# Patient Record
Sex: FEMALE | Race: WHITE | NOT HISPANIC OR LATINO | Employment: OTHER | URBAN - METROPOLITAN AREA
[De-identification: names, ages, dates, MRNs, and addresses within clinical notes are randomized per-mention and may not be internally consistent; named-entity substitution may affect disease eponyms.]

---

## 2019-06-28 ENCOUNTER — TRANSFERRED RECORDS (OUTPATIENT)
Dept: HEALTH INFORMATION MANAGEMENT | Facility: CLINIC | Age: 59
End: 2019-06-28

## 2021-05-24 ENCOUNTER — RECORDS - HEALTHEAST (OUTPATIENT)
Dept: ADMINISTRATIVE | Facility: CLINIC | Age: 61
End: 2021-05-24

## 2023-01-19 ENCOUNTER — MEDICAL CORRESPONDENCE (OUTPATIENT)
Dept: HEALTH INFORMATION MANAGEMENT | Facility: CLINIC | Age: 63
End: 2023-01-19
Payer: COMMERCIAL

## 2023-01-20 ENCOUNTER — TRANSCRIBE ORDERS (OUTPATIENT)
Dept: OTHER | Age: 63
End: 2023-01-20

## 2023-01-20 ENCOUNTER — TELEPHONE (OUTPATIENT)
Dept: DERMATOLOGY | Facility: CLINIC | Age: 63
End: 2023-01-20

## 2023-01-20 DIAGNOSIS — K50.119 CROHN'S DISEASE OF COLON WITH COMPLICATION (H): Primary | ICD-10-CM

## 2023-01-20 NOTE — TELEPHONE ENCOUNTER
This encounter is being sent to inform the clinic that this patient has a referral from Dr Aftab Wall  [facility]  for the diagnoses of Pyoderma Gangrenosum and has requested that this patient be seen within 3-5 days.  Based on the availability of our provider(s), we are unable to accommodate this request.      Were all sites offered this patient?  Yes      Does scheduling algorithm request to schedule next available?  Patient has been scheduled for the first available opening with Dr Frandy Crocker on 6/26.  We have informed the patient that the clinic will review their referral and reach out if a sooner appointment is medically necessary.

## 2023-01-21 NOTE — TELEPHONE ENCOUNTER
FUTURE VISIT INFORMATION      FUTURE VISIT INFORMATION:    Date: 1.31.23    Time: 10:00    Location: [facility]  REFERRAL INFORMATION:    Referring provider:  Mae    Referring providers clinic:  [facility]    Reason for visit/diagnosis  Pyoderma Gangrenosum/ referrd by Dr Aftab Wall/ recs in Epic/ appt sched per pt    RECORDS REQUESTED FROM:       Clinic name Comments Records Status   [facility] 1.13.23  Mae TRAN                                   [FreeTextEntry1] : US and records reviewed

## 2023-01-31 ENCOUNTER — LAB (OUTPATIENT)
Dept: LAB | Facility: CLINIC | Age: 63
End: 2023-01-31
Payer: COMMERCIAL

## 2023-01-31 ENCOUNTER — OFFICE VISIT (OUTPATIENT)
Dept: DERMATOLOGY | Facility: CLINIC | Age: 63
End: 2023-01-31
Payer: COMMERCIAL

## 2023-01-31 ENCOUNTER — PRE VISIT (OUTPATIENT)
Dept: DERMATOLOGY | Facility: CLINIC | Age: 63
End: 2023-01-31

## 2023-01-31 DIAGNOSIS — Z79.899 ENCOUNTER FOR LONG-TERM (CURRENT) USE OF HIGH-RISK MEDICATION: ICD-10-CM

## 2023-01-31 DIAGNOSIS — L88 PARASTOMAL PYODERMA GANGRENOSUM (H): Primary | ICD-10-CM

## 2023-01-31 DIAGNOSIS — K50.119 CROHN'S DISEASE OF COLON WITH COMPLICATION (H): ICD-10-CM

## 2023-01-31 LAB
BASOPHILS # BLD AUTO: 0.1 10E3/UL (ref 0–0.2)
BASOPHILS NFR BLD AUTO: 1 %
EOSINOPHIL # BLD AUTO: 0.6 10E3/UL (ref 0–0.7)
EOSINOPHIL NFR BLD AUTO: 6 %
ERYTHROCYTE [DISTWIDTH] IN BLOOD BY AUTOMATED COUNT: 14.9 % (ref 10–15)
FERRITIN SERPL-MCNC: 363 NG/ML (ref 11–328)
HCT VFR BLD AUTO: 34.9 % (ref 35–47)
HGB BLD-MCNC: 11 G/DL (ref 11.7–15.7)
IMM GRANULOCYTES # BLD: 0.1 10E3/UL
IMM GRANULOCYTES NFR BLD: 1 %
IRON BINDING CAPACITY (ROCHE): 226 UG/DL (ref 240–430)
IRON SATN MFR SERPL: 15 % (ref 15–46)
IRON SERPL-MCNC: 35 UG/DL (ref 37–145)
LYMPHOCYTES # BLD AUTO: 1.2 10E3/UL (ref 0.8–5.3)
LYMPHOCYTES NFR BLD AUTO: 11 %
MCH RBC QN AUTO: 28.6 PG (ref 26.5–33)
MCHC RBC AUTO-ENTMCNC: 31.5 G/DL (ref 31.5–36.5)
MCV RBC AUTO: 91 FL (ref 78–100)
MONOCYTES # BLD AUTO: 0.5 10E3/UL (ref 0–1.3)
MONOCYTES NFR BLD AUTO: 5 %
NEUTROPHILS # BLD AUTO: 7.9 10E3/UL (ref 1.6–8.3)
NEUTROPHILS NFR BLD AUTO: 76 %
NRBC # BLD AUTO: 0 10E3/UL
NRBC BLD AUTO-RTO: 0 /100
PLATELET # BLD AUTO: 404 10E3/UL (ref 150–450)
RBC # BLD AUTO: 3.85 10E6/UL (ref 3.8–5.2)
RETICS # AUTO: 0.05 10E6/UL (ref 0.03–0.1)
RETICS/RBC NFR AUTO: 1.3 % (ref 0.5–2)
WBC # BLD AUTO: 10.3 10E3/UL (ref 4–11)

## 2023-01-31 PROCEDURE — 99000 SPECIMEN HANDLING OFFICE-LAB: CPT | Performed by: PATHOLOGY

## 2023-01-31 PROCEDURE — 82728 ASSAY OF FERRITIN: CPT | Performed by: PATHOLOGY

## 2023-01-31 PROCEDURE — 85045 AUTOMATED RETICULOCYTE COUNT: CPT | Performed by: PATHOLOGY

## 2023-01-31 PROCEDURE — 99205 OFFICE O/P NEW HI 60 MIN: CPT | Mod: GC | Performed by: STUDENT IN AN ORGANIZED HEALTH CARE EDUCATION/TRAINING PROGRAM

## 2023-01-31 PROCEDURE — 85025 COMPLETE CBC W/AUTO DIFF WBC: CPT | Performed by: PATHOLOGY

## 2023-01-31 PROCEDURE — 83550 IRON BINDING TEST: CPT | Performed by: PATHOLOGY

## 2023-01-31 PROCEDURE — 83540 ASSAY OF IRON: CPT | Performed by: PATHOLOGY

## 2023-01-31 PROCEDURE — 36415 COLL VENOUS BLD VENIPUNCTURE: CPT | Performed by: PATHOLOGY

## 2023-01-31 RX ORDER — CLOBETASOL PROPIONATE 0.5 MG/G
OINTMENT TOPICAL
COMMUNITY
Start: 2022-12-27 | End: 2023-09-19

## 2023-01-31 RX ORDER — DAPSONE 100 MG/1
100 TABLET ORAL DAILY
Qty: 30 TABLET | Refills: 2 | Status: SHIPPED | OUTPATIENT
Start: 2023-01-31 | End: 2023-06-20

## 2023-01-31 RX ORDER — OXYCODONE HYDROCHLORIDE 5 MG/1
TABLET ORAL
COMMUNITY
Start: 2022-11-29 | End: 2023-06-20

## 2023-01-31 RX ORDER — OSTOMY SUPPLY 1"
EACH MISCELLANEOUS
Qty: 30 EACH | Refills: 2 | Status: SHIPPED | OUTPATIENT
Start: 2023-01-31 | End: 2023-04-20

## 2023-01-31 RX ORDER — CLOBETASOL PROPIONATE 0.5 MG/G
CREAM TOPICAL
Qty: 60 G | Refills: 2 | Status: SHIPPED | OUTPATIENT
Start: 2023-01-31 | End: 2023-09-19

## 2023-01-31 RX ORDER — ONDANSETRON 4 MG/1
4 TABLET, ORALLY DISINTEGRATING ORAL
COMMUNITY
Start: 2022-12-12 | End: 2023-03-07

## 2023-01-31 NOTE — PROGRESS NOTES
[facility] Dermatology Note  Encounter Date: Jan 31, 2023  Office Visit     Dermatology Problem List:  1.  Peristomal pyoderma gangrenosum  -Dapsone 100 mg daily, clobetasol cream  *History of Crohn's disease and anal cancer status post colostomy, on vedolizumab    ____________________________________________    Assessment & Plan:     # Peristomal pyoderma gangrenosum  Currently very mild activity, however given her prior response to dapsone we have opted to restart her on this treatment and are confident that it will continue to help her PG improve.  Counseled on adverse effects to be aware of including hemolytic anemia, methemoglobinemia and long-term use with peripheral sensory and motor neuropathy, however we suspect that she will only be on this for short period of time. We will also change her clobetasol ointment to a cream formulation, suggested by her stoma nurse, this may help her colostomy bag adhere bit better to the skin.  We will also have her continue the DuoDERM dressing mostly as a barrier to prevent stool from irritating the surrounding skin and eroded areas.  -Restart dapsone 100 mg daily  -Change clobetasol ointment to cream  -Continue DuoDERM dressing with bag changes  -Labs today: CBC, reticulocyte, ferritin, iron, TIBC  -Repeat CBC and reticulocyte labs [id number]    Procedures Performed:   None    Follow-up: 1 month(s) in-person, or earlier for new or changing lesions    Staff and Resident:     Kesha Goel DO (PGY-4)  Dermatology Resident  [facility]    Staff: Dr. CESAR Guo  ____________________________________________    CC: Autoimmune Disease (Pyoderma on stomach near stoma. Patient noticed it in September; and states they were treating it at [place] with dapsone that had success in the past. Patient also states that they have c-diff and chron's diease. )    HPI:  Ms. Nicol Aldrich is a(n) 62 year old female who presents today as a new patient for evaluation  of peristomal pyoderma gangrenosum.  She has a history of Crohn's disease as well as anal cancer (SCC) and is status post diverting colostomy in 2021 with stoma currently in place on the left lower abdomen. She is currently on vedolizumab to treat her Crohns and was previously on prednisone as well, which was stopped during recent hospitalization.  She reports recent admission to [facility] where she was diagnosed with pyoderma gangrenosum as well as abdominal wall abscess near her stoma. She initially noticed a small ulcer near her stoma in September and was evaluated by dermatology during her stay at [place]. She was also diagnosed with C. difficile during that admission and was treated with fidaxomicin at that time as well as dapsone for her pyoderma.  After her discharge she was advised to discontinue all antibiotics, therefore she has also since stopped the dapsone, but states that it did help her pyoderma gangrenosum improve while she was on it.  Her regimen more recently has consisted of daily colostomy bag changes with application of clobetasol ointment to the affected skin around her stoma site followed by DuoDERM application and then her colostomy bag over that.  She denies any severe pain but states that the skin frequently gets irritated from the stool that leaks onto the open areas.    Patient is otherwise feeling well, without additional skin concerns.    Labs Reviewed:  CBC, G6PD    Physical Exam:  Vitals: There were no vitals taken for this visit.  SKIN: Focused examination of abdomen was performed.  - Very small eroded area just lateral to the stoma site  - Violaceous to pink patch of skin with overlying shallow depressions that have fully re-epithelialized   - No other lesions of concern on areas examined.           Medications:  Current Outpatient Medications   Medication     ondansetron (ZOFRAN ODT) 4 MG ODT tab     clobetasol (TEMOVATE) 0.05 % external ointment     oxyCODONE (ROXICODONE) 5 MG  tablet     vedolizumab (ENTYVIO) 60 MG/ML injection     No current facility-administered medications for this visit.      Past Medical History:   There is no problem list on file for this patient.    History reviewed. No pertinent past medical history.    CC Aftab Wall MD  [facility]  [address] [place] [address] on close of this encounter.

## 2023-01-31 NOTE — PATIENT INSTRUCTIONS
- Restart dapsone at 100 mg per day   - Please check your labs today: CBC, Reticulocyte, ferritin and iron panel  - Please have your labs drawn every 2 weeks for the next 2 months at any [place] location

## 2023-01-31 NOTE — NURSING NOTE
Dermatology Rooming Note    Nicol Aldrich's goals for this visit include:   Chief Complaint   Patient presents with     Autoimmune Disease     Pyoderma on stomach near stoma. Patient noticed it in September; and states they were treating it at [place] with dapsone that had success in the past. Patient also states that they have c-diff and chron's diease.      Chiki Lopez, EMT-B

## 2023-01-31 NOTE — LETTER
1/31/2023       RE: Nicol Aldrich  [address]  [place] [facility] [address]     Dear Colleague,    Thank you for referring your patient, Nicol Aldrich, to the [facility] [place] at [facility]. Please see a copy of my visit note below.    [facility] Dermatology Note  Encounter Date: Jan 31, 2023  Office Visit     Dermatology Problem List:  1.  Peristomal pyoderma gangrenosum  -Dapsone 100 mg daily, clobetasol cream  *History of Crohn's disease and anal cancer status post colostomy, on vedolizumab    ____________________________________________    Assessment & Plan:     # Peristomal pyoderma gangrenosum  Currently very mild activity, however given her prior response to dapsone we have opted to restart her on this treatment and are confident that it will continue to help her PG improve.  Counseled on adverse effects to be aware of including hemolytic anemia, methemoglobinemia and long-term use with peripheral sensory and motor neuropathy, however we suspect that she will only be on this for short period of time. We will also change her clobetasol ointment to a cream formulation, suggested by her stoma nurse, this may help her colostomy bag adhere bit better to the skin.  We will also have her continue the DuoDERM dressing mostly as a barrier to prevent stool from irritating the surrounding skin and eroded areas.  -Restart dapsone 100 mg daily  -Change clobetasol ointment to cream  -Continue DuoDERM dressing with bag changes  -Labs today: CBC, reticulocyte, ferritin, iron, TIBC  -Repeat CBC and reticulocyte labs [id number]    Procedures Performed:   None    Follow-up: 1 month(s) in-person, or earlier for new or changing lesions    Staff and Resident:     Kesha Goel DO (PGY-4)  Dermatology Resident  [facility]    Staff: Dr. CESAR Guo  ____________________________________________    CC: Autoimmune Disease (Pyoderma  on stomach near stoma. Patient noticed it in September; and states they were treating it at [place] with dapsone that had success in the past. Patient also states that they have c-diff and chron's diease. )    HPI:  Ms. Nicol Aldrich is a(n) 62 year old female who presents today as a new patient for evaluation of peristomal pyoderma gangrenosum.  She has a history of Crohn's disease as well as anal cancer (SCC) and is status post diverting colostomy in 2021 with stoma currently in place on the left lower abdomen. She is currently on vedolizumab to treat her Crohns and was previously on prednisone as well, which was stopped during recent hospitalization.  She reports recent admission to [facility] where she was diagnosed with pyoderma gangrenosum as well as abdominal wall abscess near her stoma. She initially noticed a small ulcer near her stoma in September and was evaluated by dermatology during her stay at [place]. She was also diagnosed with C. difficile during that admission and was treated with fidaxomicin at that time as well as dapsone for her pyoderma.  After her discharge she was advised to discontinue all antibiotics, therefore she has also since stopped the dapsone, but states that it did help her pyoderma gangrenosum improve while she was on it.  Her regimen more recently has consisted of daily colostomy bag changes with application of clobetasol ointment to the affected skin around her stoma site followed by DuoDERM application and then her colostomy bag over that.  She denies any severe pain but states that the skin frequently gets irritated from the stool that leaks onto the open areas.    Patient is otherwise feeling well, without additional skin concerns.    Labs Reviewed:  CBC, G6PD    Physical Exam:  Vitals: There were no vitals taken for this visit.  SKIN: Focused examination of abdomen was performed.  - Very small eroded area just lateral to the stoma site  - Violaceous to pink patch of skin with  overlying shallow depressions that have fully re-epithelialized   - No other lesions of concern on areas examined.           Medications:  Current Outpatient Medications   Medication     ondansetron (ZOFRAN ODT) 4 MG ODT tab     clobetasol (TEMOVATE) 0.05 % external ointment     oxyCODONE (ROXICODONE) 5 MG tablet     vedolizumab (ENTYVIO) 60 MG/ML injection     No current facility-administered medications for this visit.      Past Medical History:   There is no problem list on file for this patient.    History reviewed. No pertinent past medical history.    CC Aftab Wall MD  [facility]  [address] [place] [address] on close of this encounter.    Attestation signed by Kelechi Guo MD at 1/31/2023 11:43 AM:  I have personally examined this patient and agree with the resident doctor's documentation and plan of care. I have reviewed and amended the resident's note. The documentation accurately reflects my clinical observations, diagnoses, treatment and follow-up plans.     Provider Time: New 61 minutes spent on the date of the encounter doing chart review, patient visit (including history, exam, and counseling), and documentation, excluding any procedures performed.       Kelechi Guo MD  Dermatology Staff

## 2023-02-14 ENCOUNTER — TELEPHONE (OUTPATIENT)
Dept: WOUND CARE | Facility: CLINIC | Age: 63
End: 2023-02-14
Payer: COMMERCIAL

## 2023-02-14 NOTE — TELEPHONE ENCOUNTER
"[facility]    Received call from patient with voicemail 2/14 13:19p explaining she has a colostomy and is from the [facility].     Call returned, left voicemail asking patient to call back and provided dept fax and shared with patient she may contact PCP for referral to ostomy clinic.    Karlene PATEL   1st: Voc[employer] Connect, call \"Karlene Cutler\" or call Group \"[facility] Nurse\"   (2nd option: leave a voicemail at Dept. Office Number: [phone]. Messages checked occasionally M-F)    "

## 2023-02-15 ENCOUNTER — LAB (OUTPATIENT)
Dept: LAB | Facility: CLINIC | Age: 63
End: 2023-02-15
Payer: COMMERCIAL

## 2023-02-15 DIAGNOSIS — Z79.899 ENCOUNTER FOR LONG-TERM (CURRENT) USE OF HIGH-RISK MEDICATION: ICD-10-CM

## 2023-02-15 LAB
BASOPHILS # BLD AUTO: 0.1 10E3/UL (ref 0–0.2)
BASOPHILS NFR BLD AUTO: 1 %
EOSINOPHIL # BLD AUTO: 0.8 10E3/UL (ref 0–0.7)
EOSINOPHIL NFR BLD AUTO: 10 %
ERYTHROCYTE [DISTWIDTH] IN BLOOD BY AUTOMATED COUNT: 14.1 % (ref 10–15)
HCT VFR BLD AUTO: 34.4 % (ref 35–47)
HGB BLD-MCNC: 10.5 G/DL (ref 11.7–15.7)
IMM GRANULOCYTES # BLD: 0 10E3/UL
IMM GRANULOCYTES NFR BLD: 1 %
LYMPHOCYTES # BLD AUTO: 1.4 10E3/UL (ref 0.8–5.3)
LYMPHOCYTES NFR BLD AUTO: 17 %
MCH RBC QN AUTO: 29.1 PG (ref 26.5–33)
MCHC RBC AUTO-ENTMCNC: 30.5 G/DL (ref 31.5–36.5)
MCV RBC AUTO: 95 FL (ref 78–100)
MONOCYTES # BLD AUTO: 0.7 10E3/UL (ref 0–1.3)
MONOCYTES NFR BLD AUTO: 8 %
NEUTROPHILS # BLD AUTO: 5.2 10E3/UL (ref 1.6–8.3)
NEUTROPHILS NFR BLD AUTO: 63 %
NRBC # BLD AUTO: 0 10E3/UL
NRBC BLD AUTO-RTO: 0 /100
PLATELET # BLD AUTO: 427 10E3/UL (ref 150–450)
RBC # BLD AUTO: 3.61 10E6/UL (ref 3.8–5.2)
RETICS # AUTO: 0.11 10E6/UL (ref 0.03–0.1)
RETICS/RBC NFR AUTO: 3 % (ref 0.5–2)
WBC # BLD AUTO: 8.1 10E3/UL (ref 4–11)

## 2023-02-15 PROCEDURE — 36415 COLL VENOUS BLD VENIPUNCTURE: CPT

## 2023-02-15 PROCEDURE — 85045 AUTOMATED RETICULOCYTE COUNT: CPT

## 2023-02-15 PROCEDURE — 85025 COMPLETE CBC W/AUTO DIFF WBC: CPT

## 2023-02-16 ENCOUNTER — TELEPHONE (OUTPATIENT)
Dept: WOUND CARE | Facility: CLINIC | Age: 63
End: 2023-02-16
Payer: COMMERCIAL

## 2023-02-16 NOTE — TELEPHONE ENCOUNTER
Pt calling to schedule appt with Ostomy nurse, having had a referral placed in [facility].  She has new insurance and will no longer be following up at [place]. Pt has had a colostomy since May 2021 related to Crohn's.  Over past few months she has had bouts of colitis and the development of pyoderma wounds to peristomal area.  She was last seen by Derm 1/31/23.  She continues to have issues with the bag leaking, she thinks because of the wounds undermining the seal.  Currently reports needing to change the bag at least once a day and is running low on supplies.  She is using a hydrocolloid over the wounds, then applying her 2pc Coloplast Click convex cut-to-fit appliance with a barrier ring and a little paste.  She states she does have stoma powder and a little PolyMem, both of which she has used before but did not think they were of much help.  She also has a spray barrier film.  Encouraged pt to trial using the powder, then the film, then PolyMem under the hydrocolloid to assist with managing the drainage, until she can come into clinic.  Appt scheduled for next Tues 2/12 @ 1p at [facility].  Pt will call with any questions or concerns in the meantime as needed.

## 2023-02-21 ENCOUNTER — TELEPHONE (OUTPATIENT)
Dept: WOUND CARE | Facility: CLINIC | Age: 63
End: 2023-02-21
Payer: COMMERCIAL

## 2023-02-21 NOTE — TELEPHONE ENCOUNTER
"[facility]    2/20 15:41p received call from patient with voicemail stating wants to change appt time on Tues 2/21.     2/20 per [facility] team member handwriting, a voicemail for patient was left on her machine offering 10am 2/21 for appointment or another day.     2/21 ~10am patient called to reschedule apt and left voicemail.     2/21 call returned 16:15pm, spoke to patient via phone, discussed rescheduling apt, discussed rescheduling for feb 28th, advised patient of the need to bring a current set of pouch supplies to be applied after the peristomal skin assessment, patient verbalized feeling uncomfortable with the application of the products which she brings from home, verbalized desiring to have different pouching products applied at the appointment. Clarified for patient that recommendations are made at [facility] and product samples can be obtained from product manufactures. Patient verbalized feeling unsure about making an appointment due to the fact her same pouching products wound be applied and not the newly recommended products, if different recommendations were made. Encouraged patient think about it and call back when ready to make appointment. Patient stated she may make appointment with a different clinic instead.     Karlene PATEL   1st: [employer] Connect, call \"Karlene Cutler\" or call Group \"[facility] Nurse\"   (2nd option: leave a voicemail at Dept. Office Number: [phone]. Messages checked occasionally M-F)    "

## 2023-03-03 ENCOUNTER — LAB (OUTPATIENT)
Dept: LAB | Facility: CLINIC | Age: 63
End: 2023-03-03
Payer: COMMERCIAL

## 2023-03-03 DIAGNOSIS — Z79.899 ENCOUNTER FOR LONG-TERM (CURRENT) USE OF HIGH-RISK MEDICATION: ICD-10-CM

## 2023-03-03 LAB
BASOPHILS # BLD AUTO: 0.1 10E3/UL (ref 0–0.2)
BASOPHILS NFR BLD AUTO: 1 %
EOSINOPHIL # BLD AUTO: 0.7 10E3/UL (ref 0–0.7)
EOSINOPHIL NFR BLD AUTO: 7 %
ERYTHROCYTE [DISTWIDTH] IN BLOOD BY AUTOMATED COUNT: 18.7 % (ref 10–15)
HCT VFR BLD AUTO: 28.6 % (ref 35–47)
HGB BLD-MCNC: 8.7 G/DL (ref 11.7–15.7)
IMM GRANULOCYTES # BLD: 0 10E3/UL
IMM GRANULOCYTES NFR BLD: 0 %
LYMPHOCYTES # BLD AUTO: 1.5 10E3/UL (ref 0.8–5.3)
LYMPHOCYTES NFR BLD AUTO: 16 %
MCH RBC QN AUTO: 29.8 PG (ref 26.5–33)
MCHC RBC AUTO-ENTMCNC: 30.4 G/DL (ref 31.5–36.5)
MCV RBC AUTO: 98 FL (ref 78–100)
MONOCYTES # BLD AUTO: 0.6 10E3/UL (ref 0–1.3)
MONOCYTES NFR BLD AUTO: 7 %
NEUTROPHILS # BLD AUTO: 6.6 10E3/UL (ref 1.6–8.3)
NEUTROPHILS NFR BLD AUTO: 69 %
NRBC # BLD AUTO: 0 10E3/UL
NRBC BLD AUTO-RTO: 0 /100
PLATELET # BLD AUTO: 370 10E3/UL (ref 150–450)
RBC # BLD AUTO: 2.92 10E6/UL (ref 3.8–5.2)
RETICS # AUTO: 0.18 10E6/UL (ref 0.03–0.1)
RETICS/RBC NFR AUTO: 6.3 % (ref 0.5–2)
WBC # BLD AUTO: 9.4 10E3/UL (ref 4–11)

## 2023-03-03 PROCEDURE — 36415 COLL VENOUS BLD VENIPUNCTURE: CPT

## 2023-03-03 PROCEDURE — 85025 COMPLETE CBC W/AUTO DIFF WBC: CPT

## 2023-03-03 PROCEDURE — 85045 AUTOMATED RETICULOCYTE COUNT: CPT

## 2023-03-07 ENCOUNTER — OFFICE VISIT (OUTPATIENT)
Dept: DERMATOLOGY | Facility: CLINIC | Age: 63
End: 2023-03-07
Payer: COMMERCIAL

## 2023-03-07 DIAGNOSIS — L88 PYODERMA GANGRENOSUM (H): ICD-10-CM

## 2023-03-07 DIAGNOSIS — L20.89 OTHER ATOPIC DERMATITIS: Primary | ICD-10-CM

## 2023-03-07 PROCEDURE — 99215 OFFICE O/P EST HI 40 MIN: CPT | Mod: 25 | Performed by: STUDENT IN AN ORGANIZED HEALTH CARE EDUCATION/TRAINING PROGRAM

## 2023-03-07 PROCEDURE — 11900 INJECT SKIN LESIONS </W 7: CPT | Performed by: STUDENT IN AN ORGANIZED HEALTH CARE EDUCATION/TRAINING PROGRAM

## 2023-03-07 RX ORDER — PIMECROLIMUS 10 MG/G
CREAM TOPICAL 2 TIMES DAILY
Qty: 60 G | Refills: 3 | Status: SHIPPED | OUTPATIENT
Start: 2023-03-07 | End: 2023-09-19

## 2023-03-07 NOTE — PATIENT INSTRUCTIONS
Stop dapsone     Continue clobetasol until you get the pimecrolimus. Once you have that, use clobetasol and pimecrolimus on alternate days for 2 weeks, then transition to pimecrolimus entirely. Continue until areas are healed.

## 2023-03-07 NOTE — NURSING NOTE
Dermatology Rooming Note    Nicol Aldrich's goals for this visit include:   Chief Complaint   Patient presents with     Derm Problem     1 month follow up for pyoderma gangrenosum on stomach near stoma. Patient states that the wound has been healing near the stoma but not the other section and they have experienced a increase in fatigue and dizziness and shortness of breath.      Chiki Lopez, EMT-B

## 2023-03-07 NOTE — LETTER
3/7/2023       RE: Nicol Aldrich  [address]  [facility] [address]     Dear Colleague,    Thank you for referring your patient, Nicol Aldrich, to the [facility]ERMAT[facility]IN[facility] [place] at [facility]. Please see a copy of my visit note below.    Provider Time: Established  40 minutes spent on the date of the encounter doing chart review, patient visit (including history, exam, and counseling), and documentation, excluding any procedures performed.       [facility] Dermatology Note    Encounter Date: Mar 7, 2023    Dermatology Problem List:  #Peristomal pyoderma gangrenosum  - 01/31/23 Dapsone 100 mg daily, clobetasol cream  - 03/07/23 stop dapsone due to anemia. Transition from clob to pimecrolimus. 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 ILK10 0.2ml to that area     Major PMHx  #*History of Crohn's disease and anal cancer status post colostomy, on vedolizumab    ______________________________________    Impression/Plan:  Nicol was seen today for derm problem.    Diagnoses and all orders for this visit:    Other atopic dermatitis  -     pimecrolimus (ELIDEL) 1 % external cream; Apply topically 2 times daily    Pyoderma gangrenosum  -     INJECTION INTO SKIN LESIONS <=7  -     triamcinolone acetonide (KENALOG-10) injection 2 mg  -Pyoderma gangrenosum appears to be quiescent  - Minor erosion adjacent to stoma site appears improved  - Some mild drainage from fistulous appearing papules at within sites of previous PG. These papules are more proliferative than last time, bolstering my suspicion that they are not active PG. I recommend zooming in on both photos to appreciate this difference It's possible they represent cutaneous crohns, graulation tissue, or even incipient enterocutaneous fistulas.   - we empirically injected 0.2ml of K10 and will see how the area responds. If no improvement at follow up we will  biopsy  - STOP dapsone due to sig drop in hemoglobin which is symptomatic w/ DAMON and orthostatic sx. Recheck hemoglobin 2 weeks  - transition from clobetasol cream to pimecrolimus cream due tto prevent skin atrophy in hte context of quiescent PG    01/31/23 03/07/23             After positioning and cleansing with isopropyl alcohol, 0.2 total cc of Kenalog 10 mg/cc was injected into 3  Lesion(s) on the abdomen.  The patient tolerated the procedure well and left the Dermatology clinic in good condition.    Follow-up in 1 mo.       Staff Involved:  Staff Only    Kelechi Guo MD   of Dermatology  Department of Dermatology  [facility] [employer] [facility]      CC:   Chief Complaint   Patient presents with     Derm Problem     1 month follow up for pyoderma gangrenosum on stomach near stoma. Patient states that the wound has been healing near the stoma but not the other section and they have experienced a increase in fatigue and dizziness and shortness of breath.        History of Present Illness  Ms. Nicol Aldrich is a 62 year old female who presents as a return patient.    Last visit restarted dapsone, switched clob oint to cr. Continue duoderm dressing w/ back changes. Labs [id number]. Hgb at that time was 11. 2 weeks later had dropped to 10.5, and 03/04 had dropped to 8.7    Has been using clobetasol regularly. Feels like things are better     Labs:      Physical exam:  Vitals: There were no vitals taken for this visit.  GEN: well developed, well-nourished, in no acute distress, in a pleasant mood.     SKIN: Jarrell phototype 1  - Focused examination of the abdomen was performed.  - improved appearance of peristomal erosion  - more pronounce moist papules adjaced to stoma at site of previous PG   - No other lesions of concern on areas examined.     Past Medical History:   History reviewed. No pertinent past medical history.  History reviewed. No pertinent surgical  "history.    Social History:   reports that she has never smoked. She has never used smokeless tobacco. She reports current alcohol use of about 2.0 standard drinks per week.    Family History:  History reviewed. No pertinent family history.    Medications:  Current Outpatient Medications   Medication Sig Dispense Refill     clobetasol (TEMOVATE) 0.05 % external cream Apply to ulcers around stoma site with each bag change 60 g 2     Control Gel Formula Dressing (DUODERM CGF DRESSING) MISC Apply daily to stoma site - DuoDerm Extra Thin 30 each 2     dapsone (ACZONE) 100 MG tablet Take 1 tablet (100 mg) by mouth daily 30 tablet 2     pimecrolimus (ELIDEL) 1 % external cream Apply topically 2 times daily 60 g 3     vedolizumab (ENTYVIO) 60 MG/ML injection Inject 300 mg into the vein       clobetasol (TEMOVATE) 0.05 % external ointment APPLY TO THE AFFECTED AREA TWICE DAILY AS NEEDED FOR UP TO 14 DAYS. APPLY TO ULCER AROUND THE OSTOMY (Patient not taking: Reported on 3/7/2023)       oxyCODONE (ROXICODONE) 5 MG tablet  (Patient not taking: Reported on 3/7/2023)       Allergies   Allergen Reactions     Penicillins Rash     On 07/10/18, pt states that she experienced a rash when she had this \"a while ago.\" This was not a reaction from childhood.   On 07/10/18, pt states that she experienced a rash when she had this \"a while ago.\" This was not a reaction from childhood.            "

## 2023-03-07 NOTE — PROGRESS NOTES
Provider Time: Established  40 minutes spent on the date of the encounter doing chart review, patient visit (including history, exam, and counseling), and documentation, excluding any procedures performed.       [facility] Dermatology Note    Encounter Date: Mar 7, 2023    Dermatology Problem List:  #Peristomal pyoderma gangrenosum  - 01/31/23 Dapsone 100 mg daily, clobetasol cream  - 03/07/23 stop dapsone due to anemia. Transition from clob to pimecrolimus. 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 ILK10 0.2ml to that area     Major PMHx  #*History of Crohn's disease and anal cancer status post colostomy, on vedolizumab    ______________________________________    Impression/Plan:  Nicol was seen today for derm problem.    Diagnoses and all orders for this visit:    Other atopic dermatitis  -     pimecrolimus (ELIDEL) 1 % external cream; Apply topically 2 times daily    Pyoderma gangrenosum  -     INJECTION INTO SKIN LESIONS <=7  -     triamcinolone acetonide (KENALOG-10) injection 2 mg  -Pyoderma gangrenosum appears to be quiescent  - Minor erosion adjacent to stoma site appears improved  - Some mild drainage from fistulous appearing papules at within sites of previous PG. These papules are more proliferative than last time, bolstering my suspicion that they are not active PG. I recommend zooming in on both photos to appreciate this difference It's possible they represent cutaneous crohns, graulation tissue, or even incipient enterocutaneous fistulas.   - we empirically injected 0.2ml of K10 and will see how the area responds. If no improvement at follow up we will biopsy  - STOP dapsone due to sig drop in hemoglobin which is symptomatic w/ DAMON and orthostatic sx. Recheck hemoglobin 2 weeks  - transition from clobetasol cream to pimecrolimus cream due tto prevent skin atrophy in hte context of quiescent PG    01/31/23 03/07/23             After positioning and  cleansing with isopropyl alcohol, 0.2 total cc of Kenalog 10 mg/cc was injected into 3  Lesion(s) on the abdomen.  The patient tolerated the procedure well and left the Dermatology clinic in good condition.    Follow-up in 1 mo.       Staff Involved:  Staff Only    Kelechi Guo MD   of Dermatology  Department of Dermatology  [facility] [facility] [employer]      CC:   Chief Complaint   Patient presents with     Derm Problem     1 month follow up for pyoderma gangrenosum on stomach near stoma. Patient states that the wound has been healing near the stoma but not the other section and they have experienced a increase in fatigue and dizziness and shortness of breath.        History of Present Illness  Ms. Nicol Aldrich is a 62 year old female who presents as a return patient.    Last visit restarted dapsone, switched clob oint to cr. Continue duoderm dressing w/ back changes. Labs [id number]. Hgb at that time was 11. 2 weeks later had dropped to 10.5, and 03/04 had dropped to 8.7    Has been using clobetasol regularly. Feels like things are better     Labs:      Physical exam:  Vitals: There were no vitals taken for this visit.  GEN: well developed, well-nourished, in no acute distress, in a pleasant mood.     SKIN: Jarrell phototype 1  - Focused examination of the abdomen was performed.  - improved appearance of peristomal erosion  - more pronounce moist papules adjaced to stoma at site of previous PG   - No other lesions of concern on areas examined.     Past Medical History:   History reviewed. No pertinent past medical history.  History reviewed. No pertinent surgical history.    Social History:   reports that she has never smoked. She has never used smokeless tobacco. She reports current alcohol use of about 2.0 standard drinks per week.    Family History:  History reviewed. No pertinent family history.    Medications:  Current Outpatient Medications   Medication Sig Dispense  "Refill     clobetasol (TEMOVATE) 0.05 % external cream Apply to ulcers around stoma site with each bag change 60 g 2     Control Gel Formula Dressing (DUODERM CGF DRESSING) MISC Apply daily to stoma site - DuoDerm Extra Thin 30 each 2     dapsone (ACZONE) 100 MG tablet Take 1 tablet (100 mg) by mouth daily 30 tablet 2     pimecrolimus (ELIDEL) 1 % external cream Apply topically 2 times daily 60 g 3     vedolizumab (ENTYVIO) 60 MG/ML injection Inject 300 mg into the vein       clobetasol (TEMOVATE) 0.05 % external ointment APPLY TO THE AFFECTED AREA TWICE DAILY AS NEEDED FOR UP TO 14 DAYS. APPLY TO ULCER AROUND THE OSTOMY (Patient not taking: Reported on 3/7/2023)       oxyCODONE (ROXICODONE) 5 MG tablet  (Patient not taking: Reported on 3/7/2023)       Allergies   Allergen Reactions     Penicillins Rash     On 07/10/18, pt states that she experienced a rash when she had this \"a while ago.\" This was not a reaction from childhood.   On 07/10/18, pt states that she experienced a rash when she had this \"a while ago.\" This was not a reaction from childhood.                  "

## 2023-03-07 NOTE — NURSING NOTE
Drug Administration Record    Prior to injection, verified patient identity using patient's name and date of birth.  Due to injection administration, patient instructed to remain in clinic for 15 minutes  afterwards, and to report any adverse reaction to me immediately.    Drug Name: triamcinolone acetonide(kenalog)  Dose: 0.2mL of triamcinolone 10mg/mL, 2mg dose  Route administered: intra-lesional  NDC #: Kenalog-10 (3936-1200-55)  Amount of waste(mL):4.8mL  Reason for waste: Multi dose vial    LOT #: [id number]  SITE: See provider's notes  : [employer]  EXPIRATION DATE: 04/30/2023

## 2023-03-20 ENCOUNTER — LAB (OUTPATIENT)
Dept: LAB | Facility: CLINIC | Age: 63
End: 2023-03-20
Attending: STUDENT IN AN ORGANIZED HEALTH CARE EDUCATION/TRAINING PROGRAM
Payer: COMMERCIAL

## 2023-03-20 DIAGNOSIS — Z79.899 ENCOUNTER FOR LONG-TERM (CURRENT) USE OF HIGH-RISK MEDICATION: ICD-10-CM

## 2023-03-20 LAB
BASOPHILS # BLD AUTO: 0 10E3/UL (ref 0–0.2)
BASOPHILS NFR BLD AUTO: 0 %
EOSINOPHIL # BLD AUTO: 0.3 10E3/UL (ref 0–0.7)
EOSINOPHIL NFR BLD AUTO: 3 %
ERYTHROCYTE [DISTWIDTH] IN BLOOD BY AUTOMATED COUNT: 13.7 % (ref 10–15)
HCT VFR BLD AUTO: 35.2 % (ref 35–47)
HGB BLD-MCNC: 10.9 G/DL (ref 11.7–15.7)
IMM GRANULOCYTES # BLD: 0.1 10E3/UL
IMM GRANULOCYTES NFR BLD: 1 %
LYMPHOCYTES # BLD AUTO: 1.1 10E3/UL (ref 0.8–5.3)
LYMPHOCYTES NFR BLD AUTO: 10 %
MCH RBC QN AUTO: 30 PG (ref 26.5–33)
MCHC RBC AUTO-ENTMCNC: 31 G/DL (ref 31.5–36.5)
MCV RBC AUTO: 97 FL (ref 78–100)
MONOCYTES # BLD AUTO: 0.8 10E3/UL (ref 0–1.3)
MONOCYTES NFR BLD AUTO: 7 %
NEUTROPHILS # BLD AUTO: 8.4 10E3/UL (ref 1.6–8.3)
NEUTROPHILS NFR BLD AUTO: 79 %
NRBC # BLD AUTO: 0 10E3/UL
NRBC BLD AUTO-RTO: 0 /100
PLATELET # BLD AUTO: 513 10E3/UL (ref 150–450)
RBC # BLD AUTO: 3.63 10E6/UL (ref 3.8–5.2)
RETICS # AUTO: 0.15 10E6/UL (ref 0.03–0.1)
RETICS/RBC NFR AUTO: 4 % (ref 0.5–2)
WBC # BLD AUTO: 10.6 10E3/UL (ref 4–11)

## 2023-03-20 PROCEDURE — 85045 AUTOMATED RETICULOCYTE COUNT: CPT

## 2023-03-20 PROCEDURE — 85025 COMPLETE CBC W/AUTO DIFF WBC: CPT

## 2023-03-20 PROCEDURE — 36415 COLL VENOUS BLD VENIPUNCTURE: CPT

## 2023-04-04 DIAGNOSIS — L88 PYODERMA GANGRENOSUM (H): Primary | ICD-10-CM

## 2023-04-04 RX ORDER — PREDNISONE 20 MG/1
TABLET ORAL
Qty: 65 TABLET | Refills: 0 | Status: SHIPPED | OUTPATIENT
Start: 2023-04-04 | End: 2023-05-25

## 2023-04-04 RX ORDER — CLOBETASOL PROPIONATE 0.5 MG/G
OINTMENT TOPICAL
Qty: 60 G | Refills: 1 | Status: SHIPPED | OUTPATIENT
Start: 2023-04-04 | End: 2023-09-19

## 2023-04-08 ENCOUNTER — HEALTH MAINTENANCE LETTER (OUTPATIENT)
Age: 63
End: 2023-04-08

## 2023-04-19 ENCOUNTER — MYC MEDICAL ADVICE (OUTPATIENT)
Dept: DERMATOLOGY | Facility: CLINIC | Age: 63
End: 2023-04-19
Payer: COMMERCIAL

## 2023-04-20 DIAGNOSIS — L88 PARASTOMAL PYODERMA GANGRENOSUM (H): ICD-10-CM

## 2023-04-20 RX ORDER — OSTOMY SUPPLY 1"
EACH MISCELLANEOUS
Qty: 30 EACH | Refills: 2 | Status: SHIPPED | OUTPATIENT
Start: 2023-04-20 | End: 2023-09-19

## 2023-04-25 ENCOUNTER — OFFICE VISIT (OUTPATIENT)
Dept: DERMATOLOGY | Facility: CLINIC | Age: 63
End: 2023-04-25
Payer: COMMERCIAL

## 2023-04-25 DIAGNOSIS — L02.91 ABSCESS: Primary | ICD-10-CM

## 2023-04-25 DIAGNOSIS — L88 PYODERMA GANGRENOSA (H): ICD-10-CM

## 2023-04-25 PROCEDURE — 87077 CULTURE AEROBIC IDENTIFY: CPT | Performed by: PATHOLOGY

## 2023-04-25 PROCEDURE — 87070 CULTURE OTHR SPECIMN AEROBIC: CPT | Performed by: PATHOLOGY

## 2023-04-25 PROCEDURE — 99214 OFFICE O/P EST MOD 30 MIN: CPT | Performed by: STUDENT IN AN ORGANIZED HEALTH CARE EDUCATION/TRAINING PROGRAM

## 2023-04-25 PROCEDURE — 99000 SPECIMEN HANDLING OFFICE-LAB: CPT | Performed by: PATHOLOGY

## 2023-04-25 PROCEDURE — 87186 SC STD MICRODIL/AGAR DIL: CPT | Mod: 91 | Performed by: PATHOLOGY

## 2023-04-25 RX ORDER — VANCOMYCIN HYDROCHLORIDE 125 MG/1
CAPSULE ORAL
COMMUNITY
Start: 2023-01-10 | End: 2023-05-17

## 2023-04-25 NOTE — LETTER
4/25/2023       RE: Nicol Aldrich  [address]  [facility] [address]     Dear Colleague,    Thank you for referring your patient, Nicol Aldrich, to the [facility] [place] at [facility]. Please see a copy of my visit note below.    [facility] Dermatology Note  Encounter Date: Apr 25, 2023  Office Visit     Dermatology Problem List:  #Peristomal pyoderma gangrenosum  - 01/31/23 Dapsone 100 mg daily, clobetasol cream  - 03/07/23 stop dapsone due to anemia. Transition from clob to pimecrolimus. 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 ILK10 0.2ml to that area   - 4/25/23 Currently on prednisone 30 mg d/t flare and will taper off. Prednisone helped alleviate symptoms.     #Peristomal Abscess, ? Recurrent  #Peristomal enterocutaneous fistula  - 4/25/23 Ordered US to further evaluate. Advised she follow up with GI doctor or establish with colorectal surgeon as well.      Major PMHx  #History of Crohn's disease and anal cancer status post colostomy, on vedolizumab  ____________________________________________    Assessment & Plan:  # Pyoderma Gangrenosum, peristomal, chronic, quiescent.   Concern for other etiology occurring as PG seems to be quiescent. Most concerned for a recurrent abscess, cutaneous crohn's, and/or enterocutaneous fistula.  - Minor erosion adjacent to stoma site appears improved on prednisone  - Having drainage or purulence and possible stool as above.    #Peristomal abscess, chronic, flared  She has previously been diagnosed with an abscess at this site. She is having purulent drainage from the same area with some overlying erythema.   - Continue clobetasol with dressing changes.    - Currently taking 30 mg prednisone with plans to taper down to 20 mg daily for 30 days.   - Will culture this today.  - Will order ultrasound today.    - Future considerations: biopsy, in-depth  imaging as advised by GI or colorectal surgery.     #Concern for enterocutaneous fistula  Patient is able to express stool from the peristomal cutaneous erosive sites.   - Advised patient to discuss with GI doctor regarding possible enterocutaneous fistula. Emphasized the importance of her following up with her GI doctor about possible stool coming out of the peristomal skin erosions.      Procedures Performed:   None.     Follow-up: 3 month(s) in-person, or earlier for new or changing lesions    Staff and Medical Student:     IChandrakant, discussed and evaluated the patient with Dr. Guo.     Chandrakant Gomez, MS3  ____________________________________________    CC: Derm Problem (Patient states it has improved with prednisone, 1 month follow up for pyroderma on abdomen.)    HPI:  Ms. Nicol Aldrich is a(n) 62 year old female who presents today for follow-up  for peristomal skin changes.     Last visit 3/7/23. Dapsone was stopped d/t anemia. At last visit, she had kenalog injected into sites.  Messaged in early April with concerns of worsening symptoms including pain. Symptoms greatly improved after starting prednisone 3 weeks ago. She continues duoderm dressing changes with topical clobetasol at time of dressing changes.     She is concerned as she has had increased drainage from the from the peristomal skin erosions and possibly able to express stool from this area as well.     Patient is otherwise feeling well, without additional skin concerns.      Labs:  None reviewed.    Physical Exam:  Vitals: There were no vitals taken for this visit.  SKIN:   - Focused exam of the abdomen performed.   - Mild peristomal erosion, mostly lateral to the stoma.  - Several punctum with purulent drainage.   - Possible stool expressed from one of the punctum.   - No other lesions of concern on areas examined.      01/31/23 03/07/23 4/25/23        Medications:  Current Outpatient Medications    Medication    clobetasol (TEMOVATE) 0.05 % external cream    clobetasol (TEMOVATE) 0.05 % external ointment    Control Gel Formula Dressing (DUODERM CGF DRESSING) MISC    pimecrolimus (ELIDEL) 1 % external cream    predniSONE (DELTASONE) 20 MG tablet    vancomycin (VANCOCIN) 125 MG capsule    vedolizumab (ENTYVIO) 60 MG/ML injection    clobetasol (TEMOVATE) 0.05 % external ointment    dapsone (ACZONE) 100 MG tablet    oxyCODONE (ROXICODONE) 5 MG tablet     No current facility-administered medications for this visit.      Past Medical History:   There is no problem list on file for this patient.    No past medical history on file.     CC Aftab Wall MD  [facility]  [address] [place] [address] on close of this encounter.     Attestation signed by Kelechi Guo MD at 4/25/2023 12:55 PM:  I have personally examined this patient and agree with the medical student's documentation and plan of care. I have reviewed and amended the medical student's note as necessary.The documentation accurately reflects my clinical observations, diagnoses, treatment and follow-up plans.     #Peristomal PG  - improved on pred, 2 areas of not healed. 1. Which drains stool; recommend pt discuss enterocutaneous fistual w/ GI doc. 2. One which drains purulent material; this material was cultured, US ordered to Eval as pt has hx of peristomal abscess   - continue pred taper, off dapsone due to anemia. Continue clobetasol PRN    01/31/23 03/07/23 4/25/23    Kelechi Guo MD  Dermatology Staff

## 2023-04-25 NOTE — PROGRESS NOTES
[facility] Dermatology Note  Encounter Date: Apr 25, 2023  Office Visit     Dermatology Problem List:  #Peristomal pyoderma gangrenosum  - 01/31/23 Dapsone 100 mg daily, clobetasol cream  - 03/07/23 stop dapsone due to anemia. Transition from clob to pimecrolimus. 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 ILK10 0.2ml to that area   - 4/25/23 Currently on prednisone 30 mg d/t flare and will taper off. Prednisone helped alleviate symptoms.     #Peristomal Abscess, ? Recurrent  #Peristomal enterocutaneous fistula  - 4/25/23 Ordered US to further evaluate. Advised she follow up with GI doctor or establish with colorectal surgeon as well.      Major PMHx  #History of Crohn's disease and anal cancer status post colostomy, on vedolizumab  ____________________________________________    Assessment & Plan:  # Pyoderma Gangrenosum, peristomal, chronic, quiescent.   Concern for other etiology occurring as PG seems to be quiescent. Most concerned for a recurrent abscess, cutaneous crohn's, and/or enterocutaneous fistula.  - Minor erosion adjacent to stoma site appears improved on prednisone  - Having drainage or purulence and possible stool as above.    #Peristomal abscess, chronic, flared  She has previously been diagnosed with an abscess at this site. She is having purulent drainage from the same area with some overlying erythema.   - Continue clobetasol with dressing changes.    - Currently taking 30 mg prednisone with plans to taper down to 20 mg daily for 30 days.   - Will culture this today.  - Will order ultrasound today.    - Future considerations: biopsy, in-depth imaging as advised by GI or colorectal surgery.     #Concern for enterocutaneous fistula  Patient is able to express stool from the peristomal cutaneous erosive sites.   - Advised patient to discuss with GI doctor regarding possible enterocutaneous fistula. Emphasized the importance of her following up with her GI  doctor about possible stool coming out of the peristomal skin erosions.      Procedures Performed:   None.     Follow-up: 3 month(s) in-person, or earlier for new or changing lesions    Staff and Medical Student:     Chandrakant TAYLOR, discussed and evaluated the patient with Dr. Guo.     Chandrakant Gomez, MS3  ____________________________________________    CC: Derm Problem (Patient states it has improved with prednisone, 1 month follow up for pyroderma on abdomen.)    HPI:  Ms. Nicol Aldrich is a(n) 62 year old female who presents today for follow-up  for peristomal skin changes.     Last visit 3/7/23. Dapsone was stopped d/t anemia. At last visit, she had kenalog injected into sites.  Messaged in early April with concerns of worsening symptoms including pain. Symptoms greatly improved after starting prednisone 3 weeks ago. She continues duoderm dressing changes with topical clobetasol at time of dressing changes.     She is concerned as she has had increased drainage from the from the peristomal skin erosions and possibly able to express stool from this area as well.     Patient is otherwise feeling well, without additional skin concerns.      Labs:  None reviewed.    Physical Exam:  Vitals: There were no vitals taken for this visit.  SKIN:   - Focused exam of the abdomen performed.   - Mild peristomal erosion, mostly lateral to the stoma.  - Several punctum with purulent drainage.   - Possible stool expressed from one of the punctum.   - No other lesions of concern on areas examined.      01/31/23 03/07/23 4/25/23        Medications:  Current Outpatient Medications   Medication     clobetasol (TEMOVATE) 0.05 % external cream     clobetasol (TEMOVATE) 0.05 % external ointment     Control Gel Formula Dressing (DUODERM CGF DRESSING) MISC     pimecrolimus (ELIDEL) 1 % external cream     predniSONE (DELTASONE) 20 MG tablet     vancomycin (VANCOCIN) 125 MG capsule     vedolizumab  (ENTYVIO) 60 MG/ML injection     clobetasol (TEMOVATE) 0.05 % external ointment     dapsone (ACZONE) 100 MG tablet     oxyCODONE (ROXICODONE) 5 MG tablet     No current facility-administered medications for this visit.      Past Medical History:   There is no problem list on file for this patient.    No past medical history on file.     CC Aftab Wall MD  [facility]  [address] [phone] on close of this encounter.

## 2023-04-25 NOTE — NURSING NOTE
Dermatology Rooming Note    Nicol Aldrich's goals for this visit include:   Chief Complaint   Patient presents with     Derm Problem     Patient states it has improved with prednisone, 1 month follow up for pyroderma.     Chiki Lopez, EMT-B

## 2023-04-25 NOTE — PATIENT INSTRUCTIONS
Please follow up with your GI doctor and let him know that we are concerned about a possible enterocutaneous fistula.     Given that you describe stool coming out of the area. We were able to get pus out of other punctum and we ordered an Ultrasound today to evaluated for abscess and we also took a bacterial culture.

## 2023-04-28 ENCOUNTER — TELEPHONE (OUTPATIENT)
Dept: DERMATOLOGY | Facility: CLINIC | Age: 63
End: 2023-04-28
Payer: COMMERCIAL

## 2023-04-28 DIAGNOSIS — L02.91 ABSCESS: Primary | ICD-10-CM

## 2023-04-28 LAB
BACTERIA ABSC ANAEROBE+AEROBE CULT: ABNORMAL

## 2023-04-28 RX ORDER — SULFAMETHOXAZOLE/TRIMETHOPRIM 800-160 MG
1 TABLET ORAL 2 TIMES DAILY
Qty: 20 TABLET | Refills: 0 | Status: SHIPPED | OUTPATIENT
Start: 2023-04-28 | End: 2023-06-20

## 2023-04-28 NOTE — TELEPHONE ENCOUNTER
[facility] Call Center    Phone Message    May a detailed message be left on voicemail: yes     Reason for Call: imaging needs order to be revised for abdomin then needs scheduling. Pls call Pt      Action Taken: Message routed to:  [facility] DERM    Travel Screening: Not Applicable

## 2023-04-28 NOTE — TELEPHONE ENCOUNTER
This order needs to be corrected. They can not do an abdomen US with a neck US order.    Yancy NEWMAN, MICHOACANO

## 2023-05-01 NOTE — TELEPHONE ENCOUNTER
Aneudy Waldron MD  You; [facility] 3 days ago     [place]  Order changed. Thanks

## 2023-05-02 ENCOUNTER — ANCILLARY PROCEDURE (OUTPATIENT)
Dept: ULTRASOUND IMAGING | Facility: OTHER | Age: 63
End: 2023-05-02
Attending: STUDENT IN AN ORGANIZED HEALTH CARE EDUCATION/TRAINING PROGRAM
Payer: COMMERCIAL

## 2023-05-02 DIAGNOSIS — L02.91 ABSCESS: ICD-10-CM

## 2023-05-02 PROCEDURE — 76705 ECHO EXAM OF ABDOMEN: CPT | Mod: TC | Performed by: RADIOLOGY

## 2023-05-12 ENCOUNTER — MEDICAL CORRESPONDENCE (OUTPATIENT)
Dept: HEALTH INFORMATION MANAGEMENT | Facility: CLINIC | Age: 63
End: 2023-05-12
Payer: COMMERCIAL

## 2023-05-17 ENCOUNTER — TRANSCRIBE ORDERS (OUTPATIENT)
Dept: OTHER | Age: 63
End: 2023-05-17

## 2023-05-17 DIAGNOSIS — K50.819 CROHN'S DISEASE OF BOTH SMALL AND LARGE INTESTINE WITH COMPLICATION (H): Primary | ICD-10-CM

## 2023-05-17 DIAGNOSIS — K50.819 CROHN'S DISEASE OF SMALL AND LARGE INTESTINES WITH COMPLICATION (H): Primary | ICD-10-CM

## 2023-05-19 NOTE — TELEPHONE ENCOUNTER
Diagnosis, Referred by & from: Crohn's Disease of both small and large intestine with complication   Appt date: 8/15/2023   NOTES STATUS DETAILS   OFFICE NOTE from referring provider Care Everywhere [facility]:  4/26/23, 1/13/23 - GI OV with Dr. Wall   OFFICE NOTE from other specialist Care Everywhere / Internal [facility]:  6/13/23 - GI OV with Dr. Coyle  4/25/23 - DERM OV with Dr. Guo    [facility]:  5/12/23, 1/11/23 - WOUND OV with Linda Morrison RN  9/16/22 - GI OV with Dr. Talavera  8/10/18 - SURG OV with DANICA Jay  8/9/18 - ONC OV with Dr. Beckman  4/6/18 - Infusion  6/23/15 - RAD ONC OV with Dr. Velazco    [facility]:  1/23/23 - PCC OV with Dr. Noe    [facility]:  12/19/22 - CR OV with Severiano Mar NP  12/15/22 - ID OV with Dr. Eliot Gillis  11/18/22, 1/6/21 - CR VO with Dr. Keys  1/6/21 - GI OV with Dr. Pop   DISCHARGE SUMMARY from hospital Care Everywhere [facility]:  12/6/22 - Admission with Dr. Daily  5/12/21 - Admission with Dr. Keys    [facility]  11/1/22 - Admission with Dr. Lucas  4/8/21 - Admission with Dr. Macario  11/18/18 - Admission with Dr. Macario   DISCHARGE REPORT from the ER Care Everywhere [facility]:  10/26/22 - ED OV with Dr. Simental  5/22/18 - ED OV with Dr. Carrillo  5/2/18 - ED OV with Dr. Severson   OPERATIVE REPORT Care Everywhere [facility]:  5/12/21 - OP Note for LAPAROSCOPIC COLOSTOMY CONSTRUCTION with Dr. Ludmila De Paz:  8/2/18 - OP Note for PERIANAL EXAM WITH BIOPSIES with Dr. Kumar  10/2/14 - OP Note for TRANSANAL RESECTION SQUAMOUS CELL CARCINOMA INSITU ANUS with Dr. Rivas   MEDICATION LIST Internal    LABS     BIOPSIES/PATHOLOGY RELATED TO DIAGNOSIS Care Everywhere [facility]:  11/3/22 - Colon Biopsy (Case: [id number])  8/29/22 - Colon Biopsy (Case: [id number])  12/19/18 - Colon Biopsy (Case: [id number])  8/2/18 - Anal Biopsy (Case: [id number])  5/30/18 - Anal Biopsy (Case: [id number])    [facility]:  12/9/20 - Colon Biopsy (Case: [id number])    DIAGNOSTIC PROCEDURES     COLONOSCOPY Care Everywhere [facility]:  11/3/22 - Colonoscopy  8/29/22 - Colonoscopy  12/19/18 - Colonoscopy    [facility]:  12/9/20 - Colonoscopy   FLEX SIGMOIDOSCOPY Care Everywhere [facility]:  2/6/20 - Flexible Sigmoidoscopy  5/30/18 - Flexible Sigmoidoscopy  1/30/18 - Flexible Sigmoidoscopy   IMAGING (DISC & REPORT)      CT Received [facility]:  1/11/23 - CT Abd/Pelvis  11/1/22 - CT Abd/Pelvis  9/28/22 - CT Abd/Pelvis   4/8/21 - CT Abd/Pelvis  11/19/18 - CT Abd/Pelvis  8/8/18 - CT Abd/Pelvis  7/10/18 - CT Abd/Pelvis  5/2/18 - CT Abd/Pelvis    [facility]:  12/7/22 - CT Abd/Pelvis   MRI Received / Internal [facility]:  ([facility]) 6/21/23 - MRI Enterography    [facility]:  5/8/23 - MRI Enterography  11/5/21 - MRI Pelvis  8/16/22 - MRI Pelvis  2/10/20 - MRI Pelvis  5/15/18 - MRI Pelvis    [facility]:  12/2/20 - MRI Pelvis   ULTRASOUND  (ENDOANAL/ENDORECTAL) Received / Internal [facility]:  5/2/23 - US Abdomen    [facility]:  12/27/22 - US Abdomen  12/10/22 - US Abdomen     Records Requested  05/19/23    Facility  [facility]  Fax: [phone]  [facility]  Fax: [phone]   Outcome * 5/19/23 9:03 AM Faxed req to  and [facility] for images to be pushed to [facility] PACs. - Janie    * 5/22/23 2:40 PM Images received from  and attached to the patient in PACs. - Janie    * 6/15/23 8:32 AM Faxed 2nd urg req to [facility] for images to be pushed to [place]s. - Janie    * 6/16/23 3:51 PM Images received from [facility] and attached to the patient in PACs. - Janie

## 2023-06-09 ASSESSMENT — ENCOUNTER SYMPTOMS
CHILLS: 0
JAUNDICE: 0
CONSTIPATION: 0
HEARTBURN: 0
NAUSEA: 0
ABDOMINAL PAIN: 1
POLYPHAGIA: 0
DIARRHEA: 1
WEIGHT LOSS: 0
ALTERED TEMPERATURE REGULATION: 0
VOMITING: 0
WEIGHT GAIN: 0
BOWEL INCONTINENCE: 1
FATIGUE: 0
FEVER: 0
BLOATING: 0
POOR WOUND HEALING: 1
HALLUCINATIONS: 0
INCREASED ENERGY: 0
NAIL CHANGES: 0
NIGHT SWEATS: 1
POLYDIPSIA: 0
RECTAL PAIN: 0
DECREASED APPETITE: 0
BLOOD IN STOOL: 1
SKIN CHANGES: 0

## 2023-06-12 NOTE — TELEPHONE ENCOUNTER
REFERRAL INFORMATION:    Referring Provider:  Dr. Aftab Wall    Referring Clinic:  [facility]     Reason for Visit/Diagnosis: Crohn's disease     FUTURE VISIT INFORMATION:    Appointment Date: 06-13-23    Appointment Time: @ 2:20pm      NOTES STATUS DETAILS   OFFICE NOTE from Referring Provider Care Everywhere 05-17-23, 01-13-23 Dr. Aftab Wall   OFFICE NOTE from Other Specialist Care Everywhere 11-14-22 Dr. Bart Holt  11-19-18 Dr. Nichols [facility] DISCHARGE SUMMARY/  ED VISITS Care Everywhere 11-07-22, 11-05-22 Dr. Yosef Swann  11-04-22, 11-03-22 Dr. Bart Phan  11-02-22 Alisa Wynn PA-C  11-02-22 Dr. Beto Hernández  04-08-21 Dr. Nicol Lorenz   MEDICATION LIST Internal         PERTINENT LABS Internal/Care everywhere CBC: 03-30-23, 03-20-23, 03--03-23, 02-15-23, 01-31-23, 12-27-22  PCR: 03-22-23, 01-09-23  BMP: 12-12-22, 12-11-22, 12-10-22, 12-09-22  *additional in epic*    IMAGING (CT, MRI, EGD, MRCP, Small Bowel Follow Through/SBT, MR/CT Enterography) Internal/Care everywhere  MRI enterography: 05-09-23  US abd: 05-02-23, 12-27-22, 12-11-22  CT abd: 01-13-23, 12-07-22, 11-01-22, 09-28-22  XR abd: 08-31-20

## 2023-06-13 ENCOUNTER — PRE VISIT (OUTPATIENT)
Dept: GASTROENTEROLOGY | Facility: CLINIC | Age: 63
End: 2023-06-13

## 2023-06-13 ENCOUNTER — LAB (OUTPATIENT)
Dept: LAB | Facility: CLINIC | Age: 63
End: 2023-06-13
Payer: COMMERCIAL

## 2023-06-13 ENCOUNTER — OFFICE VISIT (OUTPATIENT)
Dept: GASTROENTEROLOGY | Facility: CLINIC | Age: 63
End: 2023-06-13
Attending: INTERNAL MEDICINE
Payer: COMMERCIAL

## 2023-06-13 VITALS
HEIGHT: 66 IN | BODY MASS INDEX: 27.38 KG/M2 | OXYGEN SATURATION: 98 % | HEART RATE: 79 BPM | WEIGHT: 170.4 LBS | SYSTOLIC BLOOD PRESSURE: 115 MMHG | DIASTOLIC BLOOD PRESSURE: 61 MMHG

## 2023-06-13 DIAGNOSIS — Z71.89 OSTOMY NURSE CONSULTATION: ICD-10-CM

## 2023-06-13 DIAGNOSIS — K50.819 CROHN'S DISEASE OF SMALL AND LARGE INTESTINES WITH COMPLICATION (H): ICD-10-CM

## 2023-06-13 DIAGNOSIS — A49.8 RECURRENT CLOSTRIDIOIDES DIFFICILE INFECTION: ICD-10-CM

## 2023-06-13 DIAGNOSIS — A49.8 RECURRENT CLOSTRIDIOIDES DIFFICILE INFECTION: Primary | ICD-10-CM

## 2023-06-13 LAB
ALBUMIN SERPL BCG-MCNC: 4.2 G/DL (ref 3.5–5.2)
ALP SERPL-CCNC: 81 U/L (ref 35–104)
ALT SERPL W P-5'-P-CCNC: 14 U/L (ref 0–50)
ANION GAP SERPL CALCULATED.3IONS-SCNC: 8 MMOL/L (ref 7–15)
AST SERPL W P-5'-P-CCNC: 60 U/L (ref 0–45)
BASOPHILS # BLD AUTO: 0 10E3/UL (ref 0–0.2)
BASOPHILS NFR BLD AUTO: 0 %
BILIRUB SERPL-MCNC: 0.2 MG/DL
BUN SERPL-MCNC: 13.1 MG/DL (ref 8–23)
CALCIUM SERPL-MCNC: 9.5 MG/DL (ref 8.8–10.2)
CHLORIDE SERPL-SCNC: 104 MMOL/L (ref 98–107)
CREAT SERPL-MCNC: 0.97 MG/DL (ref 0.51–0.95)
CRP SERPL-MCNC: 12.2 MG/L
DEPRECATED HCO3 PLAS-SCNC: 28 MMOL/L (ref 22–29)
EOSINOPHIL # BLD AUTO: 0.1 10E3/UL (ref 0–0.7)
EOSINOPHIL NFR BLD AUTO: 1 %
ERYTHROCYTE [DISTWIDTH] IN BLOOD BY AUTOMATED COUNT: 14.1 % (ref 10–15)
ERYTHROCYTE [SEDIMENTATION RATE] IN BLOOD BY WESTERGREN METHOD: 17 MM/HR (ref 0–30)
FERRITIN SERPL-MCNC: 135 NG/ML (ref 11–328)
GFR SERPL CREATININE-BSD FRML MDRD: 67 ML/MIN/1.73M2
GLUCOSE SERPL-MCNC: 96 MG/DL (ref 70–99)
HCT VFR BLD AUTO: 38.5 % (ref 35–47)
HGB BLD-MCNC: 12.5 G/DL (ref 11.7–15.7)
IMM GRANULOCYTES # BLD: 0 10E3/UL
IMM GRANULOCYTES NFR BLD: 0 %
IRON BINDING CAPACITY (ROCHE): 301 UG/DL (ref 240–430)
IRON SATN MFR SERPL: 15 % (ref 15–46)
IRON SERPL-MCNC: 45 UG/DL (ref 37–145)
LYMPHOCYTES # BLD AUTO: 1.8 10E3/UL (ref 0.8–5.3)
LYMPHOCYTES NFR BLD AUTO: 18 %
MCH RBC QN AUTO: 27.8 PG (ref 26.5–33)
MCHC RBC AUTO-ENTMCNC: 32.5 G/DL (ref 31.5–36.5)
MCV RBC AUTO: 86 FL (ref 78–100)
MONOCYTES # BLD AUTO: 0.6 10E3/UL (ref 0–1.3)
MONOCYTES NFR BLD AUTO: 6 %
NEUTROPHILS # BLD AUTO: 7.4 10E3/UL (ref 1.6–8.3)
NEUTROPHILS NFR BLD AUTO: 75 %
NRBC # BLD AUTO: 0 10E3/UL
NRBC BLD AUTO-RTO: 0 /100
PLATELET # BLD AUTO: 366 10E3/UL (ref 150–450)
POTASSIUM SERPL-SCNC: 4.4 MMOL/L (ref 3.4–5.3)
PROT SERPL-MCNC: 7.3 G/DL (ref 6.4–8.3)
RBC # BLD AUTO: 4.5 10E6/UL (ref 3.8–5.2)
SODIUM SERPL-SCNC: 140 MMOL/L (ref 136–145)
WBC # BLD AUTO: 10 10E3/UL (ref 4–11)

## 2023-06-13 PROCEDURE — 85025 COMPLETE CBC W/AUTO DIFF WBC: CPT | Performed by: PATHOLOGY

## 2023-06-13 PROCEDURE — 80053 COMPREHEN METABOLIC PANEL: CPT | Performed by: PATHOLOGY

## 2023-06-13 PROCEDURE — 86140 C-REACTIVE PROTEIN: CPT | Performed by: PATHOLOGY

## 2023-06-13 PROCEDURE — 83540 ASSAY OF IRON: CPT | Performed by: PATHOLOGY

## 2023-06-13 PROCEDURE — 82728 ASSAY OF FERRITIN: CPT | Performed by: PATHOLOGY

## 2023-06-13 PROCEDURE — 36415 COLL VENOUS BLD VENIPUNCTURE: CPT | Performed by: PATHOLOGY

## 2023-06-13 PROCEDURE — 99205 OFFICE O/P NEW HI 60 MIN: CPT | Mod: GC | Performed by: INTERNAL MEDICINE

## 2023-06-13 PROCEDURE — 83550 IRON BINDING TEST: CPT | Performed by: PATHOLOGY

## 2023-06-13 PROCEDURE — 85652 RBC SED RATE AUTOMATED: CPT | Performed by: PATHOLOGY

## 2023-06-13 RX ORDER — BUDESONIDE 3 MG/1
9 CAPSULE, COATED PELLETS ORAL EVERY MORNING
Qty: 90 CAPSULE | Refills: 11 | Status: SHIPPED | OUTPATIENT
Start: 2023-06-13 | End: 2023-09-19

## 2023-06-13 ASSESSMENT — PAIN SCALES - GENERAL: PAINLEVEL: SEVERE PAIN (7)

## 2023-06-13 NOTE — PATIENT INSTRUCTIONS
Nicol,     It was a pleasure meeting you in clinic today. Please see below for our care plan and recommendations.     We recommend continuing your Remicade as ordered, we will order a Remicade drug level prior to you week 6 infusion, our nurses will help to continue your home infusion  We have ordered some labs for today: blood (inflammation, blood counts, liver, kindeys), stool (C. Diff test)  We have ordered a repeat MRI of your belly to give us a better idea of what your fistulas are doing, our radiology center will call to schedule   We have ordered budesonide today (start 9mg daily for now)  We have placed a referral for wound care nursing for your ostomy, they will call to schedule  We will communicate with our colorectal surgery colleagues to potentially expedite your visit  We will see you again in 3 months, sooner if needed    Please reach out with any questions or concerns,     Best,  Dr. Caldwell and Dr. Coyle

## 2023-06-13 NOTE — NURSING NOTE
"Chief Complaint   Patient presents with     New Patient       Vitals:    06/13/23 1357   BP: 115/61   Pulse: 79   SpO2: 98%   Weight: 77.3 kg (170 lb 6.4 oz)   Height: 1.664 m (5' 5.5\")       Body mass index is 27.92 kg/m .    [employer]    "

## 2023-06-14 ENCOUNTER — APPOINTMENT (OUTPATIENT)
Dept: LAB | Facility: OTHER | Age: 63
End: 2023-06-14
Payer: COMMERCIAL

## 2023-06-14 LAB — C DIFF TOX B STL QL: POSITIVE

## 2023-06-14 PROCEDURE — 87493 C DIFF AMPLIFIED PROBE: CPT | Mod: 90 | Performed by: PATHOLOGY

## 2023-06-14 PROCEDURE — 99000 SPECIMEN HANDLING OFFICE-LAB: CPT | Performed by: PATHOLOGY

## 2023-06-15 ENCOUNTER — TELEPHONE (OUTPATIENT)
Dept: SURGERY | Facility: CLINIC | Age: 63
End: 2023-06-15
Payer: COMMERCIAL

## 2023-06-15 ENCOUNTER — MYC MEDICAL ADVICE (OUTPATIENT)
Dept: GASTROENTEROLOGY | Facility: CLINIC | Age: 63
End: 2023-06-15
Payer: COMMERCIAL

## 2023-06-15 LAB
C DIFF GDH STL QL IA: POSITIVE
C DIFF TOX A+B STL QL IA: NEGATIVE

## 2023-06-15 RX ORDER — VANCOMYCIN HYDROCHLORIDE 125 MG/1
125 CAPSULE ORAL DAILY
Qty: 90 CAPSULE | Refills: 3 | Status: SHIPPED | OUTPATIENT
Start: 2023-06-15 | End: 2023-09-19

## 2023-06-15 NOTE — TELEPHONE ENCOUNTER
Called to offer sooner Esther appt. LVANEESH with L pod #. Will also send MyChart.    Offer 06/22 at 8am.

## 2023-06-16 ENCOUNTER — TELEPHONE (OUTPATIENT)
Dept: GASTROENTEROLOGY | Facility: CLINIC | Age: 63
End: 2023-06-16
Payer: COMMERCIAL

## 2023-06-16 DIAGNOSIS — K50.80 CROHN'S ILEOCOLITIS (H): ICD-10-CM

## 2023-06-16 NOTE — TELEPHONE ENCOUNTER
----- Message from Jordan Coyle MD sent at 6/16/2023  8:23 AM CDT -----  Regarding: RE: IBD care, home infusion, lab monitoring  Thanks, Hayder!    Jeovany, just to clarify. We want a week 14 level. Thanks!    J  ----- Message -----  From: Hayder Caldwell MD  Sent: 6/15/2023   9:10 PM CDT  To: Angle Kay RN; #  Subject: IBD care, home infusion, lab monitoring          Jeovany,     I hope all is well.     Can you help with the following for Nicol?    1. She is currently undergoing induction with infliximab through her previous provider, how do we transfer her home infusion management to our clinic and thus our lab monitoring care plan for infliximab? Can you help with this.     2. also, can we have an infliximab level and anti-infliximab antibody drawn the day of her first maintenance infusion (post-induction)?    Thank you! Let me know how I can help.     Best,  Hayder

## 2023-06-16 NOTE — TELEPHONE ENCOUNTER
Contacted patient to discuss infusions and next steps. Patient currently infuses through [employer], and would prefer to stay with this infusion company. Next infusion is scheduled on 6/22. Writer informed patient that no changes will be made prior to that infusion to ensure the prior authorization is in place. Inbasket reminder message set for writer to transition all orders over to Dr. Coyle following the 6/22 infusion, and to add on a level for the first maintenance infusion 8/17. Patient expressed understanding and appreciation.

## 2023-06-16 NOTE — PROGRESS NOTES
Colon and Rectal Surgery Clinic Note    RE: Nicol Aldrich.  : 1960.  RYLAND: 2023.    Reason for visit: Crohn's, perianal crohn's - Patient of Dr. Coyle    HPI: Nicol is a 62 year old female who was diagnosed with Crohn disease at age 31, with colonic involvement and with a perianal fistula (had a fistulotomy but no records) She was treated initially with mesalamine. 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 On 2014 she underwent transanal excision of the lesion with subsequent chemotherapy and radiation therapy. She started methotrexate postoperatively and in  she started on Entyvio. 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 Recently started on infliximab, but having stool output from EC fistulas. 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       History of C. Diff. She was C. Diff + last as of , treated with PO Vancomycin by Dr. Coyle      [facility]  Description of Procedure:  The patient was taken to the operating room and placed in the supine position. General anesthesia was induced. Endotracheal tube was placed. She was rolled to the prone jackknife position. Buttocks taped laterally. The anal area was prepped with Betadine Prep and sterilely draped.   Pause for identification of patient and procedure carried out.   Anoscopy is done. We found the lesion just to the left of posterior midline. This does run over about 2 to 2.5 cm in length. An elliptical incision is now made, working from outside the dentated line proximally. The anal sphincters are spared. We now work deeper and work our way around this lesion up to the upper end and the distal end of the specimen was marked with a Vicryl suture. Beyond the upper end of resection, a 2-0 Vicryl suture was placed. The  specimen was removed and sent to Pathology. After hemostasis was obtained with cautery, the wound was closed with running locked 2-0 Vicryl. The frozen section revealed on the anterior margin that would be further to the left we were very close to this margin and felt we should take additional tissue. We took out the sutures and removed about another 0.5 cm of tissue. Again hemostasis was obtained with cautery. The wound was again closed with running locked 2-0 Vicryl. (Exparel) long acting local anesthetic is used. We used 20 mL of this. Fibrillar collagen hemostatic agent applied and additional 4 x 4s. She tolerated this well.   Patient transferred out of operating room in good condition. All surgical medications, procedures and x-rays performed in surgery-on the order of the operating physicians. Micaela Buenrostro PA-C assisted in the above procedure. They provided assistance with pre-operative positioning, prepping, and draping of the patient. The assistant provided vital operative assistance with retraction using instruments thus providing the necessary exposure and visualization for the case, manipulation of tissues to achieve hemostasis, suction for visualization and assisted in closure of the wound. Post-operatively they assisted in transfer of the patient off the operative table and transition to the PACU physicians.    DIAGNOSIS:   DISTAL RECTUM AND ANUS, EXCISION WITH ANTERIOR MARGIN RE-EXCISION        --HISTOLOGIC DIAGNOSIS:  INVASIVE MODERATELY DIFFERENTIATED SQUAMOUS CELL CARCINOMA WITH EXTENSIVE SQUAMOUS CELL CARCINOMA IN SITU        --DEPTH OF INVASION: TUMOR INVOLVES THE SUBMUCOSA        --TUMOR SIZE:  1.5 CM (LINEAR EXTENT)        --MARGINS:             --SQUAMOUS CELL CARCINOMA IN SITU INVOLVES THE POSTERIOR MARGIN             --INVASIVE SQUAMOUS CELL CARCINOMA IS 0.1 CM FROM THE POSTERIOR MARGIN        --NO INVASIVE CARCINOMA IDENTIFIED AT THE MARGINS        --LYMPH NODES: NO CARCINOMA IDENTIFIED IN 1  LYMPH NODE WITHIN THE          MAIN SPECIMEN        --VASCULAR INVASION: NOT IDENTIFIED     [id number]   STAGING NOTE:  With the available information, this lesion represents a   Stage I tumor (pT1, N0, MX).       PERIANAL EXAM WITH BIOPSIES with Dr. Kumar 8/2/18          Laparoscopic LOOP COLOSTOMY  by Dr. Keys- [facility] 5/12/21  This case was substantially more difficult than usual because of significant effort and difficulty mobilizing and identifying anatomical structures due to altered surgical field secondary to distorted anatomy and inflammation.    Nicol Aldrich was taken to the operating room, placed on a table in a supine position, underwent general endotracheal anesthesia, and was then repositioned in a synchronous position with Rubin napier. A urinary catheter was inserted and sequential compression devices were placed to the bilateral lower extremities. Preoperative antibiotics and subcutaneous heparin were administered. The patient was then prepped and draped in a normal sterile fashion followed by a pause identifying the correct patient, date, site, and procedure.    The abdominal cavity was entered under direct visualization utilizing a Riky technique in the supraumbilical midline. Three additional 5mm trocars were placed under direct visualization.     After appropriate positioning the sigmoid colon was identified and mobilized. The sigmoid itself was quite abnormal with signs of prior inflammation consistent with Crohn's disease. She also had extensive diverticulosis involving the sigmoid colon. There were dense adhesions between the sigmoid and the retroperitoneum/lateral abdominal wall. We did take down the majority of these adhesions and then took down the attachments between the descending colon and the abdominal wall and retroperitoneum. We were able to identify and protect the left ureter. We continued this dissection up to the splenic flexure. At this point we did make our  stoma site in the left upper quadrant marked 1. We cored out the skin and soft tissue down to the fascia and then opened the fascia in a cruciate manner. We spread the rectus muscle to expose the posterior fashion peritoneum which was also opened in a cruciate manner. We attempted to pull the mobilized colon out of this hole laparoscopically, but it was clear that we did not have enough mobilization. So we placed an Daniel wound protector at this site and reinsufflated the abdomen. I then used the LigaSure device to fully mobilize the splenic flexure from all of its attachments. We did have to take the omentum off of the transverse colon and off of the splenic flexure and proximal descending colon to get full mobilization. We then tried again to pull the colon through the planned site, and this time it did reach with minimal tension. We did use a red rubber catheter to act as a stoma bridge to hold it up without significant tension. We also looked again laparoscopically to confirm correct proximal and distal orientation.     We desufflated the abdomen and proceeded to close all the 5 port as well as the Curran port. The Curran was closed with interrupted 1. Vicryl sutures. All skin wounds were closed with running 3-0 Monocryl in a subcuticular fashion followed by Dermabond. We then matured the stoma with multiple interrupted 3 0 Vicryl suture circumferentially. We did leave the red rubber catheter in place to act as a stoma bridge and secured this to the skin with nylon sutures.    The abdomen was then cleaned and dried and dressings were applied to all the incisions.     Next I put her legs up and looked at the perineum. She has significant radiation damage to the perianal skin. She does have a patulous anus with exposed mucosa circumferentially. There is an essentially divot or crater like abnormality on the left anterior region of the anus. There is no mucosal break in no skin breaks year consistent with a  fistula. There is no open wound and the mucosa itself looks normal. I was not able to probe any areas to suggest that there was any underlying tracking. It just appears that this is likely the site of her prior cancer and with regression of the cancer at left some functional and structural abnormality in this region. There was no other concern on the perineum for any undrained fluid collections.    Nicol Aldrich tolerated the procedure well. There were no complications during the case. The patient was transferred to the postanesthesia care unit in stable condition.  Kera Keys M.D.       Colonoscopy 11/3/22  Findings:        The perianal exam findings included some scarring at the anal area and absence of anal tone. The periostomal skin was without rashes or abscesses. The colostomy was first accessed: A continuous area of severly inflammed        and friable nonbleeding mucosa with extensive large cratered ulcers was present in the transverse colon, in the ascending colon and in the cecum. Biopsies were taken with a cold forceps for histology R/O CMV, Crohn's disease, ischemia.        One large ulcer was present in the distal rectum. Biopsies were taken with a cold forceps for histology. A diffuse area of severely friable mucosa with contact bleeding was found in the rectum and in the recto-sigmoid colon. Biopsies were taken        with a cold forceps for histology R/O diversion colitis. A 2 mm likely fistula was found at 18 cm proximal to the anus.     Impression:     - Abnormal perianal exam.                          - Colostomy accessed: Severly inflammed mucosa with large ulcers. Biopsied R/O CMV, Crohn's disease, ischemia.                          - Ulcer in distal rectum. Biopsied.                          - Friability with contact bleeding in the rectum and in the recto-sigmoid colon. Biopsied R/O diversion colitis.                          - A possible fistula opening was seen at 18 cm from the  "anus.           Medical history:  Anal cancer  Pyoderma Gangrenosum    Surgical history:  Loop colostomy, lap 2019    Family history:  Noncontributory    Medications:  Current Outpatient Medications   Medication Sig Dispense Refill     budesonide (ENTOCORT EC) 3 MG EC capsule Take 3 capsules (9 mg) by mouth every morning for 360 days 90 capsule 11     clobetasol (TEMOVATE) 0.05 % external cream Apply to ulcers around stoma site with each bag change 60 g 2     clobetasol (TEMOVATE) 0.05 % external ointment Use on Saturday / Sunday, twice daily 60 g 1     clobetasol (TEMOVATE) 0.05 % external ointment APPLY TO THE AFFECTED AREA TWICE DAILY AS NEEDED FOR UP TO 14 DAYS. APPLY TO ULCER AROUND THE OSTOMY (Patient not taking: Reported on 3/7/2023)       Control Gel Formula Dressing (DUODERM CGF DRESSING) MISC Apply daily to stoma site - DuoDerm Extra Thin 30 each 2     dapsone (ACZONE) 100 MG tablet Take 1 tablet (100 mg) by mouth daily (Patient not taking: Reported on 4/25/2023) 30 tablet 2     oxyCODONE (ROXICODONE) 5 MG tablet  (Patient not taking: Reported on 3/7/2023)       pimecrolimus (ELIDEL) 1 % external cream Apply topically 2 times daily 60 g 3     sulfamethoxazole-trimethoprim (BACTRIM DS) 800-160 MG tablet Take 1 tablet by mouth 2 times daily 20 tablet 0     vancomycin (VANCOCIN) 125 MG capsule Take 1 capsule (125 mg) by mouth daily 90 capsule 3     vedolizumab (ENTYVIO) 60 MG/ML injection Inject 300 mg into the vein         Allergies:  Allergies   Allergen Reactions     Penicillins Rash     On 07/10/18, pt states that she experienced a rash when she had this \"a while ago.\" This was not a reaction from childhood.   On 07/10/18, pt states that she experienced a rash when she had this \"a while ago.\" This was not a reaction from childhood.          Social history:   Social History     Tobacco Use     Smoking status: Never     Smokeless tobacco: Never   Vaping Use     Vaping status: Not on file   Substance Use " "Topics     Alcohol use: Yes     Alcohol/week: 2.0 standard drinks of alcohol     Types: 2 Glasses of wine per week     Marital status: .    ROS:  A complete review of systems was performed with the patient and all systems negative except as per HPI.    Physical Examination:  Exam was chaperoned by Gerri Padilla CMA  /75 (BP Location: Left arm, Patient Position: Sitting, Cuff Size: Adult Regular)   Pulse 72   Ht 1.664 m (5' 5.5\")   Wt 77.5 kg (170 lb 14.4 oz)   SpO2 100%   BMI 28.01 kg/m    General: Well hydrated. No acute distress.  Abdomen: Soft, NT, nondistended, loop colostomy present and healthy with primary colocutaneous fistula 3 cm left lateral to stoma. Mildly macerated skin around fistula, but otherwise minimal obvious inflammation, overall appears pretty healthy. No inguinal adenopathy palpated.  Perianal external examination:  Perianal skin with changes of scarring and radiation, without obvious growths or lesions.  Digital rectal examination: Was performed.   No evidence of masses, easily friable anal mucosa.    Anoscopy: Was deferred      Laboratory values reviewed:  Recent Labs   Lab Test 06/13/23  1554   WBC 10.0   HGB 12.5      CR 0.97*   ALBUMIN 4.2   BILITOTAL 0.2   ALKPHOS 81   ALT 14   AST 60*       Imaging personally reviewed by me:       MR Pelvis 11/5/22  IMPRESSION:   1. Increased fluid signal enhancement along the posterior left aspect of the anus near the 5 o'clock position which may represent a small fistula.   2. No abscess or large fistulous tract identified.   3. Chronic inflammatory changes of the sigmoid colon and rectum.      CT A/P 1/13/23  IMPRESSION:   1. Interval resolution of previous pancolitis.   2. Peristomal hernia containing multiple small bowel loops from the diverting colostomy in the left anterior mid abdomen.  No obstruction or strangulation. Fatty hernia that is ventral above the umbilicus also redemonstrated.   3. No abnormal fluid " collection or abscess formation.   4. The left posterior perianal fistulous tract at approximate 5 o'clock position without active inflammation or abscess formation redemonstrated.    ASSESSMENT  1. Crohn disease with distal colonic involvement - (Budesonide, Entyvio)  2. History of squamous cell carcinoma of the anus, previous radiation and chemotherapy in 2014  3. Fecal incontinence, recurrent perianal skin infection  4. APR in 2021  5. Pyoderma Gangrenosum  6. Colostomy status    PLAN  1. Given her improvement under the care of Dr. Coyle and Dr. Guo, I would favor continued medical management. Her parastomal hernia is not symptomatic at this time, and with her improvement, a local revision is still a major undertaking. Furthermore, her primary disease has been her distal colon, which without optimal control will likely result in a futile revision with recurrence. The two surgical options I discussed were a local revision which would require a conversion from loop to end colostomy, and then resection back proximal to her fistulas. This is a major undertaking in itself, and would not guarantee that I would be able to appropriately extracorporialize the stoma. The other option, should her disease progress, is an total abdominal colectomy with end ileostomy, with new siting in the right abdomen, without primary repair closure of her stoma.    Risks, benefits, and alternatives of operative treatment were thoroughly discussed with the patient, she understands these well and agrees to proceed.    Time spent: 60 minutes, >50% spent in discussing, counseling and coordinating care.    Kunal Herrera M.D    Division of Colon and Rectal Surgery  [facility]    Referring Provider:  Aftab Wall MD  [address]  [address]  [place] [address]     Primary Care Provider:  Janet Noe

## 2023-06-20 ENCOUNTER — OFFICE VISIT (OUTPATIENT)
Dept: DERMATOLOGY | Facility: CLINIC | Age: 63
End: 2023-06-20
Payer: COMMERCIAL

## 2023-06-20 DIAGNOSIS — K63.2 ENTEROCUTANEOUS FISTULA: ICD-10-CM

## 2023-06-20 DIAGNOSIS — L88 PYODERMA GANGRENOSUM (H): Primary | ICD-10-CM

## 2023-06-20 PROCEDURE — 99214 OFFICE O/P EST MOD 30 MIN: CPT | Performed by: STUDENT IN AN ORGANIZED HEALTH CARE EDUCATION/TRAINING PROGRAM

## 2023-06-20 ASSESSMENT — PAIN SCALES - GENERAL: PAINLEVEL: NO PAIN (0)

## 2023-06-20 NOTE — PROGRESS NOTES
[facility] Dermatology Note    Encounter Date: Jun 20, 2023    Dermatology Problem List:  #Peristomal pyoderma gangrenosum  - 01/31/23 Dapsone 100 mg daily, clobetasol cream  - 03/07/23 stop dapsone due to anemia. Transition from clob to pimecrolimus. 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 ILK10 0.2ml to that area   - 4/25/23 Currently on prednisone 30 mg d/t flare and will taper off. Prednisone helped alleviate symptoms.   - 05/2023 switched from vedolizumab to infliximab     #Peristomal Abscess, ? Recurrent  #Peristomal enterocutaneous fistula  - 4/25/23 Ordered US to further evaluate. Advised she follow up with GI doctor or establish with colorectal surgeon as well.   - 06/08/23 pt scheduled w/ colorectal surgeon and scheduled for MRI. Improved on infliximab. Decrease clobetasol application -> more pimecrolimus    Major PMHx  -   ______________________________________    Impression/Plan:  Nicol was seen today for derm problem.    Diagnoses and all orders for this visit:    Pyoderma gangrenosum  -At this point is quiescent, its not clear whether patient's disease from the beginning was an enteric cutaneous fistula or if it was pyoderma gangrenosum that led to an enteric cutaneous fistula or something else  - Patient was originally diagnosed and treated empirically as PG without a skin biopsy as far as I can tell  - Regardless patient is currently on infliximab which is an excellent treatment for pyoderma gangrenosum    Enterocutaneous fistula  -Areas of ulceration around stomal site which patient saw me for originally, they at points appeared proliferative rather than ulcerative, at subsequent visits vickie fecal material was noted to be emanating from the areas of erosion essentially confirming enteric cutaneous fistulas.  She does have a history of perianal fistulas in the past  - These areas have been responsive to topical and oral steroids  - Most recently she is  started oral budesonide as well as transitioned off vedolizumab onto infliximab.  With these changes she has noticed closure of the skin around the fistulous  - She has an upcoming MR enterography as well as consultation with a colorectal surgeon to evaluate for treatment of the enteric cutaneous fistulas  -Now that areas are doing better can decrease clobetasol use and increase pimecrolimus use to prevent skin atrophy    06/20/23 04/03/23            Follow-up in 3 mo .       Staff Involved:  Staff Only    Kelechi Guo MD   of Dermatology  Department of Dermatology  [facility]      CC:   Chief Complaint   Patient presents with     Derm Problem     Pyoderma gangrenosum follow up.  Sore on the abdomen is getting worse.         History of Present Illness:  Ms. Nicol Aldrich is a 62 year old female who presents as a return patient.    Enterocutaneous fistulas have improved since using clobetasol twice daily most days as well as switching infliximab is also on oral budesonide.     Labs:      Physical exam:  Vitals: There were no vitals taken for this visit.  GEN: well developed, well-nourished, in no acute distress, in a pleasant mood.     SKIN: Jarrell phototype 1  - Waist-up skin, which includes the head/face, neck, both arms, chest, back, abdomen, digits and/or nails was examined.  - small punctate areas of skin breakdown around stoma  - No other lesions of concern on areas examined.     Past Medical History:   No past medical history on file.  No past surgical history on file.    Social History:   reports that she has never smoked. She has never used smokeless tobacco. She reports current alcohol use of about 2.0 standard drinks of alcohol per week.    Family History:  No family history on file.    Medications:  Current Outpatient Medications   Medication Sig Dispense Refill     budesonide (ENTOCORT EC) 3 MG EC capsule Take 3 capsules (9 mg) by mouth every  "morning for 360 days 90 capsule 11     clobetasol (TEMOVATE) 0.05 % external cream Apply to ulcers around stoma site with each bag change 60 g 2     Control Gel Formula Dressing (DUODERM CGF DRESSING) MISC Apply daily to stoma site - DuoDerm Extra Thin 30 each 2     pimecrolimus (ELIDEL) 1 % external cream Apply topically 2 times daily 60 g 3     vancomycin (VANCOCIN) 125 MG capsule Take 1 capsule (125 mg) by mouth daily 90 capsule 3     clobetasol (TEMOVATE) 0.05 % external ointment Use on Saturday / Sunday, twice daily 60 g 1     clobetasol (TEMOVATE) 0.05 % external ointment APPLY TO THE AFFECTED AREA TWICE DAILY AS NEEDED FOR UP TO 14 DAYS. APPLY TO ULCER AROUND THE OSTOMY (Patient not taking: Reported on 3/7/2023)       Allergies   Allergen Reactions     Penicillins Rash     On 07/10/18, pt states that she experienced a rash when she had this \"a while ago.\" This was not a reaction from childhood.   On 07/10/18, pt states that she experienced a rash when she had this \"a while ago.\" This was not a reaction from childhood.                  "

## 2023-06-20 NOTE — PATIENT INSTRUCTIONS
Your peristomal fistulas have improved since starting the Remicade and using the clobetasol.   - as tolerated transition back to using the pimecrolimus    Follow up with colorectal surgeon to discuss treatment of peristomal fistulas

## 2023-06-20 NOTE — NURSING NOTE
Dermatology Rooming Note    Nicol Aldrich's goals for this visit include:   Chief Complaint   Patient presents with     Derm Problem     Pyoderma gangrenosum follow up.  Sore on the abdomen is getting worse.       Marry Moore CMA

## 2023-06-20 NOTE — LETTER
6/20/2023       RE: Nicol Aldrich  [address]  [facility] [address]     Dear Colleague,    Thank you for referring your patient, Nicol Aldrich, to the [facility] DERMATOLOGY CLINIC [place] at [facility]. Please see a copy of my visit note below.    [facility] Dermatology Note    Encounter Date: Jun 20, 2023    Dermatology Problem List:  #Peristomal pyoderma gangrenosum  - 01/31/23 Dapsone 100 mg daily, clobetasol cream  - 03/07/23 stop dapsone due to anemia. Transition from clob to pimecrolimus. 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 ILK10 0.2ml to that area   - 4/25/23 Currently on prednisone 30 mg d/t flare and will taper off. Prednisone helped alleviate symptoms.   - 05/2023 switched from vedolizumab to infliximab     #Peristomal Abscess, ? Recurrent  #Peristomal enterocutaneous fistula  - 4/25/23 Ordered US to further evaluate. Advised she follow up with GI doctor or establish with colorectal surgeon as well.   - 06/08/23 pt scheduled w/ colorectal surgeon and scheduled for MRI. Improved on infliximab. Decrease clobetasol application -> more pimecrolimus    Major PMHx  -   ______________________________________    Impression/Plan:  Nicol was seen today for derm problem.    Diagnoses and all orders for this visit:    Pyoderma gangrenosum  -At this point is quiescent, its not clear whether patient's disease from the beginning was an enteric cutaneous fistula or if it was pyoderma gangrenosum that led to an enteric cutaneous fistula or something else  - Patient was originally diagnosed and treated empirically as PG without a skin biopsy as far as I can tell  - Regardless patient is currently on infliximab which is an excellent treatment for pyoderma gangrenosum    Enterocutaneous fistula  -Areas of ulceration around stomal site which patient saw me for originally, they at points appeared proliferative rather  than ulcerative, at subsequent visits vickie fecal material was noted to be emanating from the areas of erosion essentially confirming enteric cutaneous fistulas.  She does have a history of perianal fistulas in the past  - These areas have been responsive to topical and oral steroids  - Most recently she is started oral budesonide as well as transitioned off vedolizumab onto infliximab.  With these changes she has noticed closure of the skin around the fistulous  - She has an upcoming MR enterography as well as consultation with a colorectal surgeon to evaluate for treatment of the enteric cutaneous fistulas  -Now that areas are doing better can decrease clobetasol use and increase pimecrolimus use to prevent skin atrophy    06/20/23 04/03/23            Follow-up in 3 mo .       Staff Involved:  Staff Only    Kelechi Guo MD   of Dermatology  Department of Dermatology  [facility]      CC:   Chief Complaint   Patient presents with    Derm Problem     Pyoderma gangrenosum follow up.  Sore on the abdomen is getting worse.         History of Present Illness:  Ms. Nicol Aldrich is a 62 year old female who presents as a return patient.    Enterocutaneous fistulas have improved since using clobetasol twice daily most days as well as switching infliximab is also on oral budesonide.     Labs:      Physical exam:  Vitals: There were no vitals taken for this visit.  GEN: well developed, well-nourished, in no acute distress, in a pleasant mood.     SKIN: Jarrell phototype 1  - Waist-up skin, which includes the head/face, neck, both arms, chest, back, abdomen, digits and/or nails was examined.  - small punctate areas of skin breakdown around stoma  - No other lesions of concern on areas examined.     Past Medical History:   No past medical history on file.  No past surgical history on file.    Social History:   reports that she has never smoked. She has never used  "smokeless tobacco. She reports current alcohol use of about 2.0 standard drinks of alcohol per week.    Family History:  No family history on file.    Medications:  Current Outpatient Medications   Medication Sig Dispense Refill    budesonide (ENTOCORT EC) 3 MG EC capsule Take 3 capsules (9 mg) by mouth every morning for 360 days 90 capsule 11    clobetasol (TEMOVATE) 0.05 % external cream Apply to ulcers around stoma site with each bag change 60 g 2    Control Gel Formula Dressing (DUODERM CGF DRESSING) MISC Apply daily to stoma site - DuoDerm Extra Thin 30 each 2    pimecrolimus (ELIDEL) 1 % external cream Apply topically 2 times daily 60 g 3    vancomycin (VANCOCIN) 125 MG capsule Take 1 capsule (125 mg) by mouth daily 90 capsule 3    clobetasol (TEMOVATE) 0.05 % external ointment Use on Saturday / Sunday, twice daily 60 g 1    clobetasol (TEMOVATE) 0.05 % external ointment APPLY TO THE AFFECTED AREA TWICE DAILY AS NEEDED FOR UP TO 14 DAYS. APPLY TO ULCER AROUND THE OSTOMY (Patient not taking: Reported on 3/7/2023)       Allergies   Allergen Reactions    Penicillins Rash     On 07/10/18, pt states that she experienced a rash when she had this \"a while ago.\" This was not a reaction from childhood.   On 07/10/18, pt states that she experienced a rash when she had this \"a while ago.\" This was not a reaction from childhood.          "

## 2023-06-21 ENCOUNTER — HOSPITAL ENCOUNTER (OUTPATIENT)
Dept: MRI IMAGING | Facility: CLINIC | Age: 63
Discharge: HOME | End: 2023-06-21
Attending: INTERNAL MEDICINE | Admitting: INTERNAL MEDICINE
Payer: COMMERCIAL

## 2023-06-21 DIAGNOSIS — K50.819 CROHN'S DISEASE OF SMALL AND LARGE INTESTINES WITH COMPLICATION (H): ICD-10-CM

## 2023-06-21 PROCEDURE — 72197 MRI PELVIS W/O & W/DYE: CPT

## 2023-06-21 PROCEDURE — 255N000002 HC RX 255 OP 636: Mod: JZ | Performed by: INTERNAL MEDICINE

## 2023-06-21 PROCEDURE — A9585 GADOBUTROL INJECTION: HCPCS | Mod: JZ | Performed by: INTERNAL MEDICINE

## 2023-06-21 PROCEDURE — 74183 MRI ABD W/O CNTR FLWD CNTR: CPT

## 2023-06-21 PROCEDURE — 250N000011 HC RX IP 250 OP 636: Performed by: INTERNAL MEDICINE

## 2023-06-21 RX ORDER — GADOBUTROL 604.72 MG/ML
7.5 INJECTION INTRAVENOUS ONCE
Status: COMPLETED | OUTPATIENT
Start: 2023-06-21 | End: 2023-06-21

## 2023-06-21 RX ADMIN — GLUCAGON HYDROCHLORIDE 1 MG: KIT at 09:26

## 2023-06-21 RX ADMIN — GADOBUTROL 7.5 ML: 604.72 INJECTION INTRAVENOUS at 10:11

## 2023-06-22 ASSESSMENT — ENCOUNTER SYMPTOMS
FATIGUE: 1
BLOATING: 0
POLYDIPSIA: 0
WEIGHT GAIN: 1
CONSTIPATION: 0
SKIN CHANGES: 0
HALLUCINATIONS: 0
POLYPHAGIA: 0
DYSURIA: 0
WEIGHT LOSS: 0
DECREASED APPETITE: 0
NAUSEA: 1
DIFFICULTY URINATING: 0
CHILLS: 1
NIGHT SWEATS: 1
BLOOD IN STOOL: 0
VOMITING: 0
ALTERED TEMPERATURE REGULATION: 1
ABDOMINAL PAIN: 1
FEVER: 0
DIARRHEA: 1
RECTAL PAIN: 0
FLANK PAIN: 0
BOWEL INCONTINENCE: 1
HEMATURIA: 0
POOR WOUND HEALING: 1
HEARTBURN: 0
JAUNDICE: 0
INCREASED ENERGY: 0
NAIL CHANGES: 0

## 2023-06-22 NOTE — TELEPHONE ENCOUNTER
Jordan Coyle MD  You 3 minutes ago (11:23 AM)     JH  Please let her know I will call her later today or tomorrow to discuss.     Thanks!   J

## 2023-06-23 NOTE — TELEPHONE ENCOUNTER
Called patient to discuss:    She is feeling better. Pain at stoma is improved/gone. She thinks IFX and budesonide helping.    Stool output improving. She occasionally has constipation. Drinks coffee and it helps. After MRE all her stool evacuated    Discussed results.    1. Looks like there is improved inflammation in transverse colon. This is good news. Continue IFX and check trough level 1 day prior to next infusion (she prefers to do this for trough level)    2. Stool in colon - follow for constipation - take miralax as needed (can use coffee too)    3. Small pancreatic cyst - repeat MRI in 1 year. Reassurance    4. C diff - continue daily vanco until IBD under control. May be 3-6 months. Then stop and consider FMT.    5. No clear fistula around stoma seen on MRI but we remain concerned for EC clinically with feculent material coming from this spot. Will have her see CRS and ostomy nurse    She will continue to follow with dermatology for PG - this is now quiescent.  Continue IFX    All questions answered. The patient agrees with the plan.    Jordan Coyle MD

## 2023-06-29 ENCOUNTER — OFFICE VISIT (OUTPATIENT)
Dept: WOUND CARE | Facility: CLINIC | Age: 63
End: 2023-06-29
Attending: INTERNAL MEDICINE
Payer: COMMERCIAL

## 2023-06-29 ENCOUNTER — OFFICE VISIT (OUTPATIENT)
Dept: SURGERY | Facility: CLINIC | Age: 63
End: 2023-06-29
Attending: INTERNAL MEDICINE
Payer: COMMERCIAL

## 2023-06-29 ENCOUNTER — TELEPHONE (OUTPATIENT)
Dept: GASTROENTEROLOGY | Facility: CLINIC | Age: 63
End: 2023-06-29

## 2023-06-29 VITALS
HEART RATE: 72 BPM | WEIGHT: 170.9 LBS | DIASTOLIC BLOOD PRESSURE: 75 MMHG | HEIGHT: 66 IN | SYSTOLIC BLOOD PRESSURE: 125 MMHG | BODY MASS INDEX: 27.47 KG/M2 | OXYGEN SATURATION: 100 %

## 2023-06-29 DIAGNOSIS — Z71.89 OSTOMY NURSE CONSULTATION: ICD-10-CM

## 2023-06-29 DIAGNOSIS — K50.819 CROHN'S DISEASE OF BOTH SMALL AND LARGE INTESTINE WITH COMPLICATION (H): ICD-10-CM

## 2023-06-29 DIAGNOSIS — K50.819 CROHN'S DISEASE OF SMALL AND LARGE INTESTINES WITH COMPLICATION (H): ICD-10-CM

## 2023-06-29 PROBLEM — K50.80 CROHN'S ILEOCOLITIS (H): Status: ACTIVE | Noted: 2023-06-29

## 2023-06-29 PROCEDURE — 99205 OFFICE O/P NEW HI 60 MIN: CPT | Performed by: SURGERY

## 2023-06-29 RX ORDER — ALBUTEROL SULFATE 0.83 MG/ML
2.5 SOLUTION RESPIRATORY (INHALATION)
OUTPATIENT
Start: 2023-06-29

## 2023-06-29 RX ORDER — DIPHENHYDRAMINE HYDROCHLORIDE 50 MG/ML
50 INJECTION INTRAMUSCULAR; INTRAVENOUS
Start: 2023-06-29

## 2023-06-29 RX ORDER — EPINEPHRINE 1 MG/ML
0.3 INJECTION, SOLUTION, CONCENTRATE INTRAVENOUS EVERY 5 MIN PRN
OUTPATIENT
Start: 2023-06-29

## 2023-06-29 RX ORDER — DIPHENHYDRAMINE HCL 25 MG
25 CAPSULE ORAL ONCE
Start: 2023-06-29 | End: 2023-06-29

## 2023-06-29 RX ORDER — METHYLPREDNISOLONE SODIUM SUCCINATE 125 MG/2ML
125 INJECTION, POWDER, LYOPHILIZED, FOR SOLUTION INTRAMUSCULAR; INTRAVENOUS
Start: 2023-06-29

## 2023-06-29 RX ORDER — ALBUTEROL SULFATE 90 UG/1
1-2 AEROSOL, METERED RESPIRATORY (INHALATION)
Start: 2023-06-29

## 2023-06-29 RX ORDER — ACETAMINOPHEN 325 MG/1
650 TABLET ORAL ONCE
Start: 2023-06-29 | End: 2023-06-29

## 2023-06-29 RX ORDER — MEPERIDINE HYDROCHLORIDE 25 MG/ML
25 INJECTION INTRAMUSCULAR; INTRAVENOUS; SUBCUTANEOUS EVERY 30 MIN PRN
OUTPATIENT
Start: 2023-06-29

## 2023-06-29 ASSESSMENT — PAIN SCALES - GENERAL: PAINLEVEL: NO PAIN (0)

## 2023-06-29 NOTE — LETTER
2023       RE: Nicol Aldrich  [address]  [facility] [address]       Dear Colleague,    Thank you for referring your patient, Nicol Aldrich, to the [facility] COLON AND RECTAL SURGERY CLINIC [place] at [facility]. Please see a copy of my visit note below.    Colon and Rectal Surgery Clinic Note    RE: Nicol Aldrich.  : 1960.  [place]LAND: 2023.    Reason for visit: Crohn's, perianal crohn's - Patient of Dr. Coyle    HPI: Nicol is a 62 year old female who was diagnosed with Crohn disease at age 31, with colonic involvement and with a perianal fistula (had a fistulotomy but no records) She was treated initially with mesalamine. 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 On 2014 she underwent transanal excision of the lesion with subsequent chemotherapy and radiation therapy. She started methotrexate postoperatively and in  she started on Entyvio. 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 Recently started on infliximab, but having stool output from EC fistulas. 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     History of C. Diff. She was C. Diff + last as of , treated with PO Vancomycin by Dr. Coyle    Transanal excision (10/2014) Dr. Rivas [facility]  Description of Procedure:  The patient was taken to the operating room and placed in the supine position. General anesthesia was induced. Endotracheal tube was placed. She was rolled to the prone jackknife position. Buttocks taped laterally. The anal area was prepped with Betadine Prep and sterilely draped.   Pause for identification of patient and procedure carried out.   Anoscopy is done. We found the lesion just to the left of posterior midline. This does run over about 2 to 2.5 cm in length. An  elliptical incision is now made, working from outside the dentated line proximally. The anal sphincters are spared. We now work deeper and work our way around this lesion up to the upper end and the distal end of the specimen was marked with a Vicryl suture. Beyond the upper end of resection, a 2-0 Vicryl suture was placed. The specimen was removed and sent to Pathology. After hemostasis was obtained with cautery, the wound was closed with running locked 2-0 Vicryl. The frozen section revealed on the anterior margin that would be further to the left we were very close to this margin and felt we should take additional tissue. We took out the sutures and removed about another 0.5 cm of tissue. Again hemostasis was obtained with cautery. The wound was again closed with running locked 2-0 Vicryl. (Exparel) long acting local anesthetic is used. We used 20 mL of this. Fibrillar collagen hemostatic agent applied and additional 4 x 4s. She tolerated this well.   Patient transferred out of operating room in good condition. All surgical medications, procedures and x-rays performed in surgery-on the order of the operating physicians. Micaela Buenrostro PA-C assisted in the above procedure. They provided assistance with pre-operative positioning, prepping, and draping of the patient. The assistant provided vital operative assistance with retraction using instruments thus providing the necessary exposure and visualization for the case, manipulation of tissues to achieve hemostasis, suction for visualization and assisted in closure of the wound. Post-operatively they assisted in transfer of the patient off the operative table and transition to the PACU physicians.    DIAGNOSIS:   DISTAL RECTUM AND ANUS, EXCISION WITH ANTERIOR MARGIN RE-EXCISION        --HISTOLOGIC DIAGNOSIS:  INVASIVE MODERATELY DIFFERENTIATED SQUAMOUS CELL CARCINOMA WITH EXTENSIVE SQUAMOUS CELL CARCINOMA IN SITU        --DEPTH OF INVASION: TUMOR INVOLVES THE SUBMUCOSA         --TUMOR SIZE:  1.5 CM (LINEAR EXTENT)        --MARGINS:             --SQUAMOUS CELL CARCINOMA IN SITU INVOLVES THE POSTERIOR MARGIN             --INVASIVE SQUAMOUS CELL CARCINOMA IS 0.1 CM FROM THE POSTERIOR MARGIN        --NO INVASIVE CARCINOMA IDENTIFIED AT THE MARGINS        --LYMPH NODES: NO CARCINOMA IDENTIFIED IN 1 LYMPH NODE WITHIN THE          MAIN SPECIMEN        --VASCULAR INVASION: NOT IDENTIFIED     [id number]   STAGING NOTE:  With the available information, this lesion represents a   Stage I tumor (pT1, N0, MX).     PERIANAL EXAM WITH BIOPSIES with Dr. Kumar 8/2/18        Laparoscopic LOOP COLOSTOMY  by Dr. Keys- [facility] 5/12/21  This case was substantially more difficult than usual because of significant effort and difficulty mobilizing and identifying anatomical structures due to altered surgical field secondary to distorted anatomy and inflammation.    Nicol Aldrich was taken to the operating room, placed on a table in a supine position, underwent general endotracheal anesthesia, and was then repositioned in a synchronous position with Yellofin stirrups. A urinary catheter was inserted and sequential compression devices were placed to the bilateral lower extremities. Preoperative antibiotics and subcutaneous heparin were administered. The patient was then prepped and draped in a normal sterile fashion followed by a pause identifying the correct patient, date, site, and procedure.    The abdominal cavity was entered under direct visualization utilizing a Riky technique in the supraumbilical midline. Three additional 5mm trocars were placed under direct visualization.     After appropriate positioning the sigmoid colon was identified and mobilized. The sigmoid itself was quite abnormal with signs of prior inflammation consistent with Crohn's disease. She also had extensive diverticulosis involving the sigmoid colon. There were dense adhesions between the sigmoid and the  retroperitoneum/lateral abdominal wall. We did take down the majority of these adhesions and then took down the attachments between the descending colon and the abdominal wall and retroperitoneum. We were able to identify and protect the left ureter. We continued this dissection up to the splenic flexure. At this point we did make our stoma site in the left upper quadrant marked 1. We cored out the skin and soft tissue down to the fascia and then opened the fascia in a cruciate manner. We spread the rectus muscle to expose the posterior fashion peritoneum which was also opened in a cruciate manner. We attempted to pull the mobilized colon out of this hole laparoscopically, but it was clear that we did not have enough mobilization. So we placed an Daniel wound protector at this site and reinsufflated the abdomen. I then used the LigaSure device to fully mobilize the splenic flexure from all of its attachments. We did have to take the omentum off of the transverse colon and off of the splenic flexure and proximal descending colon to get full mobilization. We then tried again to pull the colon through the planned site, and this time it did reach with minimal tension. We did use a red rubber catheter to act as a stoma bridge to hold it up without significant tension. We also looked again laparoscopically to confirm correct proximal and distal orientation.     We desufflated the abdomen and proceeded to close all the 5 port as well as the Curran port. The Curran was closed with interrupted 1. Vicryl sutures. All skin wounds were closed with running 3-0 Monocryl in a subcuticular fashion followed by Dermabond. We then matured the stoma with multiple interrupted 3 0 Vicryl suture circumferentially. We did leave the red rubber catheter in place to act as a stoma bridge and secured this to the skin with nylon sutures.    The abdomen was then cleaned and dried and dressings were applied to all the incisions.     Next I put  her legs up and looked at the perineum. She has significant radiation damage to the perianal skin. She does have a patulous anus with exposed mucosa circumferentially. There is an essentially divot or crater like abnormality on the left anterior region of the anus. There is no mucosal break in no skin breaks year consistent with a fistula. There is no open wound and the mucosa itself looks normal. I was not able to probe any areas to suggest that there was any underlying tracking. It just appears that this is likely the site of her prior cancer and with regression of the cancer at left some functional and structural abnormality in this region. There was no other concern on the perineum for any undrained fluid collections.    Nicol Aldrich tolerated the procedure well. There were no complications during the case. The patient was transferred to the postanesthesia care unit in stable condition.  Kera Keys M.D.     Colonoscopy 11/3/22  Findings:        The perianal exam findings included some scarring at the anal area and absence of anal tone. The periostomal skin was without rashes or abscesses. The colostomy was first accessed: A continuous area of severly inflammed        and friable nonbleeding mucosa with extensive large cratered ulcers was present in the transverse colon, in the ascending colon and in the cecum. Biopsies were taken with a cold forceps for histology R/O CMV, Crohn's disease, ischemia.        One large ulcer was present in the distal rectum. Biopsies were taken with a cold forceps for histology. A diffuse area of severely friable mucosa with contact bleeding was found in the rectum and in the recto-sigmoid colon. Biopsies were taken        with a cold forceps for histology R/O diversion colitis. A 2 mm likely fistula was found at 18 cm proximal to the anus.     Impression:     - Abnormal perianal exam.                          - Colostomy accessed: Severly inflammed mucosa with large  "ulcers. Biopsied R/O CMV, Crohn's disease, ischemia.                          - Ulcer in distal rectum. Biopsied.                          - Friability with contact bleeding in the rectum and in the recto-sigmoid colon. Biopsied R/O diversion colitis.                          - A possible fistula opening was seen at 18 cm from the anus.           Medical history:  Anal cancer  Pyoderma Gangrenosum    Surgical history:  Loop colostomy, lap 2019    Family history:  Noncontributory    Medications:  Current Outpatient Medications   Medication Sig Dispense Refill    budesonide (ENTOCORT EC) 3 MG EC capsule Take 3 capsules (9 mg) by mouth every morning for 360 days 90 capsule 11    clobetasol (TEMOVATE) 0.05 % external cream Apply to ulcers around stoma site with each bag change 60 g 2    clobetasol (TEMOVATE) 0.05 % external ointment Use on Saturday / Sunday, twice daily 60 g 1    clobetasol (TEMOVATE) 0.05 % external ointment APPLY TO THE AFFECTED AREA TWICE DAILY AS NEEDED FOR UP TO 14 DAYS. APPLY TO ULCER AROUND THE OSTOMY (Patient not taking: Reported on 3/7/2023)      Control Gel Formula Dressing (DUODERM CGF DRESSING) MISC Apply daily to stoma site - DuoDerm Extra Thin 30 each 2    dapsone (ACZONE) 100 MG tablet Take 1 tablet (100 mg) by mouth daily (Patient not taking: Reported on 4/25/2023) 30 tablet 2    oxyCODONE (ROXICODONE) 5 MG tablet  (Patient not taking: Reported on 3/7/2023)      pimecrolimus (ELIDEL) 1 % external cream Apply topically 2 times daily 60 g 3    sulfamethoxazole-trimethoprim (BACTRIM DS) 800-160 MG tablet Take 1 tablet by mouth 2 times daily 20 tablet 0    vancomycin (VANCOCIN) 125 MG capsule Take 1 capsule (125 mg) by mouth daily 90 capsule 3    vedolizumab (ENTYVIO) 60 MG/ML injection Inject 300 mg into the vein         Allergies:  Allergies   Allergen Reactions    Penicillins Rash     On 07/10/18, pt states that she experienced a rash when she had this \"a while ago.\" This was not a " "reaction from childhood.   On 07/10/18, pt states that she experienced a rash when she had this \"a while ago.\" This was not a reaction from childhood.          Social history:   Social History     Tobacco Use    Smoking status: Never    Smokeless tobacco: Never   Vaping Use    Vaping status: Not on file   Substance Use Topics    Alcohol use: Yes     Alcohol/week: 2.0 standard drinks of alcohol     Types: 2 Glasses of wine per week     Marital status: .    ROS:  A complete review of systems was performed with the patient and all systems negative except as per HPI.    Physical Examination:  Exam was chaperoned by Gerri Padilla CMA  /75 (BP Location: Left arm, Patient Position: Sitting, Cuff Size: Adult Regular)   Pulse 72   Ht 1.664 m (5' 5.5\")   Wt 77.5 kg (170 lb 14.4 oz)   SpO2 100%   BMI 28.01 kg/m    General: Well hydrated. No acute distress.  Abdomen: Soft, NT, nondistended, loop colostomy present and healthy with primary colocutaneous fistula 3 cm left lateral to stoma. Mildly macerated skin around fistula, but otherwise minimal obvious inflammation, overall appears pretty healthy. No inguinal adenopathy palpated.  Perianal external examination:  Perianal skin with changes of scarring and radiation, without obvious growths or lesions.  Digital rectal examination: Was performed.   No evidence of masses, easily friable anal mucosa.    Anoscopy: Was deferred      Laboratory values reviewed:  Recent Labs   Lab Test 06/13/23  1554   WBC 10.0   HGB 12.5      CR 0.97*   ALBUMIN 4.2   BILITOTAL 0.2   ALKPHOS 81   ALT 14   AST 60*       Imaging personally reviewed by me:     MR Pelvis 11/5/22  IMPRESSION:   1. Increased fluid signal enhancement along the posterior left aspect of the anus near the 5 o'clock position which may represent a small fistula.   2. No abscess or large fistulous tract identified.   3. Chronic inflammatory changes of the sigmoid colon and rectum.    CT A/P " 1/13/23  IMPRESSION:   1. Interval resolution of previous pancolitis.   2. Peristomal hernia containing multiple small bowel loops from the diverting colostomy in the left anterior mid abdomen.  No obstruction or strangulation. Fatty hernia that is ventral above the umbilicus also redemonstrated.   3. No abnormal fluid collection or abscess formation.   4. The left posterior perianal fistulous tract at approximate 5 o'clock position without active inflammation or abscess formation redemonstrated.    ASSESSMENT  1. Crohn disease with distal colonic involvement - (Budesonide, Entyvio)  2. History of squamous cell carcinoma of the anus, previous radiation and chemotherapy in 2014  3. Fecal incontinence, recurrent perianal skin infection  4. APR in 2021  5. Pyoderma Gangrenosum  6. Colostomy status    PLAN  1. Given her improvement under the care of Dr. Coyle and Dr. Guo, I would favor continued medical management. Her parastomal hernia is not symptomatic at this time, and with her improvement, a local revision is still a major undertaking. Furthermore, her primary disease has been her distal colon, which without optimal control will likely result in a futile revision with recurrence. The two surgical options I discussed were a local revision which would require a conversion from loop to end colostomy, and then resection back proximal to her fistulas. This is a major undertaking in itself, and would not guarantee that I would be able to appropriately extracorporialize the stoma. The other option, should her disease progress, is an total abdominal colectomy with end ileostomy, with new siting in the right abdomen, without primary repair closure of her stoma.    Risks, benefits, and alternatives of operative treatment were thoroughly discussed with the patient, she understands these well and agrees to proceed.    Time spent: 60 minutes, >50% spent in discussing, counseling and coordinating care.    Referring  Provider:  Aftab Wall MD  [address]  [address]  [place] [address]     Primary Care Provider:  Janet Noe, thank you for allowing me to participate in the care of your patient.      Sincerely,    Kunal Herrera MD, MD

## 2023-06-29 NOTE — NURSING NOTE
"Chief Complaint   Patient presents with     New Patient     Crohn's disease        Vitals:    06/29/23 0830   BP: 125/75   BP Location: Left arm   Patient Position: Sitting   Cuff Size: Adult Regular   Pulse: 72   SpO2: 100%   Weight: 170 lb 14.4 oz   Height: 5' 5.5\"       Body mass index is 28.01 kg/m .    Gerri Padilla CMA    "

## 2023-06-29 NOTE — TELEPHONE ENCOUNTER
----- Message from Angle Kay RN sent at 6/29/2023 12:46 PM CDT -----  Regarding: RE: IBD care, home infusion, lab monitoring  Nope!! Thank you though!  ----- Message -----  From: J CarloseleanordevanCleopatra LPN  Sent: 6/29/2023  12:43 PM CDT  To: Angle Kay RN  Subject: RE: IBD care, home infusion, lab monitoring      Did you also want the future orders: hep b and tb faxed as well?    Cleopatra WhitmaneleanordevanIZABELLA    ----- Message -----  From: Angle Kay RN  Sent: 6/29/2023  10:44 AM CDT  To: Cleopatra IZABELLA Lozada  Subject: FW: IBD care, home infusion, lab monitoring      Good morning,    Can we please fax this patient's therapy plan, standing lab orders, and IFX level order to [facility] at [phone]?    Thank you!!  Jeovany   ----- Message -----  From: Angle Kay RN  Sent: 6/26/2023  12:00 AM CDT  To: Angle Kay RN  Subject: FW: IBD care, home infusion, lab monitoring      Third induction dose completed 6/22. Call [facility] and transition infusion orders to Dr. Coyle. Order level for first maintenance infusion on 8/17.    ----- Message -----  From: Jordan Coyle MD  Sent: 6/16/2023   8:24 AM CDT  To: Angle Kay RN; Hayder Caldwell MD  Subject: RE: IBD care, home infusion, lab monitoring      Thanks, Hayder!    Jeovany, just to clarify. We want a week 14 level. Thanks!    EUSEBIO  ----- Message -----  From: Hayder Caldwell MD  Sent: 6/15/2023   9:10 PM CDT  To: Angle Kay RN; #  Subject: IBD care, home infusion, lab monitoring          Jeovany,     I hope all is well.     Can you help with the following for Nicol?    1. She is currently undergoing induction with infliximab through her previous provider, how do we transfer her home infusion management to our clinic and thus our lab monitoring care plan for infliximab? Can you help with this.     2. also, can we have an infliximab level and anti-infliximab antibody drawn the day of her first maintenance infusion  (post-induction)?    Thank you! Let me know how I can help.     Best,  Hayder

## 2023-06-29 NOTE — PROGRESS NOTES
"[facility] Ostomy Assessment  Patient comes to clinic for consultation regarding ostomy issues.    Procedure: past medical history of Crohn's disease (s/p hemicolectomy with colostomy bag in 2021, on prednisone and vedolizumab), two hernias near stoma, diversion colitis, squamous cell carcinoma of the anal canal (s/p transanal excision of anal CA in 2014), and perirectal abscess who presents with an ulcer of her ostomy    Dx related to ostomy:Crohn's and rectal cancer  Consulted per Dr Jamal Herrera    Subjective:Ostomy care is provided by self   Patient's reason for visit: \"fistula care recommendations\"     The quantity of ostomy supplies needed by a beneficiary is determined primarily by the type of ostomy, its location, its construction, and the condition of the skin surface surrounding the stoma.    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 This is  scheduled.   Current pouch system/supplies: Coloplast   two piece, cut to fit, convex and barrier ring    Assessment: Pt is doing well with daily stoma cares. HX of PG and now Crohn's related fistula. Erythema has reduced since starting Remicade to control Crohn's. She has used Foam and Duoderm on the fistula and was given an ointment by derm. Drainage is reducing     Intervention/Plan:  Continue current cares since she is managing well and skin is improving. Suggested to isolate the fistula from the stoma by cutting af the lateral edge of the barrier so the fistula can be covered with foam and gauze , frequently changed to protect the skin and the barrier wouldn't need to be replaced often in that scenario. Pt was given some foam, Aquacel, Duoderm and Tegaderm to try.   Return to clinic prn .      KIKE Herrera " saw pt and  was available for supervision of care if needed or if questions should arise and regarding plan of care.      Sabiha Ventura, RN  RN CWON

## 2023-06-29 NOTE — PATIENT INSTRUCTIONS
Follow up:  As needed      Please call with any questions or concerns regarding your clinic visit today.    It is a pleasure being involved in your health care.    Contacts post-consultation depending on your need:    Radiology Appointments [phone]    Schedule Clinic Appointments [phone] # 1   M-F 7:30 - 5 pm    [facility] [phone]    Clinic Fax Number [phone]    Surgery Scheduling [phone]    My Chart is available 24 hours a day and is a secure way to access your records and communicate with your care team.  I strongly recommend signing up if you haven't already done so, if you are comfortable with computers.  If you would like to inquire about this or are having problems with My Chart access, you may call [phone] or go online at [email or web address].  Please allow at least 24 hours for a response and extra time on weekends and Holidays.

## 2023-06-29 NOTE — TELEPHONE ENCOUNTER
Therapy plan ordered. Standing lab orders placed. Contacted [facility] to confirm Infliximab dose- per pharmacist patient last infused 400mg, which is 5mg/kg.     [employer] message sent to Ms. Lozada requesting therapy plan and standing lab orders be faxed to [facility] at [phone].     TB Quantiferon Gold and Hepatitis B Serologies due at this time- no record noted in the chart. Attempted to contact the patient to set up lab appointment while the patient is currently in the clinic, no answer. Left detailed message requesting callback.

## 2023-06-29 NOTE — TELEPHONE ENCOUNTER
Printed therapy plan, lab orders: infliximab level, hepatic, CRP, CBC, ESR and faxed to [facility] at [phone]. Will follow up to make sure they received this fax.     There was no answer so left message with [facility] to see if they got the lab orders. Writers callback number given.     Left message with [facility] as not heard back.

## 2023-06-30 ENCOUNTER — LAB (OUTPATIENT)
Dept: LAB | Facility: OTHER | Age: 63
End: 2023-06-30
Payer: COMMERCIAL

## 2023-06-30 DIAGNOSIS — K50.80 CROHN'S ILEOCOLITIS (H): ICD-10-CM

## 2023-06-30 PROCEDURE — 86704 HEP B CORE ANTIBODY TOTAL: CPT

## 2023-06-30 PROCEDURE — 36415 COLL VENOUS BLD VENIPUNCTURE: CPT

## 2023-06-30 PROCEDURE — 86481 TB AG RESPONSE T-CELL SUSP: CPT

## 2023-06-30 PROCEDURE — 86706 HEP B SURFACE ANTIBODY: CPT

## 2023-06-30 PROCEDURE — 87340 HEPATITIS B SURFACE AG IA: CPT

## 2023-07-02 LAB
GAMMA INTERFERON BACKGROUND BLD IA-ACNC: 0.01 IU/ML
HBV CORE AB SERPL QL IA: NONREACTIVE
HBV SURFACE AB SERPL IA-ACNC: 0.53 M[IU]/ML
HBV SURFACE AB SERPL IA-ACNC: NONREACTIVE M[IU]/ML
HBV SURFACE AG SERPL QL IA: NONREACTIVE
M TB IFN-G BLD-IMP: NEGATIVE
M TB IFN-G CD4+ BCKGRND COR BLD-ACNC: 9.99 IU/ML
MITOGEN IGNF BCKGRD COR BLD-ACNC: 0.05 IU/ML
MITOGEN IGNF BCKGRD COR BLD-ACNC: 0.06 IU/ML
QUANTIFERON MITOGEN: 10 IU/ML
QUANTIFERON NIL TUBE: 0.01 IU/ML
QUANTIFERON TB1 TUBE: 0.07 IU/ML
QUANTIFERON TB2 TUBE: 0.06

## 2023-07-06 ENCOUNTER — TELEPHONE (OUTPATIENT)
Dept: GASTROENTEROLOGY | Facility: CLINIC | Age: 63
End: 2023-07-06
Payer: COMMERCIAL

## 2023-07-06 NOTE — TELEPHONE ENCOUNTER
faxed the Hepatitis B serologies and TB Quant Gold results to [facility] at [phone]    Labs w/ [facility]  Received: Yesterday  Angle Kay, RN  Cleopatra Lozada LPN Hey Crystal,    Just to prevent them missing it again, we are going to have Nicol go to the [facility] lab and do her IFX level, and then also her routine labs. Would you mind reaching out to [facility] and letting them know that we do not need ANY labs drawn at her upcoming infusion on 8/17.    I just dont want them to draw labs as well and then have the pt potentially get two separate bills.    I also told the pt to tell her infusion nurse, but you never know.    Thank you!!   Jeovany    Called [facility] and left message regarding level.    Cleopatra Lozada LPN

## 2023-07-07 ENCOUNTER — TELEPHONE (OUTPATIENT)
Dept: GASTROENTEROLOGY | Facility: CLINIC | Age: 63
End: 2023-07-07
Payer: COMMERCIAL

## 2023-07-07 NOTE — TELEPHONE ENCOUNTER
Contacted [facility] to ensure orders have been transferred over to Dr. Coyle. Received confirmation. Current prior authorization is good through April 2024.

## 2023-08-14 ENCOUNTER — TELEPHONE (OUTPATIENT)
Dept: GASTROENTEROLOGY | Facility: CLINIC | Age: 63
End: 2023-08-14
Payer: COMMERCIAL

## 2023-08-14 ENCOUNTER — MYC MEDICAL ADVICE (OUTPATIENT)
Dept: GASTROENTEROLOGY | Facility: CLINIC | Age: 63
End: 2023-08-14
Payer: COMMERCIAL

## 2023-08-14 DIAGNOSIS — K50.819 CROHN'S DISEASE OF SMALL AND LARGE INTESTINES WITH COMPLICATION (H): Primary | ICD-10-CM

## 2023-08-14 NOTE — TELEPHONE ENCOUNTER
Received voicemail from patient reporting that the next infusion is scheduled for 8/17. Wondering if trough should be drawn in [facility] rather than having [facility] draw.     Attempted to contact, the patient however no answer. Left detailed message confirming plan for trough level. Also sent [employer] message. Canceled previous IFX level order and reordered to avoid confusion of fax instructions. Updated Ms. Lozada regarding order and prometheus form.

## 2023-08-15 ENCOUNTER — PRE VISIT (OUTPATIENT)
Dept: SURGERY | Facility: CLINIC | Age: 63
End: 2023-08-15

## 2023-08-15 NOTE — TELEPHONE ENCOUNTER
Lab appointment scheduled on 8/17, prior to patient's infusion to have all routine labs completed, along with IFX level. Inbasket message sent to Ms. Mcdanieldevan requesting [facility] be updated that no labs will be needed at this infusion. Updated patient via [employer].

## 2023-08-16 ENCOUNTER — DOCUMENTATION ONLY (OUTPATIENT)
Dept: GASTROENTEROLOGY | Facility: CLINIC | Age: 63
End: 2023-08-16
Payer: COMMERCIAL

## 2023-08-16 ENCOUNTER — MEDICAL CORRESPONDENCE (OUTPATIENT)
Dept: HEALTH INFORMATION MANAGEMENT | Facility: CLINIC | Age: 63
End: 2023-08-16
Payer: COMMERCIAL

## 2023-08-17 ENCOUNTER — LAB (OUTPATIENT)
Dept: LAB | Facility: OTHER | Age: 63
End: 2023-08-17
Payer: COMMERCIAL

## 2023-08-17 DIAGNOSIS — K50.819 CROHN'S DISEASE OF SMALL AND LARGE INTESTINES WITH COMPLICATION (H): ICD-10-CM

## 2023-08-17 DIAGNOSIS — K50.80 CROHN'S ILEOCOLITIS (H): ICD-10-CM

## 2023-08-17 LAB
ALBUMIN SERPL BCG-MCNC: 4.2 G/DL (ref 3.5–5.2)
ALP SERPL-CCNC: 72 U/L (ref 35–104)
ALT SERPL W P-5'-P-CCNC: 15 U/L (ref 0–50)
AST SERPL W P-5'-P-CCNC: 31 U/L (ref 0–45)
BASOPHILS # BLD AUTO: 0 10E3/UL (ref 0–0.2)
BASOPHILS NFR BLD AUTO: 0 %
BILIRUB DIRECT SERPL-MCNC: <0.2 MG/DL (ref 0–0.3)
BILIRUB SERPL-MCNC: 0.3 MG/DL
CRP SERPL-MCNC: 6.46 MG/L
EOSINOPHIL # BLD AUTO: 0.1 10E3/UL (ref 0–0.7)
EOSINOPHIL NFR BLD AUTO: 2 %
ERYTHROCYTE [DISTWIDTH] IN BLOOD BY AUTOMATED COUNT: 13.8 % (ref 10–15)
ERYTHROCYTE [SEDIMENTATION RATE] IN BLOOD BY WESTERGREN METHOD: 11 MM/HR (ref 0–30)
HCT VFR BLD AUTO: 38.6 % (ref 35–47)
HGB BLD-MCNC: 12.8 G/DL (ref 11.7–15.7)
IMM GRANULOCYTES # BLD: 0 10E3/UL
IMM GRANULOCYTES NFR BLD: 0 %
LYMPHOCYTES # BLD AUTO: 1.3 10E3/UL (ref 0.8–5.3)
LYMPHOCYTES NFR BLD AUTO: 22 %
MCH RBC QN AUTO: 29.2 PG (ref 26.5–33)
MCHC RBC AUTO-ENTMCNC: 33.2 G/DL (ref 31.5–36.5)
MCV RBC AUTO: 88 FL (ref 78–100)
MONOCYTES # BLD AUTO: 0.6 10E3/UL (ref 0–1.3)
MONOCYTES NFR BLD AUTO: 11 %
NEUTROPHILS # BLD AUTO: 3.7 10E3/UL (ref 1.6–8.3)
NEUTROPHILS NFR BLD AUTO: 65 %
PLATELET # BLD AUTO: 294 10E3/UL (ref 150–450)
PROT SERPL-MCNC: 6.9 G/DL (ref 6.4–8.3)
RBC # BLD AUTO: 4.39 10E6/UL (ref 3.8–5.2)
WBC # BLD AUTO: 5.6 10E3/UL (ref 4–11)

## 2023-08-17 PROCEDURE — 80076 HEPATIC FUNCTION PANEL: CPT

## 2023-08-17 PROCEDURE — 86140 C-REACTIVE PROTEIN: CPT

## 2023-08-17 PROCEDURE — 85652 RBC SED RATE AUTOMATED: CPT

## 2023-08-17 PROCEDURE — 80230 DRUG ASSAY INFLIXIMAB: CPT | Mod: 90

## 2023-08-17 PROCEDURE — 36415 COLL VENOUS BLD VENIPUNCTURE: CPT

## 2023-08-17 PROCEDURE — 82542 COL CHROMOTOGRAPHY QUAL/QUAN: CPT | Mod: 90

## 2023-08-17 PROCEDURE — 99000 SPECIMEN HANDLING OFFICE-LAB: CPT

## 2023-08-17 PROCEDURE — 85025 COMPLETE CBC W/AUTO DIFF WBC: CPT

## 2023-08-22 LAB
INFLIXIMAB AB SERPL IA-MCNC: <3.1 U/ML
INFLIXIMAB SERPL-MCNC: 12.6 UG/ML

## 2023-09-18 ASSESSMENT — ENCOUNTER SYMPTOMS
WEIGHT GAIN: 0
CHILLS: 0
HEARTBURN: 0
RECTAL PAIN: 0
ABDOMINAL PAIN: 1
FATIGUE: 1
BLOOD IN STOOL: 0
POLYPHAGIA: 0
BLOATING: 0
DIFFICULTY URINATING: 0
HALLUCINATIONS: 0
NAUSEA: 0
DECREASED APPETITE: 0
WEIGHT LOSS: 0
JAUNDICE: 0
VOMITING: 0
ALTERED TEMPERATURE REGULATION: 1
FEVER: 0
NIGHT SWEATS: 0
CONSTIPATION: 0
FLANK PAIN: 0
HEMATURIA: 0
INCREASED ENERGY: 1
DYSURIA: 0
BOWEL INCONTINENCE: 0
DIARRHEA: 0

## 2023-09-19 ENCOUNTER — OFFICE VISIT (OUTPATIENT)
Dept: DERMATOLOGY | Facility: CLINIC | Age: 63
End: 2023-09-19
Payer: COMMERCIAL

## 2023-09-19 ENCOUNTER — LAB (OUTPATIENT)
Dept: LAB | Facility: CLINIC | Age: 63
End: 2023-09-19
Payer: COMMERCIAL

## 2023-09-19 ENCOUNTER — OFFICE VISIT (OUTPATIENT)
Dept: GASTROENTEROLOGY | Facility: CLINIC | Age: 63
End: 2023-09-19
Attending: INTERNAL MEDICINE
Payer: COMMERCIAL

## 2023-09-19 VITALS
HEART RATE: 91 BPM | WEIGHT: 177.5 LBS | SYSTOLIC BLOOD PRESSURE: 112 MMHG | DIASTOLIC BLOOD PRESSURE: 73 MMHG | BODY MASS INDEX: 28.53 KG/M2 | OXYGEN SATURATION: 98 % | HEIGHT: 66 IN

## 2023-09-19 DIAGNOSIS — R39.15 URINARY URGENCY: Primary | ICD-10-CM

## 2023-09-19 DIAGNOSIS — A49.8 RECURRENT CLOSTRIDIOIDES DIFFICILE INFECTION: ICD-10-CM

## 2023-09-19 DIAGNOSIS — D48.5 NEOPLASM OF UNCERTAIN BEHAVIOR OF SKIN: Primary | ICD-10-CM

## 2023-09-19 DIAGNOSIS — K50.819 CROHN'S DISEASE OF SMALL AND LARGE INTESTINES WITH COMPLICATION (H): ICD-10-CM

## 2023-09-19 DIAGNOSIS — K63.2 ENTEROCUTANEOUS FISTULA: ICD-10-CM

## 2023-09-19 PROCEDURE — 11102 TANGNTL BX SKIN SINGLE LES: CPT | Mod: GC | Performed by: STUDENT IN AN ORGANIZED HEALTH CARE EDUCATION/TRAINING PROGRAM

## 2023-09-19 PROCEDURE — 99215 OFFICE O/P EST HI 40 MIN: CPT | Performed by: STUDENT IN AN ORGANIZED HEALTH CARE EDUCATION/TRAINING PROGRAM

## 2023-09-19 PROCEDURE — 99214 OFFICE O/P EST MOD 30 MIN: CPT | Mod: 25 | Performed by: STUDENT IN AN ORGANIZED HEALTH CARE EDUCATION/TRAINING PROGRAM

## 2023-09-19 PROCEDURE — 88305 TISSUE EXAM BY PATHOLOGIST: CPT | Mod: TC | Performed by: STUDENT IN AN ORGANIZED HEALTH CARE EDUCATION/TRAINING PROGRAM

## 2023-09-19 PROCEDURE — 11103 TANGNTL BX SKIN EA SEP/ADDL: CPT | Mod: GC | Performed by: STUDENT IN AN ORGANIZED HEALTH CARE EDUCATION/TRAINING PROGRAM

## 2023-09-19 PROCEDURE — 88305 TISSUE EXAM BY PATHOLOGIST: CPT | Mod: 26 | Performed by: PATHOLOGY

## 2023-09-19 ASSESSMENT — PAIN SCALES - GENERAL: PAINLEVEL: MODERATE PAIN (4)

## 2023-09-19 NOTE — LETTER
9/19/2023         RE: Nicol Lopeztter  [address]  [facility] [address]      [facility]-  IBD Clinic:     Follow up for complicated Crohn's disease, EC fistulas, PG, hx of CDI    Last visit: 6/13/23  Date of visit: 09/19/23     Referring provider: Jordan Coyle    Follow up: 2nd opinion - penetrating crohn's with peristomal fistulas     Referring provider: Mae     ASSESSMENT AND PLAN:     #. Penetrating ileocolonic crohn's  Diagnosed at approximately age 31. Significant perianal disease and now with more than likely peristomal enterocutaneous fistulas. Has been exposed to steroids, 6MP, methtrexate, vedolizumab (low drug levels, no antibodies, progression of penetrating disease) and now recent start on infliximab (currently in induction phase) with a recent taper off of prednisone.  Since being seen has done well with IFX. CRP decreasing, MRE without activity (aside from possible areas of thickening within peristomal hernia). Clinically and biochemically improving on IFX, drug levels 12, no abs. Would ideally target levels > 15 given penetrating phenotype with residual fistula.   -- Most recent endoscopic disease activity: severe inflammation, large cratered ulcers in colon, needs repeat disease activity assessment   -- Most recent radiologic disease activity:  6/21/23, normal SB, some akren-stomal inflammation vs decompressed colon within karen-stomal hernia  -- Most recent TDM eval:   -- vedo levels: 5/1 (3/2021) and 3.4 (11/2022) (undetectable per their assay),  --  IFX levels 12.6 (8/17/23), abs undetectable   -- Most recent serologic eval: CRP 6.4 (8/2023) down from 12.2 (6/2023)  -- Most recent FC: 492 (4/2023), down from 906 (11/2022)  -- Current medication: infliximab, early maintenance     PLAN:  -- narrow IFX interval to q 4 weeks to increase drug levels given penetrating disease (ideally >15)  -- fecal calprotectin to quantify luminal inflammation given equivocal  MRE  -- colonoscopy for disease activity assessment in December 2022/January 2023  -- follow with CRS as she is, no current plans for resection   -- follow up in 3-6 months in clinic with Dr. Caldwell and Dr. Coyle     #. Peristomal fistulas  Significant improvement in last 3 months while on IFX. One small area of fistula remains with intermittent pus and stool passage.   -- continue IFX, narrow interval to increase levels associated with fistula healing     #. Recurrent clostridiodes difficile infection  Two positive tests since January 2023, first positive 8/2020. Numerous negative intervening tests. Completed a number of vancomycin tapers. Increased risk of recurrent illness given iBD and increased risk of worsened IBD outcomes with colonization/recurrent infection. Positive again 6/2023, s/p vancomycin course with improvement in stool consistency and pain.   -- will not re-test C.diff PCR today  -- pre-emptive referral to IMT clinic     #. Pyoderma gangrenosum   Peristomal location. Complicated by skin/soft tissue breakdown and recurrent infections. Managed by dermatology here at the [facility]. Most recently in early 2023 has been told her disease is now quiescent. Pictures of this area with documented improvement can be seen in the dermatology notes and the media tab. Ongoing impressive improvement during IFX therapy.  -- defer management to dermatology     #. Routine health maintenance in IBD  We will address vaccines, age appropriate cancer screening, infectious screening, bone health, and mental health as IBD therapy becomes more stable.   -- defer to next visit      #. Hx of anal cancer  #. 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 Diverted due to incontinence presumed secondary to rectal irritation/compliance issues.   -- continue to monitor    #. Nocturia  #. Urinary urgency  -- urogynecology referral   -- resume pelvic physical therapy exercises at home as  "previous     Return to Clinic: 3-6 months with Dr. Caldwell and/or Dr. Coyle.     Hayder Caldwell MD  GI fellow      Patient discussed and seen with Gastroenterology staff Dr. Coyle, who is in agreement with the above.       ====================================================================================================================================  HPI:     Ms. Aldrich presents for follow up today.     Overall, she feels much improved. In general, abdominal pain has improved, stool consistency has thickened, and her peristomal cutaneous issues have healed dramatically. Ostomy output is mostly solid, no melena, no hematochezia. Blood comes out from around ostomy at level of skin and is infrequent.     She is currently one her first infusion of IFX during the maintenance phase and it has been well tolerated. Completed both vancomycin and budesonide in mid August, both of which were helpful.     She does complain today though off pre-defecation pain near her stoma. She thinks it may be here hernia but is worried it could be recurrent Cdiff. Reassurance provided. Has to \"push\" her hernia back in at times.     She continues to follow with dermatology (PG) and CRS (complicated penetrating crohns, hx of ECFs, hx of anal cancer).    Targeted GI ROS negative unless noted above. All questions answered.     No past medical history on file.    No past surgical history on file.    No family history on file.    Social History     Tobacco Use    Smoking status: Never    Smokeless tobacco: Never   Substance Use Topics    Alcohol use: Yes     Alcohol/week: 2.0 standard drinks of alcohol     Types: 2 Glasses of wine per week       Physical exam:     Vitals:/73   Pulse 91   Ht 1.664 m (5' 5.5\")   Wt 80.5 kg (177 lb 8 oz)   SpO2 98%   BMI 29.09 kg/m     BMI: Body mass index is 29.09 kg/m .      GEN: NAD, A&Ox3, appropriate  HEENT: EOMI, PERRLA, MMM, hearing grossly intact  Neck: full ROM  Cardiopulmonary: non " labored, comfortable on RA, nondiaphoretic, no LE edema  Abdominal: soft, nontender, nondistended, no HSM, no rebound, no guarding, normoactive BS, ostomy appliance in place, abdominal wall hernia near stoma (reducible),  Skin: no jaundice, peristomal fistula with small punctate tract, decreased surrounding erythema from previous  Neuro: A&Ox3, grossly intact motor/sensation, gait intact  MSK: no gross deformity, moves arms/legs equally  Psych: normal affect, congruent with mood      Labs:   Lab Results   Component Value Date    WBC 5.6 08/17/2023    HGB 12.8 08/17/2023    HCT 38.6 08/17/2023     08/17/2023     06/13/2023    POTASSIUM 4.4 06/13/2023    CHLORIDE 104 06/13/2023    CO2 28 06/13/2023    BUN 13.1 06/13/2023    CR 0.97 (H) 06/13/2023    GLC 96 06/13/2023    SED 11 08/17/2023    AST 31 08/17/2023    ALT 15 08/17/2023    ALKPHOS 72 08/17/2023    BILITOTAL 0.3 08/17/2023      CRP: 6 (from 12)    C.diff: PCR and GDH positive (6/2023)    Relevant imaging:     MRE: 6/21/23  IMPRESSION:  1. Postsurgical changes of loop colostomy with moderate size  parastomal hernia. The transverse colon near the stoma within the  parastomal hernia demonstrates mild colonic wall thickening and  enhancement, could be due to underdistention versus active  inflammation. No convincing evidence of fistula formation. Large  amount of stool in the colon upstream to the stoma. The more distal  descending and sigmoid colon are decompressed.  2. No evidence of abnormal small bowel wall thickening or enhancement  to suggest presence of active small bowel Crohn's disease.  3. 8 mm T2 hyperintense focus in the pancreatic body, too small to  characterize could represent side branch intraductal papillary  mucinous neoplasm versus other pancreatic cystic lesions. Recommend  follow-up exam with contrast enhanced MRI/MRCP in one year to assess  for interval change.     HEMAL MAYER MD     Endoscopy:    Colonoscopy:  11/3/2022    Findings:        The perianal exam findings included some scarring at the anal area and        absence of anal tone.        The periostomal skin was without rashes or abscesses.        The colostomy was first accessed: A continuous area of severly inflammed        and friable nonbleeding mucosa with extensive large cratered ulcers was        present in the transverse colon, in the ascending colon and in the        cecum. Biopsies were taken with a cold forceps for histology R/O CMV,        Crohn's disease, ischemia.        One large ulcer was present in the distal rectum. Biopsies were taken        with a cold forceps for histology.        A diffuse area of severely friable mucosa with contact bleeding was        found in the rectum and in the recto-sigmoid colon. Biopsies were taken        with a cold forceps for histology R/O diversion colitis.        A 2 mm likely fistula was found at 18 cm proximal to the anus.     Impression:            - Abnormal perianal exam.                          - Colostomy accessed: Severly inflammed mucosa with                          large ulcers. Biopsied R/O CMV, Crohn's disease,                          ischemia.                          - Ulcer in distal rectum. Biopsied.                          - Friability with contact bleeding in the rectum and                          in the recto-sigmoid colon. Biopsied R/O diversion                          colitis.                          - A possible fistula opening was seen at 18 cm from                          the anus.     Recommendation:        - [facility] patient to [facility] for [facility] care.                          - Resume previous diet.                          - Await pathology results.                          - Consider MRI pelvis R/O fistula.                          - Further recs per inaptient GI team. Will consider IV                          steroids.   Ramiro Bach MD   11/3/2022 5:59:45 PM             Patient ROS:  Answers for HPI/ROS submitted by the patient on 9/18/2023  General Symptoms: Yes  Skin Symptoms: No  HENT Symptoms: No  EYE SYMPTOMS: No  HEART SYMPTOMS: No  LUNG SYMPTOMS: No  INTESTINAL SYMPTOMS: Yes  URINARY SYMPTOMS: Yes  GYNECOLOGIC SYMPTOMS: No  BREAST SYMPTOMS: No  SKELETAL SYMPTOMS: No  BLOOD SYMPTOMS: No  NERVOUS SYSTEM SYMPTOMS: No  MENTAL HEALTH SYMPTOMS: No  Fever: No  Loss of appetite: No  Weight loss: No  Weight gain: No  Fatigue: Yes  Night sweats: No  Chills: No  Increased stress: No  Excessive hunger: No  Feeling hot or cold when others believe the temperature is normal: Yes  Loss of height: No  Post-operative complications: No  Surgical site pain: No  Hallucinations: No  Change in or Loss of Energy: Yes  Hyperactivity: No  Confusion: No  Heart burn or indigestion: No  Nausea: No  Vomiting: No  Abdominal pain: Yes  Bloating: No  Constipation: No  Diarrhea: No  Blood in stool: No  Black stools: No  Rectal or Anal pain: No  Fecal incontinence: No  Yellowing of skin or eyes: No  Vomit with blood: No  Change in stools: No  Trouble holding urine or incontinence: Yes  Pain or burning: No  Trouble starting or stopping: No  Increased frequency of urination: Yes  Blood in urine: No  Decreased frequency of urination: No  Frequent nighttime urination: Yes  Flank pain: No  Difficulty emptying bladder: No          Attestation signed by Jordan Coyle MD at 9/29/2023 12:31 AM:  ATTENDING ATTESTATION:     DATE SEEN: 9/19/23    Patient was discussed, seen, and examined by Jordan rebolledo. The plan of care and pertinent data/imaging were reviewed with Dr. Caldwell. I agree with the assessment and plan as delineated above with the following additions:     Overall has had good improvement by labs and symptoms. Excellent improvement in PG/EC fistula. Will maximize IFX by increasing to 5 mg/kg q 4 weeks if insurance improves. Recheck fecal calprotectin. If elevated can give some  Entocort to help bridge as increased IFX takes effect. Recheck c diff if she has loose/watery stools. If has recurrence will need treatment followed by suppressive therapy until Crohn's is controlled. After Crohn's controlled could then consider IMT. She does have parastomal pain. MRE was reassuring just a couple months ago.     Would like to give patient more time at optimized dose of IFX before checking colonoscopy for mucosal healing. Plan for December/January.    She will keep us updated on symptoms. Can consider repeat cross sectional imaging of hernia if pain worsens but for now there is no evidence of significant issue. She has seen CRS in June and surgical intervention was not recommended. Medical management of Crohn's was recommended and pursued.    Thank you for this consultation.  It was a pleasure to participate in the care of this patient; please contact us with any further questions.  I spent a total of 40 minutes during the day of encounter performed chart review, meeting with patient, patient counseling, care coordination, and documentation.     Please contact me with any further questions.      Hayder Caldwell MD

## 2023-09-19 NOTE — NURSING NOTE
Dermatology Rooming Note    Nicol Aldrich's goals for this visit include:   Chief Complaint   Patient presents with    Derm Problem     Pt states it has been improving; and that there is only 1 spot left.     Chiki Lopez, EMT-B

## 2023-09-19 NOTE — PATIENT INSTRUCTIONS
Nicol,     Oc to see you in clinic today. We are glad that you are improving. Please see below for our plan.     We have ordered a fecal calprotectin stool test, you can pick that kit up on the first floor and can drop it off at any [facility] lab  We have ordered your colonoscopy with Dr. Coyle in December/January, our office will reach out to set this up  We have ordered a IMT/FMT clinic evaluation, they will call to discuss further  We have ordered a uro-gynecology clinic visit to discuss your urinary symptoms, they will reach out to schedule  We will try to narrow your Remicade to every 4 weeks, this may be limited by insurance, your levels are really good, but we want to see them > 15 to help with fistula healing  We will see you in clinic in 6 months, sooner if needed (this can be scheduled on [facility])    Please reach out with any questions or concerns,    Best,  Dr. Caldwell and Dr. Coyle

## 2023-09-19 NOTE — LETTER
9/19/2023       RE: Nicol Aldrich  [address]  [facility] [address]     Dear Colleague,    Thank you for referring your patient, Nicol Aldrich, to the [facility]ERMATOLOGY [facility] [place] at [facility]. Please see a copy of my visit note below.    [facility] Dermatology Note    Encounter Date: Sep 19, 2023    Dermatology Problem List:  06/20/23  # NUB, right shoulder  Ddx includes basal cell carcinoma vs blk  S/p biopsy 9/19/2023   # NUB, right anterior leg  Ddx includes porokeratosis vs SCC  S/p shave biopsy 9/19/2023     #Suspected peristomal pyoderma gangrenosum (ultimately felt to be enterocuteaneous fistula after 03/07/23 visit when areas were noted to drain stool)  - 12/27/22 seen by [facility] [place], dx and treated as peristomal PG   - 01/31/23 Dapsone 100 mg daily, clobetasol cream  - 03/07/23 stop dapsone due to anemia. Transition from clob to pimecrolimus. 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 ILK10 0.2ml to that area   - 4/25/23 Currently on prednisone 30 mg d/t flare and will taper off. Prednisone helped alleviate symptoms.   - 05/2023 switched from vedolizumab to infliximab     #Peristomal Abscess, ? Recurrent  #Peristomal enterocutaneous fistula  - 4/25/23 Ordered US to further evaluate. Advised she follow up with GI doctor or establish with colorectal surgeon as well.   - 06/08/23 pt scheduled w/ colorectal surgeon and scheduled for MRI. MRI equivocal. Improved on infliximab. Decrease clobetasol application -> more pimecrolimus  - 9/19/2023 with clinical improvement, likely enterocutaneous fistula    Major PMHx  - Crohns  ______________________________________    Impression/Plan:  Nicol was seen today for derm problem.    Diagnoses and all orders for this visit:    #. Neoplasm of uncertain behavior of the skin - A) right shoulder, B) right anterior leg  Differential at this  time includes A) BCC vs BLK B) porokeratosis vs SCC  - Offered biopsy to the patient for histologic analysis.   - Described risks and benefits of biopsy; patient wishes to proceed.  - Verbal consent obtained 2 shave biopsy performed  - See procedure note below.     Pyoderma gangrenosum  -At this point is quiescent, its not clear whether patient's disease from the beginning was an enteric cutaneous fistula or if it was pyoderma gangrenosum that led to an enteric cutaneous fistula or something else  - Patient was originally diagnosed and treated empirically as PG without a skin biopsy as far as I can tell  - Regardless patient is currently on infliximab which is an excellent treatment for pyoderma gangrenosum  - overall suspect was enterocutaneous fistula/crohns activity all along considering area was noted along with abscess adjacent to stoma site at [facility] admission in 2022 at which time IP consult dx her w/ PG     Enterocutaneous fistula  -Areas of ulceration around stomal site which patient saw me for originally, they at points appeared proliferative rather than ulcerative, at subsequent visits vickie fecal material was noted to be emanating from the areas of erosion essentially confirming enteric cutaneous fistulas.  She does have a history of perianal fistulas in the past  - These areas have been responsive to topical and oral steroids, however a single persistent lesion remains.   - We do not feel there is benefit to continuing steroids to this lesion.   - Recent MR equivocal.  - Counseled patient that she can safely continue picrolimus as there is no risk of skin atrophy with this medication.   - She can also use zinc-oxide containing barrier creams in order to protect the peristomal skin from irritants.     06/20/23 04/03/23            Follow-up in 6 mo      Staff Involved:    Kelechi Gou MD   of Dermatology  Department of Dermatology  [facility] [employer]   Medicine      CC:   No chief complaint on file.      History of Present Illness:  Ms. Nicol Aldrich is a 62 year old female who presents as a return patient.    She was last seen 06/20/23 by Dr. Kelechi Guo (preceptor for today's visit).     At her last visit, she had seen improvement since using the clobetasol twice daily as well as switching infliximab.   Per medication review, she is not using ant topicals presently. She saw one of her surgeons to advised she discontinue the topical steroids as they can cause skin thinning. She hasn't used anything topically since (~2-3 months) and thinks the lesions have improved. She's been on infliximab for about 3-4 months, and is unsure how if and how she's improving.     One lesion still remains. She notes that it occasionally leaks fecal material when she voids via her stoma, and is occasionally painful.     Additionally, and unrelated to her CC, she notes a painful lesion at the R shoulder that appeared within the past year.        Labs:      Physical exam:  Vitals: There were no vitals taken for this visit.  GEN: well developed, well-nourished, in no acute distress, in a pleasant mood.     SKIN: Jarrell phototype 1  - Waist-up skin, which includes the head/face, neck, both arms, chest, back, abdomen, digits and/or nails was examined.   --- Additionally, legs were included.   - Small punctate areas of skin breakdown around stoma  - A single friable beefy red papule with fibrinous material appreciated centrally is visible at left of the stoma.   - No other lesions of concern on areas examined.     Past Medical History:   No past medical history on file.  No past surgical history on file.    Social History:   reports that she has never smoked. She has never used smokeless tobacco. She reports current alcohol use of about 2.0 standard drinks of alcohol per week.    Family History:  No family history on file.    Medications:  Current Outpatient Medications   Medication  "Sig Dispense Refill    budesonide (ENTOCORT EC) 3 MG EC capsule Take 3 capsules (9 mg) by mouth every morning for 360 days 90 capsule 11    clobetasol (TEMOVATE) 0.05 % external cream Apply to ulcers around stoma site with each bag change 60 g 2    clobetasol (TEMOVATE) 0.05 % external ointment Use on Saturday / Sunday, twice daily 60 g 1    clobetasol (TEMOVATE) 0.05 % external ointment APPLY TO THE AFFECTED AREA TWICE DAILY AS NEEDED FOR UP TO 14 DAYS. APPLY TO ULCER AROUND THE OSTOMY (Patient not taking: Reported on 3/7/2023)      Control Gel Formula Dressing (DUODERM CGF DRESSING) MISC Apply daily to stoma site - DuoDerm Extra Thin 30 each 2    pimecrolimus (ELIDEL) 1 % external cream Apply topically 2 times daily 60 g 3    vancomycin (VANCOCIN) 125 MG capsule Take 1 capsule (125 mg) by mouth daily 90 capsule 3     Allergies   Allergen Reactions    Penicillins Rash     On 07/10/18, pt states that she experienced a rash when she had this \"a while ago.\" This was not a reaction from childhood.   On 07/10/18, pt states that she experienced a rash when she had this \"a while ago.\" This was not a reaction from childhood.                  Attestation with edits by Kelechi Guo MD at 9/19/2023 11:47 AM:  I have personally examined this patient and agree with the resident doctor's documentation and plan of care. I have reviewed and amended the resident's note. The documentation accurately reflects my clinical observations, diagnoses, treatment and follow-up plans. I was present for key portions of the procedure(s).       Kelechi Guo MD  Dermatology Staff      Lidocaine-epinephrine 1-1:720491 % injection   3.0 mL once for one use, starting 9/19/2023 ending 9/19/2023,  2mL disp, R-0, injection  Injected by Dr. Potter    "

## 2023-09-19 NOTE — LETTER
9/19/2023         RE: Nicol Aldrich  [address]  [facility] [address]        Dear Colleague,    Thank you for referring your patient, Nicol Aldrich, to the [facility] GASTROENTEROLOGY CLINIC [place]. Please see a copy of my visit note below.    [facility]-  IBD Clinic:     Follow up for complicated Crohn's disease, EC fistulas, PG, hx of CDI    Last visit: 6/13/23  Date of visit: 09/19/23     Referring provider: Jordan Coyle    Follow up: 2nd opinion - penetrating crohn's with peristomal fistulas     Referring provider: Mae     ASSESSMENT AND PLAN:     #. Penetrating ileocolonic crohn's  Diagnosed at approximately age 31. Significant perianal disease and now with more than likely peristomal enterocutaneous fistulas. Has been exposed to steroids, 6MP, methtrexate, vedolizumab (low drug levels, no antibodies, progression of penetrating disease) and now recent start on infliximab (currently in induction phase) with a recent taper off of prednisone.  Since being seen has done well with IFX. CRP decreasing, MRE without activity (aside from possible areas of thickening within peristomal hernia). Clinically and biochemically improving on IFX, drug levels 12, no abs. Would ideally target levels > 15 given penetrating phenotype with residual fistula.   -- Most recent endoscopic disease activity: severe inflammation, large cratered ulcers in colon, needs repeat disease activity assessment   -- Most recent radiologic disease activity:  6/21/23, normal SB, some karen-stomal inflammation vs decompressed colon within karen-stomal hernia  -- Most recent TDM eval:   -- vedo levels: 5/1 (3/2021) and 3.4 (11/2022) (undetectable per their assay),  --  IFX levels 12.6 (8/17/23), abs undetectable   -- Most recent serologic eval: CRP 6.4 (8/2023) down from 12.2 (6/2023)  -- Most recent FC: 492 (4/2023), down from 906 (11/2022)  -- Current medication: infliximab, early maintenance      PLAN:  -- narrow IFX interval to q 4 weeks to increase drug levels given penetrating disease (ideally >15)  -- fecal calprotectin to quantify luminal inflammation given equivocal MRE  -- colonoscopy for disease activity assessment in December 2022/January 2023  -- follow with CRS as she is, no current plans for resection   -- follow up in 3-6 months in clinic with Dr. Caldwell and Dr. Coyle     #. Peristomal fistulas  Significant improvement in last 3 months while on IFX. One small area of fistula remains with intermittent pus and stool passage.   -- continue IFX, narrow interval to increase levels associated with fistula healing     #. Recurrent clostridiodes difficile infection  Two positive tests since January 2023, first positive 8/2020. Numerous negative intervening tests. Completed a number of vancomycin tapers. Increased risk of recurrent illness given iBD and increased risk of worsened IBD outcomes with colonization/recurrent infection. Positive again 6/2023, s/p vancomycin course with improvement in stool consistency and pain.   -- will not re-test C.diff PCR today  -- pre-emptive referral to IMT clinic     #. Pyoderma gangrenosum   Peristomal location. Complicated by skin/soft tissue breakdown and recurrent infections. Managed by dermatology here at the [facility]. Most recently in early 2023 has been told her disease is now quiescent. Pictures of this area with documented improvement can be seen in the dermatology notes and the media tab. Ongoing impressive improvement during IFX therapy.  -- defer management to dermatology     #. Routine health maintenance in IBD  We will address vaccines, age appropriate cancer screening, infectious screening, bone health, and mental health as IBD therapy becomes more stable.   -- defer to next visit      #. Hx of anal cancer  #. 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 Diverted due to incontinence presumed secondary to  "rectal irritation/compliance issues.   -- continue to monitor    #. Nocturia  #. Urinary urgency  -- urogynecology referral   -- resume pelvic physical therapy exercises at home as previous     Return to Clinic: 3-6 months with Dr. Caldwell and/or Dr. Coyle.     Hayder Caldwell MD  GI fellow      Patient discussed and seen with Gastroenterology staff Dr. Coyle, who is in agreement with the above.       ====================================================================================================================================  HPI:     Ms. Aldrich presents for follow up today.     Overall, she feels much improved. In general, abdominal pain has improved, stool consistency has thickened, and her peristomal cutaneous issues have healed dramatically. Ostomy output is mostly solid, no melena, no hematochezia. Blood comes out from around ostomy at level of skin and is infrequent.     She is currently one her first infusion of IFX during the maintenance phase and it has been well tolerated. Completed both vancomycin and budesonide in mid August, both of which were helpful.     She does complain today though off pre-defecation pain near her stoma. She thinks it may be here hernia but is worried it could be recurrent Cdiff. Reassurance provided. Has to \"push\" her hernia back in at times.     She continues to follow with dermatology (PG) and CRS (complicated penetrating crohns, hx of ECFs, hx of anal cancer).    Targeted GI ROS negative unless noted above. All questions answered.     No past medical history on file.    No past surgical history on file.    No family history on file.    Social History     Tobacco Use    Smoking status: Never    Smokeless tobacco: Never   Substance Use Topics    Alcohol use: Yes     Alcohol/week: 2.0 standard drinks of alcohol     Types: 2 Glasses of wine per week       Physical exam:     Vitals:/73   Pulse 91   Ht 1.664 m (5' 5.5\")   Wt 80.5 kg (177 lb 8 oz)   SpO2 98%   " BMI 29.09 kg/m     BMI: Body mass index is 29.09 kg/m .      GEN: NAD, A&Ox3, appropriate  HEENT: EOMI, PERRLA, MMM, hearing grossly intact  Neck: full ROM  Cardiopulmonary: non labored, comfortable on RA, nondiaphoretic, no LE edema  Abdominal: soft, nontender, nondistended, no HSM, no rebound, no guarding, normoactive BS, ostomy appliance in place, abdominal wall hernia near stoma (reducible),  Skin: no jaundice, peristomal fistula with small punctate tract, decreased surrounding erythema from previous  Neuro: A&Ox3, grossly intact motor/sensation, gait intact  MSK: no gross deformity, moves arms/legs equally  Psych: normal affect, congruent with mood      Labs:   Lab Results   Component Value Date    WBC 5.6 08/17/2023    HGB 12.8 08/17/2023    HCT 38.6 08/17/2023     08/17/2023     06/13/2023    POTASSIUM 4.4 06/13/2023    CHLORIDE 104 06/13/2023    CO2 28 06/13/2023    BUN 13.1 06/13/2023    CR 0.97 (H) 06/13/2023    GLC 96 06/13/2023    SED 11 08/17/2023    AST 31 08/17/2023    ALT 15 08/17/2023    ALKPHOS 72 08/17/2023    BILITOTAL 0.3 08/17/2023      CRP: 6 (from 12)    C.diff: PCR and GDH positive (6/2023)    Relevant imaging:     MRE: 6/21/23  IMPRESSION:  1. Postsurgical changes of loop colostomy with moderate size  parastomal hernia. The transverse colon near the stoma within the  parastomal hernia demonstrates mild colonic wall thickening and  enhancement, could be due to underdistention versus active  inflammation. No convincing evidence of fistula formation. Large  amount of stool in the colon upstream to the stoma. The more distal  descending and sigmoid colon are decompressed.  2. No evidence of abnormal small bowel wall thickening or enhancement  to suggest presence of active small bowel Crohn's disease.  3. 8 mm T2 hyperintense focus in the pancreatic body, too small to  characterize could represent side branch intraductal papillary  mucinous neoplasm versus other pancreatic cystic  lesions. Recommend  follow-up exam with contrast enhanced MRI/MRCP in one year to assess  for interval change.     HEMAL MAYER MD     Endoscopy:    Colonoscopy: 11/3/2022    Findings:        The perianal exam findings included some scarring at the anal area and        absence of anal tone.        The periostomal skin was without rashes or abscesses.        The colostomy was first accessed: A continuous area of severly inflammed        and friable nonbleeding mucosa with extensive large cratered ulcers was        present in the transverse colon, in the ascending colon and in the        cecum. Biopsies were taken with a cold forceps for histology R/O CMV,        Crohn's disease, ischemia.        One large ulcer was present in the distal rectum. Biopsies were taken        with a cold forceps for histology.        A diffuse area of severely friable mucosa with contact bleeding was        found in the rectum and in the recto-sigmoid colon. Biopsies were taken        with a cold forceps for histology R/O diversion colitis.        A 2 mm likely fistula was found at 18 cm proximal to the anus.     Impression:            - Abnormal perianal exam.                          - Colostomy accessed: Severly inflammed mucosa with                          large ulcers. Biopsied R/O CMV, Crohn's disease,                          ischemia.                          - Ulcer in distal rectum. Biopsied.                          - Friability with contact bleeding in the rectum and                          in the recto-sigmoid colon. Biopsied R/O diversion                          colitis.                          - A possible fistula opening was seen at 18 cm from                          the anus.     Recommendation:        - [facility] to [facility] for [facility] care.                          - Resume previous diet.                          - Await pathology results.                          - Consider MRI pelvis R/O fistula.                           - Further recs per inaptient GI team. Will consider IV                          steroids.   Ramiro Bach MD   11/3/2022 5:59:45 PM            Patient ROS:  Answers for HPI/ROS submitted by the patient on 9/18/2023  General Symptoms: Yes  Skin Symptoms: No  HENT Symptoms: No  EYE SYMPTOMS: No  HEART SYMPTOMS: No  LUNG SYMPTOMS: No  INTESTINAL SYMPTOMS: Yes  URINARY SYMPTOMS: Yes  GYNECOLOGIC SYMPTOMS: No  BREAST SYMPTOMS: No  SKELETAL SYMPTOMS: No  BLOOD SYMPTOMS: No  NERVOUS SYSTEM SYMPTOMS: No  MENTAL HEALTH SYMPTOMS: No  Fever: No  Loss of appetite: No  Weight loss: No  Weight gain: No  Fatigue: Yes  Night sweats: No  Chills: No  Increased stress: No  Excessive hunger: No  Feeling hot or cold when others believe the temperature is normal: Yes  Loss of height: No  Post-operative complications: No  Surgical site pain: No  Hallucinations: No  Change in or Loss of Energy: Yes  Hyperactivity: No  Confusion: No  Heart burn or indigestion: No  Nausea: No  Vomiting: No  Abdominal pain: Yes  Bloating: No  Constipation: No  Diarrhea: No  Blood in stool: No  Black stools: No  Rectal or Anal pain: No  Fecal incontinence: No  Yellowing of skin or eyes: No  Vomit with blood: No  Change in stools: No  Trouble holding urine or incontinence: Yes  Pain or burning: No  Trouble starting or stopping: No  Increased frequency of urination: Yes  Blood in urine: No  Decreased frequency of urination: No  Frequent nighttime urination: Yes  Flank pain: No  Difficulty emptying bladder: No          Attestation signed by Jordan Coyle MD at 9/29/2023 12:31 AM:  ATTENDING ATTESTATION:     DATE SEEN: 9/19/23    Patient was discussed, seen, and examined by meJordan. The plan of care and pertinent data/imaging were reviewed with Dr. Caldwell. I agree with the assessment and plan as delineated above with the following additions:     Overall has had good improvement by labs and symptoms. Excellent  improvement in PG/EC fistula. Will maximize IFX by increasing to 5 mg/kg q 4 weeks if insurance improves. Recheck fecal calprotectin. If elevated can give some Entocort to help bridge as increased IFX takes effect. Recheck c diff if she has loose/watery stools. If has recurrence will need treatment followed by suppressive therapy until Crohn's is controlled. After Crohn's controlled could then consider IMT. She does have parastomal pain. MRE was reassuring just a couple months ago.     Would like to give patient more time at optimized dose of IFX before checking colonoscopy for mucosal healing. Plan for December/January.    She will keep us updated on symptoms. Can consider repeat cross sectional imaging of hernia if pain worsens but for now there is no evidence of significant issue. She has seen CRS in June and surgical intervention was not recommended. Medical management of Crohn's was recommended and pursued.    Thank you for this consultation.  It was a pleasure to participate in the care of this patient; please contact us with any further questions.  I spent a total of 40 minutes during the day of encounter performed chart review, meeting with patient, patient counseling, care coordination, and documentation.     Please contact me with any further questions.        Again, thank you for allowing me to participate in the care of your patient.      Sincerely,    Hayder Caldwell MD

## 2023-09-19 NOTE — PROGRESS NOTES
Lidocaine-epinephrine 1-1:698878 % injection   3.0 mL once for one use, starting 9/19/2023 ending 9/19/2023,  2mL disp, R-0, injection  Injected by Dr. Potter

## 2023-09-19 NOTE — PROGRESS NOTES
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 Transition from clob to pimecrolimus. 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 ILK10 0.2ml to that area   - 4/25/23 Currently on prednisone 30 mg d/t flare and will taper off. Prednisone helped alleviate symptoms.   - 05/2023 switched from vedolizumab to infliximab     #Peristomal Abscess, ? Recurrent  #Peristomal enterocutaneous fistula  - 4/25/23 Ordered US to further evaluate. Advised she follow up with GI doctor or establish with colorectal surgeon as well.   - 06/08/23 pt scheduled w/ colorectal surgeon and scheduled for MRI. MRI equivocal. Improved on infliximab. Decrease clobetasol application -> more pimecrolimus  - 9/19/2023 with clinical improvement, likely enterocutaneous fistula    Major PMHx  - Crohns  ______________________________________    Impression/Plan:  Nicol was seen today for derm problem.    Diagnoses and all orders for this visit:    #. Neoplasm of uncertain behavior of the skin - A) right shoulder, B) right anterior leg  Differential at this time includes A) BCC vs BLK B) porokeratosis vs SCC  - Offered biopsy to the patient for histologic analysis.   - Described risks and benefits of biopsy; patient wishes to proceed.  - Verbal consent obtained 2 shave biopsy performed  - See procedure note below.     Pyoderma gangrenosum  -At this point is  quiescent, its not clear whether patient's disease from the beginning was an enteric cutaneous fistula or if it was pyoderma gangrenosum that led to an enteric cutaneous fistula or something else  - Patient was originally diagnosed and treated empirically as PG without a skin biopsy as far as I can tell  - Regardless patient is currently on infliximab which is an excellent treatment for pyoderma gangrenosum  - overall suspect was enterocutaneous fistula/crohns activity all along considering area was noted along with abscess adjacent to stoma site at [facility] admission in 2022 at which time IP consult dx her w/ PG     Enterocutaneous fistula  -Areas of ulceration around stomal site which patient saw me for originally, they at points appeared proliferative rather than ulcerative, at subsequent visits vickie fecal material was noted to be emanating from the areas of erosion essentially confirming enteric cutaneous fistulas.  She does have a history of perianal fistulas in the past  - These areas have been responsive to topical and oral steroids, however a single persistent lesion remains.   - We do not feel there is benefit to continuing steroids to this lesion.   - Recent MR equivocal.  - Counseled patient that she can safely continue picrolimus as there is no risk of skin atrophy with this medication.   - She can also use zinc-oxide containing barrier creams in order to protect the peristomal skin from irritants.     06/20/23 04/03/23            Follow-up in 6 mo      Staff Involved:    Kelechi Guo MD   of Dermatology  Department of Dermatology  [facility] [employer]      CC:   No chief complaint on file.      History of Present Illness:  Ms. Nicol Aldrich is a 62 year old female who presents as a return patient.    She was last seen 06/20/23 by Dr. Kelechi Guo (preceptor for today's visit).     At her last visit, she had seen improvement since using the  clobetasol twice daily as well as switching infliximab.   Per medication review, she is not using ant topicals presently. She saw one of her surgeons to advised she discontinue the topical steroids as they can cause skin thinning. She hasn't used anything topically since (~2-3 months) and thinks the lesions have improved. She's been on infliximab for about 3-4 months, and is unsure how if and how she's improving.     One lesion still remains. She notes that it occasionally leaks fecal material when she voids via her stoma, and is occasionally painful.     Additionally, and unrelated to her CC, she notes a painful lesion at the R shoulder that appeared within the past year.        Labs:      Physical exam:  Vitals: There were no vitals taken for this visit.  GEN: well developed, well-nourished, in no acute distress, in a pleasant mood.     SKIN: Jarrell phototype 1  - Waist-up skin, which includes the head/face, neck, both arms, chest, back, abdomen, digits and/or nails was examined.   --- Additionally, legs were included.   - Small punctate areas of skin breakdown around stoma  - A single friable beefy red papule with fibrinous material appreciated centrally is visible at left of the stoma.   - No other lesions of concern on areas examined.     Past Medical History:   No past medical history on file.  No past surgical history on file.    Social History:   reports that she has never smoked. She has never used smokeless tobacco. She reports current alcohol use of about 2.0 standard drinks of alcohol per week.    Family History:  No family history on file.    Medications:  Current Outpatient Medications   Medication Sig Dispense Refill    budesonide (ENTOCORT EC) 3 MG EC capsule Take 3 capsules (9 mg) by mouth every morning for 360 days 90 capsule 11    clobetasol (TEMOVATE) 0.05 % external cream Apply to ulcers around stoma site with each bag change 60 g 2    clobetasol (TEMOVATE) 0.05 % external ointment Use on  "Saturday / Sunday, twice daily 60 g 1    clobetasol (TEMOVATE) 0.05 % external ointment APPLY TO THE AFFECTED AREA TWICE DAILY AS NEEDED FOR UP TO 14 DAYS. APPLY TO ULCER AROUND THE OSTOMY (Patient not taking: Reported on 3/7/2023)      Control Gel Formula Dressing (DUODERM CGF DRESSING) MISC Apply daily to stoma site - DuoDerm Extra Thin 30 each 2    pimecrolimus (ELIDEL) 1 % external cream Apply topically 2 times daily 60 g 3    vancomycin (VANCOCIN) 125 MG capsule Take 1 capsule (125 mg) by mouth daily 90 capsule 3     Allergies   Allergen Reactions    Penicillins Rash     On 07/10/18, pt states that she experienced a rash when she had this \"a while ago.\" This was not a reaction from childhood.   On 07/10/18, pt states that she experienced a rash when she had this \"a while ago.\" This was not a reaction from childhood.                "

## 2023-09-19 NOTE — PROGRESS NOTES
[facility]-  IBD Clinic:     Follow up for complicated Crohn's disease, EC fistulas, PG, hx of CDI    Last visit: 6/13/23  Date of visit: 09/19/23     Referring provider: Jordan Coyle    Follow up: 2nd opinion - penetrating crohn's with peristomal fistulas     Referring provider: Mae     ASSESSMENT AND PLAN:     #. Penetrating ileocolonic crohn's  Diagnosed at approximately age 31. Significant perianal disease and now with more than likely peristomal enterocutaneous fistulas. Has been exposed to steroids, 6MP, methtrexate, vedolizumab (low drug levels, no antibodies, progression of penetrating disease) and now recent start on infliximab (currently in induction phase) with a recent taper off of prednisone.  Since being seen has done well with IFX. CRP decreasing, MRE without activity (aside from possible areas of thickening within peristomal hernia). Clinically and biochemically improving on IFX, drug levels 12, no abs. Would ideally target levels > 15 given penetrating phenotype with residual fistula.   -- Most recent endoscopic disease activity: severe inflammation, large cratered ulcers in colon, needs repeat disease activity assessment   -- Most recent radiologic disease activity:  6/21/23, normal SB, some karen-stomal inflammation vs decompressed colon within karen-stomal hernia  -- Most recent TDM eval:   -- vedo levels: 5/1 (3/2021) and 3.4 (11/2022) (undetectable per their assay),  --  IFX levels 12.6 (8/17/23), abs undetectable   -- Most recent serologic eval: CRP 6.4 (8/2023) down from 12.2 (6/2023)  -- Most recent FC: 492 (4/2023), down from 906 (11/2022)  -- Current medication: infliximab, early maintenance     PLAN:  -- narrow IFX interval to q 4 weeks to increase drug levels given penetrating disease (ideally >15)  -- fecal calprotectin to quantify luminal inflammation given equivocal MRE  -- colonoscopy for disease activity assessment in December 2022/January  2023  -- follow with CRS as she is, no current plans for resection   -- follow up in 3-6 months in clinic with Dr. Caldwell and Dr. Coyle     #. Peristomal fistulas  Significant improvement in last 3 months while on IFX. One small area of fistula remains with intermittent pus and stool passage.   -- continue IFX, narrow interval to increase levels associated with fistula healing     #. Recurrent clostridiodes difficile infection  Two positive tests since January 2023, first positive 8/2020. Numerous negative intervening tests. Completed a number of vancomycin tapers. Increased risk of recurrent illness given iBD and increased risk of worsened IBD outcomes with colonization/recurrent infection. Positive again 6/2023, s/p vancomycin course with improvement in stool consistency and pain.   -- will not re-test C.diff PCR today  -- pre-emptive referral to IMT clinic     #. Pyoderma gangrenosum   Peristomal location. Complicated by skin/soft tissue breakdown and recurrent infections. Managed by dermatology here at the [place]. Most recently in early 2023 has been told her disease is now quiescent. Pictures of this area with documented improvement can be seen in the dermatology notes and the media tab. Ongoing impressive improvement during IFX therapy.  -- defer management to dermatology     #. Routine health maintenance in IBD  We will address vaccines, age appropriate cancer screening, infectious screening, bone health, and mental health as IBD therapy becomes more stable.   -- defer to next visit      #. Hx of anal cancer  #. 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 Diverted due to incontinence presumed secondary to rectal irritation/compliance issues.   -- continue to monitor    #. Nocturia  #. Urinary urgency  -- urogynecology referral   -- resume pelvic physical therapy exercises at home as previous     Return to Clinic: 3-6 months with Dr. Caldwell and/or Dr. Coyle.    "  Hayder Caldwell MD  GI fellow      Patient discussed and seen with Gastroenterology staff Dr. Coyle, who is in agreement with the above.       ====================================================================================================================================  HPI:     Ms. Aldrich presents for follow up today.     Overall, she feels much improved. In general, abdominal pain has improved, stool consistency has thickened, and her peristomal cutaneous issues have healed dramatically. Ostomy output is mostly solid, no melena, no hematochezia. Blood comes out from around ostomy at level of skin and is infrequent.     She is currently one her first infusion of IFX during the maintenance phase and it has been well tolerated. Completed both vancomycin and budesonide in mid August, both of which were helpful.     She does complain today though off pre-defecation pain near her stoma. She thinks it may be here hernia but is worried it could be recurrent Cdiff. Reassurance provided. Has to \"push\" her hernia back in at times.     She continues to follow with dermatology (PG) and CRS (complicated penetrating crohns, hx of ECFs, hx of anal cancer).    Targeted GI ROS negative unless noted above. All questions answered.     No past medical history on file.    No past surgical history on file.    No family history on file.    Social History     Tobacco Use     Smoking status: Never     Smokeless tobacco: Never   Substance Use Topics     Alcohol use: Yes     Alcohol/week: 2.0 standard drinks of alcohol     Types: 2 Glasses of wine per week       Physical exam:     Vitals:/73   Pulse 91   Ht 1.664 m (5' 5.5\")   Wt 80.5 kg (177 lb 8 oz)   SpO2 98%   BMI 29.09 kg/m     BMI: Body mass index is 29.09 kg/m .      GEN: NAD, A&Ox3, appropriate  HEENT: EOMI, PERRLA, MMM, hearing grossly intact  Neck: full ROM  Cardiopulmonary: non labored, comfortable on RA, nondiaphoretic, no LE edema  Abdominal: soft, " nontender, nondistended, no HSM, no rebound, no guarding, normoactive BS, ostomy appliance in place, abdominal wall hernia near stoma (reducible),  Skin: no jaundice, peristomal fistula with small punctate tract, decreased surrounding erythema from previous  Neuro: A&Ox3, grossly intact motor/sensation, gait intact  MSK: no gross deformity, moves arms/legs equally  Psych: normal affect, congruent with mood      Labs:   Lab Results   Component Value Date    WBC 5.6 08/17/2023    HGB 12.8 08/17/2023    HCT 38.6 08/17/2023     08/17/2023     06/13/2023    POTASSIUM 4.4 06/13/2023    CHLORIDE 104 06/13/2023    CO2 28 06/13/2023    BUN 13.1 06/13/2023    CR 0.97 (H) 06/13/2023    GLC 96 06/13/2023    SED 11 08/17/2023    AST 31 08/17/2023    ALT 15 08/17/2023    ALKPHOS 72 08/17/2023    BILITOTAL 0.3 08/17/2023      CRP: 6 (from 12)    C.diff: PCR and GDH positive (6/2023)    Relevant imaging:     MRE: 6/21/23  IMPRESSION:  1. Postsurgical changes of loop colostomy with moderate size  parastomal hernia. The transverse colon near the stoma within the  parastomal hernia demonstrates mild colonic wall thickening and  enhancement, could be due to underdistention versus active  inflammation. No convincing evidence of fistula formation. Large  amount of stool in the colon upstream to the stoma. The more distal  descending and sigmoid colon are decompressed.  2. No evidence of abnormal small bowel wall thickening or enhancement  to suggest presence of active small bowel Crohn's disease.  3. 8 mm T2 hyperintense focus in the pancreatic body, too small to  characterize could represent side branch intraductal papillary  mucinous neoplasm versus other pancreatic cystic lesions. Recommend  follow-up exam with contrast enhanced MRI/MRCP in one year to assess  for interval change.     HEMAL MAYER MD     Endoscopy:    Colonoscopy: 11/3/2022    Findings:        The perianal exam findings included some scarring at the  anal area and        absence of anal tone.        The periostomal skin was without rashes or abscesses.        The colostomy was first accessed: A continuous area of severly inflammed        and friable nonbleeding mucosa with extensive large cratered ulcers was        present in the transverse colon, in the ascending colon and in the        cecum. Biopsies were taken with a cold forceps for histology R/O CMV,        Crohn's disease, ischemia.        One large ulcer was present in the distal rectum. Biopsies were taken        with a cold forceps for histology.        A diffuse area of severely friable mucosa with contact bleeding was        found in the rectum and in the recto-sigmoid colon. Biopsies were taken        with a cold forceps for histology R/O diversion colitis.        A 2 mm likely fistula was found at 18 cm proximal to the anus.     Impression:            - Abnormal perianal exam.                          - Colostomy accessed: Severly inflammed mucosa with                          large ulcers. Biopsied R/O CMV, Crohn's disease,                          ischemia.                          - Ulcer in distal rectum. Biopsied.                          - Friability with contact bleeding in the rectum and                          in the recto-sigmoid colon. Biopsied R/O diversion                          colitis.                          - A possible fistula opening was seen at 18 cm from                          the anus.     Recommendation:        - [facility] patient to [facility] for [facility] care.                          - Resume previous diet.                          - Await pathology results.                          - Consider MRI pelvis R/O fistula.                          - Further recs per inaptient GI team. Will consider IV                          steroids.   Ramiro Bach MD   11/3/2022 5:59:45 PM            Patient ROS:  Answers for HPI/ROS submitted by the patient on 9/18/2023  General  Symptoms: Yes  Skin Symptoms: No  HENT Symptoms: No  EYE SYMPTOMS: No  HEART SYMPTOMS: No  LUNG SYMPTOMS: No  INTESTINAL SYMPTOMS: Yes  URINARY SYMPTOMS: Yes  GYNECOLOGIC SYMPTOMS: No  BREAST SYMPTOMS: No  SKELETAL SYMPTOMS: No  BLOOD SYMPTOMS: No  NERVOUS SYSTEM SYMPTOMS: No  MENTAL HEALTH SYMPTOMS: No  Fever: No  Loss of appetite: No  Weight loss: No  Weight gain: No  Fatigue: Yes  Night sweats: No  Chills: No  Increased stress: No  Excessive hunger: No  Feeling hot or cold when others believe the temperature is normal: Yes  Loss of height: No  Post-operative complications: No  Surgical site pain: No  Hallucinations: No  Change in or Loss of Energy: Yes  Hyperactivity: No  Confusion: No  Heart burn or indigestion: No  Nausea: No  Vomiting: No  Abdominal pain: Yes  Bloating: No  Constipation: No  Diarrhea: No  Blood in stool: No  Black stools: No  Rectal or Anal pain: No  Fecal incontinence: No  Yellowing of skin or eyes: No  Vomit with blood: No  Change in stools: No  Trouble holding urine or incontinence: Yes  Pain or burning: No  Trouble starting or stopping: No  Increased frequency of urination: Yes  Blood in urine: No  Decreased frequency of urination: No  Frequent nighttime urination: Yes  Flank pain: No  Difficulty emptying bladder: No

## 2023-09-20 ENCOUNTER — TELEPHONE (OUTPATIENT)
Dept: DERMATOLOGY | Facility: CLINIC | Age: 63
End: 2023-09-20

## 2023-09-20 ENCOUNTER — TELEPHONE (OUTPATIENT)
Dept: GASTROENTEROLOGY | Facility: CLINIC | Age: 63
End: 2023-09-20

## 2023-09-20 ENCOUNTER — APPOINTMENT (OUTPATIENT)
Dept: LAB | Facility: OTHER | Age: 63
End: 2023-09-20
Payer: COMMERCIAL

## 2023-09-20 ENCOUNTER — PATIENT OUTREACH (OUTPATIENT)
Dept: GASTROENTEROLOGY | Facility: CLINIC | Age: 63
End: 2023-09-20
Payer: COMMERCIAL

## 2023-09-20 LAB
PATH REPORT.COMMENTS IMP SPEC: NORMAL
PATH REPORT.COMMENTS IMP SPEC: NORMAL
PATH REPORT.FINAL DX SPEC: NORMAL
PATH REPORT.GROSS SPEC: NORMAL
PATH REPORT.MICROSCOPIC SPEC OTHER STN: NORMAL
PATH REPORT.RELEVANT HX SPEC: NORMAL

## 2023-09-20 PROCEDURE — 99000 SPECIMEN HANDLING OFFICE-LAB: CPT | Performed by: PATHOLOGY

## 2023-09-20 PROCEDURE — 83993 ASSAY FOR CALPROTECTIN FECAL: CPT | Performed by: INTERNAL MEDICINE

## 2023-09-20 NOTE — TELEPHONE ENCOUNTER
Writer received a message from [facility]ME[facility] RAMIREZ to help get Pt scheduled for a New C. Diff appointment with Pat Sim, per Dr. Caldwell. Writer called and left message for Pt. Writer will also send Pt a [employer] message.

## 2023-09-20 NOTE — TELEPHONE ENCOUNTER
Called patient to discuss skin biopsy results. Call went to an identified voicemail box, at which time I left a message stating that I was attempting to reach her with her biopsy results, and that I would attempt another call at a later time.     Sahra Potter MD  PGY-4, Internal Medicine-Dermatology  Pager: [phone]     Attempt 1 of 3 at 11:57 AM, 09/20/23.

## 2023-09-20 NOTE — TELEPHONE ENCOUNTER
Following appointment with Dr. Caldwell on 9/19:    Submit prior authorization for infusions every four weeks. Therapy plan updated. Inbasket sent to GI clinic support team requesting fax with updated therapy plan, Infliximab drug level, and most recent clinic visit note be sent to [facility]. Writer's direct number also provided in fax to provide updates, etc.     Get patient into clinic with PA for c diff infections. Inbasket message sent to [facility] to assist with scheduling clinic visit with Pat BLAKE for recurring c diff infections.

## 2023-09-21 ENCOUNTER — DOCUMENTATION ONLY (OUTPATIENT)
Dept: GASTROENTEROLOGY | Facility: CLINIC | Age: 63
End: 2023-09-21
Payer: COMMERCIAL

## 2023-09-21 NOTE — PROGRESS NOTES
"Faxed Infusion Therapy Plan per request.    Faxed:   -- Infusion Therapy Plan  -- Lab results from 8-17-23:  Infliximab Level and Antibodies  Hepatic panel  CRP inflammation  Erythrocyte sedimentation rate auto  CBC with Platelets & Differential  -- Office Visit Clinic Note (9-19-23) (Dr. Hayder Caldwell)  -- Included note: Please initiate prior authorization for infusions every 4 weeks rather than every 8 weeks. Please provide patient's GI team with updates on the status of the prior authorization. If further documents needed, or any questions, please contact Angle Kay RNCC for Dr. Jordan Coyle at [phone]. Thank you.\"    Facility Information:  [facility]   Fax #: [phone]      SK  "

## 2023-09-21 NOTE — TELEPHONE ENCOUNTER
Case discussed with attending Dr. Guo. [facility] [place] communicate results with patient and schedule appropriate procedures.     Sahra Potter MD  PGY-4, Internal Medicine-Dermatology  Pager: [phone]

## 2023-09-21 NOTE — TELEPHONE ENCOUNTER
Called patient to discuss skin biopsy results. Call again went to an identified voicemail box, at which time I left a message stating that I was attempting to reach her with her biopsy results, and that I would attempt another call at a later time. Voice mail box not amenable to long or detailed messages.     Sahra Potter MD  PGY-4, Internal Medicine-Dermatology  Pager: *[phone]     Attempt 2 of 3.

## 2023-09-22 NOTE — TELEPHONE ENCOUNTER
Writer is returning Pt's missed phone call. Writer called and left message for the Pt, along with call back number.

## 2023-09-26 ENCOUNTER — MYC MEDICAL ADVICE (OUTPATIENT)
Dept: GASTROENTEROLOGY | Facility: CLINIC | Age: 63
End: 2023-09-26
Payer: COMMERCIAL

## 2023-09-26 DIAGNOSIS — K50.819 CROHN'S DISEASE OF SMALL AND LARGE INTESTINES WITH COMPLICATION (H): Primary | ICD-10-CM

## 2023-09-26 DIAGNOSIS — R19.7 DIARRHEA: ICD-10-CM

## 2023-09-26 LAB — CALPROTECTIN STL-MCNT: 1040 MG/KG (ref 0–49.9)

## 2023-09-26 NOTE — TELEPHONE ENCOUNTER
Pt called back. Pt is scheduled for an appointment with Pat Sim on 10/13/2023 at 10:30 AM for a video visit.

## 2023-09-26 NOTE — TELEPHONE ENCOUNTER
Attempted to contact [facility] PA department to receive an update on current PA request. No answer, left detailed message requesting callback.     Writer has not received an update from [facility] [employer], however previously the infusion company has updated the patient and not the GI team. bookletmobile message sent to the patient inquiring if [facility] has provided an update.

## 2023-09-27 ENCOUNTER — TELEPHONE (OUTPATIENT)
Dept: GASTROENTEROLOGY | Facility: CLINIC | Age: 63
End: 2023-09-27
Payer: COMMERCIAL

## 2023-09-27 RX ORDER — BUDESONIDE 3 MG/1
CAPSULE, COATED PELLETS ORAL
Qty: 126 CAPSULE | Refills: 0 | Status: SHIPPED | OUTPATIENT
Start: 2023-09-27 | End: 2024-01-24

## 2023-09-27 NOTE — TELEPHONE ENCOUNTER
"Per MD Recommendation:    Jordan Coyle MD   to Me       9/27/23 11:50 AM  \"Yes I saw the fecal calprotectin. Just came back yesterday afternoon.    Let's give her a course of Entocort - 9 mg daily x 4 weeks, then 6 mg daily x 2 weeks then 3 mg daily x 2 weeks    Let's continue to work to get IFX to every 4 weeks.    If she is having loose/watery stool then also check c diff    Thanks!  J\"    Entocort prescription sent to patient's preferred pharmacy.     Waiting on mychart response/callback to discuss symptoms.   "

## 2023-09-27 NOTE — TELEPHONE ENCOUNTER
Attempted to contact the patient to discuss symptoms. No answer, left detailed message requesting callback.     Patient needs to schedule colonoscopy as discussed in clinic visit. Will discuss upon callback.    Writer previously sent PA documentation to initiate q4wk interval of IFX infusions following clinic visit, and has also left VM with [facility] requesting an update, however has not heard back.     Fecal calprotectin result noted to be elevated.     Scheduled with Pat Sim for recurring c diff on 10/13.     Previously saw CRS on 6/29; no follow up scheduled at this time, will discuss scheduling with patient.     Follow up with Dr. Caldwell scheduled in March of 2024.     Per clinic visit with Dr. Caldwell on 9/19:  PLAN:  -- narrow IFX interval to q 4 weeks to increase drug levels given penetrating disease (ideally >15)  -- fecal calprotectin to quantify luminal inflammation given equivocal MRE  -- colonoscopy for disease activity assessment in December 2022/January 2023  -- follow with CRS as she is, no current plans for resection   -- follow up in 3-6 months in clinic with Dr. Caldwell and Dr. Coyle

## 2023-09-27 NOTE — TELEPHONE ENCOUNTER
Received callback from patient.     Reports stools are occurring more frequently; most often after eating. Patient reports she is limiting intake to specific times of the day to prevent increased output.     Reports stools are softer, but denies watery stools/diarrhea. Instructed patient to complete c diff kit if stools progress to diarrhea, patient expressed understanding. Order placed.     Denies any bloody stools, severe abdominal pain, or fevers.     Reports some abdominal discomfort with stooling. Fistula is also causing pain, mainly a burning sensation.     Instructed patient to  Entocort prescription, and update GI team with any worsening symptoms.     Patient is also going to try and call [facility] to request an update, as writer has not been able to get in touch.

## 2023-09-27 NOTE — TELEPHONE ENCOUNTER
Contacted [facility]. Requested to speak to pharmacy team rather than PA team and was able to get through to a pharmacy team member. Pharmacy transferred writer to a person, rather than the answering service.     Prior authorization personnel updated writer that the prior authorization was initiated upon receiving fax on 9/20, and they received approval today.     Message was sent to nursing team to schedule next infusion with the patient.     Attempted to contact the patient to provide update, however no answer. Left detailed message.

## 2023-09-27 NOTE — TELEPHONE ENCOUNTER
"Endoscopy Scheduling Screen    Have you had a positive Covid test in the last 14 days?  No    Are you active on MyChart?   Yes    What insurance is in the chart?  Other:  BCBS    Ordering/Referring Provider:   JERONIMO KAISER        (If ordering provider performs procedure, schedule with ordering provider unless otherwise instructed. )    BMI: Estimated body mass index is 29.09 kg/m  as calculated from the following:    Height as of 9/19/23: 1.664 m (5' 5.5\").    Weight as of 9/19/23: 80.5 kg (177 lb 8 oz).     Sedation Ordered  moderate sedation.   If patient BMI > 50 do not schedule in [facility].    If patient BMI > 45 do not schedule at [facility].    Are you taking methadone or Suboxone?  No    Are you taking any prescription medications for pain 3 or more times per week?   No    Do you have a history of malignant hyperthermia or adverse reaction to anesthesia?  No    (Females) Are you currently pregnant?   No     Have you been diagnosed or told you have pulmonary hypertension?   No    Do you have an LVAD?  No    Have you been told you have moderate to severe sleep apnea?  No    Have you been told you have COPD, asthma, or any other lung disease?  No    Do you have any heart conditions?  No     Have you ever had an organ transplant?   No    Have you ever had or are you awaiting a heart or lung transplant?   No    Have you had a stroke or transient ischemic attack (TIA aka \"mini stroke\" in the last 6 months?   No    Have you been diagnosed with or been told you have cirrhosis of the liver?   No    Are you currently on dialysis?   No    Do you need assistance transferring?   No    BMI: Estimated body mass index is 29.09 kg/m  as calculated from the following:    Height as of 9/19/23: 1.664 m (5' 5.5\").    Weight as of 9/19/23: 80.5 kg (177 lb 8 oz).     Is patients BMI > 40 and scheduling location [facility]?  No    Do you take an injectable medication for weight loss or diabetes (excluding insulin)?  No    Do you take the " medication Naltrexone?  No    Do you take blood thinners?  No       Prep   Are you currently on dialysis or do you have chronic kidney disease?  No    Do you have a diagnosis of diabetes?  No    Do you have a diagnosis of cystic fibrosis (CF)?  No    On a regular basis do you go 3 -5 days between bowel movements?  No    BMI > 40?  No    Preferred Pharmacy:    [id number] [place] [address] [address] [facility] [address]  [address] [facility] [address]  Phone: [phone] Fax: [phone]    Final Scheduling Details   Colonoscopy prep sent?  Standard MiraLAX    Procedure scheduled  Colonoscopy    Surgeon:  JOB     Date of procedure:  01/02/2024     Pre-OP / PAC:   No - Not required for this site.    Location  [facility] - [facility] - Patient preference.    Sedation   Moderate Sedation - Per order.      Patient Reminders:   You will receive a call from a Nurse to review instructions and health history.  This assessment must be completed prior to your procedure.  Failure to complete the Nurse assessment may result in the procedure being cancelled.      On the day of your procedure, please designate an adult(s) who can drive you home stay with you for the next 24 hours. The medicines used in the exam will make you sleepy. You will not be able to drive.      You cannot take public transportation, ride share services, or non-medical taxi service without a responsible caregiver.  Medical transport services are allowed with the requirement that a responsible caregiver will receive you at your destination.  We require that drivers and caregivers are confirmed prior to your procedure.

## 2023-10-04 NOTE — TELEPHONE ENCOUNTER
MEDICAL RECORDS REQUEST   [facility]  Urology Clinic  [address] [place] [address]  PHONE: [phone]  Fax: [phone]        FUTURE VISIT INFORMATION                                                   Nicol Aldrich, : 1960 scheduled for future visit at [facility]    APPOINTMENT INFORMATION:  Date: 10/17/2023  Provider:  Melonie Lopez PA-C  Reason for Visit/Diagnosis: Urinary Incontinence    REFERRAL INFORMATION:  Referring provider:  Jordan Coyle MD in [facility]      RECORDS REQUESTED FOR VISIT                                                     NOTES  STATUS/DETAILS   OFFICE NOTE from referring provider  yes   OFFICE NOTE from other specialist  yes, 2023 -- Hayder Caldwell MD @ [facility] gastro   OPERATIVE REPORT  yes   MEDICATION LIST  yes   LABS     URINALYSIS (UA)  yes     PRE-VISIT CHECKLIST      Joint diagnostic appointment coordinated correctly          (ensure right order & amount of time) Yes   RECORD COLLECTION COMPLETE Yes

## 2023-10-06 ENCOUNTER — TELEPHONE (OUTPATIENT)
Dept: DERMATOLOGY | Facility: CLINIC | Age: 63
End: 2023-10-06
Payer: COMMERCIAL

## 2023-10-06 ENCOUNTER — DOCUMENTATION ONLY (OUTPATIENT)
Dept: GASTROENTEROLOGY | Facility: CLINIC | Age: 63
End: 2023-10-06
Payer: COMMERCIAL

## 2023-10-06 ENCOUNTER — PRE VISIT (OUTPATIENT)
Dept: UROLOGY | Facility: CLINIC | Age: 63
End: 2023-10-06
Payer: COMMERCIAL

## 2023-10-06 NOTE — LETTER
[address]  [address]  [phone]        10/9/2023       Nicol Aldrich  [address] [address] [place] [id number]      Dear Nicol:    You are scheduled for Mohs Surgery on: Thursday, November 16, 2023 at 8:30 am- please check in at 8:15 am    Please check in at [facility], [address].     You don't need to arrive more than 5-10 minutes prior to your appointment time.     Be sure to eat a good breakfast and bathe and wash your hair prior to surgery.     If you are taking any anti-coagulants that are prescribed by your Doctor (such as Coumadin/Warfarin, Plavix, Aspirin, Ibuprofen), please continue taking them.     However, if you are taking anti-coagulants over the counter without a Doctor's order for a medical condition, please discontinue them 10 days prior to surgery.     Please wear loose comfortable clothing as it could possibly be 4-6 hours until your surgery is completed depending upon how many layers of tissue need to be removed.      Thank you,    ANSHUL Curry MD

## 2023-10-06 NOTE — TELEPHONE ENCOUNTER
[facility] Call Center    Phone Message    May a detailed message be left on voicemail: yes     Reason for Call: Other: Pt called and said that someone was suppose to call her to schedule an appt to remove something from her leg. Pt said it got biopsied and that a surgeon was suppose to remove it. Please call her back to discuss. Thanks      Action Taken: Message routed to:  [facility] ([facility] DERM    Travel Screening: Not Applicable

## 2023-10-06 NOTE — PROGRESS NOTES
INCOMING FAX:      Date/ Time: 10/6/2023    Facility: [place] [address]    PH: [phone]    Fax: [phone]      What: Inquiry about rapid infusion    Routed document to Dr. Coyle and Jeovany RAMIREZ to review and sign.       Form completed and signed by Dr. Coyle and faxed back to Guero Burgess at [phone]. Copy scanned into patient chart.     Cleopatra Lozada LPN

## 2023-10-09 NOTE — TELEPHONE ENCOUNTER
Called patient:  Patient notified and educated on test results   Mohs procedure explained- all questions answered  appointment scheduled- mohs packet mailed.    Thank you,    Nicol AGUILERARN BSN  [facility]  [phone]

## 2023-10-10 ENCOUNTER — MEDICAL CORRESPONDENCE (OUTPATIENT)
Dept: HEALTH INFORMATION MANAGEMENT | Facility: CLINIC | Age: 63
End: 2023-10-10

## 2023-10-13 ENCOUNTER — VIRTUAL VISIT (OUTPATIENT)
Dept: GASTROENTEROLOGY | Facility: CLINIC | Age: 63
End: 2023-10-13
Payer: COMMERCIAL

## 2023-10-13 DIAGNOSIS — K50.819 CROHN'S DISEASE OF SMALL AND LARGE INTESTINES WITH COMPLICATION (H): ICD-10-CM

## 2023-10-13 DIAGNOSIS — R19.7 DIARRHEA, UNSPECIFIED TYPE: ICD-10-CM

## 2023-10-13 DIAGNOSIS — A49.8 RECURRENT CLOSTRIDIOIDES DIFFICILE INFECTION: Primary | ICD-10-CM

## 2023-10-13 DIAGNOSIS — R19.7 DIARRHEA: ICD-10-CM

## 2023-10-13 PROCEDURE — 99215 OFFICE O/P EST HI 40 MIN: CPT | Mod: 95 | Performed by: PHYSICIAN ASSISTANT

## 2023-10-13 ASSESSMENT — PAIN SCALES - GENERAL: PAINLEVEL: MODERATE PAIN (5)

## 2023-10-13 NOTE — PROGRESS NOTES
Virtual Visit Details    Type of service:  Video Visit     Originating Location (pt. Location): Home    Distant Location (provider location):  Off-site  Platform used for Video Visit: Michael    Recurrent C.Diff Questionnaire     Please rate your symptoms from 0 to 100 based on severity  0-19 None or minimal  20-39 Not very severe  40-59 Quite severe  60-79 Severe   Very severe    1. How severe were your abdominal pains/cramps over the past week? 20-39 Not Very Severe    2. How severe is your abdominal pain at this time? 0-19 None or Minimal     3. If you have abdominal distension, how severe is it (over the past week)? 0-19 None or Minimal     4. How satisfied are you with your bowel habits? 60-79 Severe    5. How much do your abdominal symptoms interfere with your life in general? 60-79 Severe      In addition, please answer these questions:    1. How many bowel movements do you have per day (average over the past week)? 3-5    0-1    1-2    2-3    3-5    6-10    > 10    2. What is the consistency of your stools? Loose    Watery    Loose    Formed    Hard    3. Did you have any episodes of fecal incontinence in the past week? No    4. Please indicate if you have any of these symptoms: Upper abdominal pain, Lack of appetite, and Nausea and/or Vomiting     Bloating/gas    Upper abdominal pain    Lack of appetite    Nausea and/or vomiting    Diarrhea    Constipation        GI CLINIC VISIT    CC/REFERRING PROVIDER: No ref. provider found  HPI: 63 year old female with PMH of ileocolonic crohn's disease presenting to GI clinic for pre-emptive IMT consultation    Nicol follows with Dr. Caldwell in our [facility] with Dr. 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 She was recently started on infliximab.    C diff PCR and toxin positive at [place] 12/7/2022, presented with abdominal pain and diarrhea, and found to  have pyoderma gangrenosum as well as an abdominal wall abscess. She was treated with antibiotics. Notes document that the diarrhea was felt to relate to CDI as opposed to active Crohn's. Documentation through [facility] following this state that loose stools did not improve after Dificid.    C diff PCR positive through [facility] 1/9/2023 and  3/22/2023, both without toxin EIA or other adjunctive testing. Subjective improvement in symptoms while on treatments for CDI.    C diff PCR positive 6/14/2023, toxin EIA negative. CBC, CMP unremarkable at that time. She was recommended suppressive vancomycin 125 mg once daily to remove C diff as a variable, given active Crohns disease. She has consistently found that when she was on vancomycin, she felt better. She stopped the suppressive vancomycin around 3 weeks ago, stopped when she initiated Remicade.    Recently, she was found to have elevated fecal calprotectin and was started on a budesonide taper, with motion to get IFX every 4 weeks. She is planning colonoscopy 1/2/2024 to re-evaluate Crohns. At this time, she states that since stopping the suppressive vancomycin, she has had unformed mushy loose stools, with variable frequency, averaging 3-4 stools per day. She hasn't noticed a change in the stool frequency or consistency since starting budesonide. At baseline, she notes some abdominal pain starts in conjunction with some nausea, often preceding a bowel movement. This has improved with budesonide. As noted above, in general, she has improved     ROS: 10pt ROS performed and otherwise negative.    PAST MEDICAL HISTORY:  No past medical history on file.    PREVIOUS ABDOMINAL/GYNECOLOGIC SURGERIES:    No past surgical history on file.      PERTINENT MEDICATIONS:  Current Outpatient Medications   Medication Sig Dispense Refill     budesonide (ENTOCORT EC) 3 MG EC capsule Take 3 capsules (9 mg) by mouth every morning for 28 days, THEN 2 capsules (6 mg) every morning for  14 days, THEN 1 capsule (3 mg) every morning for 14 days. 126 capsule 0     inFLIXimab Inject into the vein once         SOCIAL HISTORY:    Social History     Socioeconomic History     Marital status:      Spouse name: Not on file     Number of children: Not on file     Years of education: Not on file     Highest education level: Not on file   Occupational History     Not on file   Tobacco Use     Smoking status: Never     Smokeless tobacco: Never   Substance and Sexual Activity     Alcohol use: Yes     Alcohol/week: 2.0 standard drinks of alcohol     Types: 2 Glasses of wine per week     Drug use: Not on file     Sexual activity: Not on file   Other Topics Concern     Not on file   Social History Narrative     Not on file     Social Determinants of Health     Financial Resource Strain: Not on file   Food Insecurity: Not on file   Transportation Needs: Not on file   Physical Activity: Not on file   Stress: Not on file   Social Connections: Not on file   Interpersonal Safety: Not on file   Housing Stability: Not on file       FAMILY HISTORY:  No family history on file.    PHYSICAL EXAMINATION:  Vitals reviewed  There were no vitals taken for this visit.  Video physical exam  General: Patient appears well in no acute distress.   Skin: No visualized rash or lesions on visualized skin  Eyes: EOMI, no erythema, sclera icterus or discharge noted  Resp: Appears to be breathing comfortably without accessory muscle usage, speaking in full sentences, no cough  MSK: Appears to have normal range of motion based on visualized movements  Neurologic: No apparent tremors, facial movements symmetric  Psych: affect normal, alert and oriented    The rest of a comprehensive physical examination is deferred due to PHE (public health emergency) video restrictions      PERTINENT STUDIES Reviewed in EMR    ASSESSMENT/PLAN:    # Recurrent C difficile  # Crohns disease  Nicol is presenting today for history of recurrent C difficile  infections versus colonization. She has had multiple positive PCR tests, without confirmatory toxin testing or other adjunctive studies, however has symptom improvement while on C diff-directed antibiotic therapies. Most recently, she had a positive PCR with negative toxin June 2023, at which time she was started on suppressive vancomycin 125 mg daily to eliminate CDI as a variable. Whle on suppressive vancomycin, she had improved symptoms, with worsening loose stools upon discontinuation of vancomycin several weeks ago. Meanwhile, her Crohn's treatment was changed to IFX due to endoscopic assessment showing severe inflammation. Planning repeat endoscoic evaluation January 2024.    Given onset of loose stools, plan to check for C difficile. If this is positive by PCR and toxin, would start traetment dosing followed by suppressive therapy, to be continued through upcoming colonoscopy, with plan to then complete an IMT via oral capsules pending Crohn's status at that time. Alternatively, if PCR positive, toxin negative, could consider just starting suppressive vancomycin to remove C diff as a variable, and still plan for IMT via oral capsules once mucosal healing is hopefully documented at time of upcoming colonoscopy.    We had a long discussion about the risks and benefits of IMT for multiple recurrent C difficile infection.  We discussed that the efficacy for multiple recurrent C diff is greater than 90% and that there have not been serious side effects reported in the literature.  We discussed the risks including transmission of potentially pathogenic microbiota (with possible infectious, inflammatory or metabolic consequences).  We discussed that there were likely unknown risks and that the long term risks were currently unknown. We discussed our donor screening protocol.  Patient was given handouts on IMT as well as IMT consent process to review.  We reviewed the administration via oral capsules versus the  colonoscopic route.  We also discussed the commercially available, FDA-approved products, Rebyota (enema-based) and Vowst (capsules).  After our discussion, the patient expressed desire to move forward with our capsule IMT should C diff return positive. I have sent a message to Dr. Coyle updating him on this conversation as well.        RTC pending the above  Thank you for this consultation. It was a pleasure to participate in the care of this patient; please contact us with any further questions.    Pat Sim PA-C    57 minutes spent on the date of the encounter doing chart review, review of test results, patient visit and documentation

## 2023-10-13 NOTE — NURSING NOTE
Is the patient currently in the state of [place]? YES    Visit mode:VIDEO    If the visit is dropped, the patient can be reconnected by: VIDEO VISIT: Text to cell phone:   Telephone Information:   Mobile [phone]       Will anyone else be joining the visit? NO  (If patient encounters technical issues they should call [phone]    How would you like to obtain your AVS? MyChart    Are changes needed to the allergy or medication list? No    Reason for visit: Consult    Giovanni BELLO

## 2023-10-13 NOTE — PATIENT INSTRUCTIONS
"It was a pleasure taking care of you today.  I've included a brief summary of our discussion and care plan from today's visit below.  Please review this information with your primary care provider.  _______________________________________________________________________    My recommendations are summarized as follows:    Please submit a stool sample at your earliest convenience to your local [facility] lab. If positive, we will start treatment with vancomycin, followed by an IMT at some point after your colonoscopy in January.    Link to the website:  -- [email or web address]  -- If the link doesn't work, in Google, type \"[facility] [employer]\", and it should bring you to this site. Go to the section listed \"Patients\", which should bring you to a drop-down menu and you can select \"post-IMT diet\".        ______________________________________________________________________    How do I schedule labs, imaging studies, or procedures that were ordered in clinic today?     Labs: To schedule lab appointment at the [facility], use my chart or call [phone]. If you have a [facility] lab closer to home where you are regularly seen you can give them a call.     Procedures: If a colonoscopy, upper endoscopy, breath test, esophageal manometry, or pH impedence was ordered today, our endoscopy team will call you to schedule this. If you have not heard from our endoscopy team within a week, please call [phone] option 2 to schedule.     Imaging Studies: If you were scheduled for a CT scan, X-ray, MRI, ultrasound, HIDA scan or other imaging study, please call [phone] to have this scheduled.     Referral: If a referral to another specialty was ordered, expect a phone call or follow instructions above. If you have not heard from anyone regarding your referral in a week, please call our clinic to check the status.     Who do I call with any questions after " my visit?  Please be in touch if there are any further questions that arise following today's visit.  There are multiple ways to contact your gastroenterology care team.      During business hours, you may reach a Gastroenterology nurse at [phone]    To schedule or reschedule an appointment, please call [phone].     You can always send a secure message through [employer].  [employer] messages are answered by your nurse or doctor typically within 24 hours.  Please allow extra time on weekends and holidays.      For urgent/emergent questions after business hours, you may reach the on-call GI Fellow by contacting the [facility]  at [phone].     How will I get the results of any tests ordered?    You will receive all of your results.  If you have signed up for [employer], any tests ordered at your visit will be available to you after your physician reviews them.  Typically this takes 1-2 weeks.  If there are urgent results that require a change in your care plan, your physician or nurse will call you to discuss the next steps.      What is [employer]?  [employer] is a secure way for you to access all of your healthcare records from the [facility].  It is a web based computer program, so you can sign on to it from any location.  It also allows you to send secure messages to your care team.  I recommend signing up for [employer] access if you have not already done so and are comfortable with using a computer.      How to I schedule a follow-up visit?  If you did not schedule a follow-up visit today, please call [phone] to schedule a follow-up office visit.      Sincerely,    Pat Sim PA-C  Division of Gastroenterology, Hepatology & Nutrition  [facility]

## 2023-10-14 LAB — C DIFF TOX B STL QL: NEGATIVE

## 2023-10-14 PROCEDURE — 87493 C DIFF AMPLIFIED PROBE: CPT | Mod: VID | Performed by: PHYSICIAN ASSISTANT

## 2023-10-17 ENCOUNTER — VIRTUAL VISIT (OUTPATIENT)
Dept: UROLOGY | Facility: CLINIC | Age: 63
End: 2023-10-17
Attending: INTERNAL MEDICINE
Payer: COMMERCIAL

## 2023-10-17 ENCOUNTER — TELEPHONE (OUTPATIENT)
Dept: UROLOGY | Facility: CLINIC | Age: 63
End: 2023-10-17

## 2023-10-17 DIAGNOSIS — R35.1 NOCTURIA: ICD-10-CM

## 2023-10-17 DIAGNOSIS — N39.41 URGE INCONTINENCE: ICD-10-CM

## 2023-10-17 DIAGNOSIS — R39.15 URINARY URGENCY: Primary | ICD-10-CM

## 2023-10-17 PROCEDURE — 99244 OFF/OP CNSLTJ NEW/EST MOD 40: CPT | Mod: 95 | Performed by: PHYSICIAN ASSISTANT

## 2023-10-17 RX ORDER — ONDANSETRON 4 MG/1
4 TABLET, FILM COATED ORAL
COMMUNITY
Start: 2023-03-24 | End: 2024-01-24

## 2023-10-17 RX ORDER — TROSPIUM CHLORIDE ER 60 MG/1
60 CAPSULE ORAL EVERY MORNING
Qty: 30 CAPSULE | Refills: 11 | Status: SHIPPED | OUTPATIENT
Start: 2023-10-17 | End: 2024-01-24

## 2023-10-17 ASSESSMENT — PAIN SCALES - GENERAL: PAINLEVEL: NO PAIN (0)

## 2023-10-17 NOTE — NURSING NOTE
Is the patient currently in the state of [place]? YES    Visit mode:VIDEO    If the visit is dropped, the patient can be reconnected by: VIDEO VISIT: Text to cell phone:   Telephone Information:   Mobile [phone]       Will anyone else be joining the visit? NO  (If patient encounters technical issues they should call [phone])    How would you like to obtain your AVS? MyChart    Are changes needed to the allergy or medication list? Pt stated no changes to allergies and Pt stated no med changes    Reason for visit: Consult (Urinary Incontinence/)    Marsha BELLO

## 2023-10-17 NOTE — LETTER
10/17/2023       RE: Nicol Aldrich  [address]  [facility] [address]     Dear Colleague,    Thank you for referring your patient, Nicol Aldrich, to the [facility]OLOG[facility] [place] at [facility]. Please see a copy of my visit note below.      Name: Nicol Aldrich    MRN: [id number]   YOB: 1960                 Chief Complaint:   Urinary incontinence         Assessment and Plan:   63 year old female with urge incontinence. We discussed available treatment options include lifestyle modifications, bladder retraining exercises, pelvic floor physical therapy, medications, and third line treatments such as bladder Botox injections, SNS, or PTNS. Also discussed further evaluation with urodynamics and cystoscopy. The mutual decision was made to proceed as follows:  -Trial of trospium ER 60 mg qAM AC. Side effects discussed.  -Bladder retraining.  -Limit bladder irritants.  -Follow up in person in [place] in 3 months to recheck.      Melonie Lopez PA-C  October 17, 2023          History of Present Illness:   Nicol Aldrich is a 63 year old female with PMH of ileocolonic Crohn's disease with significant perianal disease s/p diverting colostomy, as well as anal cancer s/p chemoradiation (2014) seen today via virtual visit in consultation from Dr. Coyle for evaluation of urinary incontinence. She has had bladder symptoms for a number of years. She is primarily bothered by urinary urgency, urge incontinence, and nocturia 2-3 times per night. She denies any dysuria, gross hematuria, or history of frequent UTI. She infrequently has stress incontinence. Wears a pad at all times because of the urgency incontinence. She feels to empty her bladder well.    Tried Myrbetriq and Detrol in the past which were ineffective and caused side effects. She also tried pelvic floor physical therapy in the past without significant benefit.          Past Medical  "History:   No past medical history on file.         Past Surgical History:   No past surgical history on file.         Social History:     Social History     Tobacco Use    Smoking status: Never    Smokeless tobacco: Never   Substance Use Topics    Alcohol use: Yes     Alcohol/week: 2.0 standard drinks of alcohol     Types: 2 Glasses of wine per week            Family History:   No family history on file.         Allergies:     Allergies   Allergen Reactions    Penicillins Rash     On 07/10/18, pt states that she experienced a rash when she had this \"a while ago.\" This was not a reaction from childhood.   On 07/10/18, pt states that she experienced a rash when she had this \"a while ago.\" This was not a reaction from childhood.               Medications:     Current Outpatient Medications   Medication Sig    ondansetron (ZOFRAN) 4 MG tablet Take 4 mg by mouth    budesonide (ENTOCORT EC) 3 MG EC capsule Take 3 capsules (9 mg) by mouth every morning for 28 days, THEN 2 capsules (6 mg) every morning for 14 days, THEN 1 capsule (3 mg) every morning for 14 days.    inFLIXimab Inject into the vein once     No current facility-administered medications for this visit.            Physical Exam:   GENERAL: Healthy, alert and no distress  EYES: Eyes grossly normal to inspection.  No discharge or erythema, or obvious scleral/conjunctival abnormalities.  RESP: No audible wheeze, cough, or visible cyanosis.  No visible retractions or increased work of breathing.    SKIN: Visible skin clear. No significant rash, abnormal pigmentation or lesions.  NEURO: Cranial nerves grossly intact.  Mentation and speech appropriate for age.  PSYCH: Mentation appears normal, affect normal/bright, judgement and insight intact, normal speech and appearance well-groomed.       Labs:      Creatinine   Date Value Ref Range Status   06/13/2023 0.97 (H) 0.51 - 0.95 mg/dL Final         Imaging:    CT ABD AND PELVIS WITH IV CONTRAST   " 1/13/2023    FINDINGS:   Minor curvilinear atelectasis identified in the lung bases.  The lung bases   otherwise are clear.     The liver, spleen, pancreas, gallbladder, adrenals and kidneys appear to be   normal.  Minor calcified plaque formation in the abdominal aorta extending into   the iliac vessels without aneurysmal dilatation.  There is no retroperitoneal   adenopathy.     Diverting colostomy identified in the left anterior mid abdomen.  There is   peristomal hernia containing multiple small bowel loops without obstruction or   strangulation.  No small intestine enhancing abnormalities identified.  The   previous pancolitis of the colon have resolved.  The fatty hernia that is   ventral above the umbilicus have not appreciably changed.     Images of the pelvis again show the atrophic uterus.  There is no adnexal mass.   The urinary bladder is decompressed.     A left posterior perianal fistulous tract is again identified at approximate 5   o'clock position without active inflammation or abscess formation.  There   appearance is very similar to the CT and MRI done in November of 2022.  There   are degenerative changes identified in the lumbar spine.     IMPRESSION:   1. Interval resolution of previous pancolitis.   2. Peristomal hernia containing multiple small bowel loops from the diverting   colostomy in the left anterior mid abdomen.  No obstruction or strangulation.   Fatty hernia that is ventral above the umbilicus also redemonstrated.   3. No abnormal fluid collection or abscess formation.   4. The left posterior perianal fistulous tract at approximate 5 o'clock   position without active inflammation or abscess formation redemonstrated.        Virtual Visit Details    Type of service:  Video Visit     Video start time: 9:30 AM    Video end time: 10:01 AM    Originating Location (pt. Location): Home    Distant Location (provider location):  Off-site  Platform used for Video Visit: Michael      43 minutes  spent on the date of the encounter doing chart review, review of test results, interpretation of tests, patient visit, and documentation

## 2023-10-17 NOTE — PATIENT INSTRUCTIONS
UROLOGY CLINIC VISIT PATIENT INSTRUCTIONS    Start taking trospium (Sanctura XR) 60 mg once daily in the morning on an empty stomach.    Bladder Retraining  In this packet, you will learn 3 steps to help you improve your bladder control. If you have any questions regarding any of these steps once you are home, please feel free to call the office.   The 3 steps you will learn are:   Double Voiding   Fluid Guidelines  Timed Voiding   Double Voiding   A technique called double voiding can be used to help ensure that you have fully emptied your bladder while on the toilet. The main idea behind double voiding is to try to void two or even three times during each trip to the bathroom.  By doing this you can reduce residual urine volumes and minimize your chance of having an accident, an infection, or leakage later on.   The technique is simple.  Some people find that they can void, remain on the toilet for a rest period of two to five to ten minutes, and void again.  Others find it useful to void, then stand up and sit back down and attempt to void again.   You can also compress the bladder in order to empty more fully.  To do the Crede maneuver, press firmly with one hand (or both hands) directly into the abdomen over the bladder.  You may also find it helpful to lean forward or rock while sitting on the toilet in order to help empty the bladder better.  Fluid Guidelines   Managing your fluid intake can also help you improve your bladder control.  It is VERY important that you drink at least 5 to 7 glasses of fluid each day. The bulk of this fluid should be water. Drinking appropriate amounts of fluid daily and emptying your bladder at regular intervals helps decrease bladder infections. Managing your problem by restricting fluid intake is counterproductive, and NOT recommended.     Suggestions Regarding Fluid Intake  Try to spread your fluid consumption out over the course of the day rather than consuming large amounts  at one sitting and then going long periods of time without drinking.   Try to minimize fluid consumption after your evening meal.   Try to minimize caffeine and alcohol consumption. Use only decaffeinated coffee, tea or soda when possible.      Timed Voiding    It might be a good idea to start this approach to managing your problem on a weekend or when you plan to be at home or near a bathroom facility. The purpose of timed voiding is to gradually:   increase the amount of time between emptying your bladder   increase the amount of fluid your bladder can hold, and hopefully,   diminish the sense of urgency and/or leakage associated with your problem.     Week 1 - After awakening, empty your bladder every half-hour on the hour (even if you do NOT feel the need to go). Make sure you are drinking frequently. During the night only go to the bathroom if you waken and find it necessary     Week 2 - Increase the time between emptying your bladder to once per hour, following the above fluid and night instructions.     Week 3 - Increase the time between emptying your bladder to every 1 1/2 hours, following the above fluid and night instructions.   Week 4 - Increase the time between emptying your bladder to every 2 hours, following the above fluid and night instructions.  Work up to voiding every 3 to 3   hours if you can.     If you can already hold your bladder longer than 1/2 hour, you do not need to start at Week 1. Start at the point that is appropriate for you and work up from there. Just remember to 1) increase your voiding intervals by no more than 30 minutes at a time, 2) void regularly even if you do not feel the need to go, and 3) during the night, only go to the bathroom if you waken and find it necessary.   You will be the best  of how quickly you can advance to the next step. These instructions are an outline which you can modify (for example, you may find it more comfortable to stretch from 1 to 1 1/4  hours).   You may also increase the pace of this sample schedule, depending on your individual symptoms and bladder capacity. For example, you may increase the hourly increments every 5 days, instead of every 7 days.     Don t Get Discouraged  This program works! The keys to success are self-motivation and gradual increases in the time interval between voids. If you try to progress too rapidly, you will exceed your capabilities and become frustrated.    Some Additional Behavioral Techniques to Help Bladder Control  Do not rush to the bathroom. Try to be calm and maintain control. Rushing to the bathroom can intensify the urge for the bladder to contract.  Do several quick contractions of the pelvic floor muscles. Use effort to keep from leaking. If possible, sit down for direct pressure on the pelvic floor.  Relax. If you have practiced diaphragmatic breathing in the past, use that skill to relax. (Take slow, deep breaths through your nose, and then slowly breathe out through pursed lips.) Use distraction techniques to try and make the urinary urge go away.  When you feel the urge subside, walk slowly and normally to the bathroom. You can repeat the above steps to gain control if the urge returns.  You can slowly proceed to the toilet room to empty your bladder once the urge has subsided.      Follow up for an in person visit at [place] in 3 months.     If you have any issues, questions or concerns in the meantime, do not hesitate to contact us at [phone] or via [employer].     It was a pleasure meeting with you today.  Thank you for allowing me and my team the privilege of caring for you today.  YOU are the reason we are here, and I truly hope we provided you with the excellent service you deserve.  Please let us know if there is anything else we can do for you so that we can be sure you are leaving completely satisfied with your care experience.

## 2023-10-17 NOTE — PROGRESS NOTES
Name: Nicol Aldrich    MRN: [id number]   YOB: 1960                 Chief Complaint:   Urinary incontinence         Assessment and Plan:   63 year old female with urge incontinence. We discussed available treatment options include lifestyle modifications, bladder retraining exercises, pelvic floor physical therapy, medications, and third line treatments such as bladder Botox injections, SNS, or PTNS. Also discussed further evaluation with urodynamics and cystoscopy. The mutual decision was made to proceed as follows:  -Trial of trospium ER 60 mg qAM AC. Side effects discussed.  -Bladder retraining.  -Limit bladder irritants.  -Follow up in person in [place] in 3 months to recheck.      Melonie Lopez PA-C  October 17, 2023          History of Present Illness:   Nicol Aldrich is a 63 year old female with PMH of ileocolonic Crohn's disease with significant perianal disease s/p diverting colostomy, as well as anal cancer s/p chemoradiation (2014) seen today via virtual visit in consultation from Dr. Coyle for evaluation of urinary incontinence. She has had bladder symptoms for a number of years. She is primarily bothered by urinary urgency, urge incontinence, and nocturia 2-3 times per night. She denies any dysuria, gross hematuria, or history of frequent UTI. She infrequently has stress incontinence. Wears a pad at all times because of the urgency incontinence. She feels to empty her bladder well.    Tried Myrbetriq and Detrol in the past which were ineffective and caused side effects. She also tried pelvic floor physical therapy in the past without significant benefit.          Past Medical History:   No past medical history on file.         Past Surgical History:   No past surgical history on file.         Social History:     Social History     Tobacco Use    Smoking status: Never    Smokeless tobacco: Never   Substance Use Topics    Alcohol use: Yes     Alcohol/week: 2.0 standard drinks of  "alcohol     Types: 2 Glasses of wine per week            Family History:   No family history on file.         Allergies:     Allergies   Allergen Reactions    Penicillins Rash     On 07/10/18, pt states that she experienced a rash when she had this \"a while ago.\" This was not a reaction from childhood.   On 07/10/18, pt states that she experienced a rash when she had this \"a while ago.\" This was not a reaction from childhood.               Medications:     Current Outpatient Medications   Medication Sig    ondansetron (ZOFRAN) 4 MG tablet Take 4 mg by mouth    budesonide (ENTOCORT EC) 3 MG EC capsule Take 3 capsules (9 mg) by mouth every morning for 28 days, THEN 2 capsules (6 mg) every morning for 14 days, THEN 1 capsule (3 mg) every morning for 14 days.    inFLIXimab Inject into the vein once     No current facility-administered medications for this visit.            Physical Exam:   GENERAL: Healthy, alert and no distress  EYES: Eyes grossly normal to inspection.  No discharge or erythema, or obvious scleral/conjunctival abnormalities.  RESP: No audible wheeze, cough, or visible cyanosis.  No visible retractions or increased work of breathing.    SKIN: Visible skin clear. No significant rash, abnormal pigmentation or lesions.  NEURO: Cranial nerves grossly intact.  Mentation and speech appropriate for age.  PSYCH: Mentation appears normal, affect normal/bright, judgement and insight intact, normal speech and appearance well-groomed.       Labs:      Creatinine   Date Value Ref Range Status   06/13/2023 0.97 (H) 0.51 - 0.95 mg/dL Final         Imaging:    CT ABD AND PELVIS WITH IV CONTRAST   1/13/2023    FINDINGS:   Minor curvilinear atelectasis identified in the lung bases.  The lung bases   otherwise are clear.     The liver, spleen, pancreas, gallbladder, adrenals and kidneys appear to be   normal.  Minor calcified plaque formation in the abdominal aorta extending into   the iliac vessels without aneurysmal " dilatation.  There is no retroperitoneal   adenopathy.     Diverting colostomy identified in the left anterior mid abdomen.  There is   peristomal hernia containing multiple small bowel loops without obstruction or   strangulation.  No small intestine enhancing abnormalities identified.  The   previous pancolitis of the colon have resolved.  The fatty hernia that is   ventral above the umbilicus have not appreciably changed.     Images of the pelvis again show the atrophic uterus.  There is no adnexal mass.   The urinary bladder is decompressed.     A left posterior perianal fistulous tract is again identified at approximate 5   o'clock position without active inflammation or abscess formation.  There   appearance is very similar to the CT and MRI done in November of 2022.  There   are degenerative changes identified in the lumbar spine.     IMPRESSION:   1. Interval resolution of previous pancolitis.   2. Peristomal hernia containing multiple small bowel loops from the diverting   colostomy in the left anterior mid abdomen.  No obstruction or strangulation.   Fatty hernia that is ventral above the umbilicus also redemonstrated.   3. No abnormal fluid collection or abscess formation.   4. The left posterior perianal fistulous tract at approximate 5 o'clock   position without active inflammation or abscess formation redemonstrated.        Virtual Visit Details    Type of service:  Video Visit     Video start time: 9:30 AM    Video end time: 10:01 AM    Originating Location (pt. Location): Home    Distant Location (provider location):  Off-site  Platform used for Video Visit: RIGID      43 minutes spent on the date of the encounter doing chart review, review of test results, interpretation of tests, patient visit, and documentation

## 2023-10-18 ENCOUNTER — OFFICE VISIT (OUTPATIENT)
Dept: DERMATOLOGY | Facility: CLINIC | Age: 63
End: 2023-10-18
Payer: COMMERCIAL

## 2023-10-18 ENCOUNTER — DOCUMENTATION ONLY (OUTPATIENT)
Dept: GASTROENTEROLOGY | Facility: CLINIC | Age: 63
End: 2023-10-18

## 2023-10-18 DIAGNOSIS — C44.612 BASAL CELL CARCINOMA (BCC) OF RIGHT SHOULDER: ICD-10-CM

## 2023-10-18 PROCEDURE — 11603 EXC TR-EXT MAL+MARG 2.1-3 CM: CPT | Performed by: STUDENT IN AN ORGANIZED HEALTH CARE EDUCATION/TRAINING PROGRAM

## 2023-10-18 PROCEDURE — 88305 TISSUE EXAM BY PATHOLOGIST: CPT | Performed by: DERMATOLOGY

## 2023-10-18 PROCEDURE — 13101 CMPLX RPR TRUNK 2.6-7.5 CM: CPT | Performed by: STUDENT IN AN ORGANIZED HEALTH CARE EDUCATION/TRAINING PROGRAM

## 2023-10-18 PROCEDURE — 13102 CMPLX RPR TRUNK ADDL 5CM/<: CPT | Performed by: STUDENT IN AN ORGANIZED HEALTH CARE EDUCATION/TRAINING PROGRAM

## 2023-10-18 NOTE — PATIENT INSTRUCTIONS
Try zinc oxide 40% can be purchased online or over the counter     Excision Wound Care Instructions  I will experience scar, altered skin color, bleeding, swelling, pain, crusting and redness. I may experience altered sensation. Risks are excessive bleeding, infection, muscle weakness, thick (hypertrophic or keloidal) scar, and recurrence. A second procedure may be recommended to obtain the best cosmetic or functional result.  Possible complications of any surgical procedure are bleeding, infection, scarring, alteration in skin color and sensation, muscle weakness in the area, wound dehiscence or seperation, or recurrence of the lesion or disease. On occasion, after healing, a secondary procedure or revision may be recommended in order to obtain the best cosmetic or functional result.   After your surgery, a pressure bandage will be placed over the area that has sutures. This will help prevent bleeding. Please follow these instructions until you come back to clinic for suture removal on day 10-12, as they will help you to prevent complications as your wound heals.  For the First 24 hours After Surgery:  Leave the pressure bandage on and keep it dry. If it should come loose, you may retape it, but do not take it off.  Relax and take it easy. Do not do any vigorous exercise, heavy lifting, or bending forward. This could cause the wound to bleed.  Post-operative pain is usually mild. You may alternate between 1000 mg of Tylenol (acetaminophen) and 400 mg of Ibuprofen every 4 hours.  Do not take more than 4,000mg of acetaminophen in a 24 hour period or 3200 mg of Ibuprofen in a 24 hr period.  Avoid alcohol and vitamin E as these may increase your tendency to bleed.  You may put an ice pack around the bandaged area for 20 minutes every 2-3 hours. This may help reduce swelling, bruising, and pain. Make sure the ice pack is waterproof so that the pressure bandage does not get wet.   You may see a small amount of drainage  or blood on your pressure bandage. This is normal. However, if drainage or bleeding continues or saturates the bandage, you will need to apply firm pressure over the bandage with a washcloth for 15 minutes. If bleeding continues after applying pressure for 15 minutes then go to the nearest emergency room.  After the first 24 hours from Surgery  Carefully remove the bandage and start daily wound care and dressing changes. You may also now shower and get the wound wet.  Daily Wound Care:  Wash wound with a mild soap and water.  Use caution when washing the wound, be gentle and do not let the forceful shower stream hit the wound directly.  Pat dry.  Apply Vaseline (from a new container or tube) over the suture line with a Q-tip. It is very important to keep the wound continuously moist, as wounds heal best in a moist environment.  If you had stitches placed keep the site covered until sutures have either been removed or dissolve.  You can cover it with a Telfa (non-stick) dressing and tape or a band-aid.    If you are unable to keep wound covered, you must apply Vaseline every 2-3 hours (while awake) to ensure it is being kept moist for optimal healing. A dressing overnight is recommended to keep the area moist.  Call Us If:  You have pain that is not controlled with Tylenol/Ibuprofen  You have signs or symptoms of an infection, such as: fever over 100 degrees F, redness, warmth, or foul-smelling or yellow drainage from the wound.  Who should I call with questions?  [facility]: [phone]   [facility]: [phone]  [facility]: [phone]  For urgent needs outside of business hours call the [facility] at [phone] and ask to speak with the dermatology resident on call

## 2023-10-18 NOTE — PROGRESS NOTES
INCOMING FAX:      Date/ Time: 10/13/2023 @ 1622    Facility: [facility]    Phone: [phone]     Fax: [phone]     What: Plan of treatment needing signature from physician. Certification period 11-1-23 to 5-1-24    Routed document to Dr. Coyle and Jeovany RAMIREZ to review and sign.       Form reivew and signed by Dr. Deal. Faxed back to [facility] at [phone]. Copy scanned into patient chart.    Cleopatra Lozada LPN

## 2023-10-18 NOTE — LETTER
10/18/2023         RE: Nicol Aldrich  [address]  [facility] [address]        Dear Colleague,    Thank you for referring your patient, Nicol Alrdich, to the [facility]. Please see a copy of my visit note below.    DERMATOLOGIC SURGERY REPORT    NAME OF PROCEDURE:  EXCISION AND CLOSURE    Surgeon:  Kelechi Guo MD    PREOPERATIVE DIAGNOSIS: [facility] [place]  POSTOPERATIVE DIAGNOSIS: Same  Lesion Size: 20 mm lesion with 4 mm margins  Final excision size: 28 mm  Repair type: Complex  FINAL REPAIR LENGTH:  77 mm  Location: R shoulder  Prior Biopsy Accession #: [id number]     INDICATIONS:Excision was indicated for treatment. We discussed the principles of treatment and most likely complications including bleeding, infection, wound dehiscence, pain, nerve damage, and scarring. Informed consent was obtained and the patient underwent the procedure as follows.    PROCEDURE:  The patient was taken to the operative suite. The treatment area was anesthetized with 1% lidocaine with 1:502033 epinephrine buffered with bicarbonate. The area was washed with hibiclens, rinsed with saline and draped with sterile towels. The lesion was delineated and excised down to subcutaneous fat. Hemostasis was obtained by electrocoagulation.     REPAIR:  In order to repair this defect while maintaining the normal anatomic relations and function, we elected to utilize a linear closure. Closure was oriented so that the wound was in the patient's natural skin tension lines. Two redundant cones were removed by triangulation. Due to tightness of the surrounding skin deeper layers of the subcutaneous tissue were undermined extensively to a distance  greater than width of the defect on both sides by dissection in the subcutaneous plane until adequate tissue mobility was obtained. Deep dermal and subcutaneous layer closure was performed using 3-0 viryl deep, intradermal and subcutaneous sutures.  Final cutaneous approximation  was achieved with 4-0 monocryl simple running sutures.      A total of 7mL of anesthesia was administered for all surgical sites. Estimated blood loss was less than 5 mL for all surgical sites. A sterile pressure dressing was applied and wound care instructions, with a written handout, were given. The patient was discharged alert and ambulatory.    Kelechi Guo M.D.      Department of Dermatology       Again, thank you for allowing me to participate in the care of your patient.        Sincerely,        Kelechi Guo MD

## 2023-10-18 NOTE — PROGRESS NOTES
DERMATOLOGIC SURGERY REPORT    NAME OF PROCEDURE:  EXCISION AND CLOSURE    Surgeon:  Kelechi Guo MD    PREOPERATIVE DIAGNOSIS: [facility] [place]  POSTOPERATIVE DIAGNOSIS: Same  Lesion Size: 20 mm lesion with 4 mm margins  Final excision size: 28 mm  Repair type: Complex  FINAL REPAIR LENGTH:  77 mm  Location: R shoulder  Prior Biopsy Accession #: [id number]     INDICATIONS:Excision was indicated for treatment. We discussed the principles of treatment and most likely complications including bleeding, infection, wound dehiscence, pain, nerve damage, and scarring. Informed consent was obtained and the patient underwent the procedure as follows.    PROCEDURE:  The patient was taken to the operative suite. The treatment area was anesthetized with 1% lidocaine with 1:960175 epinephrine buffered with bicarbonate. The area was washed with hibiclens, rinsed with saline and draped with sterile towels. The lesion was delineated and excised down to subcutaneous fat. Hemostasis was obtained by electrocoagulation.     REPAIR:  In order to repair this defect while maintaining the normal anatomic relations and function, we elected to utilize a linear closure. Closure was oriented so that the wound was in the patient's natural skin tension lines. Two redundant cones were removed by triangulation. Due to tightness of the surrounding skin deeper layers of the subcutaneous tissue were undermined extensively to a distance  greater than width of the defect on both sides by dissection in the subcutaneous plane until adequate tissue mobility was obtained. Deep dermal and subcutaneous layer closure was performed using 3-0 viryl deep, intradermal and subcutaneous sutures.  Final cutaneous approximation was achieved with 4-0 monocryl simple running sutures.      A total of 7mL of anesthesia was administered for all surgical sites. Estimated blood loss was less than 5 mL for all surgical sites. A sterile pressure dressing was applied and wound care  instructions, with a written handout, were given. The patient was discharged alert and ambulatory.    Kelechi Guo M.D.      Department of Dermatology

## 2023-11-02 ENCOUNTER — OFFICE VISIT (OUTPATIENT)
Dept: WOUND CARE | Facility: CLINIC | Age: 63
End: 2023-11-02
Payer: COMMERCIAL

## 2023-11-02 DIAGNOSIS — Z43.3 ENCOUNTER FOR ATTENTION TO COLOSTOMY (H): Primary | ICD-10-CM

## 2023-11-02 DIAGNOSIS — K63.2 ENTEROCUTANEOUS FISTULA: ICD-10-CM

## 2023-11-02 DIAGNOSIS — K50.819 CROHN'S DISEASE OF SMALL AND LARGE INTESTINES WITH COMPLICATION (H): ICD-10-CM

## 2023-11-02 PROCEDURE — 99211 OFF/OP EST MAY X REQ PHY/QHP: CPT

## 2023-11-02 NOTE — PROGRESS NOTES
"[facility] Ostomy Assessment  Patient comes to clinic for consultation regarding ostomy issues.    Procedure: past medical history of Crohn's disease (s/p hemicolectomy with colostomy bag in 2021, on prednisone and vedolizumab), two hernias near stoma, diversion colitis, squamous cell carcinoma of the anal canal (s/p transanal excision of anal CA in 2014), and perirectal abscess who presents with an ulcer of her ostomy    Dx related to ostomy:Crohn's and rectal cancer  Consulted per Dr Jamal Herrera    Subjective:Ostomy care is provided by self   Patient's reason for visit: \"fistula take down, will meet with dr Herrera as well     The quantity of ostomy supplies needed by a beneficiary is determined primarily by the type of ostomy, its location, its construction, and the condition of the skin surface surrounding the stoma.    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 This is  scheduled.   Current pouch system/supplies: Coloplast   two piece, cut to fit, convex and barrier ring    Assessment: Pt is doing well with daily stoma and fistula cares. HX of PG and now Crohn's related fistula.   Erythema is minimal. She does have a hernia and is inquiring about hernia repair as well. Spoke with dr Herrera and will wait, watch and observe for now since she is tolerating the current management well and risks of repair are significant . She was told she shouldn't wear a hernia belt due to the fistula but she certainly could  Nu hope belt 4 \" , 41\" girth with 2 3/4 ring.     Intervention/Plan:  Continue current cares since she is managing well and skin is improving. Suggested to isolate the fistula from the stoma by cutting af the lateral edge of the " barrier so the fistula can be covered with foam and gauze Consider the barrier strips to hold in the gauze. Will order hernia belt She wants a larger belt  6413-A for now but number to next size up given as well 6412-A     Return to clinic prn .      KIKE Herrera saw pt and  was available for supervision of care if needed or if questions should arise and regarding plan of care.      Sabiha Ventura, RN  RN CWON

## 2023-11-14 NOTE — PROGRESS NOTES
Surgical Office Location:  [facility]  [address] [place] [id number]

## 2023-11-16 ENCOUNTER — OFFICE VISIT (OUTPATIENT)
Dept: DERMATOLOGY | Facility: CLINIC | Age: 63
End: 2023-11-16
Payer: COMMERCIAL

## 2023-11-16 DIAGNOSIS — L81.4 LENTIGO: ICD-10-CM

## 2023-11-16 DIAGNOSIS — D18.01 ANGIOMA OF SKIN: ICD-10-CM

## 2023-11-16 DIAGNOSIS — L82.1 SEBORRHEIC KERATOSES: ICD-10-CM

## 2023-11-16 DIAGNOSIS — D23.9 DERMAL NEVUS: ICD-10-CM

## 2023-11-16 DIAGNOSIS — C44.722 SQUAMOUS CELL CARCINOMA OF RIGHT LOWER LEG: Primary | ICD-10-CM

## 2023-11-16 PROCEDURE — 17313 MOHS 1 STAGE T/A/L: CPT | Performed by: DERMATOLOGY

## 2023-11-16 PROCEDURE — 99213 OFFICE O/P EST LOW 20 MIN: CPT | Mod: 25 | Performed by: DERMATOLOGY

## 2023-11-16 NOTE — LETTER
11/16/2023         RE: Nicol Aldrich  [address]  [facility] [address]        Dear Colleague,    Thank you for referring your patient, Nicol Aldrich, to the [facility]. Please see a copy of my visit note below.    Surgical Office Location:  [facility]  [address] [place] [id number]      Nicol Aldrich , a 63 year old year old female patient, I was asked to see by Dr. Guo for squamous cell carcinoma on right shin.  Patient has no other skin complaints today.  Remainder of the HPI, Meds, PMH, Allergies, FH, and SH was reviewed in chart.    Pertinent Hx:   Non-melanoma skin cancer   Past Medical History:   Diagnosis Date     Squamous cell carcinoma of skin, unspecified        History reviewed. No pertinent surgical history.     History reviewed. No pertinent family history.    Social History     Socioeconomic History     Marital status:      Spouse name: Not on file     Number of children: Not on file     Years of education: Not on file     Highest education level: Not on file   Occupational History     Not on file   Tobacco Use     Smoking status: Never     Smokeless tobacco: Never   Vaping Use     Vaping Use: Never used   Substance and Sexual Activity     Alcohol use: Yes     Alcohol/week: 2.0 standard drinks of alcohol     Types: 2 Glasses of wine per week     Drug use: Not on file     Sexual activity: Not on file   Other Topics Concern     Not on file   Social History Narrative     Not on file     Social Determinants of Health     Financial Resource Strain: Not on file   Food Insecurity: Not on file   Transportation Needs: Not on file   Physical Activity: Not on file   Stress: Not on file   Social Connections: Not on file   Interpersonal Safety: Not on file   Housing Stability: Not on file       Outpatient Encounter Medications as of 11/16/2023   Medication Sig Dispense Refill     budesonide (ENTOCORT EC) 3 MG EC capsule Take 3 capsules (9  mg) by mouth every morning for 28 days, THEN 2 capsules (6 mg) every morning for 14 days, THEN 1 capsule (3 mg) every morning for 14 days. 126 capsule 0     inFLIXimab Inject into the vein once       ondansetron (ZOFRAN) 4 MG tablet Take 4 mg by mouth (Patient not taking: Reported on 10/18/2023)       Ostomy Supplies MISC 1 each daily 1 each 11     trospium (SANCTURA XR) 60 MG CP24 24 hr capsule Take 1 capsule (60 mg) by mouth every morning 30 capsule 11     No facility-administered encounter medications on file as of 11/16/2023.             Review Of Systems  Skin: As above  Eyes: negative  Ears/Nose/Throat: negative  Respiratory: No shortness of breath, dyspnea on exertion, cough, or hemoptysis  Cardiovascular: negative  Gastrointestinal: negative  Genitourinary: negative  Musculoskeletal: negative  Neurologic: negative  Psychiatric: negative  Hematologic/Lymphatic/Immunologic: negative  Endocrine: negative      O:   NAD, WDWN, Alert & Oriented, Mood & Affect wnl, Vitals stable   General appearance annalisa ii   Vitals stable   Alert, oriented and in no acute distress      Following lymph nodes palpated: popliteal no lad  R shin 1cm red scaly papule    Stuck on papules and brown macules on trunk and ext    Red papules on ext   Flesh colored papules on face      Eyes: Conjunctivae/lids:Normal     ENT: Lips, buccal mucosa, tongue: normal    MSK:Normal    Cardiovascular: peripheral edema none    Pulm: Breathing Normal    Neuro/Psych: Orientation:Normal; Mood/Affect:Normal      A/P:  1. Seborrheic keratosis, lentigo, angioma, dermal nevus  2. R shin squamous cell carcinoma   It was a pleasure speaking to Nicol Aldrich today.  Previous clinic  notes and pertinent laboratory tests were reviewed prior to Nicol Aldrich's visit.  Signs and Symptoms of skin cancer discussed with patient.  Patient encouraged to perform monthly skin exams.  UV precautions reviewed with patient.  Risks of non-melanoma skin cancer discussed with  patient   Return to clinic 1 month  PROCEDURE NOTE  R shin squamous cell carcinoma   MOHS:   Location    The rationale for Mohs surgery was discussed with the patient and consent was obtained.  The risks and benefits as well as alternatives to therapy were discussed, in detail.  Specifically, the risks of infection, scarring, bleeding, prolonged wound healing, incomplete removal, allergy to anesthesia, nerve injury and recurrence were addressed.  Indication for Mohs was Location. Prior to the procedure, the treatment site was clearly identified and, if available, confirmed with previous photos and confirmed by the patient   All components of the Universal Protocol/PAUSE rule were completed.  The Mohs surgeon operated in two distinct and integrated capacities as the surgeon and pathologist.      The area was prepped with Betasept.  A rim of normal appearing skin was marked circumferentially around the lesion.  The area was infiltrated with local anesthesia.  The tumor was first debulked to remove all clinically apparent tumor.  An incision following the standard Mohs approach was done and the specimen was oriented,mapped and placed in 1 block(s).  Each specimen was then chromacoded and processed in the Mohs laboratory using standard Mohs technique and submitted for frozen section histology.  Frozen section analysis showed no residual tumor but CLEAR MARGINS.      The tumor was excised using standard Mohs technique in 1 stages(s).  CLEAR MARGINS OBTAINED and Final defect size was 1.5 cm.     We discussed the options for wound management in full with the patient including risks/benefits/ possible outcomes.      REPAIR SECOND INTENT: We discussed the options for wound management in full with the patient including risks/benefits/possible outcomes. Decision made to allow the wound to heal by second intention. Cautery was used for for hemostasis. EBL minimal; complications none; wound care routine.  The patient was  discharged in good condition and will return in one month or prn for wound evaluation.        Again, thank you for allowing me to participate in the care of your patient.        Sincerely,        Chapito Curry MD

## 2023-11-16 NOTE — PROGRESS NOTES
Nicol Aldrich , a 63 year old year old female patient, I was asked to see by Dr. Guo for squamous cell carcinoma on right shin.  Patient has no other skin complaints today.  Remainder of the HPI, Meds, PMH, Allergies, FH, and SH was reviewed in chart.    Pertinent Hx:   Non-melanoma skin cancer   Past Medical History:   Diagnosis Date    Squamous cell carcinoma of skin, unspecified        History reviewed. No pertinent surgical history.     History reviewed. No pertinent family history.    Social History     Socioeconomic History    Marital status:      Spouse name: Not on file    Number of children: Not on file    Years of education: Not on file    Highest education level: Not on file   Occupational History    Not on file   Tobacco Use    Smoking status: Never    Smokeless tobacco: Never   Vaping Use    Vaping Use: Never used   Substance and Sexual Activity    Alcohol use: Yes     Alcohol/week: 2.0 standard drinks of alcohol     Types: 2 Glasses of wine per week    Drug use: Not on file    Sexual activity: Not on file   Other Topics Concern    Not on file   Social History Narrative    Not on file     Social Determinants of Health     Financial Resource Strain: Not on file   Food Insecurity: Not on file   Transportation Needs: Not on file   Physical Activity: Not on file   Stress: Not on file   Social Connections: Not on file   Interpersonal Safety: Not on file   Housing Stability: Not on file       Outpatient Encounter Medications as of 11/16/2023   Medication Sig Dispense Refill    budesonide (ENTOCORT EC) 3 MG EC capsule Take 3 capsules (9 mg) by mouth every morning for 28 days, THEN 2 capsules (6 mg) every morning for 14 days, THEN 1 capsule (3 mg) every morning for 14 days. 126 capsule 0    inFLIXimab Inject into the vein once      ondansetron (ZOFRAN) 4 MG tablet Take 4 mg by mouth (Patient not taking: Reported on 10/18/2023)      Ostomy Supplies MISC 1 each daily 1 each 11    trospium (SANCTURA XR)  60 MG CP24 24 hr capsule Take 1 capsule (60 mg) by mouth every morning 30 capsule 11     No facility-administered encounter medications on file as of 11/16/2023.             Review Of Systems  Skin: As above  Eyes: negative  Ears/Nose/Throat: negative  Respiratory: No shortness of breath, dyspnea on exertion, cough, or hemoptysis  Cardiovascular: negative  Gastrointestinal: negative  Genitourinary: negative  Musculoskeletal: negative  Neurologic: negative  Psychiatric: negative  Hematologic/Lymphatic/Immunologic: negative  Endocrine: negative      O:   NAD, WDWN, Alert & Oriented, Mood & Affect wnl, Vitals stable   General appearance annalisa ii   Vitals stable   Alert, oriented and in no acute distress      Following lymph nodes palpated: popliteal no lad  R shin 1cm red scaly papule    Stuck on papules and brown macules on trunk and ext    Red papules on ext   Flesh colored papules on face      Eyes: Conjunctivae/lids:Normal     ENT: Lips, buccal mucosa, tongue: normal    MSK:Normal    Cardiovascular: peripheral edema none    Pulm: Breathing Normal    Neuro/Psych: Orientation:Normal; Mood/Affect:Normal      A/P:  1. Seborrheic keratosis, lentigo, angioma, dermal nevus  2. R shin squamous cell carcinoma   It was a pleasure speaking to Nicol Aldrich today.  Previous clinic  notes and pertinent laboratory tests were reviewed prior to Nicol Aldrich's visit.  Signs and Symptoms of skin cancer discussed with patient.  Patient encouraged to perform monthly skin exams.  UV precautions reviewed with patient.  Risks of non-melanoma skin cancer discussed with patient   Return to clinic 1 month  PROCEDURE NOTE  R shin squamous cell carcinoma   MOHS:   Location    The rationale for Mohs surgery was discussed with the patient and consent was obtained.  The risks and benefits as well as alternatives to therapy were discussed, in detail.  Specifically, the risks of infection, scarring, bleeding, prolonged wound healing, incomplete  removal, allergy to anesthesia, nerve injury and recurrence were addressed.  Indication for Mohs was Location. Prior to the procedure, the treatment site was clearly identified and, if available, confirmed with previous photos and confirmed by the patient   All components of the Universal Protocol/PAUSE rule were completed.  The Mohs surgeon operated in two distinct and integrated capacities as the surgeon and pathologist.      The area was prepped with Betasept.  A rim of normal appearing skin was marked circumferentially around the lesion.  The area was infiltrated with local anesthesia.  The tumor was first debulked to remove all clinically apparent tumor.  An incision following the standard Mohs approach was done and the specimen was oriented,mapped and placed in 1 block(s).  Each specimen was then chromacoded and processed in the Mohs laboratory using standard Mohs technique and submitted for frozen section histology.  Frozen section analysis showed no residual tumor but CLEAR MARGINS.      The tumor was excised using standard Mohs technique in 1 stages(s).  CLEAR MARGINS OBTAINED and Final defect size was 1.5 cm.     We discussed the options for wound management in full with the patient including risks/benefits/ possible outcomes.      REPAIR SECOND INTENT: We discussed the options for wound management in full with the patient including risks/benefits/possible outcomes. Decision made to allow the wound to heal by second intention. Cautery was used for for hemostasis. EBL minimal; complications none; wound care routine.  The patient was discharged in good condition and will return in one month or prn for wound evaluation.

## 2023-11-16 NOTE — PATIENT INSTRUCTIONS
Open Wound Care     for _____LEG_________        No strenuous activity for 48 hours    Take Tylenol as needed for discomfort.                                                .         Do not drink alcoholic beverages for 48 hours.    Keep the pressure bandage in place for 24 hours. If the bandage becomes blood tinged or loose, reinforce it with gauze and tape.        (Refer to the reverse side of this page for management of bleeding).    Remove bandage in 24 hours and begin wound care as follows:     Clean area with tap water using a Q tip or gauze pad, (shower / bathe normally)  Dry wound with Q tip or gauze pad  Apply Aquaphor, Vaseline, Polysporin or Bacitracin Ointment with a Q tip  Do NOT use Neosporin Ointment *  Cover the wound with a band-aid or nonstick gauze pad and paper tape.  Repeat wound care once a day until wound is completely healed.    It is an old wives tale that a wound heals better when it is exposed to air and allowed to dry out. The wound will heal faster with a better cosmetic result if it is kept moist with ointment and covered with a bandage.  Do not let the wound dry out.      Supplies Needed:                Qtips or gauze pads                Polysporin or Bacitracin Ointment                Bandaids or nonstick gauze pads and paper tape    Wound care kits and brown paper tape are available for purchase at   the pharmacy.    BLEEDING:    Use tightly rolled up gauze or cloth to apply direct pressure over the bandage for 20   minutes.  Reapply pressure for an additional 20 minutes if necessary  Call the office or go to the nearest emergency room if pressure fails to stop the bleeding.  Use additional gauze and tape to maintain pressure once the bleeding has stopped.  Begin wound care 24 hours after surgery as directed.                  WOUND HEALING    One week after surgery a pink / red halo will form around the outside of the wound.   This is new skin.  The center of the wound will appear  yellowish white and produce some drainage.  The pink halo will slowly migrate in toward the center of the wound until the wound is covered with new shiny pink skin.  There will be no more drainage when the wound is completely healed.  It will take six months to one year for the redness to fade.  The scar may be itchy, tight and sensitive to extreme temperatures for a year after the surgery.  Massaging the area several times a day for several minutes after the wound is completely healed will help the scar soften and normalize faster. Begin massage only after healing is complete.      In case of emergency call: Dr Curry: [phone]    [facility]: [phone]    [facility]:[phone]

## 2023-11-21 ENCOUNTER — PRE VISIT (OUTPATIENT)
Dept: UROLOGY | Facility: CLINIC | Age: 63
End: 2023-11-21

## 2023-11-21 DIAGNOSIS — L08.9 WOUND INFECTION: Primary | ICD-10-CM

## 2023-11-21 DIAGNOSIS — T14.8XXA WOUND INFECTION: Primary | ICD-10-CM

## 2023-11-21 RX ORDER — DOXYCYCLINE HYCLATE 100 MG
100 TABLET ORAL 2 TIMES DAILY
Qty: 20 TABLET | Refills: 0 | Status: SHIPPED | OUTPATIENT
Start: 2023-11-21 | End: 2024-01-24

## 2023-12-12 ENCOUNTER — TELEPHONE (OUTPATIENT)
Dept: GASTROENTEROLOGY | Facility: CLINIC | Age: 63
End: 2023-12-12
Payer: COMMERCIAL

## 2023-12-12 NOTE — TELEPHONE ENCOUNTER
Pre visit planning completed.      Procedure details:    Patient scheduled for Colonoscopy  on 1/2/24.     Arrival time: 0700. Procedure time 0800    Pre op exam needed? N/A    Facility location: [facility]; [address] [place] [address], [address] Floor, [place] [address]    Sedation type: Conscious sedation     Indication for procedure:   Crohn's disease of small and large intestines with complication (H) [K50.819]      Recurrent Clostridioides difficile infection             Chart review:     Electronic implanted devices? No    Recent diagnosis of diverticulitis within the last 6 weeks? No    Diabetic? No    Diabetic medication HOLDING recommendations: (if applicable)  Oral diabetic medications: No  Diabetic injectables: No  Insulin: No      Medication review:    Anticoagulants? No    NSAIDS? No    Other medication HOLDING recommendations:  N/A      Prep for procedure:     Bowel prep recommendation: Standard Miralax   Due to:  standard bowel prep.    Prep instructions sent via [email or web address]         Kyleigh Alicea RN  Endoscopy Procedure Pre Assessment RN  [phone] option 4

## 2023-12-12 NOTE — TELEPHONE ENCOUNTER
Caller: Nicol  Reason for Reschedule/Cancellation (please be detailed, any staff messages or encounters to note?): Patient needed different day      Prior to reschedule please review:  Ordering Provider: Leonides  Sedation per order: CS  Does patient have any ASC Exclusions, please identify?: N      Notes on Cancelled Procedure:  Procedure: Lower Endoscopy [Colonoscopy]   Date: 1/2/24  Location: [facility]; [address] [place] [address], [address] [place] [id number]  Surgeon: Leonides      Rescheduled: No-patient decided to leave as scheduled on 1/2/24 after she found out that she could not get back in until March     Send In - basket message to Panc - Red Pool if EUS  procedure is canceled or rescheduled: [ N/A, YES or NO] NA

## 2023-12-14 NOTE — TELEPHONE ENCOUNTER
Attempted to contact patient in order to complete pre assessment questions.     Patient scheduled for Colonoscopy  on 1/2/24.     No answer. Left message to return call to [phone] option 4      Kyleigh Alicea RN  Endoscopy Procedure Pre Assessment RN

## 2023-12-14 NOTE — TELEPHONE ENCOUNTER
Pre assessment completed for upcoming procedure.   (Please see previous telephone encounter notes for complete details)    Patient  returned call.       Procedure details:    Arrival time and facility location reviewed.    Pre op exam needed? N/A    Designated  policy reviewed. Instructed to have someone stay 6 hours post procedure.     COVID policy reviewed.      Medication review:    Medications reviewed. Please see supporting documentation below. Holding recommendations discussed (if applicable).       Prep for procedure:     Patient stated they have already reviewed the bowel prep instructions and had no questions. NPO instructions reviewed.    Patient was instructed to call if any questions or concerns arise.       Additional information needed?  N/A      Patient  verbalized understanding and had no questions or concerns at this time.      Tara Pham RN  Endoscopy Procedure Pre Assessment RN  [phone] option 4

## 2024-01-02 ENCOUNTER — HOSPITAL ENCOUNTER (OUTPATIENT)
Facility: AMBULATORY SURGERY CENTER | Age: 64
Discharge: HOME | End: 2024-01-02
Attending: INTERNAL MEDICINE | Admitting: INTERNAL MEDICINE
Payer: COMMERCIAL

## 2024-01-02 VITALS
RESPIRATION RATE: 18 BRPM | TEMPERATURE: 98 F | DIASTOLIC BLOOD PRESSURE: 50 MMHG | HEART RATE: 77 BPM | SYSTOLIC BLOOD PRESSURE: 115 MMHG | OXYGEN SATURATION: 100 %

## 2024-01-02 LAB — COLONOSCOPY: NORMAL

## 2024-01-02 PROCEDURE — 88305 TISSUE EXAM BY PATHOLOGIST: CPT | Mod: TC | Performed by: INTERNAL MEDICINE

## 2024-01-02 PROCEDURE — 44389 COLONOSCOPY WITH BIOPSY: CPT | Performed by: INTERNAL MEDICINE

## 2024-01-02 PROCEDURE — 88305 TISSUE EXAM BY PATHOLOGIST: CPT | Mod: 26 | Performed by: PATHOLOGY

## 2024-01-02 RX ORDER — FLUMAZENIL 0.1 MG/ML
0.2 INJECTION, SOLUTION INTRAVENOUS
Status: CANCELLED | OUTPATIENT
Start: 2024-01-02 | End: 2024-01-02

## 2024-01-02 RX ORDER — ONDANSETRON 2 MG/ML
4 INJECTION INTRAMUSCULAR; INTRAVENOUS EVERY 6 HOURS PRN
Status: CANCELLED | OUTPATIENT
Start: 2024-01-02

## 2024-01-02 RX ORDER — NALOXONE HYDROCHLORIDE 0.4 MG/ML
0.4 INJECTION, SOLUTION INTRAMUSCULAR; INTRAVENOUS; SUBCUTANEOUS
Status: CANCELLED | OUTPATIENT
Start: 2024-01-02

## 2024-01-02 RX ORDER — ONDANSETRON 4 MG/1
4 TABLET, ORALLY DISINTEGRATING ORAL EVERY 6 HOURS PRN
Status: CANCELLED | OUTPATIENT
Start: 2024-01-02

## 2024-01-02 RX ORDER — ONDANSETRON 2 MG/ML
4 INJECTION INTRAMUSCULAR; INTRAVENOUS
Status: DISCONTINUED | OUTPATIENT
Start: 2024-01-02 | End: 2024-01-03 | Stop reason: HOSPADM

## 2024-01-02 RX ORDER — NALOXONE HYDROCHLORIDE 0.4 MG/ML
0.2 INJECTION, SOLUTION INTRAMUSCULAR; INTRAVENOUS; SUBCUTANEOUS
Status: CANCELLED | OUTPATIENT
Start: 2024-01-02

## 2024-01-02 RX ORDER — PROCHLORPERAZINE MALEATE 10 MG
10 TABLET ORAL EVERY 6 HOURS PRN
Status: CANCELLED | OUTPATIENT
Start: 2024-01-02

## 2024-01-02 RX ORDER — SODIUM CHLORIDE, SODIUM LACTATE, POTASSIUM CHLORIDE, CALCIUM CHLORIDE 600; 310; 30; 20 MG/100ML; MG/100ML; MG/100ML; MG/100ML
INJECTION, SOLUTION INTRAVENOUS CONTINUOUS
Status: DISCONTINUED | OUTPATIENT
Start: 2024-01-02 | End: 2024-01-03 | Stop reason: HOSPADM

## 2024-01-02 RX ORDER — LIDOCAINE 40 MG/G
CREAM TOPICAL
Status: DISCONTINUED | OUTPATIENT
Start: 2024-01-02 | End: 2024-01-03 | Stop reason: HOSPADM

## 2024-01-02 RX ORDER — FENTANYL CITRATE 50 UG/ML
INJECTION, SOLUTION INTRAMUSCULAR; INTRAVENOUS DAILY PRN
Status: DISCONTINUED | OUTPATIENT
Start: 2024-01-02 | End: 2024-01-02 | Stop reason: HOSPADM

## 2024-01-02 NOTE — H&P
"Nicol Aldrich  [id number]  female  63 year old      Reason for procedure/surgery: Crohn's Disease    Patient Active Problem List   Diagnosis    Crohn's ileocolitis (H)    Neoplasm of uncertain behavior of skin       Past Surgical History:  History reviewed. No pertinent surgical history.    Past Medical History:   Past Medical History:   Diagnosis Date    Squamous cell carcinoma of skin, unspecified        Social History:   Social History     Tobacco Use    Smoking status: Never    Smokeless tobacco: Never   Substance Use Topics    Alcohol use: Yes     Alcohol/week: 2.0 standard drinks of alcohol     Types: 2 Glasses of wine per week       Family History: History reviewed. No pertinent family history.    Allergies:   Allergies   Allergen Reactions    Penicillins Rash     On 07/10/18, pt states that she experienced a rash when she had this \"a while ago.\" This was not a reaction from childhood.   On 07/10/18, pt states that she experienced a rash when she had this \"a while ago.\" This was not a reaction from childhood.          Active Medications:   Current Outpatient Medications   Medication Sig Dispense Refill    doxycycline hyclate (VIBRA-TABS) 100 MG tablet Take 1 tablet (100 mg) by mouth 2 times daily 20 tablet 0    inFLIXimab Inject into the vein once      Ostomy Supplies MISC 1 each daily 1 each 11    trospium (SANCTURA XR) 60 MG CP24 24 hr capsule Take 1 capsule (60 mg) by mouth every morning 30 capsule 11    budesonide (ENTOCORT EC) 3 MG EC capsule Take 3 capsules (9 mg) by mouth every morning for 28 days, THEN 2 capsules (6 mg) every morning for 14 days, THEN 1 capsule (3 mg) every morning for 14 days. 126 capsule 0    ondansetron (ZOFRAN) 4 MG tablet Take 4 mg by mouth (Patient not taking: Reported on 10/18/2023)         Systemic Review:   CONSTITUTIONAL: NEGATIVE for fever, chills, change in weight  ENT/MOUTH: NEGATIVE for ear, mouth and throat problems  RESP: NEGATIVE for significant cough or SOB  CV: " NEGATIVE for chest pain, palpitations or peripheral edema    Physical Examination:   Vital Signs: /73   Pulse 78   Temp 97.5  F (36.4  C) (Temporal)   Resp 15   SpO2 98%   GENERAL: healthy, alert and no distress  NECK: no adenopathy, no asymmetry, masses, or scars  RESP: lungs clear to auscultation - no rales, rhonchi or wheezes  CV: regular rate and rhythm, normal S1 S2, no S3 or S4, no murmur, click or rub, no peripheral edema and peripheral pulses strong  ABDOMEN: soft, nontender, no hepatosplenomegaly, no masses and bowel sounds normal  MS: no gross musculoskeletal defects noted, no edema    ASA Classification: (II)  Mild systemic disease  Airway Exam: Mallampati Score: Class III (Visualization of only the base of uvula)    Plan: Appropriate to proceed as scheduled.      Jordan Coyle MD  1/2/2024    PCP:  Janet Noe

## 2024-01-03 LAB
PATH REPORT.COMMENTS IMP SPEC: NORMAL
PATH REPORT.FINAL DX SPEC: NORMAL
PATH REPORT.GROSS SPEC: NORMAL
PATH REPORT.MICROSCOPIC SPEC OTHER STN: NORMAL
PATH REPORT.RELEVANT HX SPEC: NORMAL
PHOTO IMAGE: NORMAL

## 2024-01-04 ENCOUNTER — TELEPHONE (OUTPATIENT)
Dept: SURGERY | Facility: CLINIC | Age: 64
End: 2024-01-04
Payer: COMMERCIAL

## 2024-01-04 DIAGNOSIS — K50.819 CROHN'S DISEASE OF BOTH SMALL AND LARGE INTESTINE WITH COMPLICATION (H): Primary | ICD-10-CM

## 2024-01-04 NOTE — TELEPHONE ENCOUNTER
On 1/4/2024 at 11:32 and 11:33 AM:  M for patient to call back regarding scheduling on patient's home and mobile numbers.     Provided call back number [phone].    Krystyna Guevara  Shelby-op Coordinator  [facility]  Direct Phone: [phone]

## 2024-01-17 NOTE — PROGRESS NOTES
"Colon and Rectal Surgery Clinic Note      The patient has been notified of following:     \"This video visit will be conducted via a call between you and your physician/provider. We have found that certain health care needs can be provided without the need for an in-person physical exam.  This service lets us provide the care you need with a video conversation.  If a prescription is necessary we can send it directly to your pharmacy.  If lab work is needed we can place an order for that and you can then stop by our lab to have the test done at a later time.    Video visits are billed at different rates depending on your insurance coverage.  Please reach out to your insurance provider with any questions.    If during the course of the call the physician/provider feels a video visit is not appropriate, you will not be charged for this service.\"    Patient has given verbal consent for Video visit? Yes    Video Start Time:  9:40 AM      RE: Nicol Aldrich.  : 1960.  RYLAND: 2024.     Reason for visit: Crohn's, perianal crohn's - Patient of Dr. Leonides DAVIS (23): Nicol is a 62 year old female who was diagnosed with Crohn disease at age 31, with colonic involvement and with a perianal fistula (had a fistulotomy but no records) She was treated initially with mesalamine. 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 On 2014 she underwent transanal excision of the lesion with subsequent chemotherapy and radiation therapy. She started methotrexate postoperatively and in  she started on Entyvio. 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 Recently started on infliximab, but having stool output from EC fistulas. 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     History of C. Diff. She was C. Diff + last as of , treated with PO " Vancomycin by Dr. Coyle    [facility]  Description of Procedure:  The patient was taken to the operating room and placed in the supine position. General anesthesia was induced. Endotracheal tube was placed. She was rolled to the prone jackknife position. Buttocks taped laterally. The anal area was prepped with Betadine Prep and sterilely draped.   Pause for identification of patient and procedure carried out.   Anoscopy is done. We found the lesion just to the left of posterior midline. This does run over about 2 to 2.5 cm in length. An elliptical incision is now made, working from outside the dentated line proximally. The anal sphincters are spared. We now work deeper and work our way around this lesion up to the upper end and the distal end of the specimen was marked with a Vicryl suture. Beyond the upper end of resection, a 2-0 Vicryl suture was placed. The specimen was removed and sent to Pathology. After hemostasis was obtained with cautery, the wound was closed with running locked 2-0 Vicryl. The frozen section revealed on the anterior margin that would be further to the left we were very close to this margin and felt we should take additional tissue. We took out the sutures and removed about another 0.5 cm of tissue. Again hemostasis was obtained with cautery. The wound was again closed with running locked 2-0 Vicryl. (Exparel) long acting local anesthetic is used. We used 20 mL of this. Fibrillar collagen hemostatic agent applied and additional 4 x 4s. She tolerated this well.   Patient transferred out of operating room in good condition. All surgical medications, procedures and x-rays performed in surgery-on the order of the operating physicians. Micaela Buenrostro PA-C assisted in the above procedure. They provided assistance with pre-operative positioning, prepping, and draping of the patient. The assistant provided vital operative assistance with retraction using  instruments thus providing the necessary exposure and visualization for the case, manipulation of tissues to achieve hemostasis, suction for visualization and assisted in closure of the wound. Post-operatively they assisted in transfer of the patient off the operative table and transition to the PACU physicians.    DIAGNOSIS:   DISTAL RECTUM AND ANUS, EXCISION WITH ANTERIOR MARGIN RE-EXCISION        --HISTOLOGIC DIAGNOSIS:  INVASIVE MODERATELY DIFFERENTIATED SQUAMOUS CELL CARCINOMA WITH EXTENSIVE SQUAMOUS CELL CARCINOMA IN SITU        --DEPTH OF INVASION: TUMOR INVOLVES THE SUBMUCOSA        --TUMOR SIZE:  1.5 CM (LINEAR EXTENT)        --MARGINS:             --SQUAMOUS CELL CARCINOMA IN SITU INVOLVES THE POSTERIOR MARGIN             --INVASIVE SQUAMOUS CELL CARCINOMA IS 0.1 CM FROM THE POSTERIOR MARGIN        --NO INVASIVE CARCINOMA IDENTIFIED AT THE MARGINS        --LYMPH NODES: NO CARCINOMA IDENTIFIED IN 1 LYMPH NODE WITHIN THE          MAIN SPECIMEN        --VASCULAR INVASION: NOT IDENTIFIED     [id number]   STAGING NOTE:  With the available information, this lesion represents a   Stage I tumor (pT1, N0, MX).     PERIANAL EXAM WITH BIOPSIES with Dr. Kumar 8/2/18        Laparoscopic LOOP COLOSTOMY  by Dr. Keys- [facility] 5/12/21  This case was substantially more difficult than usual because of significant effort and difficulty mobilizing and identifying anatomical structures due to altered surgical field secondary to distorted anatomy and inflammation.    Nicol Aldrich was taken to the operating room, placed on a table in a supine position, underwent general endotracheal anesthesia, and was then repositioned in a synchronous position with Rubin stirjerry. A urinary catheter was inserted and sequential compression devices were placed to the bilateral lower extremities. Preoperative antibiotics and subcutaneous heparin were administered. The patient was then prepped and draped in a normal sterile fashion  followed by a pause identifying the correct patient, date, site, and procedure.    The abdominal cavity was entered under direct visualization utilizing a Riky technique in the supraumbilical midline. Three additional 5mm trocars were placed under direct visualization.     After appropriate positioning the sigmoid colon was identified and mobilized. The sigmoid itself was quite abnormal with signs of prior inflammation consistent with Crohn's disease. She also had extensive diverticulosis involving the sigmoid colon. There were dense adhesions between the sigmoid and the retroperitoneum/lateral abdominal wall. We did take down the majority of these adhesions and then took down the attachments between the descending colon and the abdominal wall and retroperitoneum. We were able to identify and protect the left ureter. We continued this dissection up to the splenic flexure. At this point we did make our stoma site in the left upper quadrant marked 1. We cored out the skin and soft tissue down to the fascia and then opened the fascia in a cruciate manner. We spread the rectus muscle to expose the posterior fashion peritoneum which was also opened in a cruciate manner. We attempted to pull the mobilized colon out of this hole laparoscopically, but it was clear that we did not have enough mobilization. So we placed an Daniel wound protector at this site and reinsufflated the abdomen. I then used the LigaSure device to fully mobilize the splenic flexure from all of its attachments. We did have to take the omentum off of the transverse colon and off of the splenic flexure and proximal descending colon to get full mobilization. We then tried again to pull the colon through the planned site, and this time it did reach with minimal tension. We did use a red rubber catheter to act as a stoma bridge to hold it up without significant tension. We also looked again laparoscopically to confirm correct proximal and distal  orientation.     We desufflated the abdomen and proceeded to close all the 5 port as well as the Curran port. The Curran was closed with interrupted 1. Vicryl sutures. All skin wounds were closed with running 3-0 Monocryl in a subcuticular fashion followed by Dermabond. We then matured the stoma with multiple interrupted 3 0 Vicryl suture circumferentially. We did leave the red rubber catheter in place to act as a stoma bridge and secured this to the skin with nylon sutures.    The abdomen was then cleaned and dried and dressings were applied to all the incisions.     Next I put her legs up and looked at the perineum. She has significant radiation damage to the perianal skin. She does have a patulous anus with exposed mucosa circumferentially. There is an essentially divot or crater like abnormality on the left anterior region of the anus. There is no mucosal break in no skin breaks year consistent with a fistula. There is no open wound and the mucosa itself looks normal. I was not able to probe any areas to suggest that there was any underlying tracking. It just appears that this is likely the site of her prior cancer and with regression of the cancer at left some functional and structural abnormality in this region. There was no other concern on the perineum for any undrained fluid collections.    Nicol Aldrich tolerated the procedure well. There were no complications during the case. The patient was transferred to the postanesthesia care unit in stable condition.  Kera Keys M.D.     Colonoscopy 11/3/22  Findings:        The perianal exam findings included some scarring at the anal area and absence of anal tone. The periostomal skin was without rashes or abscesses. The colostomy was first accessed: A continuous area of severly inflammed        and friable nonbleeding mucosa with extensive large cratered ulcers was present in the transverse colon, in the ascending colon and in the cecum. Biopsies were  taken with a cold forceps for histology R/O CMV, Crohn's disease, ischemia.        One large ulcer was present in the distal rectum. Biopsies were taken with a cold forceps for histology. A diffuse area of severely friable mucosa with contact bleeding was found in the rectum and in the recto-sigmoid colon. Biopsies were taken        with a cold forceps for histology R/O diversion colitis. A 2 mm likely fistula was found at 18 cm proximal to the anus.     Impression:     - Abnormal perianal exam.                          - Colostomy accessed: Severly inflammed mucosa with large ulcers. Biopsied R/O CMV, Crohn's disease, ischemia.                          - Ulcer in distal rectum. Biopsied.                          - Friability with contact bleeding in the rectum and in the recto-sigmoid colon. Biopsied R/O diversion colitis.                          - A possible fistula opening was seen at 18 cm from the anus.         Interval History:  Dr Coyle attempted a colonoscopy on 1/2/24 but was unable to pass the anus due to stricture. She is here today to discuss anal dilation under sedation.    Impression:     - Preparation of the colon was poor.                          - The pediatric colonoscopes was introduced via the                          colostomy to the colon and completed to the level of                          the ileum.                          - Anal stricture found on digital rectal exam. This                          was partially dilated with a digit. However, a                          pediatric and colonoscope could not pass the                          anal/distal rectum stricture. This appeared to be a                          chronic stricture related to diversion and prior                          radiation changes. No evidence of malignancy seen and                          no active inflammation seen.                          - Simple Endoscopic Score for Crohn's Disease: 1,                           mucosal inflammatory changes secondary to Crohn's                          disease, with ileitis and colitis. Biopsied.                          - Scarring in the cecum/ascending colon with minimal                          friablity and erythema. Mainly chronic changes.                          - Dimpling in the distal transverse colon just 2-3 cm                          prior to ostomy/stoma. 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 The infliximab is                          doing a good job at controlling active inflammation.                          Will follow to see if there is further improvement in                          enterocutaneous fistula. She does have a significant                          anal/distal rectal stricture and her diverted rectum                          and sigmoid could not be surveyed. Continue current                          meds. Will have patient follow up with CRS to discuss                          dilation of anal stricture so surveillance can be                          obtained.     Medical history:  Anal cancer  Pyoderma Gangrenosum    Surgical history:  Loop colostomy, lap 2019    Family history:  Noncontributory    Medications:  Current Outpatient Medications   Medication Sig Dispense Refill    budesonide (ENTOCORT EC) 3 MG EC capsule Take 3 capsules (9 mg) by mouth every morning for 28 days, THEN 2 capsules (6 mg) every morning for 14 days, THEN 1 capsule (3 mg) every morning for 14 days. 126 capsule 0    doxycycline hyclate (VIBRA-TABS) 100 MG tablet Take 1 tablet (100 mg) by mouth 2 times daily 20 tablet 0    inFLIXimab Inject into the vein once      ondansetron  "(ZOFRAN) 4 MG tablet Take 4 mg by mouth (Patient not taking: Reported on 10/18/2023)      Ostomy Supplies MISC 1 each daily 1 each 11    trospium (SANCTURA XR) 60 MG CP24 24 hr capsule Take 1 capsule (60 mg) by mouth every morning 30 capsule 11       Allergies:  Allergies   Allergen Reactions    Penicillins Rash     On 07/10/18, pt states that she experienced a rash when she had this \"a while ago.\" This was not a reaction from childhood.   On 07/10/18, pt states that she experienced a rash when she had this \"a while ago.\" This was not a reaction from childhood.          Social history:   Social History     Tobacco Use    Smoking status: Never    Smokeless tobacco: Never   Substance Use Topics    Alcohol use: Yes     Alcohol/week: 2.0 standard drinks of alcohol     Types: 2 Glasses of wine per week     Marital status: .    ROS:  A complete review of systems was performed with the patient and all systems negative except as per HPI.    Physical Examination:  Exam was chaperoned by Gerri Padilla CMA  There were no vitals taken for this visit.  General: Well hydrated. No acute distress.  Abdomen: Soft, NT, nondistended, loop colostomy present and healthy with primary colocutaneous fistula 3 cm left lateral to stoma. Mildly macerated skin around fistula, but otherwise minimal obvious inflammation, overall appears pretty healthy. No inguinal adenopathy palpated.  Perianal external examination:  Perianal skin with changes of scarring and radiation, without obvious growths or lesions.  Digital rectal examination: Was performed.   No evidence of masses, easily friable anal mucosa.    Anoscopy: Was deferred      Laboratory values reviewed:  Recent Labs   Lab Test 06/13/23  1554   WBC 10.0   HGB 12.5      CR 0.97*   ALBUMIN 4.2   BILITOTAL 0.2   ALKPHOS 81   ALT 14   AST 60*       Imaging personally reviewed by me:     MR Pelvis 11/5/22  IMPRESSION:   1. Increased fluid signal enhancement along the posterior " left aspect of the anus near the 5 o'clock position which may represent a small fistula.   2. No abscess or large fistulous tract identified.   3. Chronic inflammatory changes of the sigmoid colon and rectum.    CT A/P 1/13/23  IMPRESSION:   1. Interval resolution of previous pancolitis.   2. Peristomal hernia containing multiple small bowel loops from the diverting colostomy in the left anterior mid abdomen.  No obstruction or strangulation. Fatty hernia that is ventral above the umbilicus also redemonstrated.   3. No abnormal fluid collection or abscess formation.   4. The left posterior perianal fistulous tract at approximate 5 o'clock position without active inflammation or abscess formation redemonstrated.    ASSESSMENT  1. Crohn disease with distal colonic involvement - now infliximab and entecort  2. History of squamous cell carcinoma of the anus, previous radiation and chemotherapy in 2014  3. Fecal incontinence, recurrent perianal skin infection  4. APR in 2021  5. Pyoderma Gangrenosum  6. Colostomy status    PLAN  1. Given our inability to monitor the colon, rectum and anus downstream of the colostomy, we will plan for anal dilation under anesthesia to accommodate a pediatric scope. This may require serial dilations due to a combination of prior radiation for anal cancer, diversion, and her Crohn's colitis which I predict will have caused significant stenosis and scarring. I discussed at length that surgery, which would be at least an APR/VRAM with colostomy revision would be due to an inability to monitor distal to the colostomy, or if we suspect any malignant growth, among critical factors.    Risks, benefits, and alternatives of operative treatment were thoroughly discussed with the patient, she understands these well and agrees to proceed.    Time spent: 60 minutes, >50% spent in discussing, counseling and coordinating care.    Video-Visit Details    Type of service:  Video Visit    Video End Time  (time video stopped): 10:10 AM    Originating Location (pt. Location): Home    Distant Location (provider location):  [facility] [place]     Mode of Communication:  Video Conference via amwell     Kunal Herrera M.D    Division of Colon and Rectal Surgery  [facility]    Referring Provider:  Aftab Wall MD  [address] [id number]     Primary Care Provider:  Janet Noe

## 2024-01-18 ENCOUNTER — TELEPHONE (OUTPATIENT)
Dept: SURGERY | Facility: CLINIC | Age: 64
End: 2024-01-18

## 2024-01-18 ENCOUNTER — VIRTUAL VISIT (OUTPATIENT)
Dept: SURGERY | Facility: CLINIC | Age: 64
End: 2024-01-18
Payer: COMMERCIAL

## 2024-01-18 DIAGNOSIS — K50.813 CROHN'S DISEASE OF BOTH SMALL AND LARGE INTESTINE WITH FISTULA (H): Primary | ICD-10-CM

## 2024-01-18 PROCEDURE — 99215 OFFICE O/P EST HI 40 MIN: CPT | Mod: 95 | Performed by: SURGERY

## 2024-01-18 ASSESSMENT — PAIN SCALES - GENERAL: PAINLEVEL: NO PAIN (0)

## 2024-01-18 NOTE — NURSING NOTE
Chief Complaint   Patient presents with    Follow Up       There were no vitals filed for this visit.    There is no height or weight on file to calculate BMI.    Trey Pinto EMT-P

## 2024-01-18 NOTE — TELEPHONE ENCOUNTER
On 1/18/2024 at 9:40 AM:  Received call from patient that she's waiting for Dr. Kunal Herrera to join her for her scheduled Video Visit with him.     Confirmed with Dr. Herrera that he will join patient for her Video Visit shortly - he's running a bit behind due to something that came up this morning.    Patient updated.    Krystyna Guevara  S[facility]-op Coordinator  [facility]  Direct Phone: [phone]

## 2024-01-18 NOTE — LETTER
"2024       RE: Nicol Aldrich  [address] [facility] [address]       Dear Colleague,    Thank you for referring your patient, Nicol Aldrich, to the [facility] [place] [facility]. Please see a copy of my visit note below.    Colon and Rectal Surgery Clinic Note    The patient has been notified of following:     \"This video visit will be conducted via a call between you and your physician/provider. We have found that certain health care needs can be provided without the need for an in-person physical exam.  This service lets us provide the care you need with a video conversation.  If a prescription is necessary we can send it directly to your pharmacy.  If lab work is needed we can place an order for that and you can then stop by our lab to have the test done at a later time.    Video visits are billed at different rates depending on your insurance coverage.  Please reach out to your insurance provider with any questions.    If during the course of the call the physician/provider feels a video visit is not appropriate, you will not be charged for this service.\"    Patient has given verbal consent for Video visit? Yes    Video Start Time:  9:40 AM      RE: Nicol Aldrich.  : 1960.  RYLAND: 2024.     Reason for visit: Crohn's, perianal crohn's - Patient of Dr. Leonides DAVIS (23): Nicol is a 62 year old female who was diagnosed with Crohn disease at age 31, with colonic involvement and with a perianal fistula (had a fistulotomy but no records) She was treated initially with mesalamine. 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 On 2014 she underwent transanal excision of the lesion with subsequent chemotherapy and radiation therapy. She started methotrexate postoperatively and in  she started on " Entyvio. 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 Recently started on infliximab, but having stool output from EC fistulas. 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     History of C. Diff. She was C. Diff + last as of 6/14, treated with PO Vancomycin by Dr. Coyle    Transanal excision (10/2014) Dr. Rivas, [facility]  Description of Procedure:  The patient was taken to the operating room and placed in the supine position. General anesthesia was induced. Endotracheal tube was placed. She was rolled to the prone jackknife position. Buttocks taped laterally. The anal area was prepped with Betadine Prep and sterilely draped.   Pause for identification of patient and procedure carried out.   Anoscopy is done. We found the lesion just to the left of posterior midline. This does run over about 2 to 2.5 cm in length. An elliptical incision is now made, working from outside the dentated line proximally. The anal sphincters are spared. We now work deeper and work our way around this lesion up to the upper end and the distal end of the specimen was marked with a Vicryl suture. Beyond the upper end of resection, a 2-0 Vicryl suture was placed. The specimen was removed and sent to Pathology. After hemostasis was obtained with cautery, the wound was closed with running locked 2-0 Vicryl. The frozen section revealed on the anterior margin that would be further to the left we were very close to this margin and felt we should take additional tissue. We took out the sutures and removed about another 0.5 cm of tissue. Again hemostasis was obtained with cautery. The wound was again closed with running locked 2-0 Vicryl. (Exparel) long acting local anesthetic is used. We used 20 mL of this. Fibrillar collagen hemostatic agent applied and additional 4 x 4s. She tolerated this well.   Patient transferred out of operating room in good condition. All  surgical medications, procedures and x-rays performed in surgery-on the order of the operating physicians. Micaela Buenrostro PA-C assisted in the above procedure. They provided assistance with pre-operative positioning, prepping, and draping of the patient. The assistant provided vital operative assistance with retraction using instruments thus providing the necessary exposure and visualization for the case, manipulation of tissues to achieve hemostasis, suction for visualization and assisted in closure of the wound. Post-operatively they assisted in transfer of the patient off the operative table and transition to the PACU physicians.    DIAGNOSIS:   DISTAL RECTUM AND ANUS, EXCISION WITH ANTERIOR MARGIN RE-EXCISION        --HISTOLOGIC DIAGNOSIS:  INVASIVE MODERATELY DIFFERENTIATED SQUAMOUS CELL CARCINOMA WITH EXTENSIVE SQUAMOUS CELL CARCINOMA IN SITU        --DEPTH OF INVASION: TUMOR INVOLVES THE SUBMUCOSA        --TUMOR SIZE:  1.5 CM (LINEAR EXTENT)        --MARGINS:             --SQUAMOUS CELL CARCINOMA IN SITU INVOLVES THE POSTERIOR MARGIN             --INVASIVE SQUAMOUS CELL CARCINOMA IS 0.1 CM FROM THE POSTERIOR MARGIN        --NO INVASIVE CARCINOMA IDENTIFIED AT THE MARGINS        --LYMPH NODES: NO CARCINOMA IDENTIFIED IN 1 LYMPH NODE WITHIN THE          MAIN SPECIMEN        --VASCULAR INVASION: NOT IDENTIFIED     [id number]   STAGING NOTE:  With the available information, this lesion represents a   Stage I tumor (pT1, N0, MX).     PERIANAL EXAM WITH BIOPSIES with Dr. Kumar 8/2/18        Laparoscopic LOOP COLOSTOMY  by Dr. Keys- [facility] 5/12/21  This case was substantially more difficult than usual because of significant effort and difficulty mobilizing and identifying anatomical structures due to altered surgical field secondary to distorted anatomy and inflammation.    Nicol Aldrich was taken to the operating room, placed on a table in a supine position, underwent general endotracheal anesthesia, and was  then repositioned in a synchronous position with Rubin napier. A urinary catheter was inserted and sequential compression devices were placed to the bilateral lower extremities. Preoperative antibiotics and subcutaneous heparin were administered. The patient was then prepped and draped in a normal sterile fashion followed by a pause identifying the correct patient, date, site, and procedure.    The abdominal cavity was entered under direct visualization utilizing a Riky technique in the supraumbilical midline. Three additional 5mm trocars were placed under direct visualization.     After appropriate positioning the sigmoid colon was identified and mobilized. The sigmoid itself was quite abnormal with signs of prior inflammation consistent with Crohn's disease. She also had extensive diverticulosis involving the sigmoid colon. There were dense adhesions between the sigmoid and the retroperitoneum/lateral abdominal wall. We did take down the majority of these adhesions and then took down the attachments between the descending colon and the abdominal wall and retroperitoneum. We were able to identify and protect the left ureter. We continued this dissection up to the splenic flexure. At this point we did make our stoma site in the left upper quadrant marked 1. We cored out the skin and soft tissue down to the fascia and then opened the fascia in a cruciate manner. We spread the rectus muscle to expose the posterior fashion peritoneum which was also opened in a cruciate manner. We attempted to pull the mobilized colon out of this hole laparoscopically, but it was clear that we did not have enough mobilization. So we placed an Daniel wound protector at this site and reinsufflated the abdomen. I then used the LigaSure device to fully mobilize the splenic flexure from all of its attachments. We did have to take the omentum off of the transverse colon and off of the splenic flexure and proximal descending colon to  get full mobilization. We then tried again to pull the colon through the planned site, and this time it did reach with minimal tension. We did use a red rubber catheter to act as a stoma bridge to hold it up without significant tension. We also looked again laparoscopically to confirm correct proximal and distal orientation.     We desufflated the abdomen and proceeded to close all the 5 port as well as the Curran port. The Curran was closed with interrupted 1. Vicryl sutures. All skin wounds were closed with running 3-0 Monocryl in a subcuticular fashion followed by Dermabond. We then matured the stoma with multiple interrupted 3 0 Vicryl suture circumferentially. We did leave the red rubber catheter in place to act as a stoma bridge and secured this to the skin with nylon sutures.    The abdomen was then cleaned and dried and dressings were applied to all the incisions.     Next I put her legs up and looked at the perineum. She has significant radiation damage to the perianal skin. She does have a patulous anus with exposed mucosa circumferentially. There is an essentially divot or crater like abnormality on the left anterior region of the anus. There is no mucosal break in no skin breaks year consistent with a fistula. There is no open wound and the mucosa itself looks normal. I was not able to probe any areas to suggest that there was any underlying tracking. It just appears that this is likely the site of her prior cancer and with regression of the cancer at left some functional and structural abnormality in this region. There was no other concern on the perineum for any undrained fluid collections.    Nicol Aldrich tolerated the procedure well. There were no complications during the case. The patient was transferred to the postanesthesia care unit in stable condition.  Kera Keys M.D.     Colonoscopy 11/3/22  Findings:        The perianal exam findings included some scarring at the anal area and  absence of anal tone. The periostomal skin was without rashes or abscesses. The colostomy was first accessed: A continuous area of severly inflammed        and friable nonbleeding mucosa with extensive large cratered ulcers was present in the transverse colon, in the ascending colon and in the cecum. Biopsies were taken with a cold forceps for histology R/O CMV, Crohn's disease, ischemia.        One large ulcer was present in the distal rectum. Biopsies were taken with a cold forceps for histology. A diffuse area of severely friable mucosa with contact bleeding was found in the rectum and in the recto-sigmoid colon. Biopsies were taken        with a cold forceps for histology R/O diversion colitis. A 2 mm likely fistula was found at 18 cm proximal to the anus.     Impression:     - Abnormal perianal exam.                          - Colostomy accessed: Severly inflammed mucosa with large ulcers. Biopsied R/O CMV, Crohn's disease, ischemia.                          - Ulcer in distal rectum. Biopsied.                          - Friability with contact bleeding in the rectum and in the recto-sigmoid colon. Biopsied R/O diversion colitis.                          - A possible fistula opening was seen at 18 cm from the anus.         Interval History:  Dr Coyle attempted a colonoscopy on 1/2/24 but was unable to pass the anus due to stricture. She is here today to discuss anal dilation under sedation.    Impression:     - Preparation of the colon was poor.                          - The pediatric colonoscopes was introduced via the                          colostomy to the colon and completed to the level of                          the ileum.                          - Anal stricture found on digital rectal exam. This                          was partially dilated with a digit. However, a                          pediatric and colonoscope could not pass the                          anal/distal rectum stricture. This  appeared to be a                          chronic stricture related to diversion and prior                          radiation changes. No evidence of malignancy seen and                          no active inflammation seen.                          - Simple Endoscopic Score for Crohn's Disease: 1,                          mucosal inflammatory changes secondary to Crohn's                          disease, with ileitis and colitis. Biopsied.                          - Scarring in the cecum/ascending colon with minimal                          friablity and erythema. Mainly chronic changes.                          - Dimpling in the distal transverse colon just 2-3 cm                          prior to ostomy/stoma. 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 The infliximab is                          doing a good job at controlling active inflammation.                          Will follow to see if there is further improvement in                          enterocutaneous fistula. She does have a significant                          anal/distal rectal stricture and her diverted rectum                          and sigmoid could not be surveyed. Continue current                          meds. Will have patient follow up with CRS to discuss                          dilation of anal stricture so surveillance can be                          obtained.     Medical history:  Anal cancer  Pyoderma Gangrenosum    Surgical history:  Loop colostomy, lap 2019    Family history:  Noncontributory    Medications:  Current Outpatient Medications   Medication Sig Dispense Refill    budesonide (ENTOCORT EC) 3 MG EC capsule Take 3 capsules (9  "mg) by mouth every morning for 28 days, THEN 2 capsules (6 mg) every morning for 14 days, THEN 1 capsule (3 mg) every morning for 14 days. 126 capsule 0    doxycycline hyclate (VIBRA-TABS) 100 MG tablet Take 1 tablet (100 mg) by mouth 2 times daily 20 tablet 0    inFLIXimab Inject into the vein once      ondansetron (ZOFRAN) 4 MG tablet Take 4 mg by mouth (Patient not taking: Reported on 10/18/2023)      Ostomy Supplies MISC 1 each daily 1 each 11    trospium (SANCTURA XR) 60 MG CP24 24 hr capsule Take 1 capsule (60 mg) by mouth every morning 30 capsule 11       Allergies:  Allergies   Allergen Reactions    Penicillins Rash     On 07/10/18, pt states that she experienced a rash when she had this \"a while ago.\" This was not a reaction from childhood.   On 07/10/18, pt states that she experienced a rash when she had this \"a while ago.\" This was not a reaction from childhood.          Social history:   Social History     Tobacco Use    Smoking status: Never    Smokeless tobacco: Never   Substance Use Topics    Alcohol use: Yes     Alcohol/week: 2.0 standard drinks of alcohol     Types: 2 Glasses of wine per week     Marital status: .    ROS:  A complete review of systems was performed with the patient and all systems negative except as per HPI.    Physical Examination:  Exam was chaperoned by Gerri Padilla CMA  There were no vitals taken for this visit.  General: Well hydrated. No acute distress.  Abdomen: Soft, NT, nondistended, loop colostomy present and healthy with primary colocutaneous fistula 3 cm left lateral to stoma. Mildly macerated skin around fistula, but otherwise minimal obvious inflammation, overall appears pretty healthy. No inguinal adenopathy palpated.  Perianal external examination:  Perianal skin with changes of scarring and radiation, without obvious growths or lesions.  Digital rectal examination: Was performed.   No evidence of masses, easily friable anal mucosa.    Anoscopy: Was " deferred      Laboratory values reviewed:  Recent Labs   Lab Test 06/13/23  1554   WBC 10.0   HGB 12.5      CR 0.97*   ALBUMIN 4.2   BILITOTAL 0.2   ALKPHOS 81   ALT 14   AST 60*       Imaging personally reviewed by me:     MR Pelvis 11/5/22  IMPRESSION:   1. Increased fluid signal enhancement along the posterior left aspect of the anus near the 5 o'clock position which may represent a small fistula.   2. No abscess or large fistulous tract identified.   3. Chronic inflammatory changes of the sigmoid colon and rectum.    CT A/P 1/13/23  IMPRESSION:   1. Interval resolution of previous pancolitis.   2. Peristomal hernia containing multiple small bowel loops from the diverting colostomy in the left anterior mid abdomen.  No obstruction or strangulation. Fatty hernia that is ventral above the umbilicus also redemonstrated.   3. No abnormal fluid collection or abscess formation.   4. The left posterior perianal fistulous tract at approximate 5 o'clock position without active inflammation or abscess formation redemonstrated.    ASSESSMENT  1. Crohn disease with distal colonic involvement - now infliximab and entecort  2. History of squamous cell carcinoma of the anus, previous radiation and chemotherapy in 2014  3. Fecal incontinence, recurrent perianal skin infection  4. APR in 2021  5. Pyoderma Gangrenosum  6. Colostomy status    PLAN  1. Given our inability to monitor the colon, rectum and anus downstream of the colostomy, we will plan for anal dilation under anesthesia to accommodate a pediatric scope. This may require serial dilations due to a combination of prior radiation for anal cancer, diversion, and her Crohn's colitis which I predict will have caused significant stenosis and scarring. I discussed at length that surgery, which would be at least an APR/VRAM with colostomy revision would be due to an inability to monitor distal to the colostomy, or if we suspect any malignant growth, among critical  factors.    Risks, benefits, and alternatives of operative treatment were thoroughly discussed with the patient, she understands these well and agrees to proceed.    Time spent: 60 minutes, >50% spent in discussing, counseling and coordinating care.      Referring Provider:  Aftab Wall MD  [address]  [address]  [place] [address]     Primary Care Provider:  Janet Noe          Again, thank you for allowing me to participate in the care of your patient.      Sincerely,    Kunal Herrera MD, MD

## 2024-01-23 PROBLEM — K50.819 CROHN'S DISEASE OF BOTH SMALL AND LARGE INTESTINE WITH COMPLICATION (H): Status: ACTIVE | Noted: 2024-01-04

## 2024-01-23 NOTE — TELEPHONE ENCOUNTER
FUTURE VISIT INFORMATION      SURGERY INFORMATION:  Date: 24  [facility]  Surgeon:  Kunal Herrera MD   Anesthesia Type:  MAC  Procedure: EXAM UNDER ANESTHESIA, ANUS, ANAL DILATION   Consult: virtual visit 24    RECORDS REQUESTED FROM:       Primary Care Provider: Lewis Bustos    Most recent EKG+ Tracin22- Josep

## 2024-01-23 NOTE — TELEPHONE ENCOUNTER
ON 1/23/2024 at 11:11 AM:    Patient is scheduled for surgery with Dr. Kunal Herrera     Spoke with: Nicol     Date of Surgery: Tuesday February 6, 2024    Location: [facility] OR    Informed patient they will need an adult  YES    Pre op with Provider Dr. Kunal Herrera     Upcoming Related Appointment:     Jan 24, 2024  9:45 AM  Pre-op History & Physical (H&P)  (Arrive by 9:30 AM)  PAC EVALUATION with Sari Huddleston PA-C  [facility] [phone]    VIDEO VISIT     Surgery packet: sent via [employer] per patient request     Krystyna Guevara  [place]-op Coordinator  [facility]  Direct Phone: [phone]

## 2024-01-24 ENCOUNTER — VIRTUAL VISIT (OUTPATIENT)
Dept: SURGERY | Facility: CLINIC | Age: 64
End: 2024-01-24
Payer: COMMERCIAL

## 2024-01-24 ENCOUNTER — ANESTHESIA EVENT (OUTPATIENT)
Dept: SURGERY | Facility: AMBULATORY SURGERY CENTER | Age: 64
End: 2024-01-24
Payer: COMMERCIAL

## 2024-01-24 ENCOUNTER — MYC MEDICAL ADVICE (OUTPATIENT)
Dept: GASTROENTEROLOGY | Facility: CLINIC | Age: 64
End: 2024-01-24

## 2024-01-24 ENCOUNTER — PRE VISIT (OUTPATIENT)
Dept: SURGERY | Facility: CLINIC | Age: 64
End: 2024-01-24

## 2024-01-24 DIAGNOSIS — Z01.818 PRE-OP EVALUATION: Primary | ICD-10-CM

## 2024-01-24 PROCEDURE — 99202 OFFICE O/P NEW SF 15 MIN: CPT | Mod: 95 | Performed by: PHYSICIAN ASSISTANT

## 2024-01-24 ASSESSMENT — PAIN SCALES - GENERAL: PAINLEVEL: NO PAIN (0)

## 2024-01-24 ASSESSMENT — LIFESTYLE VARIABLES: TOBACCO_USE: 0

## 2024-01-24 ASSESSMENT — ENCOUNTER SYMPTOMS: SEIZURES: 0

## 2024-01-24 NOTE — PATIENT INSTRUCTIONS
Preparing for Your Surgery      Name:  Nicol Aldrich   MRN:  [id number]   :  1960   Today's Date:  2024       Arriving for surgery:  Surgery date:  24  Arrival time:  1:00 pm      Please come to:   [facility] [address] [address]     Parking is available in front of the Clinics and Surgery Center building from 5:30AM to 8:00PM.  -  Proceed to the 5th floor to check into the Ambulatory Surgery Center.              >> There will be patient concierges on the 1st and 5th floor, for assistance or an escort, if you would like.              >> Please call [phone] with any questions.    What can I eat or drink?  -  You may eat and drink normally up to 8 hours prior to arrival time.   -  You may have clear liquids until 2 hours prior to arrival time.   - 2 FLEETS ENEMAS AM OF SURGERY    Examples of clear liquids:  Water  Clear broth  Juices (apple, white grape, white cranberry  and cider) without pulp  Noncarbonated, powder based beverages  (lemonade and Rick-Aid)  Sodas (Sprite, 7-Up, ginger ale and seltzer)  Coffee or tea (without milk or cream)  Gatorade    -  No Alcohol or cannabis products for at least 24 hours before surgery.     Which medicines can I take?    Hold Aspirin for 7 days before surgery.   Hold Multivitamins for 7 days before surgery.  Hold Supplements for 7 days before surgery.  Hold Ibuprofen (Advil, Motrin) for 1 day(s) before surgery--unless otherwise directed by surgeon.  Hold Naproxen (Aleve) for 4 days before surgery.    -  PLEASE TAKE these medications the day of surgery:  Tylenol if needed.    How do I prepare myself?  - Please take 2 showers (one the night prior to surgery and one the morning of surgery) using Scrubcare or Hibiclens soap.    Use this soap only from the neck to your toes.     Leave the soap on your skin for one minute--then rinse thoroughly.      You may use your own shampoo and  conditioner. No other hair products.   - Please remove all jewelry and body piercings.  - No lotions, deodorants or fragrance.  - No makeup or fingernail polish.   - Bring your ID and insurance card.    -If you use a CPAP machine, please bring the CPAP machine, tubing, and mask to hospital.    -If you have a Deep Brain Stimulator, Spinal Cord Stimulator, or any Neuro Stimulator device---you must bring the remote control to the hospital.      ALL PATIENTS GOING HOME THE SAME DAY OF SURGERY ARE REQUIRED TO HAVE A RESPONSIBLE ADULT TO DRIVE AND BE IN ATTENDANCE WITH THEM FOR 24 HOURS FOLLOWING SURGERY.    Covid testing policy as of 12/06/2022  Your surgeon will notify and schedule you for a COVID test if one is needed before surgery--please direct any questions or COVID symptoms to your surgeon      Questions or Concerns:    - For any questions regarding the day of surgery or your hospital stay, please contact the Pre Admission Nursing Office at [phone].       - If you have health changes between today and your surgery, please call your surgeon.       - For questions after surgery, please call your surgeons office.           Current Visitor Guidelines    You may have 2 visitors in the pre op area.    Visiting hours: 8 a.m. to 8:30 p.m.    Patients confirmed or suspected to have symptoms of COVID 19 or flu:     No visitors allowed for adult patients.   Children (under age 18) can have 1 named visitor.     People who are sick or showing symptoms of COVID 19 or flu:    Are not allowed to visit patients--we can only make exceptions in special situations.       Please follow these guidelines for your visit:          Please maintain social distance          Masking is optional--however at times you may be asked to wear a mask for the safety of yourself and others     Clean your hands with alcohol hand . Do this when you arrive at and leave the building and patient room,    And again after you touch your mask or  anything in the room.     Go directly to and from the room you are visiting.     Stay in the patient s room during your visit. Limit going to other places in the hospital as much as possible     Leave bags and jackets at home or in the car.     For everyone s health, please don t come and go during your visit. That includes for smoking   during your visit.

## 2024-01-24 NOTE — H&P
Pre-Operative H & P     CC:  Preoperative exam to assess for increased cardiopulmonary risk while undergoing surgery and anesthesia.    Date of Encounter: 2024  Primary Care Physician:  Janet Noe     Reason for visit:   Encounter Diagnosis   Name Primary?    Pre-op evaluation Yes       HPI  Nicol Aldrich is a 63 year old female who presents for pre-operative H & P in preparation for  Procedure Information       Case: [id number] Date/Time: 24 1430    Procedure: EXAM UNDER ANESTHESIA, ANUS, ANAL DILATION (Anus)    Anesthesia type: MAC    Diagnosis: Crohn's disease of both small and large intestine with complication (H) [K50.819]    Pre-op diagnosis: Crohn's disease of both small and large intestine with complication (H) [K50.819]    Location: [place] [address] / [facility]    Providers: Kunal Herrera MD            Patient is being evaluated for comorbid conditions of SCC    Ms. Aldrich has a history of Crohn disease diagnosed at age 31.  Course has been complicated by SCC of the anus s/p radiation and chemo. She has had previous surgery and medical management. Patient was seen by Dr. Herrera for further evaluation and is now scheduled for the above procedure.     History is obtained from the patient and chart review    Hx of abnormal bleeding or anti-platelet use: denies    Menstrual history: No LMP recorded. Patient is postmenopausal.     Past Medical History  Past Medical History:   Diagnosis Date    Squamous cell carcinoma of skin, unspecified        Past Surgical History  Past Surgical History:   Procedure Laterality Date     SECTION      COLONOSCOPY N/A 2024    Procedure: Colonoscopy with biopsies;  Surgeon: Jordan Coyle MD;  Location: [facility] OR    ostomy creation         Prior to Admission Medications  Current Outpatient Medications   Medication Sig Dispense Refill    inFLIXimab Inject 4 mg into the vein every 30 days  "Last infusion 12/30      Ostomy Supplies MISC 1 each daily 1 each 11       Allergies  Allergies   Allergen Reactions    Penicillins Rash     On 07/10/18, pt states that she experienced a rash when she had this \"a while ago.\" This was not a reaction from childhood.   On 07/10/18, pt states that she experienced a rash when she had this \"a while ago.\" This was not a reaction from childhood.          Social History  Social History     Socioeconomic History    Marital status:      Spouse name: Not on file    Number of children: Not on file    Years of education: Not on file    Highest education level: Not on file   Occupational History    Not on file   Tobacco Use    Smoking status: Never    Smokeless tobacco: Never   Vaping Use    Vaping Use: Never used   Substance and Sexual Activity    Alcohol use: Yes     Alcohol/week: 2.0 standard drinks of alcohol     Types: 2 Glasses of wine per week    Drug use: Never    Sexual activity: Not on file   Other Topics Concern    Not on file   Social History Narrative    Not on file     Social Determinants of Health     Financial Resource Strain: Not on file   Food Insecurity: Not on file   Transportation Needs: Not on file   Physical Activity: Not on file   Stress: Not on file   Social Connections: Not on file   Interpersonal Safety: Not on file   Housing Stability: Not on file       Family History  Family History   Problem Relation Age of Onset    Anesthesia Reaction No family hx of     Deep Vein Thrombosis (DVT) No family hx of        Review of Systems  The complete review of systems is negative other than noted in the HPI or here.   Anesthesia Evaluation   Pt has had prior anesthetic.     No history of anesthetic complications       ROS/MED HX  ENT/Pulmonary:     (+)     ODALIS risk factors, snores loudly,                               (-) tobacco use   Neurologic:  - neg neurologic ROS  (-) no seizures and no CVA   Cardiovascular:     (+)  - -   -  - -                         "         Previous cardiac testing   Echo: Date: Results:    Stress Test:  Date: Results:    ECG Reviewed:  Date: 12/2022 Results:  Normal sinus rhythm   T wave abnormality, consider inferolateral ischemia   Cath:  Date: Results:   (-) taking anticoagulants/antiplatelets   METS/Exercise Tolerance: >4 METS Comment: Walks dog, in summer bikes and golf, downhill ski    Hematologic:  - neg hematologic  ROS  (-) history of blood clots and history of blood transfusion   Musculoskeletal:  - neg musculoskeletal ROS     GI/Hepatic:     (+)       Inflammatory bowel disease,          (-) GERD   Renal/Genitourinary:  - neg Renal ROS     Endo:  - neg endo ROS  (-) chronic steroid usage   Psychiatric/Substance Use:  - neg psychiatric ROS     Infectious Disease:  - neg infectious disease ROS     Malignancy:       Other:            Virtual visit -  No vitals were obtained    Physical Exam  Constitutional: Awake, alert, cooperative, no apparent distress, and appears stated age.  HENT: Normocephalic  Respiratory: non labored breathing   Neurologic: Awake, alert, oriented to name, place and time.   Neuropsychiatric: Calm, cooperative. Normal affect.      Prior Labs/Diagnostic Studies   All labs and imaging personally reviewed     EKG/ stress test - if available please see in ROS above   No results found.       No data to display                  The patient's records and results personally reviewed by this provider.     Outside records reviewed from: Care Everywhere      Assessment    Nicol Aldrich is a 63 year old female seen as a PAC referral for risk assessment and optimization for anesthesia.    Plan/Recommendations  Pt will be optimized for the proposed procedure.  See below for details on the assessment, risk, and preoperative recommendations    NEUROLOGY  - No history of TIA, CVA or seizure  -Post Op delirium risk factors:  No risk identified    ENT  - No current airway concerns.  Will need to be reassessed day of  "surgery.  Mallampati: Unable to assess  TM: Unable to assess    CARDIAC  - No history of CAD, Hypertension, and Afib  -denies cardiac symptoms   - METS (Metabolic Equivalents)  Patient performs 4 or more METS exercise without symptoms            Total Score: 0      RCRI-Very low risk: Class 1 0.4% complication rate            Total Score: 0        PULMONARY  ODALIS Low Risk            Total Score: 2    ODALIS: Snores loudly    ODALIS: Over 50 ys old      - Denies asthma or inhaler use  - Tobacco History    History   Smoking Status    Never   Smokeless Tobacco    Never       GI  -crohn's disease. using Infliximab- she has a dose scheduled about 1 week prior to her procedure. Typically, our guidelines are to schedule surgery one week after next scheduled dose (and skip that dose). As this is an anal dilation, she will discuss if that should be held or not with Dr. Coyle (prescriber).   PONV High Risk  Total Score: 3           1 AN PONV: Pt is Female    1 AN PONV: Patient is not a current smoker    1 AN PONV: Intended Post Op Opioids        /RENAL  - Baseline Creatinine  0.97    ENDOCRINE    - BMI: Estimated body mass index is 29.09 kg/m  as calculated from the following:    Height as of 9/19/23: 1.664 m (5' 5.5\").    Weight as of 9/19/23: 80.5 kg (177 lb 8 oz).  Overweight (BMI 25.0-29.9)  - No history of Diabetes Mellitus    HEME  VTE Low Risk 0.26%            Total Score: 1    VTE: Greater than 59 yrs old      - No history of abnormal bleeding or antiplatelet use.      Different anesthesia methods/types have been discussed with the patient, but they are aware that the final plan will be decided by the assigned anesthesia provider on the date of service.    The patient is optimized for their procedure. AVS with information on surgery time/arrival time, meds and NPO status given by nursing staff. No further diagnostic testing indicated.    Please refer to the physical examination documented by the anesthesiologist in the " anesthesia record on the day of surgery.    Video-Visit Details    Type of service:  Video Visit    Provider received verbal consent for a Video Visit from the patient? Yes     Originating Location (pt. Location): Home    Distant Location (provider location):  Off-site  Mode of Communication:  Video Conference via SAVO  On the day of service:     Prep time: 14 minutes  Visit time: 11 minutes  Documentation time: 3 minutes  ------------------------------------------  Total time: 28 minutes      Sari Huddleston PA-C  Preoperative Assessment Center  [facility]  Phone: [phone]  Fax: [phone]

## 2024-01-24 NOTE — PROGRESS NOTES
Nicol is a 63 year old who is being evaluated via a billable video visit.      How would you like to obtain your AVS? MyChart  If the video visit is dropped, the invitation should be resent by: Text to cell phone: [phone]          HPI             Physical Exam

## 2024-01-25 NOTE — TELEPHONE ENCOUNTER
Discussed with Dr. Paulette cooper to proceed with both the infusion and the procedure as scheduled.

## 2024-02-06 ENCOUNTER — HOSPITAL ENCOUNTER (OUTPATIENT)
Facility: AMBULATORY SURGERY CENTER | Age: 64
Discharge: HOME | End: 2024-02-06
Attending: SURGERY
Payer: COMMERCIAL

## 2024-02-06 ENCOUNTER — ANESTHESIA (OUTPATIENT)
Dept: SURGERY | Facility: AMBULATORY SURGERY CENTER | Age: 64
End: 2024-02-06
Payer: COMMERCIAL

## 2024-02-06 VITALS
WEIGHT: 175 LBS | RESPIRATION RATE: 16 BRPM | DIASTOLIC BLOOD PRESSURE: 71 MMHG | OXYGEN SATURATION: 98 % | TEMPERATURE: 96.8 F | HEIGHT: 65 IN | SYSTOLIC BLOOD PRESSURE: 121 MMHG | HEART RATE: 92 BPM | BODY MASS INDEX: 29.16 KG/M2

## 2024-02-06 DIAGNOSIS — K50.819 CROHN'S DISEASE OF BOTH SMALL AND LARGE INTESTINE WITH COMPLICATION (H): Primary | ICD-10-CM

## 2024-02-06 PROCEDURE — 45905 DILATION OF ANAL SPHINCTER: CPT

## 2024-02-06 PROCEDURE — 45905 DILATION OF ANAL SPHINCTER: CPT | Performed by: SURGERY

## 2024-02-06 RX ORDER — OXYCODONE HYDROCHLORIDE 5 MG/1
10 TABLET ORAL
Status: DISCONTINUED | OUTPATIENT
Start: 2024-02-06 | End: 2024-02-07 | Stop reason: HOSPADM

## 2024-02-06 RX ORDER — ACETAMINOPHEN 325 MG/1
975 TABLET ORAL ONCE
Status: COMPLETED | OUTPATIENT
Start: 2024-02-06 | End: 2024-02-06

## 2024-02-06 RX ORDER — SODIUM CHLORIDE, SODIUM LACTATE, POTASSIUM CHLORIDE, CALCIUM CHLORIDE 600; 310; 30; 20 MG/100ML; MG/100ML; MG/100ML; MG/100ML
INJECTION, SOLUTION INTRAVENOUS CONTINUOUS PRN
Status: DISCONTINUED | OUTPATIENT
Start: 2024-02-06 | End: 2024-02-06

## 2024-02-06 RX ORDER — GINSENG 100 MG
CAPSULE ORAL DAILY PRN
Status: DISCONTINUED | OUTPATIENT
Start: 2024-02-06 | End: 2024-02-06 | Stop reason: HOSPADM

## 2024-02-06 RX ORDER — SODIUM CHLORIDE, SODIUM LACTATE, POTASSIUM CHLORIDE, CALCIUM CHLORIDE 600; 310; 30; 20 MG/100ML; MG/100ML; MG/100ML; MG/100ML
INJECTION, SOLUTION INTRAVENOUS CONTINUOUS
Status: DISCONTINUED | OUTPATIENT
Start: 2024-02-06 | End: 2024-02-06 | Stop reason: HOSPADM

## 2024-02-06 RX ORDER — LIDOCAINE 40 MG/G
CREAM TOPICAL
Status: DISCONTINUED | OUTPATIENT
Start: 2024-02-06 | End: 2024-02-06 | Stop reason: HOSPADM

## 2024-02-06 RX ORDER — ONDANSETRON 2 MG/ML
4 INJECTION INTRAMUSCULAR; INTRAVENOUS ONCE
Status: COMPLETED | OUTPATIENT
Start: 2024-02-06 | End: 2024-02-06

## 2024-02-06 RX ORDER — OXYCODONE HYDROCHLORIDE 5 MG/1
5 TABLET ORAL
Status: COMPLETED | OUTPATIENT
Start: 2024-02-06 | End: 2024-02-06

## 2024-02-06 RX ORDER — KETAMINE HYDROCHLORIDE 10 MG/ML
INJECTION INTRAMUSCULAR; INTRAVENOUS PRN
Status: DISCONTINUED | OUTPATIENT
Start: 2024-02-06 | End: 2024-02-06

## 2024-02-06 RX ORDER — PROPOFOL 10 MG/ML
INJECTION, EMULSION INTRAVENOUS PRN
Status: DISCONTINUED | OUTPATIENT
Start: 2024-02-06 | End: 2024-02-06

## 2024-02-06 RX ORDER — PROPOFOL 10 MG/ML
INJECTION, EMULSION INTRAVENOUS CONTINUOUS PRN
Status: DISCONTINUED | OUTPATIENT
Start: 2024-02-06 | End: 2024-02-06

## 2024-02-06 RX ORDER — OXYCODONE HYDROCHLORIDE 5 MG/1
5 TABLET ORAL EVERY 8 HOURS PRN
Qty: 10 TABLET | Refills: 0 | Status: SHIPPED | OUTPATIENT
Start: 2024-02-06 | End: 2024-02-09

## 2024-02-06 RX ORDER — ACETAMINOPHEN 325 MG/1
975 TABLET ORAL ONCE
Qty: 3 TABLET | Refills: 0 | Status: SHIPPED | OUTPATIENT
Start: 2024-02-06 | End: 2024-02-06

## 2024-02-06 RX ORDER — ONDANSETRON 2 MG/ML
4 INJECTION INTRAMUSCULAR; INTRAVENOUS EVERY 30 MIN PRN
Status: DISCONTINUED | OUTPATIENT
Start: 2024-02-06 | End: 2024-02-07 | Stop reason: HOSPADM

## 2024-02-06 RX ORDER — FENTANYL CITRATE 50 UG/ML
INJECTION, SOLUTION INTRAMUSCULAR; INTRAVENOUS PRN
Status: DISCONTINUED | OUTPATIENT
Start: 2024-02-06 | End: 2024-02-06

## 2024-02-06 RX ORDER — ONDANSETRON 4 MG/1
4 TABLET, ORALLY DISINTEGRATING ORAL EVERY 30 MIN PRN
Status: DISCONTINUED | OUTPATIENT
Start: 2024-02-06 | End: 2024-02-07 | Stop reason: HOSPADM

## 2024-02-06 RX ORDER — ONDANSETRON 4 MG/1
4 TABLET, ORALLY DISINTEGRATING ORAL
Status: DISCONTINUED | OUTPATIENT
Start: 2024-02-06 | End: 2024-02-07 | Stop reason: HOSPADM

## 2024-02-06 RX ORDER — ACETAMINOPHEN 325 MG/1
975 TABLET ORAL ONCE
Status: DISCONTINUED | OUTPATIENT
Start: 2024-02-06 | End: 2024-02-06 | Stop reason: HOSPADM

## 2024-02-06 RX ORDER — LIDOCAINE HYDROCHLORIDE 20 MG/ML
INJECTION, SOLUTION INFILTRATION; PERINEURAL PRN
Status: DISCONTINUED | OUTPATIENT
Start: 2024-02-06 | End: 2024-02-06

## 2024-02-06 RX ADMIN — PROPOFOL 20 MG: 10 INJECTION, EMULSION INTRAVENOUS at 14:10

## 2024-02-06 RX ADMIN — FENTANYL CITRATE 25 MCG: 50 INJECTION, SOLUTION INTRAMUSCULAR; INTRAVENOUS at 13:49

## 2024-02-06 RX ADMIN — PROPOFOL 20 MG: 10 INJECTION, EMULSION INTRAVENOUS at 14:13

## 2024-02-06 RX ADMIN — KETAMINE HYDROCHLORIDE 20 MG: 10 INJECTION INTRAMUSCULAR; INTRAVENOUS at 13:49

## 2024-02-06 RX ADMIN — SODIUM CHLORIDE, SODIUM LACTATE, POTASSIUM CHLORIDE, CALCIUM CHLORIDE: 600; 310; 30; 20 INJECTION, SOLUTION INTRAVENOUS at 13:27

## 2024-02-06 RX ADMIN — SODIUM CHLORIDE, SODIUM LACTATE, POTASSIUM CHLORIDE, CALCIUM CHLORIDE: 600; 310; 30; 20 INJECTION, SOLUTION INTRAVENOUS at 13:49

## 2024-02-06 RX ADMIN — ONDANSETRON 4 MG: 2 INJECTION INTRAMUSCULAR; INTRAVENOUS at 13:49

## 2024-02-06 RX ADMIN — ACETAMINOPHEN 975 MG: 325 TABLET ORAL at 13:22

## 2024-02-06 RX ADMIN — PROPOFOL 125 MCG/KG/MIN: 10 INJECTION, EMULSION INTRAVENOUS at 13:46

## 2024-02-06 RX ADMIN — OXYCODONE HYDROCHLORIDE 5 MG: 5 TABLET ORAL at 14:38

## 2024-02-06 RX ADMIN — LIDOCAINE HYDROCHLORIDE 60 MG: 20 INJECTION, SOLUTION INFILTRATION; PERINEURAL at 13:49

## 2024-02-06 NOTE — DISCHARGE INSTRUCTIONS
Anorectal Surgery Instructions    What can I expect after anorectal surgery?  Most anorectal procedures are done as outpatient surgery, and you go home the same day as the procedure. A few surgical procedures will require that you stay in the hospital for about one to three days. No matter where the procedure is done or how long or short it takes, these recommendations will help you heal and feel more comfortable.    Medicines:  The anal area is very sensitive; you can expect to have some pain for up to 2-4 weeks after the procedure. Your doctor will give you a prescription for one or more pain medications.    Take ibuprofen 600 mg four times a day OR naprosyn 500 mg twice a day  Take this on a regular basis (not as needed) following your surgery.   The drugs are best taken with food.  Do not take if it causes stomach upset or if you have a history of ulcers or gastritis. You can stop the naprosyn (or ibuprofen) or reduce the dose when you are feeling better.    Take acetominaphen (Tylenol) 650-1000 mg four times a day.   Take this on a regular basis (not as needed) following surgery for pain control.   Take the lower dose if you are >65 years old or have liver disease. The maximum dose of acetominaphen is 4000 mg a day. You can stop the acetaminophen or reduce the dose when you are feeling better.  It is important to realize that many narcotic pain relievers (including vicodin, percocet, tylenol #3) also have acetaminophen, and excessive doses of acetaminophen can be dangerous, so do not take these in addition to acetominaphen.  You may take narcotics that don't contain acetominaphen such as oxycodone.      Take oxycodone AS NEEDED in addition to the acetominaphen and naprosyn.    Because narcotics have side effects (including constipation), you should reduce your use of these medications as tolerated as your pain improves.    *In general, the best strategy is to take (if you are able to tolerate it) the tylenol  and ibuprofen on a regular basis until your pain has largely gone away. You can take the narcotic pain medicine as needed in addition to the tylenol and ibuprofen. As your pain begins to lessen, you should cut back on your narcotic use while continuing to take your regular tylenol and ibuprofen doses.    Refilling prescriptions. If you need additional pain medication, please call the triage nurse at [phone] during normal business hours (8 a.m. to 4 p.m., Monday though Friday) or have your pharmacy fax a refill request to [phone]. If you call after hours or on the weekends, the doctor on call may not know you personally and may not renew narcotic pain medication by phone. Call your primary care provider for all other medication refills.    Perineal care:  Tub baths:  If possible, take a tub bath or shower immediately after each bowel movement.   Baths should be take at least 3 times daily for the first week to 10 days following your procedure. You should soak in the tub for 10 to 15 minutes each time with water as warm as you can tolerate. You may also use a microphone shower head (with an extension) to shower your back-side instead of a bath.  Even after you go back to work, it is a good idea to sit in the tub in the morning, after returning from work, and again in the evening before bedtime.    Bleeding/Infection:  You can expect to have some bleeding after bowel movements, but it should stop soon after you wipe.   Use a wet cloth or perianal pad (Tucks or Preparation H pads) to gently wipe the area after each bowel movement.  Do not rub the anal area or use a lot of pressure.  Using a spray bottle filled with warm water helps loosen any remaining stool. Blot gently with a soft dry cloth or tissue paper.  Infection around the anal opening is not very common. The anal area has excellent blood supply, which helps the area to heal. Bloody discharge after bowel movements is normal and may last 2 to 4 weeks  after your surgery. However, if you bleed between bowel movements and cannot get it to stop, call the triage nurse immediately [phone].    Bowel function:  Take a fiber supplement such as Metamucil, which is over the counter. It is important to drink six to eight glasses of water or juice everyday when using fiber products.    If you do not have a bowel movement after 1-2 days:  Take Milk of Magnesia-2 tablespoons.     If there are no results, repeat this or add over the counter Miralax.    If you still do not have results, contact the clinic.   If there are no results, repeat this. Stop taking Milk of Magnesia or other laxatives if you begin to have diarrhea.    * Constipation will cause you to strain when you have a bowel movement. The hard stool will be difficult to pass, will increase pain and bleeding, and will slow down healing. Try to avoid constipation and/or diarrhea as this can make the pain and bleeding worse.    * It is important to have regular bowel movements at least every other day and to keep your stool soft. A high fiber diet, including at least four servings of fruits or vegetables daily, will help to keep your bowel movements regular and soft.    Activity:  After your procedure, there are no restrictions on your activity   Except restrictions because being on narcotics and in pain, such as no heavy machine operating or driving.   You may walk, climb stairs, ride in a car, and sit as tolerated.   It is helpful to avoid sitting in one position for long periods (2 or more hours).  After some surgeries, you may be told not to perform any lifting (more than 10 pounds) for several weeks after surgery.    When to call:  When do I need to call the doctor or triage nurse?  If you experience any of the problems listed here, call our triage nurse during business hours ([phone]).   The nurse will help you with your problem or have the doctor call you.   After hours and on weekends, please call the  [facility] number ([phone]) and ask for the colon and rectal surgery person on call.   Call for:   ? Fever greater than 101 degrees   ? Chills   ? Foul-smelling drainage   ? Nausea and vomiting   ? Diarrhea - greater than 4 water stools in 24 hours   ? Constipation - no bowel movement after 3 days   ? Severe bleeding that does not stop soon after a bowel movement   ? Problems with the incision, including severe pain, swelling, or redness  [facility]  Home Care Following Anesthesia  For 24 hours after surgery:  Get plenty of rest.  A responsible adult must stay with you for at least 24 hours after you leave the surgery center.  Do not drive or use heavy equipment.  If you have weakness or tingling, don't drive or use heavy equipment until this feeling goes away.   Do not drink alcohol.   Avoid strenuous or risky activities.  Ask for help when climbing stairs.  You may feel lightheaded.  IF so, sit for a few minutes before standing.  Have someone help you get up.   If you have nausea (feel sick to your stomach): Drink only clear liquids such as apple juice, ginger ale, broth or 7-Up.  Rest may also help.  Be sure to drink enough fluids.  Move to a regular diet as you feel able.   You may have a slight fever.  Call the doctor if your fever is over 100 F (37.7 C) (taken under the tongue) or lasts longer than 24 hours.  You may have a dry mouth, a sore throat, muscle aches or trouble sleeping. These should go away after 24 hours.  Do not make important or legal decisions.   It is recommended to avoid smoking.               Tips for taking pain medications  To get the best pain relief possible, remember these points:  Take pain medications as directed, before pain becomes severe.  Pain medication can upset your stomach: taking it with food may help.  Constipation is a common side effect of pain medication. Drink plenty of  fluids.  Eat foods high in fiber. Take a stool  softener if recommended by your doctor or pharmacist.  Do not drink alcohol, drive or operate machinery while taking pain medications.  Ask about other ways to control pain, such as with heat, ice or relaxation.    Tylenol/Acetaminophen Consumption    If you feel your pain relief is insufficient, you may take Tylenol/Acetaminophen in addition to your narcotic pain medication.   Be careful not to exceed 4,000 mg of Tylenol/Acetaminophen in a 24 hour period from all sources.  If you are taking extra strength Tylenol/acetaminophen (500 mg), the maximum dose is 8 tablets in 24 hours.  If you are taking regular strength acetaminophen (325 mg), the maximum dose is 12 tablets in 24 hours.    Call a doctor for any of the following:  Signs of infection (fever, growing tenderness at the surgery site, a large amount of drainage or bleeding, severe pain, foul-smelling drainage, redness, swelling).  It has been over 8 to 10 hours since surgery and you are still not able to urinate (pass water).  Headache for over 24 hours.  Numbness, tingling or weakness the day after surgery (if you had spinal anesthesia).  Signs of Covid-19 infection (temperature over 100 degrees, shortness of breath, cough, loss of taste/smell, generalized body aches, persistent headache, chills, sore throat, nausea/vomiting/diarrhea)  Your doctor is:  Dr. Kunal Herrera, Colon Rectal: [phone]                    Or dial [phone] and ask for the resident on call for:  Colon Rectal  For emergency care, call the:  [place] [place] [facility]:  [phone] (TTY for hearing impaired: [phone])

## 2024-02-06 NOTE — ANESTHESIA CARE TRANSFER NOTE
Patient: Nicol Aldrich    Procedure: Procedure(s):  EXAM UNDER ANESTHESIA, ANUS, ANAL DILATION       Diagnosis: Crohn's disease of both small and large intestine with complication (H) [K50.819]  Diagnosis Additional Information: No value filed.    Anesthesia Type:   MAC     Note:    Oropharynx: oropharynx clear of all foreign objects and spontaneously breathing  Level of Consciousness: awake  Oxygen Supplementation: room air    Independent Airway: airway patency satisfactory and stable  Dentition: dentition unchanged  Vital Signs Stable: post-procedure vital signs reviewed and stable  Report to RN Given: handoff report given  Patient transferred to: Phase II    Handoff Report: Identifed the Patient, Identified the Reponsible Provider, Reviewed the pertinent medical history, Discussed the surgical course, Reviewed Intra-OP anesthesia mangement and issues during anesthesia, Set expectations for post-procedure period and Allowed opportunity for questions and acknowledgement of understanding      Vitals:  Vitals Value Taken Time   /69 02/06/24 1426   Temp 36  C (96.8  F) 02/06/24 1426   Pulse 92 02/06/24 1426   Resp 18 02/06/24 1426   SpO2 98 % 02/06/24 1426       Electronically Signed By: Krystyna Pineda, ANUP CRNA  February 6, 2024  2:27 PM

## 2024-02-06 NOTE — ANESTHESIA POSTPROCEDURE EVALUATION
Patient: Nicol Aldrich    Procedure: Procedure(s):  EXAM UNDER ANESTHESIA, ANUS, ANAL DILATION       Anesthesia Type:  MAC    Note:  Disposition: Outpatient   Postop Pain Control: Uneventful            Sign Out: Well controlled pain   PONV: No   Neuro/Psych: Uneventful            Sign Out: Acceptable/Baseline neuro status   Airway/Respiratory: Uneventful            Sign Out: Acceptable/Baseline resp. status   CV/Hemodynamics: Uneventful            Sign Out: Acceptable CV status; No obvious hypovolemia; No obvious fluid overload   Other NRE: NONE   DID A NON-ROUTINE EVENT OCCUR?            Last vitals:  Vitals Value Taken Time   /71 02/06/24 1548   Temp 36  C (96.8  F) 02/06/24 1548   Pulse 92 02/06/24 1426   Resp 16 02/06/24 1548   SpO2 98 % 02/06/24 1548       Electronically Signed By: Garry Foy MD  February 6, 2024  4:08 PM

## 2024-02-06 NOTE — ANESTHESIA PREPROCEDURE EVALUATION
"Anesthesia Pre-Procedure Evaluation    Patient: Nicol Aldrich   MRN: [id number] : 1960        Procedure : Procedure(s):  EXAM UNDER ANESTHESIA, ANUS, ANAL DILATION          Past Medical History:   Diagnosis Date    Squamous cell carcinoma of skin, unspecified       Past Surgical History:   Procedure Laterality Date     SECTION      COLONOSCOPY N/A 2024    Procedure: Colonoscopy with biopsies;  Surgeon: Jordan Coyle MD;  Location: [facility] OR    ostomy creation        Allergies   Allergen Reactions    Penicillins Rash     On 07/10/18, pt states that she experienced a rash when she had this \"a while ago.\" This was not a reaction from childhood.   On 07/10/18, pt states that she experienced a rash when she had this \"a while ago.\" This was not a reaction from childhood.         Social History     Tobacco Use    Smoking status: Never    Smokeless tobacco: Never   Substance Use Topics    Alcohol use: Yes     Alcohol/week: 2.0 standard drinks of alcohol     Types: 2 Glasses of wine per week      Wt Readings from Last 1 Encounters:   24 79.4 kg (175 lb)           Physical Exam    Airway        Mallampati: I   TM distance: > 3 FB   Neck ROM: full   Mouth opening: > 3 cm    Respiratory Devices and Support         Dental     Comment: Bottom retainer    (+) Minor Abnormalities - some fillings, tiny chips      Cardiovascular   cardiovascular exam normal          Pulmonary                   OUTSIDE LABS:  CBC:   Lab Results   Component Value Date    WBC 5.6 2023    WBC 10.0 2023    HGB 12.8 2023    HGB 12.5 2023    HCT 38.6 2023    HCT 38.5 2023     2023     2023     BMP:   Lab Results   Component Value Date     2023    POTASSIUM 4.4 2023    CHLORIDE 104 2023    CO2 28 2023    BUN 13.1 2023    CR 0.97 (H) 2023    GLC 96 2023     COAGS: No results found for: \"PTT\", \"INR\", \"FIBR\"  POC: " "No results found for: \"BGM\", \"HCG\", \"HCGS\"  HEPATIC:   Lab Results   Component Value Date    ALBUMIN 4.2 08/17/2023    PROTTOTAL 6.9 08/17/2023    ALT 15 08/17/2023    AST 31 08/17/2023    ALKPHOS 72 08/17/2023    BILITOTAL 0.3 08/17/2023     OTHER:   Lab Results   Component Value Date    MILO 9.5 06/13/2023    SED 11 08/17/2023       Anesthesia Plan    ASA Status:  2    NPO Status:  NPO Appropriate    Anesthesia Type: MAC.     - Reason for MAC: straight local not clinically adequate   Induction: Intravenous.   Maintenance: TIVA.        Consents            Postoperative Care    Pain management: IV analgesics, Oral pain medications, Multi-modal analgesia.   PONV prophylaxis: Ondansetron (or other 5HT-3), Dexamethasone or Solumedrol, Background Propofol Infusion     Comments:               Timoteo Kang MD    I have reviewed the pertinent notes and labs in the chart from the past 30 days and (re)examined the patient.  Any updates or changes from those notes are reflected in this note.              # Overweight: Estimated body mass index is 29.12 kg/m  as calculated from the following:    Height as of this encounter: 1.651 m (5' 5\").    Weight as of this encounter: 79.4 kg (175 lb).      "

## 2024-02-06 NOTE — OP NOTE
"[facility] Colorectal Surgery Operative Report  February 6, 2024     PREOPERATIVE DIAGNOSIS:  1. Crohn's colitis  2. History of anal cancer s/p chemoradiation in remission     POSTOPERATIVE DIAGNOSIS:   1. Anal cancer     PROCEDURE:  1. Evaluation under anesthesia.  2. Anal dilation     ANESTHESIA: MAC plus local anesthesia.     SURGEON: Kunal Herrera M.D.     ASSISTANT(S): Chastity Cary, PGY3.     INDICATIONS FOR PROCEDURE  Ms. Aldrich is a 64 yo F with hx of Crohn's colitis s/p diversion, along with anal cancer s/p chemoradiation in remission. Due to severe anal stricturing, she was unable to have a surveillance flexible sigmoidoscopy, thus I recommended evaluation under anesthesia. I thoroughly discussed the risks, benefits, and alternatives of operative treatment with the patient and she agreed to proceed.      General risks related to anorectal surgery were reviewed with the patient. These include, but are not limited to urinary retention, recurrent abscess, bleeding, chronic pain, anal stenosis, fistula, fissure, and fecal incontinence.      OPERATIVE PROCEDURE: After obtaining informed consent, the patient was brought to the operating room and placed in the prone jackknife position. Appropriate preoperative mechanical deep venous thrombosis prophylaxis was administered. General endotracheal anesthesia MAC anesthesia was gently induced. Bilateral lower extremity pneumatic compression devices were applied and all pressure points were cushioned. The perianal area was then preped and draped in the standard sterile fashion. After a \"time-out\" was performed, digital rectal exam and anoscopy were performed.  There was no evidence of any residual cancer. A scar was seen most prominently at the left anterior position. The anal canal was moderately strictured, and was gently dilated with a combination of Hegar dilators easily accommodating 15 mm size. A total of 30 mL of 1% lidocaine with epinephrine mixed with Bupivacaine " 0.25% without epinephrine was injected as a pudendal block. Hemostasis was achieved. Instrument, sponge, and needle counts were all correct as reported to me. Bacitracin was applied, as well as a sterile dressing. The patient tolerated the procedure well.     COMPLICATIONS: none, immediately.     ESTIMATED BLOOD LOSS: 0 mL.     SPECIMEN(S): None.     DRAINS/TUBES: None     OPERATIVE FINDINGS: no obvious residual cancer remaining. Easily dilated to accommodate at least a 15 mm Hegar dilator.     DISPOSITION:  PACU.     Plan: Repeat evaluation with Dr. Coyle to ensure anal canal and rectum are appropriately dilated to allow flexible sigmoidoscopy.     Kunal Herrera MD  Division of Colon and Rectal Surgery  [facility]  [phone]        CC:  [facility] Surgery billing.

## 2024-02-23 ENCOUNTER — MYC MEDICAL ADVICE (OUTPATIENT)
Dept: SURGERY | Facility: CLINIC | Age: 64
End: 2024-02-23
Payer: COMMERCIAL

## 2024-02-23 DIAGNOSIS — K50.813 CROHN'S DISEASE OF BOTH SMALL AND LARGE INTESTINE WITH FISTULA (H): Primary | ICD-10-CM

## 2024-02-26 ENCOUNTER — TELEPHONE (OUTPATIENT)
Dept: GASTROENTEROLOGY | Facility: CLINIC | Age: 64
End: 2024-02-26
Payer: COMMERCIAL

## 2024-02-26 NOTE — TELEPHONE ENCOUNTER
"Endoscopy Scheduling Screen    Out of [facility] 3/16-3/30    Have you had a positive Covid test in the last 14 days?  No    Are you active on MyChart?   Yes    What insurance is in the chart?  Other:  bcbs    Ordering/Referring Provider: Leonides   (If ordering provider performs procedure, schedule with ordering provider unless otherwise instructed. )    BMI: Estimated body mass index is 29.12 kg/m  as calculated from the following:    Height as of 2/6/24: 1.651 m (5' 5\").    Weight as of 2/6/24: 79.4 kg (175 lb).     Sedation Ordered  MAC/deep sedation.   BMI<= 45 45 < BMI <= 48 48 < BMI < = 50  BMI > 50   No Restrictions No [facility]  No [facility]  [facility] with exceptions Hospital Only OR Only       Are you taking any prescription medications for pain 3 or more times per week?   NO - No RN review required.    Do you have a history of malignant hyperthermia or adverse reaction to anesthesia?  No    (Females) Are you currently pregnant?   No     Have you been diagnosed or told you have pulmonary hypertension?   No    Do you have an LVAD?  No    Have you been told you have moderate to severe sleep apnea?  No    Have you been told you have COPD, asthma, or any other lung disease?  No    Do you have any heart conditions?  No     Have you ever had an organ transplant?   No    Have you ever had or are you awaiting a heart or lung transplant?   No    Have you had a stroke or transient ischemic attack (TIA aka \"mini stroke\" in the last 6 months?   No    Have you been diagnosed with or been told you have cirrhosis of the liver?   No    Are you currently on dialysis?   No    Do you need assistance transferring?   No    BMI: Estimated body mass index is 29.12 kg/m  as calculated from the following:    Height as of 2/6/24: 1.651 m (5' 5\").    Weight as of 2/6/24: 79.4 kg (175 lb).     Is patients BMI > 40 and scheduling location [facility]?  No    Do you take an injectable medication for weight loss or diabetes (excluding insulin)?  No    Do " you take the medication Naltrexone?  No    Do you take blood thinners?  No       Prep   Are you currently on dialysis or do you have chronic kidney disease?  No    Do you have a diagnosis of diabetes?  No    Do you have a diagnosis of cystic fibrosis (CF)?  No    On a regular basis do you go 3 -5 days between bowel movements?  No    BMI > 40?  No    Preferred Pharmacy:    [id number] - [address]  [address] [facility] [address]  Phone: [phone] Fax: [phone]    [facility]  [address] [facility] [address] [facility]  Phone: [phone] Fax: [phone]      Final Scheduling Details   Colonoscopy prep sent?  N/A    Procedure scheduled  Flexible sigmoidoscopy    Surgeon:  carson     Date of procedure:

## 2024-02-26 NOTE — TELEPHONE ENCOUNTER
LVM for patient to schedule Flex Sig. Please do screening questions and let either Stanley or myself know once the screening questions are done and we will call the patient back to put them on the schedule.

## 2024-02-26 NOTE — TELEPHONE ENCOUNTER
New referral ordered so questionnaire could be completed.    Updated patient via [employer] that Dr. Coyle and Dr. Herrera will attempt to complete the procedure together.

## 2024-03-10 PROCEDURE — 99000 SPECIMEN HANDLING OFFICE-LAB: CPT | Performed by: PATHOLOGY

## 2024-03-10 PROCEDURE — 87493 C DIFF AMPLIFIED PROBE: CPT | Mod: XU | Performed by: INTERNAL MEDICINE

## 2024-03-10 PROCEDURE — 87507 IADNA-DNA/RNA PROBE TQ 12-25: CPT | Performed by: INTERNAL MEDICINE

## 2024-03-10 PROCEDURE — 83993 ASSAY FOR CALPROTECTIN FECAL: CPT | Performed by: INTERNAL MEDICINE

## 2024-03-12 NOTE — TELEPHONE ENCOUNTER
Spot on hold for patient on 4/11 that was ok'd by Jeovany in the inbasket. If patient calls back please schedule her.

## 2024-03-12 NOTE — TELEPHONE ENCOUNTER
Final Scheduling Details     Procedure scheduled  Flexible sigmoidoscopy    Surgeon:  Leonides     Date of procedure:  04/11/2024     Pre-OP / PAC:   No - Not required for this site.    Location  [facility] - Per order.    Sedation   MAC/Deep Sedation - Per order.      Patient Reminders:   You will receive a call from a Nurse to review instructions and health history.  This assessment must be completed prior to your procedure.  Failure to complete the Nurse assessment may result in the procedure being cancelled.      On the day of your procedure, please designate an adult(s) who can drive you home stay with you for the next 24 hours. The medicines used in the exam will make you sleepy. You will not be able to drive.      You cannot take public transportation, ride share services, or non-medical taxi service without a responsible caregiver.  Medical transport services are allowed with the requirement that a responsible caregiver will receive you at your destination.  We require that drivers and caregivers are confirmed prior to your procedure.

## 2024-03-28 ENCOUNTER — DOCUMENTATION ONLY (OUTPATIENT)
Dept: GASTROENTEROLOGY | Facility: CLINIC | Age: 64
End: 2024-03-28
Payer: COMMERCIAL

## 2024-03-28 ENCOUNTER — TELEPHONE (OUTPATIENT)
Dept: GASTROENTEROLOGY | Facility: CLINIC | Age: 64
End: 2024-03-28
Payer: COMMERCIAL

## 2024-03-28 NOTE — PROGRESS NOTES
INCOMING FAX:    Facility:   [facility]  Phone: [phone]  Fax: [phone]    What:   Patient is due for a PA and [facility] is requesting records of most recent office note with in 6 months and current labs within 6 months    10/13/2023 office note with Pat Sim along with most recent labs faxed to [facility], Attn: Ainsley MELGOZA @ [phone] per fax.    Cleopatra Lozada LPN

## 2024-03-29 NOTE — TELEPHONE ENCOUNTER
Pre assessment completed for upcoming procedure.   (Please see previous telephone encounter notes for complete details)    Patient  returned call.       Procedure details:    Arrival time and facility location reviewed.    Pre op exam needed? N/A    Designated  policy reviewed. Instructed to have someone stay 24  hours post procedure.       Medication review:    Medications reviewed. Please see supporting documentation below. Holding recommendations discussed (if applicable).       Prep for procedure:     Procedure prep instructions reviewed.        Any additional information needed:  N/A      Patient  verbalized understanding and had no questions or concerns at this time.      Kyleigh Alicea RN  Endoscopy Procedure Pre Assessment RN  [phone] option 4

## 2024-03-29 NOTE — TELEPHONE ENCOUNTER
Attempted to contact patient in order to complete pre assessment questions.     No answer. Left message to return call to [phone] option 4    Callback required communication sent via [employer].      yKleigh Haynes RN  Endoscopy Procedure Pre Assessment RN

## 2024-03-29 NOTE — TELEPHONE ENCOUNTER
Pre visit planning completed.      Procedure details:    Patient scheduled for Flexible sigmoidoscopy  on 4/11/24.     Arrival time: 1315. Procedure time 1415    Facility location: [facility]; [address] [place] [address], [address] Floor, [place] [address]. Check in location: 5th Floor.    Sedation type: MAC    Pre op exam needed? N/A    Indication for procedure: crohns       Chart review:     Electronic implanted devices? No    Recent diagnosis of diverticulitis within the last 6 weeks? No    Diabetic? No      Medication review:    Anticoagulants? No    NSAIDS? No    Other medication HOLDING recommendations:  N/A      Prep for procedure:     Bowel prep recommendation: Enemas  Due to: standard bowel prep.    Prep instructions sent via [employer]         Kyleigh Haynes RN  Endoscopy Procedure Pre Assessment RN  [phone] option 4

## 2024-04-11 ENCOUNTER — ANESTHESIA (OUTPATIENT)
Dept: SURGERY | Facility: AMBULATORY SURGERY CENTER | Age: 64
End: 2024-04-11
Payer: COMMERCIAL

## 2024-04-11 ENCOUNTER — HOSPITAL ENCOUNTER (OUTPATIENT)
Facility: AMBULATORY SURGERY CENTER | Age: 64
Discharge: HOME | End: 2024-04-11
Attending: INTERNAL MEDICINE | Admitting: INTERNAL MEDICINE
Payer: COMMERCIAL

## 2024-04-11 ENCOUNTER — ANESTHESIA EVENT (OUTPATIENT)
Dept: SURGERY | Facility: AMBULATORY SURGERY CENTER | Age: 64
End: 2024-04-11
Payer: COMMERCIAL

## 2024-04-11 VITALS
OXYGEN SATURATION: 99 % | BODY MASS INDEX: 29.16 KG/M2 | DIASTOLIC BLOOD PRESSURE: 94 MMHG | RESPIRATION RATE: 16 BRPM | HEART RATE: 69 BPM | TEMPERATURE: 96.9 F | WEIGHT: 175 LBS | HEIGHT: 65 IN | SYSTOLIC BLOOD PRESSURE: 142 MMHG

## 2024-04-11 VITALS — HEART RATE: 75 BPM

## 2024-04-11 LAB — FLEXIBLE SIGMOIDOSCOPY: NORMAL

## 2024-04-11 PROCEDURE — 45330 DIAGNOSTIC SIGMOIDOSCOPY: CPT | Performed by: ANESTHESIOLOGY

## 2024-04-11 PROCEDURE — 45905 DILATION OF ANAL SPHINCTER: CPT | Mod: GC | Performed by: SURGERY

## 2024-04-11 PROCEDURE — 45330 DIAGNOSTIC SIGMOIDOSCOPY: CPT | Performed by: INTERNAL MEDICINE

## 2024-04-11 PROCEDURE — 45330 DIAGNOSTIC SIGMOIDOSCOPY: CPT | Performed by: NURSE ANESTHETIST, CERTIFIED REGISTERED

## 2024-04-11 RX ORDER — NALOXONE HYDROCHLORIDE 0.4 MG/ML
0.2 INJECTION, SOLUTION INTRAMUSCULAR; INTRAVENOUS; SUBCUTANEOUS
Status: DISCONTINUED | OUTPATIENT
Start: 2024-04-11 | End: 2024-04-12 | Stop reason: HOSPADM

## 2024-04-11 RX ORDER — TROSPIUM CHLORIDE ER 60 MG/1
60 CAPSULE ORAL
COMMUNITY
Start: 2024-02-25

## 2024-04-11 RX ORDER — ONDANSETRON 2 MG/ML
4 INJECTION INTRAMUSCULAR; INTRAVENOUS EVERY 6 HOURS PRN
Status: DISCONTINUED | OUTPATIENT
Start: 2024-04-11 | End: 2024-04-12 | Stop reason: HOSPADM

## 2024-04-11 RX ORDER — ONDANSETRON 4 MG/1
4 TABLET, ORALLY DISINTEGRATING ORAL EVERY 6 HOURS PRN
Status: DISCONTINUED | OUTPATIENT
Start: 2024-04-11 | End: 2024-04-12 | Stop reason: HOSPADM

## 2024-04-11 RX ORDER — PROPOFOL 10 MG/ML
INJECTION, EMULSION INTRAVENOUS CONTINUOUS PRN
Status: DISCONTINUED | OUTPATIENT
Start: 2024-04-11 | End: 2024-04-11

## 2024-04-11 RX ORDER — LIDOCAINE 40 MG/G
CREAM TOPICAL
Status: DISCONTINUED | OUTPATIENT
Start: 2024-04-11 | End: 2024-04-11 | Stop reason: HOSPADM

## 2024-04-11 RX ORDER — NALOXONE HYDROCHLORIDE 0.4 MG/ML
0.4 INJECTION, SOLUTION INTRAMUSCULAR; INTRAVENOUS; SUBCUTANEOUS
Status: DISCONTINUED | OUTPATIENT
Start: 2024-04-11 | End: 2024-04-12 | Stop reason: HOSPADM

## 2024-04-11 RX ORDER — PROPOFOL 10 MG/ML
INJECTION, EMULSION INTRAVENOUS PRN
Status: DISCONTINUED | OUTPATIENT
Start: 2024-04-11 | End: 2024-04-11

## 2024-04-11 RX ORDER — ONDANSETRON 2 MG/ML
4 INJECTION INTRAMUSCULAR; INTRAVENOUS
Status: DISCONTINUED | OUTPATIENT
Start: 2024-04-11 | End: 2024-04-11 | Stop reason: HOSPADM

## 2024-04-11 RX ORDER — PROCHLORPERAZINE MALEATE 10 MG
10 TABLET ORAL EVERY 6 HOURS PRN
Status: DISCONTINUED | OUTPATIENT
Start: 2024-04-11 | End: 2024-04-12 | Stop reason: HOSPADM

## 2024-04-11 RX ORDER — LIDOCAINE HYDROCHLORIDE 20 MG/ML
INJECTION, SOLUTION INFILTRATION; PERINEURAL PRN
Status: DISCONTINUED | OUTPATIENT
Start: 2024-04-11 | End: 2024-04-11

## 2024-04-11 RX ORDER — FLUMAZENIL 0.1 MG/ML
0.2 INJECTION, SOLUTION INTRAVENOUS
Status: DISCONTINUED | OUTPATIENT
Start: 2024-04-11 | End: 2024-04-12 | Stop reason: HOSPADM

## 2024-04-11 RX ORDER — SODIUM CHLORIDE, SODIUM LACTATE, POTASSIUM CHLORIDE, CALCIUM CHLORIDE 600; 310; 30; 20 MG/100ML; MG/100ML; MG/100ML; MG/100ML
INJECTION, SOLUTION INTRAVENOUS CONTINUOUS
Status: DISCONTINUED | OUTPATIENT
Start: 2024-04-11 | End: 2024-04-11 | Stop reason: HOSPADM

## 2024-04-11 RX ADMIN — PROPOFOL 200 MCG/KG/MIN: 10 INJECTION, EMULSION INTRAVENOUS at 16:00

## 2024-04-11 RX ADMIN — SODIUM CHLORIDE, SODIUM LACTATE, POTASSIUM CHLORIDE, CALCIUM CHLORIDE: 600; 310; 30; 20 INJECTION, SOLUTION INTRAVENOUS at 15:53

## 2024-04-11 RX ADMIN — PROPOFOL 20 MG: 10 INJECTION, EMULSION INTRAVENOUS at 16:02

## 2024-04-11 RX ADMIN — PROPOFOL 50 MG: 10 INJECTION, EMULSION INTRAVENOUS at 16:00

## 2024-04-11 RX ADMIN — LIDOCAINE HYDROCHLORIDE 60 MG: 20 INJECTION, SOLUTION INFILTRATION; PERINEURAL at 16:00

## 2024-04-11 NOTE — OP NOTE
"[facility] Colorectal Surgery Operative Report  April 11, 2024     PREOPERATIVE DIAGNOSIS:  1. Crohn's colitis  2. History of anal cancer s/p chemoradiation in remission     POSTOPERATIVE DIAGNOSIS:   1. Anal cancer     PROCEDURE:  1. Evaluation under anesthesia.  2. Anal dilation     ANESTHESIA: MAC     SURGEON: Kunal Herrera M.D.     INDICATIONS FOR PROCEDURE  Ms. Aldrich is a 62 yo F with hx of Crohn's colitis s/p diversion, along with anal cancer s/p chemoradiation in remission. Due to severe anal stricturing, she was unable to have a surveillance flexible sigmoidoscopy, thus I recommended evaluation under anesthesia. I thoroughly discussed the risks, benefits, and alternatives of operative treatment with the patient and she agreed to proceed.      General risks related to anorectal dilation were reviewed with the patient.      OPERATIVE PROCEDURE: After obtaining informed consent, the patient was brought to the procedure room and placed in the left lateral position. After a \"time-out\" was performed, digital rectal exam and anoscopy were performed.  There was no evidence of any residual cancer. A scar was seen most prominently at the left anterior position. The anal canal was moderately strictured, and was gently dilated with a combination of Hegar dilators easily accommodating 12 mm size. At this time, Dr. Coyle performed a flexible sigmoidoscopy. In brief, the entirety of the diverted colon and rectum was severely inflamed. The patient tolerated the procedure well.     COMPLICATIONS: none, immediately.     ESTIMATED BLOOD LOSS: 0 mL.     SPECIMEN(S): None.     DRAINS/TUBES: None     OPERATIVE FINDINGS: Severely inflamed diverted colon and rectum, no evidence of anal cancer.     DISPOSITION:  PACU.     Plan: Follow up in clinic to discuss APR, VRAM flap.     Kunal Herrera MD  Division of Colon and Rectal Surgery  [facility]  [phone]  "

## 2024-04-11 NOTE — ANESTHESIA PREPROCEDURE EVALUATION
"Anesthesia Pre-Procedure Evaluation    Patient: Nicol Aldrich   MRN: [id number] : 1960        Procedure : Procedure(s):  Sigmoidoscopy flexible          Past Medical History:   Diagnosis Date    Squamous cell carcinoma of skin, unspecified       Past Surgical History:   Procedure Laterality Date     SECTION      COLONOSCOPY N/A 2024    Procedure: Colonoscopy with biopsies;  Surgeon: Jordan Coyle MD;  Location: [facility] OR    EXAM UNDER ANESTHESIA ANUS N/A 2024    Procedure: EXAM UNDER ANESTHESIA, ANUS, ANAL DILATION;  Surgeon: Kunal Herrera MD;  Location: [facility] OR    ostomy creation        Allergies   Allergen Reactions    Penicillins Rash     On 07/10/18, pt states that she experienced a rash when she had this \"a while ago.\" This was not a reaction from childhood.   On 07/10/18, pt states that she experienced a rash when she had this \"a while ago.\" This was not a reaction from childhood.         Social History     Tobacco Use    Smoking status: Never    Smokeless tobacco: Never   Substance Use Topics    Alcohol use: Yes     Alcohol/week: 2.0 standard drinks of alcohol     Types: 2 Glasses of wine per week      Wt Readings from Last 1 Encounters:   24 79.4 kg (175 lb)        Anesthesia Evaluation            ROS/MED HX  ENT/Pulmonary:       Neurologic:       Cardiovascular:       METS/Exercise Tolerance:     Hematologic:       Musculoskeletal:       GI/Hepatic:     (+)       Inflammatory bowel disease,             Renal/Genitourinary:       Endo:       Psychiatric/Substance Use:       Infectious Disease:       Malignancy:       Other:            Physical Exam    Airway        Mallampati: II       Respiratory Devices and Support         Dental       (+) Minor Abnormalities - some fillings, tiny chips      Cardiovascular          Rhythm and rate: regular     Pulmonary                   OUTSIDE LABS:  CBC:   Lab Results   Component Value Date    WBC 5.6 2023    WBC 10.0 " "06/13/2023    HGB 12.8 08/17/2023    HGB 12.5 06/13/2023    HCT 38.6 08/17/2023    HCT 38.5 06/13/2023     08/17/2023     06/13/2023     BMP:   Lab Results   Component Value Date     06/13/2023    POTASSIUM 4.4 06/13/2023    CHLORIDE 104 06/13/2023    CO2 28 06/13/2023    BUN 13.1 06/13/2023    CR 0.97 (H) 06/13/2023    GLC 96 06/13/2023     COAGS: No results found for: \"PTT\", \"INR\", \"FIBR\"  POC: No results found for: \"BGM\", \"HCG\", \"HCGS\"  HEPATIC:   Lab Results   Component Value Date    ALBUMIN 4.2 08/17/2023    PROTTOTAL 6.9 08/17/2023    ALT 15 08/17/2023    AST 31 08/17/2023    ALKPHOS 72 08/17/2023    BILITOTAL 0.3 08/17/2023     OTHER:   Lab Results   Component Value Date    MILO 9.5 06/13/2023    SED 11 08/17/2023       Anesthesia Plan    ASA Status:  2    NPO Status:  NPO Appropriate    Anesthesia Type: MAC.     - Reason for MAC: immobility needed              Consents    Anesthesia Plan(s) and associated risks, benefits, and realistic alternatives discussed. Questions answered and patient/representative(s) expressed understanding.     - Discussed:     - Discussed with:  Patient            Postoperative Care            Comments:               Eladio Avalos MD    I have reviewed the pertinent notes and labs in the chart from the past 30 days and (re)examined the patient.  Any updates or changes from those notes are reflected in this note.              # Overweight: Estimated body mass index is 29.12 kg/m  as calculated from the following:    Height as of this encounter: 1.651 m (5' 5\").    Weight as of this encounter: 79.4 kg (175 lb).      "

## 2024-04-11 NOTE — ANESTHESIA CARE TRANSFER NOTE
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    Anesthesia Type:   MAC     Note:    Oropharynx: spontaneously breathing  Level of Consciousness: awake  Oxygen Supplementation: room air    Independent Airway: airway patency satisfactory and stable  Dentition: dentition unchanged  Vital Signs Stable: post-procedure vital signs reviewed and stable  Report to RN Given: handoff report given  Patient transferred to: Phase II    Handoff Report: Identifed the Patient, Identified the Reponsible Provider, Reviewed the pertinent medical history, Discussed the surgical course, Reviewed Intra-OP anesthesia mangement and issues during anesthesia, Set expectations for post-procedure period and Allowed opportunity for questions and acknowledgement of understanding      Vitals:  Vitals Value Taken Time   /84    Temp 96.7    Pulse 72    Resp     SpO2 100        Electronically Signed By: ANUP Madsen CRNA  April 11, 2024  4:29 PM

## 2024-04-12 NOTE — ANESTHESIA POSTPROCEDURE EVALUATION
Patient: Nicol Aldrich    Procedure: Procedure(s):  Sigmoidoscopy flexible, With Hegar Dilation       Anesthesia Type:  MAC    Note:  Disposition: Outpatient   Postop Pain Control: Uneventful            Sign Out: Well controlled pain   PONV: No   Neuro/Psych: Uneventful            Sign Out: Acceptable/Baseline neuro status   Airway/Respiratory: Uneventful            Sign Out: Acceptable/Baseline resp. status   CV/Hemodynamics: Uneventful            Sign Out: Acceptable CV status; No obvious hypovolemia; No obvious fluid overload   Other NRE: NONE   DID A NON-ROUTINE EVENT OCCUR? No       Last vitals:  Vitals Value Taken Time   /94 04/11/24 1645   Temp 36.1  C (96.9  F) 04/11/24 1645   Pulse 69 04/11/24 1645   Resp 16 04/11/24 1645   SpO2 99 % 04/11/24 1645       Electronically Signed By: Kelsie Cruz MD  April 12, 2024  7:57 AM

## 2024-04-12 NOTE — PROGRESS NOTES
"Colon and Rectal Surgery Clinic Note      The patient has been notified of following:     \"This video visit will be conducted via a call between you and your physician/provider. We have found that certain health care needs can be provided without the need for an in-person physical exam.  This service lets us provide the care you need with a video conversation.  If a prescription is necessary we can send it directly to your pharmacy.  If lab work is needed we can place an order for that and you can then stop by our lab to have the test done at a later time.    Video visits are billed at different rates depending on your insurance coverage.  Please reach out to your insurance provider with any questions.    If during the course of the call the physician/provider feels a video visit is not appropriate, you will not be charged for this service.\"    Patient has given verbal consent for Video visit? Yes    Video Start Time:  3:45 PM      RE: Nicol Aldrich.  : 1960.  RYLAND: 2024.     Reason for visit: Crohn's, perianal crohn's - Patient of Dr. Leonides DAVIS (23, 24): Nicol is a 62 year old female who was diagnosed with Crohn disease at age 31, with colonic involvement and with a perianal fistula (had a fistulotomy but no records) She was treated initially with mesalamine. 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 On 2014 she underwent transanal excision of the lesion with subsequent chemotherapy and radiation therapy. She started methotrexate postoperatively and in  she started on Entyvio. 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 Recently started on infliximab, but having stool output from EC fistulas. 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     History of C. Diff. She was C. Diff + last as of , treated " with PO Vancomycin by Dr. Coyle    Transanal excision (10/2014) Dr. Rivas [facility]  Description of Procedure:  The patient was taken to the operating room and placed in the supine position. General anesthesia was induced. Endotracheal tube was placed. She was rolled to the prone jackknife position. Buttocks taped laterally. The anal area was prepped with Betadine Prep and sterilely draped.   Pause for identification of patient and procedure carried out.   Anoscopy is done. We found the lesion just to the left of posterior midline. This does run over about 2 to 2.5 cm in length. An elliptical incision is now made, working from outside the dentated line proximally. The anal sphincters are spared. We now work deeper and work our way around this lesion up to the upper end and the distal end of the specimen was marked with a Vicryl suture. Beyond the upper end of resection, a 2-0 Vicryl suture was placed. The specimen was removed and sent to Pathology. After hemostasis was obtained with cautery, the wound was closed with running locked 2-0 Vicryl. The frozen section revealed on the anterior margin that would be further to the left we were very close to this margin and felt we should take additional tissue. We took out the sutures and removed about another 0.5 cm of tissue. Again hemostasis was obtained with cautery. The wound was again closed with running locked 2-0 Vicryl. (Exparel) long acting local anesthetic is used. We used 20 mL of this. Fibrillar collagen hemostatic agent applied and additional 4 x 4s. She tolerated this well.   Patient transferred out of operating room in good condition. All surgical medications, procedures and x-rays performed in surgery-on the order of the operating physicians. Micaela Buenrostro PA-C assisted in the above procedure. They provided assistance with pre-operative positioning, prepping, and draping of the patient. The assistant provided vital operative assistance with retraction  using instruments thus providing the necessary exposure and visualization for the case, manipulation of tissues to achieve hemostasis, suction for visualization and assisted in closure of the wound. Post-operatively they assisted in transfer of the patient off the operative table and transition to the PACU physicians.    DIAGNOSIS:   DISTAL RECTUM AND ANUS, EXCISION WITH ANTERIOR MARGIN RE-EXCISION        --HISTOLOGIC DIAGNOSIS:  INVASIVE MODERATELY DIFFERENTIATED SQUAMOUS CELL CARCINOMA WITH EXTENSIVE SQUAMOUS CELL CARCINOMA IN SITU        --DEPTH OF INVASION: TUMOR INVOLVES THE SUBMUCOSA        --TUMOR SIZE:  1.5 CM (LINEAR EXTENT)        --MARGINS:             --SQUAMOUS CELL CARCINOMA IN SITU INVOLVES THE POSTERIOR MARGIN             --INVASIVE SQUAMOUS CELL CARCINOMA IS 0.1 CM FROM THE POSTERIOR MARGIN        --NO INVASIVE CARCINOMA IDENTIFIED AT THE MARGINS        --LYMPH NODES: NO CARCINOMA IDENTIFIED IN 1 LYMPH NODE WITHIN THE          MAIN SPECIMEN        --VASCULAR INVASION: NOT IDENTIFIED     [id number]   STAGING NOTE:  With the available information, this lesion represents a   Stage I tumor (pT1, N0, MX).     PERIANAL EXAM WITH BIOPSIES with Dr. Kumar 8/2/18        Laparoscopic LOOP COLOSTOMY  by Dr. Keys- [facility] 5/12/21  This case was substantially more difficult than usual because of significant effort and difficulty mobilizing and identifying anatomical structures due to altered surgical field secondary to distorted anatomy and inflammation.    Nicol Aldrich was taken to the operating room, placed on a table in a supine position, underwent general endotracheal anesthesia, and was then repositioned in a synchronous position with Rubin napier. A urinary catheter was inserted and sequential compression devices were placed to the bilateral lower extremities. Preoperative antibiotics and subcutaneous heparin were administered. The patient was then prepped and draped in a normal sterile  fashion followed by a pause identifying the correct patient, date, site, and procedure.    The abdominal cavity was entered under direct visualization utilizing a Riky technique in the supraumbilical midline. Three additional 5mm trocars were placed under direct visualization.     After appropriate positioning the sigmoid colon was identified and mobilized. The sigmoid itself was quite abnormal with signs of prior inflammation consistent with Crohn's disease. She also had extensive diverticulosis involving the sigmoid colon. There were dense adhesions between the sigmoid and the retroperitoneum/lateral abdominal wall. We did take down the majority of these adhesions and then took down the attachments between the descending colon and the abdominal wall and retroperitoneum. We were able to identify and protect the left ureter. We continued this dissection up to the splenic flexure. At this point we did make our stoma site in the left upper quadrant marked 1. We cored out the skin and soft tissue down to the fascia and then opened the fascia in a cruciate manner. We spread the rectus muscle to expose the posterior fashion peritoneum which was also opened in a cruciate manner. We attempted to pull the mobilized colon out of this hole laparoscopically, but it was clear that we did not have enough mobilization. So we placed an Daniel wound protector at this site and reinsufflated the abdomen. I then used the LigaSure device to fully mobilize the splenic flexure from all of its attachments. We did have to take the omentum off of the transverse colon and off of the splenic flexure and proximal descending colon to get full mobilization. We then tried again to pull the colon through the planned site, and this time it did reach with minimal tension. We did use a red rubber catheter to act as a stoma bridge to hold it up without significant tension. We also looked again laparoscopically to confirm correct proximal and distal  orientation.     We desufflated the abdomen and proceeded to close all the 5 port as well as the Curran port. The Curran was closed with interrupted 1. Vicryl sutures. All skin wounds were closed with running 3-0 Monocryl in a subcuticular fashion followed by Dermabond. We then matured the stoma with multiple interrupted 3 0 Vicryl suture circumferentially. We did leave the red rubber catheter in place to act as a stoma bridge and secured this to the skin with nylon sutures.    The abdomen was then cleaned and dried and dressings were applied to all the incisions.     Next I put her legs up and looked at the perineum. She has significant radiation damage to the perianal skin. She does have a patulous anus with exposed mucosa circumferentially. There is an essentially divot or crater like abnormality on the left anterior region of the anus. There is no mucosal break in no skin breaks year consistent with a fistula. There is no open wound and the mucosa itself looks normal. I was not able to probe any areas to suggest that there was any underlying tracking. It just appears that this is likely the site of her prior cancer and with regression of the cancer at left some functional and structural abnormality in this region. There was no other concern on the perineum for any undrained fluid collections.    Nicol Aldrich tolerated the procedure well. There were no complications during the case. The patient was transferred to the postanesthesia care unit in stable condition.  Kera Keys M.D.     Colonoscopy 11/3/22  Findings:        The perianal exam findings included some scarring at the anal area and absence of anal tone. The periostomal skin was without rashes or abscesses. The colostomy was first accessed: A continuous area of severly inflammed        and friable nonbleeding mucosa with extensive large cratered ulcers was present in the transverse colon, in the ascending colon and in the cecum. Biopsies were  "taken with a cold forceps for histology R/O CMV, Crohn's disease, ischemia.        One large ulcer was present in the distal rectum. Biopsies were taken with a cold forceps for histology. A diffuse area of severely friable mucosa with contact bleeding was found in the rectum and in the recto-sigmoid colon. Biopsies were taken        with a cold forceps for histology R/O diversion colitis. A 2 mm likely fistula was found at 18 cm proximal to the anus.     Impression:     - Abnormal perianal exam.                          - Colostomy accessed: Severly inflammed mucosa with large ulcers. Biopsied R/O CMV, Crohn's disease, ischemia.                          - Ulcer in distal rectum. Biopsied.                          - Friability with contact bleeding in the rectum and in the recto-sigmoid colon. Biopsied R/O diversion colitis.                          - A possible fistula opening was seen at 18 cm from the anus.         Interval History:  Dr Coyle attempted a colonoscopy on 1/2/24 but was unable to pass the anus due to stricture. She is here today to discuss anal dilation under sedation.    Flexible sigmoidoscopy with Dr. Coyle and myself on 4/11/24  OPERATIVE PROCEDURE: After obtaining informed consent, the patient was brought to the procedure room and placed in the left lateral position. After a \"time-out\" was performed, digital rectal exam and anoscopy were performed.  There was no evidence of any residual cancer. A scar was seen most prominently at the left anterior position. The anal canal was moderately strictured, and was gently dilated with a combination of Hegar dilators easily accommodating 12 mm size. At this time, Dr. Coyle performed a flexible sigmoidoscopy. In brief, the entirety of the diverted colon and rectum was severely inflamed. The patient tolerated the procedure well.     OPERATIVE FINDINGS: Severely inflamed diverted colon and rectum, no evidence of anal cancer.    Medical history:  Anal " "cancer  Pyoderma Gangrenosum  Crohn disease    Surgical history:  Loop colostomy, lap 2019  C sections x2.    Family history:  Noncontributory    Medications:  Current Outpatient Medications   Medication Sig Dispense Refill    inFLIXimab Inject 4 mg into the vein every 30 days Last infusion 12/30      Ostomy Supplies MISC 1 each daily 1 each 11    trospium (SANCTURA XR) 60 MG CP24 24 hr capsule Take 60 mg by mouth every morning (before breakfast)         Allergies:  Allergies   Allergen Reactions    Penicillins Rash     On 07/10/18, pt states that she experienced a rash when she had this \"a while ago.\" This was not a reaction from childhood.   On 07/10/18, pt states that she experienced a rash when she had this \"a while ago.\" This was not a reaction from childhood.          Social history:   Social History     Tobacco Use    Smoking status: Never    Smokeless tobacco: Never   Substance Use Topics    Alcohol use: Yes     Alcohol/week: 2.0 standard drinks of alcohol     Types: 2 Glasses of wine per week     Marital status: .    ROS:  A complete review of systems was performed with the patient and all systems negative except as per HPI.      Laboratory values reviewed:  Recent Labs   Lab Test 06/13/23  1554   WBC 10.0   HGB 12.5      CR 0.97*   ALBUMIN 4.2   BILITOTAL 0.2   ALKPHOS 81   ALT 14   AST 60*       Imaging personally reviewed by me:     MR Pelvis 11/5/22  IMPRESSION:   1. Increased fluid signal enhancement along the posterior left aspect of the anus near the 5 o'clock position which may represent a small fistula.   2. No abscess or large fistulous tract identified.   3. Chronic inflammatory changes of the sigmoid colon and rectum.    CT A/P 1/13/23  IMPRESSION:   1. Interval resolution of previous pancolitis.   2. Peristomal hernia containing multiple small bowel loops from the diverting colostomy in the left anterior mid abdomen.  No obstruction or strangulation. Fatty hernia that is ventral " above the umbilicus also redemonstrated.   3. No abnormal fluid collection or abscess formation.   4. The left posterior perianal fistulous tract at approximate 5 o'clock position without active inflammation or abscess formation redemonstrated.    ASSESSMENT  1. Crohn disease with distal colonic involvement - now infliximab and entecort  2. History of squamous cell carcinoma of the anus, previous radiation and chemotherapy in 2014  3. Fecal incontinence, recurrent perianal skin infection  4. APR in 2021  5. Pyoderma Gangrenosum  6. Colostomy status    PLAN  1. Given our inability to monitor the colon, rectum and anus downstream of the colostomy, I recommend proceeding with an APR/VRAM with colostomy revision. This is a recommendation of both myself and Dr. Coyle due to the severe nature of the inflammation distal to the loop colostomy which hinders any reasonable surveillance.     Surgery Planned: APR with VRAM, consultation with Dr. Mccarthy for consideration give prior radiation for anal cancer.  Time needed: 360 minutes    Preoperative labs: CBC, CMP, PTT/INR, Prealbumin.  Mechanical bowel prep with oral antibiotics.  Michael new colostomy in left upper abdomen for anticipated closure of current site   Hold these medications prior to surgery: infliximab 4 weeks prior to surgery  Advised patient to begin protein shakes: Premier or Pure protein given high protein, low carb ratio for pre-operative rehab.    Risks, benefits, and alternatives of operative treatment were thoroughly discussed with the patient, she understands these well and agrees to proceed. Time spent: 60 minutes, >50% spent in discussing, counseling and coordinating care.    Video-Visit Details    Type of service:  Video Visit  Video End Time (time video stopped): 4:15 PM  Originating Location (pt. Location): Home  Distant Location (provider location):  [facility] [place]   Mode of Communication:  Video  Conference via nicholas Herrera M.D    Division of Colon and Rectal Surgery  [facility]    Referring Provider:  Aftab Wall MD  [address]  [address]  [place] [address]     Primary Care Provider:  Janet Noe

## 2024-04-18 ENCOUNTER — VIRTUAL VISIT (OUTPATIENT)
Dept: SURGERY | Facility: CLINIC | Age: 64
End: 2024-04-18
Payer: COMMERCIAL

## 2024-04-18 VITALS — HEIGHT: 65 IN | BODY MASS INDEX: 29.16 KG/M2 | WEIGHT: 175 LBS

## 2024-04-18 DIAGNOSIS — K50.813 CROHN'S DISEASE OF BOTH SMALL AND LARGE INTESTINE WITH FISTULA (H): Primary | ICD-10-CM

## 2024-04-18 PROCEDURE — 99024 POSTOP FOLLOW-UP VISIT: CPT | Mod: 95 | Performed by: SURGERY

## 2024-04-18 ASSESSMENT — PAIN SCALES - GENERAL: PAINLEVEL: NO PAIN (0)

## 2024-04-18 NOTE — NURSING NOTE
"Chief Complaint   Patient presents with    Consult     Discuss Surgery       Vitals:    04/18/24 1457   Weight: 175 lb   Height: 5' 5\"       Body mass index is 29.12 kg/m .    Gerri Padilla CMA    "

## 2024-04-19 ENCOUNTER — TELEPHONE (OUTPATIENT)
Dept: PLASTIC SURGERY | Facility: CLINIC | Age: 64
End: 2024-04-19
Payer: COMMERCIAL

## 2024-04-19 NOTE — TELEPHONE ENCOUNTER
Patient confirmed scheduled appointment:  Date: 5/22/24  Time: 11:30 am  Visit type: New Plastic Surgery  Provider: Dr. Mccarthy  Location: [facility]  Testing/imaging: n/a  Additional notes: Rescheduled 8/23 appt in [place], ok per Azul FELIZ RN

## 2024-04-22 NOTE — TELEPHONE ENCOUNTER
FUTURE VISIT INFORMATION      FUTURE VISIT INFORMATION:  Date: 5/22/24  Time: 1:30pm  Location: [facility]  REFERRAL INFORMATION:  Referring provider:  Kunal Herrera MD   Referring providers clinic:  [facility] COLON & RECTAL SURGERY   Reason for visit/diagnosis  VRAM flap     RECORDS REQUESTED FROM:       Clinic name Comments Records Status Imaging Status   [facility] COLON & RECTAL SURGERY  Virtual visit 4/18/24 EPIC

## 2024-04-23 ENCOUNTER — MYC MEDICAL ADVICE (OUTPATIENT)
Dept: GASTROENTEROLOGY | Facility: CLINIC | Age: 64
End: 2024-04-23
Payer: COMMERCIAL

## 2024-04-23 ENCOUNTER — TELEPHONE (OUTPATIENT)
Dept: GASTROENTEROLOGY | Facility: CLINIC | Age: 64
End: 2024-04-23
Payer: COMMERCIAL

## 2024-04-23 NOTE — TELEPHONE ENCOUNTER
Patient was scheduled to receive Infliximab infusion today, 4/23.    Received call from [facility] infusion RN that the infusion has been postponed due to the patient reporting a fever.     Pending symptoms, the plan will be to infuse next week.         Attempted to contact the patient to discuss symptoms, however no answer. Left detailed message requesting callback, also sent [employer] message.

## 2024-04-24 NOTE — TELEPHONE ENCOUNTER
"Contacted patient to discuss symptoms.     Patient states symptoms started late Sunday/early Monday morning.     Reports fever 101.5, chills, body aches, and headache.     Tylenol was effective for the fever and headache.     Reports symptoms seem to be improved today, body aches have resolved. Fever \"broke\" last evening.    Denies any shortness of breath. Also denies any GI symptoms.     Just fatigued and poor appetite today.     Reports she is getting plenty of rest and hydrating.     Hoping to reschedule infusion to Monday 4/29, waiting to hear back from infusion RN.     Patient will keep GI team updated if symptoms worsen or infusion is delayed further than 1 week.  "

## 2024-04-30 ENCOUNTER — OFFICE VISIT (OUTPATIENT)
Dept: GASTROENTEROLOGY | Facility: CLINIC | Age: 64
End: 2024-04-30
Attending: INTERNAL MEDICINE
Payer: COMMERCIAL

## 2024-04-30 ENCOUNTER — LAB (OUTPATIENT)
Dept: LAB | Facility: CLINIC | Age: 64
End: 2024-04-30
Payer: COMMERCIAL

## 2024-04-30 ENCOUNTER — MEDICAL CORRESPONDENCE (OUTPATIENT)
Dept: HEALTH INFORMATION MANAGEMENT | Facility: CLINIC | Age: 64
End: 2024-04-30

## 2024-04-30 ENCOUNTER — PATIENT OUTREACH (OUTPATIENT)
Dept: GASTROENTEROLOGY | Facility: CLINIC | Age: 64
End: 2024-04-30

## 2024-04-30 VITALS
WEIGHT: 181 LBS | HEART RATE: 77 BPM | DIASTOLIC BLOOD PRESSURE: 76 MMHG | OXYGEN SATURATION: 99 % | BODY MASS INDEX: 30.16 KG/M2 | SYSTOLIC BLOOD PRESSURE: 119 MMHG | HEIGHT: 65 IN

## 2024-04-30 DIAGNOSIS — K50.819 CROHN'S DISEASE OF SMALL AND LARGE INTESTINES WITH COMPLICATION (H): ICD-10-CM

## 2024-04-30 DIAGNOSIS — Z01.818 PRE-OPERATIVE EXAMINATION: ICD-10-CM

## 2024-04-30 DIAGNOSIS — Z79.899 LONG-TERM USE OF HIGH-RISK MEDICATION: Primary | ICD-10-CM

## 2024-04-30 DIAGNOSIS — K50.813 CROHN'S DISEASE OF BOTH SMALL AND LARGE INTESTINE WITH FISTULA (H): ICD-10-CM

## 2024-04-30 DIAGNOSIS — Z79.899 LONG-TERM USE OF HIGH-RISK MEDICATION: ICD-10-CM

## 2024-04-30 DIAGNOSIS — K50.819 CROHN'S DISEASE OF SMALL AND LARGE INTESTINES WITH COMPLICATION (H): Primary | ICD-10-CM

## 2024-04-30 DIAGNOSIS — A49.8 RECURRENT CLOSTRIDIOIDES DIFFICILE INFECTION: ICD-10-CM

## 2024-04-30 LAB
ALBUMIN SERPL BCG-MCNC: 4.4 G/DL (ref 3.5–5.2)
ALP SERPL-CCNC: 70 U/L (ref 40–150)
ALT SERPL W P-5'-P-CCNC: 9 U/L (ref 0–50)
ANION GAP SERPL CALCULATED.3IONS-SCNC: 8 MMOL/L (ref 7–15)
AST SERPL W P-5'-P-CCNC: 22 U/L (ref 0–45)
BASOPHILS # BLD AUTO: 0 10E3/UL (ref 0–0.2)
BASOPHILS NFR BLD AUTO: 0 %
BILIRUB SERPL-MCNC: 0.3 MG/DL
BUN SERPL-MCNC: 14.4 MG/DL (ref 8–23)
CALCIUM SERPL-MCNC: 9.7 MG/DL (ref 8.8–10.2)
CHLORIDE SERPL-SCNC: 104 MMOL/L (ref 98–107)
CREAT SERPL-MCNC: 0.8 MG/DL (ref 0.51–0.95)
CRP SERPL-MCNC: 7.31 MG/L
DEPRECATED HCO3 PLAS-SCNC: 27 MMOL/L (ref 22–29)
EGFRCR SERPLBLD CKD-EPI 2021: 83 ML/MIN/1.73M2
EOSINOPHIL # BLD AUTO: 0.1 10E3/UL (ref 0–0.7)
EOSINOPHIL NFR BLD AUTO: 2 %
ERYTHROCYTE [DISTWIDTH] IN BLOOD BY AUTOMATED COUNT: 13.8 % (ref 10–15)
ERYTHROCYTE [SEDIMENTATION RATE] IN BLOOD BY WESTERGREN METHOD: 21 MM/HR (ref 0–30)
GLUCOSE SERPL-MCNC: 113 MG/DL (ref 70–99)
HCT VFR BLD AUTO: 39.6 % (ref 35–47)
HGB BLD-MCNC: 13.3 G/DL (ref 11.7–15.7)
IMM GRANULOCYTES # BLD: 0 10E3/UL
IMM GRANULOCYTES NFR BLD: 0 %
INR PPP: 1.06 (ref 0.85–1.15)
LYMPHOCYTES # BLD AUTO: 2.2 10E3/UL (ref 0.8–5.3)
LYMPHOCYTES NFR BLD AUTO: 26 %
MCH RBC QN AUTO: 29 PG (ref 26.5–33)
MCHC RBC AUTO-ENTMCNC: 33.6 G/DL (ref 31.5–36.5)
MCV RBC AUTO: 87 FL (ref 78–100)
MONOCYTES # BLD AUTO: 0.5 10E3/UL (ref 0–1.3)
MONOCYTES NFR BLD AUTO: 6 %
NEUTROPHILS # BLD AUTO: 5.7 10E3/UL (ref 1.6–8.3)
NEUTROPHILS NFR BLD AUTO: 66 %
NRBC # BLD AUTO: 0 10E3/UL
NRBC BLD AUTO-RTO: 0 /100
PLATELET # BLD AUTO: 394 10E3/UL (ref 150–450)
POTASSIUM SERPL-SCNC: 4.5 MMOL/L (ref 3.4–5.3)
PROT SERPL-MCNC: 7.7 G/DL (ref 6.4–8.3)
RBC # BLD AUTO: 4.58 10E6/UL (ref 3.8–5.2)
SODIUM SERPL-SCNC: 139 MMOL/L (ref 135–145)
WBC # BLD AUTO: 8.6 10E3/UL (ref 4–11)

## 2024-04-30 PROCEDURE — 36415 COLL VENOUS BLD VENIPUNCTURE: CPT | Performed by: PATHOLOGY

## 2024-04-30 PROCEDURE — 99214 OFFICE O/P EST MOD 30 MIN: CPT | Mod: GC | Performed by: STUDENT IN AN ORGANIZED HEALTH CARE EDUCATION/TRAINING PROGRAM

## 2024-04-30 PROCEDURE — 86140 C-REACTIVE PROTEIN: CPT | Performed by: PATHOLOGY

## 2024-04-30 PROCEDURE — 85025 COMPLETE CBC W/AUTO DIFF WBC: CPT | Performed by: PATHOLOGY

## 2024-04-30 PROCEDURE — 85610 PROTHROMBIN TIME: CPT | Performed by: PATHOLOGY

## 2024-04-30 PROCEDURE — 99000 SPECIMEN HANDLING OFFICE-LAB: CPT | Performed by: PATHOLOGY

## 2024-04-30 PROCEDURE — 80053 COMPREHEN METABOLIC PANEL: CPT | Performed by: PATHOLOGY

## 2024-04-30 PROCEDURE — 85652 RBC SED RATE AUTOMATED: CPT | Performed by: PATHOLOGY

## 2024-04-30 PROCEDURE — 84134 ASSAY OF PREALBUMIN: CPT | Performed by: INTERNAL MEDICINE

## 2024-04-30 PROCEDURE — 86481 TB AG RESPONSE T-CELL SUSP: CPT | Performed by: INTERNAL MEDICINE

## 2024-04-30 ASSESSMENT — PAIN SCALES - GENERAL: PAINLEVEL: NO PAIN (0)

## 2024-04-30 NOTE — PROGRESS NOTES
[facility]-  IBD Clinic:     Follow up for complicated Crohn's disease, EC fistulas, PG, hx of CDI    Last visit: 9/19/23  Date of visit: 04/30/24    Referring provider: Jordan Coyle    Follow up: 2nd opinion - penetrating crohn's with peristomal fistulas     Referring provider: Mae     ASSESSMENT AND PLAN:     #. Penetrating ileocolonic crohn's  Diagnosed at approximately age 31. Significant perianal disease and peristomal enterocutaneous fistulas at one time though these have improved since initiating IFX. Has been exposed to steroids, 6MP, methtrexate, vedolizumab (low drug levels, no antibodies, progression of penetrating disease) and now recent start on infliximab in 2023 with a recent taper off of prednisone.  Since being seen has done well with IFX. CRP decreasing, MRE without activity (aside from possible areas of thickening within peristomal hernia). Clinically and biochemically improving on IFX, latest drug levels 12, no abs. Would ideally target levels > 15 given penetrating phenotype with residual fistula.   -- Most recent endoscopic disease activity: severe inflammation, large cratered ulcers in colon, needs repeat disease activity assessment --> repeat flex sig 4/2024 failure with severe strictures/diversion colitis   -- Most recent radiologic disease activity:  6/21/23, normal SB, some karen-stomal inflammation vs decompressed colon within karen-stomal hernia  -- Most recent TDM eval:   -- vedo levels: 5/1 (3/2021) and 3.4 (11/2022) (undetectable per their assay),  --  IFX levels 12.6 (8/17/23), abs undetectable    -- repeat 12 months into IFX therapy, goal >15  -- Most recent serologic eval: CRP 7.31 (4/2024), 6.4 (8/2023), 12.2 (6/2023)  -- Most recent FC: 129 (3/2024), 492 (4/2023), 906 (11/2022)  -- Current medication: infliximab, maintenance     PLAN:  -- continue IFX (q 4 weeks), would like to target drug levels >15 given penetrating disease   -- follow with  CRS, planning for APR, stoma revision, team case with plastics for a flap to protect healing in APR bed  -- follow up in 3-6 months in clinic with Dr. Hubert Chin and Dr. Coyle     #. Peristomal fistulas  Significant improvement  while on IFX. One small area of fistula remains with intermittent pus and stool passage, this area continues to shrink.   -- continue IFX, narrow interval to increase levels associated with fistula healing (>15)     #. Recurrent clostridiodes difficile infection  Two positive tests since January 2023, first positive 8/2020. Numerous negative intervening tests. Completed a number of vancomycin tapers. Increased risk of recurrent illness given iBD and increased risk of worsened IBD outcomes with colonization/recurrent infection. Positive again 6/2023, s/p vancomycin course with improvement in stool consistency and pain.   -- pre-emptive referral to IMT clinic, seen    #. Pyoderma gangrenosum   Peristomal location. Complicated by skin/soft tissue breakdown and recurrent infections. Managed by dermatology here at [facility]. Most recently in early 2023 has been told her disease is now quiescent. Pictures of this area with documented improvement can be seen in the dermatology notes and the media tab. Ongoing impressive improvement during IFX therapy.  -- defer to future     #. Routine health maintenance in IBD  We will address vaccines, age appropriate cancer screening, infectious screening, bone health, and mental health as IBD therapy becomes more stable.   -- defer to next visit      #. Hx of anal cancer  #. 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 Diverted due to incontinence presumed secondary to rectal irritation/compliance issues.   -- planning APR, stoma revision, free flap with CRS/plastics    #. Nocturia  #. Urinary urgency  -- urogynecology referral   -- resume pelvic physical therapy exercises at home as previous     Return to  Clinic: 3-6 months with Dr. Caldwell and/or Dr. Coyle.     Hayder Caldwell MD  GI fellow      Patient discussed and seen with Gastroenterology staff Dr. Coyle, who is in agreement with the above.       ====================================================================================================================================  HPI:     Ms. Aldrich presents for follow up today.     Generally speaking Ms. Aldrich is feeling well today, her pain is nearly gone, stool consistent/stomat output thickened, and peristomal fistula/PG is nearly resolved with only one small draining area remaining.    Has been tolerating her IFX infusions without issues.     Extended discussion today regarding results from most recent flexible sigmoidoscopy (24). Explored why we are recommending APR with CRS. Despite overall feeling well, she understands our limitations in performing CRC surveillance in her lower large intestine given the stricturing/narrowing. She understands she will likely need some kind of flap at the time of APR and will be seeing the plastic surgery service this spring.     She continues to follow with dermatology (PG).    She is excited to visit her daughter in [place] later this week.     Targeted GI ROS negative unless noted above. All questions answered.     Past Medical History:   Diagnosis Date    Squamous cell carcinoma of skin, unspecified        Past Surgical History:   Procedure Laterality Date     SECTION      COLONOSCOPY N/A 2024    Procedure: Colonoscopy with biopsies;  Surgeon: Jordan Coyle MD;  Location: [facility] OR    EXAM UNDER ANESTHESIA ANUS N/A 2024    Procedure: EXAM UNDER ANESTHESIA, ANUS, ANAL DILATION;  Surgeon: Kunal Herrera MD;  Location: [facility] OR    ostomy creation      SIGMOIDOSCOPY FLEXIBLE N/A 2024    Procedure: Sigmoidoscopy flexible, With Hegar Dilation;  Surgeon: Jordan Coyle MD;  Location: [facility] OR       Family History  "  Problem Relation Age of Onset    Anesthesia Reaction No family hx of     Deep Vein Thrombosis (DVT) No family hx of        Social History     Tobacco Use    Smoking status: Never    Smokeless tobacco: Never   Substance Use Topics    Alcohol use: Yes     Alcohol/week: 2.0 standard drinks of alcohol     Types: 2 Glasses of wine per week       Physical exam:     Vitals:/76   Pulse 77   Ht 1.651 m (5' 5\")   Wt 82.1 kg (181 lb)   SpO2 99%   BMI 30.12 kg/m     BMI: Body mass index is 30.12 kg/m .      GEN: NAD, A&Ox3, appropriate  HEENT: EOMI, PERRLA, MMM, hearing grossly intact  Neck: full ROM  Cardiopulmonary: non labored, comfortable on RA, nondiaphoretic, no LE edema  Abdominal: soft, nontender, nondistended, no HSM, no rebound, no guarding, normoactive BS, ostomy appliance in place, abdominal wall hernia near stoma (reducible-same as previous)  Skin: no jaundice, peristomal fistula (tiny) with small punctate tract  Neuro: A&Ox3, grossly intact motor/sensation, gait intact  MSK: no gross deformity, moves arms/legs equally  Psych: normal affect, congruent with mood      Labs:   Lab Results   Component Value Date    WBC 8.6 04/30/2024    HGB 13.3 04/30/2024    HCT 39.6 04/30/2024     04/30/2024     04/30/2024    POTASSIUM 4.5 04/30/2024    CHLORIDE 104 04/30/2024    CO2 27 04/30/2024    BUN 14.4 04/30/2024    CR 0.80 04/30/2024     (H) 04/30/2024    SED 21 04/30/2024    AST 22 04/30/2024    ALT 9 04/30/2024    ALKPHOS 70 04/30/2024    BILITOTAL 0.3 04/30/2024    INR 1.06 04/30/2024      CRP: 6 (from 12)    C.diff: PCR and GDH positive (6/2023)    Relevant imaging:     MRE: 6/21/23  IMPRESSION:  1. Postsurgical changes of loop colostomy with moderate size  parastomal hernia. The transverse colon near the stoma within the  parastomal hernia demonstrates mild colonic wall thickening and  enhancement, could be due to underdistention versus active  inflammation. No convincing evidence of " fistula formation. Large  amount of stool in the colon upstream to the stoma. The more distal  descending and sigmoid colon are decompressed.  2. No evidence of abnormal small bowel wall thickening or enhancement  to suggest presence of active small bowel Crohn's disease.  3. 8 mm T2 hyperintense focus in the pancreatic body, too small to  characterize could represent side branch intraductal papillary  mucinous neoplasm versus other pancreatic cystic lesions. Recommend  follow-up exam with contrast enhanced MRI/MRCP in one year to assess  for interval change.     HEMAL MAYER MD     Endoscopy:    Flex si24    Findings:       The digital rectal exam findings include anal stricture. This was        dilated with index finger and then Dr. Herrera dilated with Hegar        dilator to 12mm in diameter (a length of 15cm was dilated).        A benign-appearing, intrinsic severe stenosis measuring of unknown        length x 5 mm (inner diameter) was found in the rectum and was        non-traversed. Dr. Herrera dilated with Hegar dilator to 12mm in        diameter (a length of 15cm was dilated). The pediatric colonoscope was        used to evaluate for 15 cm. The stenosis persisted beyond this length.        Changes of friability due to diversion and radiation proctitis were        found. The pediatric colonscope could only be passed 15 cm proximal to        the anal verge. The pediatric scope was then used to evaluate the        inferior colostomy limb. This inferior limb was also very stenosed and        could only be evaluated for 5 cm. Further evaluation of the diverted        colon could not be performed.                                                                                    Impression:            - Anal stricture found on digital rectal exam.                          - Stricture in the rectum. Dr. Herrera dilated with                          Hegar dilator to 12mm in diameter (a length of  15cm                          was dilated). The pediatric colonoscope was used to                          evaluate for 15 cm. The stenosis persisted beyond this                          length. Changes of friability due to diversion and                          radiation proctitis were found. The pediatric                          colonscope could only be passed 15 cm proximal to the                          anal verge.                          - The pediatric scope was then used to evaluate the                          inferior colostomy limb. This inferior limb was also                          very stenosed and could only be evaluated for 5 cm.                          Further evaluation of the diverted colon could not be                          performed.                          There is severe stenosis of her entire diverted colon                          (sigmoid and rectum). This is due to a combination of                          radiation enteritis, diversion colitis and possible                          Crohn's. This canmnot be treated medically and this                          cannot be surveyed with endoscopy. Therefore, I                          recommend the patient follow up with Dr. Herrera in                          colorectal clinic to discuss completion proctectomy                          (APR).   Recommendation:        - Discharge patient to home (with escort).                          - Continue present medications.                          - Return to GI clinic as previously scheduled.                          - - Return to clinic with Dr. Herrera in colorectal                          surgery clinic                          - See above discussion                                                                                      Electronically Signed by: Dr. Jordan Coyle   ___________________________   JORDAN COYLE MD   4/11/2024 4:43:35 PM   I was physically present  for the entire viewing portion of the exam.   __________________________   Signature of teaching physician   JERONIMO KAISER MD   Number of Addenda: 0     Note Initiated On: 4/11/2024 3:48 PM   Scope In:   Scope Out:     Colonoscopy: 11/3/2022    Findings:        The perianal exam findings included some scarring at the anal area and        absence of anal tone.        The periostomal skin was without rashes or abscesses.        The colostomy was first accessed: A continuous area of severly inflammed        and friable nonbleeding mucosa with extensive large cratered ulcers was        present in the transverse colon, in the ascending colon and in the        cecum. Biopsies were taken with a cold forceps for histology R/O CMV,        Crohn's disease, ischemia.        One large ulcer was present in the distal rectum. Biopsies were taken        with a cold forceps for histology.        A diffuse area of severely friable mucosa with contact bleeding was        found in the rectum and in the recto-sigmoid colon. Biopsies were taken        with a cold forceps for histology R/O diversion colitis.        A 2 mm likely fistula was found at 18 cm proximal to the anus.     Impression:            - Abnormal perianal exam.                          - Colostomy accessed: Severly inflammed mucosa with                          large ulcers. Biopsied R/O CMV, Crohn's disease,                          ischemia.                          - Ulcer in distal rectum. Biopsied.                          - Friability with contact bleeding in the rectum and                          in the recto-sigmoid colon. Biopsied R/O diversion                          colitis.                          - A possible fistula opening was seen at 18 cm from                          the anus.     Recommendation:        - Return patient to [facility] for [facility] care.                          - Resume previous diet.                          - Await pathology  results.                          - Consider MRI pelvis R/O fistula.                          - Further recs per inaptient GI team. Will consider IV                          steroids.   Ramiro Bach MD   11/3/2022 5:59:45 PM

## 2024-04-30 NOTE — TELEPHONE ENCOUNTER
Received update from [facility] that the patient received infusion yesterday, as discussed previously (4/29).    Patient had been fever free and reported feeling better overall.     Written orders received from [facility] confirming approval to move forward. Order reviewed, signed by Dr. Coyle and faxed back to [facility] at [phone].    MRN added to document and scanned into the patient's chart.

## 2024-04-30 NOTE — NURSING NOTE
"Chief Complaint   Patient presents with    Follow Up       Vitals:    04/30/24 1443   BP: 119/76   Pulse: 77   SpO2: 99%   Weight: 82.1 kg (181 lb)   Height: 1.651 m (5' 5\")       Body mass index is 30.12 kg/m .    [employer]    "

## 2024-04-30 NOTE — PATIENT INSTRUCTIONS
Ms. Valdemar,     It was great to see you in GI clinic today. Please see below for our plan.     We have ordered labs to be drawn in the lab on the first floor of this building today, this includes the labs that Dr. Herrera wanted as well as ours, we will reach out to your home infusion company to make sure you are getting your labs on time  We agree with your upcoming surgery and agree that both colorectal surgery and plastic surgery should be involved  Continue your Remic[facility] home infusions you are  We will see you again in 6 months    Please do not hesitate to reach out with any questions or concerns,    Best,  Dr. Caldwell and Dr. Coyle

## 2024-04-30 NOTE — LETTER
4/30/2024         RE: Nicol Aldrich  [address]  [facility] [address]        Dear Colleague,    Thank you for referring your patient, Nicol Aldrich, to the [facility] GASTROENTEROLOGY CLINIC [place]. Please see a copy of my visit note below.    [facility]-  IBD Clinic:     Follow up for complicated Crohn's disease, EC fistulas, PG, hx of CDI    Last visit: 9/19/23  Date of visit: 04/30/24    Referring provider: Jordan Coyle    Follow up: 2nd opinion - penetrating crohn's with peristomal fistulas     Referring provider: Mae     ASSESSMENT AND PLAN:     #. Penetrating ileocolonic crohn's  Diagnosed at approximately age 31. Significant perianal disease and peristomal enterocutaneous fistulas at one time though these have improved since initiating IFX. Has been exposed to steroids, 6MP, methtrexate, vedolizumab (low drug levels, no antibodies, progression of penetrating disease) and now recent start on infliximab in 2023 with a recent taper off of prednisone.  Since being seen has done well with IFX. CRP decreasing, MRE without activity (aside from possible areas of thickening within peristomal hernia). Clinically and biochemically improving on IFX, latest drug levels 12, no abs. Would ideally target levels > 15 given penetrating phenotype with residual fistula.   -- Most recent endoscopic disease activity: severe inflammation, large cratered ulcers in colon, needs repeat disease activity assessment --> repeat flex sig 4/2024 failure with severe strictures/diversion colitis   -- Most recent radiologic disease activity:  6/21/23, normal SB, some karen-stomal inflammation vs decompressed colon within karen-stomal hernia  -- Most recent TDM eval:   -- vedo levels: 5/1 (3/2021) and 3.4 (11/2022) (undetectable per their assay),  --  IFX levels 12.6 (8/17/23), abs undetectable    -- repeat 12 months into IFX therapy, goal >15  -- Most recent serologic eval: CRP 7.31  (4/2024), 6.4 (8/2023), 12.2 (6/2023)  -- Most recent FC: 129 (3/2024), 492 (4/2023), 906 (11/2022)  -- Current medication: infliximab, maintenance     PLAN:  -- continue IFX (q 4 weeks), would like to target drug levels >15 given penetrating disease   -- follow with CRS, planning for APR, stoma revision, team case with plastics for a flap to protect healing in APR bed  -- follow up in 3-6 months in clinic with Dr. Hubert Chin and Dr. Coyle     #. Peristomal fistulas  Significant improvement  while on IFX. One small area of fistula remains with intermittent pus and stool passage, this area continues to shrink.   -- continue IFX, narrow interval to increase levels associated with fistula healing (>15)     #. Recurrent clostridiodes difficile infection  Two positive tests since January 2023, first positive 8/2020. Numerous negative intervening tests. Completed a number of vancomycin tapers. Increased risk of recurrent illness given iBD and increased risk of worsened IBD outcomes with colonization/recurrent infection. Positive again 6/2023, s/p vancomycin course with improvement in stool consistency and pain.   -- pre-emptive referral to IMT clinic, seen    #. Pyoderma gangrenosum   Peristomal location. Complicated by skin/soft tissue breakdown and recurrent infections. Managed by dermatology here at the [place]. Most recently in early 2023 has been told her disease is now quiescent. Pictures of this area with documented improvement can be seen in the dermatology notes and the media tab. Ongoing impressive improvement during IFX therapy.  -- defer to future     #. Routine health maintenance in IBD  We will address vaccines, age appropriate cancer screening, infectious screening, bone health, and mental health as IBD therapy becomes more stable.   -- defer to next visit      #. Hx of anal cancer  #. 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 Diverted due to  incontinence presumed secondary to rectal irritation/compliance issues.   -- planning APR, stoma revision, free flap with CRS/plastics    #. Nocturia  #. Urinary urgency  -- urogynecology referral   -- resume pelvic physical therapy exercises at home as previous     Return to Clinic: 3-6 months with Dr. Caldwell and/or Dr. Coyle.     Hayder Caldwell MD  GI fellow      Patient discussed and seen with Gastroenterology staff Dr. Coyle, who is in agreement with the above.       ====================================================================================================================================  HPI:     Ms. Aldrich presents for follow up today.     Generally speaking Ms. Aldrich is feeling well today, her pain is nearly gone, stool consistent/stomat output thickened, and peristomal fistula/PG is nearly resolved with only one small draining area remaining.    Has been tolerating her IFX infusions without issues.     Extended discussion today regarding results from most recent flexible sigmoidoscopy (24). Explored why we are recommending APR with CRS. Despite overall feeling well, she understands our limitations in performing CRC surveillance in her lower large intestine given the stricturing/narrowing. She understands she will likely need some kind of flap at the time of APR and will be seeing the plastic surgery service this spring.     She continues to follow with dermatology (PG).    She is excited to visit her daughter in [place] later this week.     Targeted GI ROS negative unless noted above. All questions answered.     Past Medical History:   Diagnosis Date    Squamous cell carcinoma of skin, unspecified        Past Surgical History:   Procedure Laterality Date     SECTION      COLONOSCOPY N/A 2024    Procedure: Colonoscopy with biopsies;  Surgeon: Jordan Coyle MD;  Location: [facility] OR    EXAM UNDER ANESTHESIA ANUS N/A 2024    Procedure: EXAM UNDER ANESTHESIA,  "ANUS, ANAL DILATION;  Surgeon: Kunal Herrera MD;  Location: [facility] OR    ostomy creation      SIGMOIDOSCOPY FLEXIBLE N/A 4/11/2024    Procedure: Sigmoidoscopy flexible, With Hegar Dilation;  Surgeon: Jordan Coyle MD;  Location: [facility] OR       Family History   Problem Relation Age of Onset    Anesthesia Reaction No family hx of     Deep Vein Thrombosis (DVT) No family hx of        Social History     Tobacco Use    Smoking status: Never    Smokeless tobacco: Never   Substance Use Topics    Alcohol use: Yes     Alcohol/week: 2.0 standard drinks of alcohol     Types: 2 Glasses of wine per week       Physical exam:     Vitals:/76   Pulse 77   Ht 1.651 m (5' 5\")   Wt 82.1 kg (181 lb)   SpO2 99%   BMI 30.12 kg/m     BMI: Body mass index is 30.12 kg/m .      GEN: NAD, A&Ox3, appropriate  HEENT: EOMI, PERRLA, MMM, hearing grossly intact  Neck: full ROM  Cardiopulmonary: non labored, comfortable on RA, nondiaphoretic, no LE edema  Abdominal: soft, nontender, nondistended, no HSM, no rebound, no guarding, normoactive BS, ostomy appliance in place, abdominal wall hernia near stoma (reducible-same as previous)  Skin: no jaundice, peristomal fistula (tiny) with small punctate tract  Neuro: A&Ox3, grossly intact motor/sensation, gait intact  MSK: no gross deformity, moves arms/legs equally  Psych: normal affect, congruent with mood      Labs:   Lab Results   Component Value Date    WBC 8.6 04/30/2024    HGB 13.3 04/30/2024    HCT 39.6 04/30/2024     04/30/2024     04/30/2024    POTASSIUM 4.5 04/30/2024    CHLORIDE 104 04/30/2024    CO2 27 04/30/2024    BUN 14.4 04/30/2024    CR 0.80 04/30/2024     (H) 04/30/2024    SED 21 04/30/2024    AST 22 04/30/2024    ALT 9 04/30/2024    ALKPHOS 70 04/30/2024    BILITOTAL 0.3 04/30/2024    INR 1.06 04/30/2024      CRP: 6 (from 12)    C.diff: PCR and GDH positive (6/2023)    Relevant imaging:     MRE: 6/21/23  IMPRESSION:  1. Postsurgical changes of " loop colostomy with moderate size  parastomal hernia. The transverse colon near the stoma within the  parastomal hernia demonstrates mild colonic wall thickening and  enhancement, could be due to underdistention versus active  inflammation. No convincing evidence of fistula formation. Large  amount of stool in the colon upstream to the stoma. The more distal  descending and sigmoid colon are decompressed.  2. No evidence of abnormal small bowel wall thickening or enhancement  to suggest presence of active small bowel Crohn's disease.  3. 8 mm T2 hyperintense focus in the pancreatic body, too small to  characterize could represent side branch intraductal papillary  mucinous neoplasm versus other pancreatic cystic lesions. Recommend  follow-up exam with contrast enhanced MRI/MRCP in one year to assess  for interval change.     HEMAL MAYER MD     Endoscopy:    Flex si24    Findings:       The digital rectal exam findings include anal stricture. This was        dilated with index finger and then Dr. Herrera dilated with Hegar        dilator to 12mm in diameter (a length of 15cm was dilated).        A benign-appearing, intrinsic severe stenosis measuring of unknown        length x 5 mm (inner diameter) was found in the rectum and was        non-traversed. Dr. Herrera dilated with Hegar dilator to 12mm in        diameter (a length of 15cm was dilated). The pediatric colonoscope was        used to evaluate for 15 cm. The stenosis persisted beyond this length.        Changes of friability due to diversion and radiation proctitis were        found. The pediatric colonscope could only be passed 15 cm proximal to        the anal verge. The pediatric scope was then used to evaluate the        inferior colostomy limb. This inferior limb was also very stenosed and        could only be evaluated for 5 cm. Further evaluation of the diverted        colon could not be performed.                                                                                     Impression:            - Anal stricture found on digital rectal exam.                          - Stricture in the rectum. Dr. Herrera dilated with                          Hegar dilator to 12mm in diameter (a length of 15cm                          was dilated). The pediatric colonoscope was used to                          evaluate for 15 cm. The stenosis persisted beyond this                          length. Changes of friability due to diversion and                          radiation proctitis were found. The pediatric                          colonscope could only be passed 15 cm proximal to the                          anal verge.                          - The pediatric scope was then used to evaluate the                          inferior colostomy limb. This inferior limb was also                          very stenosed and could only be evaluated for 5 cm.                          Further evaluation of the diverted colon could not be                          performed.                          There is severe stenosis of her entire diverted colon                          (sigmoid and rectum). This is due to a combination of                          radiation enteritis, diversion colitis and possible                          Crohn's. This canmnot be treated medically and this                          cannot be surveyed with endoscopy. Therefore, I                          recommend the patient follow up with Dr. Herrera in                          colorectal clinic to discuss completion proctectomy                          (APR).   Recommendation:        - Discharge patient to home (with escort).                          - Continue present medications.                          - Return to GI clinic as previously scheduled.                          - - Return to clinic with Dr. Herrera in colorectal                          surgery clinic                           - See above discussion                                                                                      Electronically Signed by: Dr. Jordan Kaiser   ___________________________   JORDAN KAISER MD   4/11/2024 4:43:35 PM   I was physically present for the entire viewing portion of the exam.   __________________________   Signature of teaching physician   JORDAN KAISER MD   Number of Addenda: 0     Note Initiated On: 4/11/2024 3:48 PM   Scope In:   Scope Out:     Colonoscopy: 11/3/2022    Findings:        The perianal exam findings included some scarring at the anal area and        absence of anal tone.        The periostomal skin was without rashes or abscesses.        The colostomy was first accessed: A continuous area of severly inflammed        and friable nonbleeding mucosa with extensive large cratered ulcers was        present in the transverse colon, in the ascending colon and in the        cecum. Biopsies were taken with a cold forceps for histology R/O CMV,        Crohn's disease, ischemia.        One large ulcer was present in the distal rectum. Biopsies were taken        with a cold forceps for histology.        A diffuse area of severely friable mucosa with contact bleeding was        found in the rectum and in the recto-sigmoid colon. Biopsies were taken        with a cold forceps for histology R/O diversion colitis.        A 2 mm likely fistula was found at 18 cm proximal to the anus.     Impression:            - Abnormal perianal exam.                          - Colostomy accessed: Severly inflammed mucosa with                          large ulcers. Biopsied R/O CMV, Crohn's disease,                          ischemia.                          - Ulcer in distal rectum. Biopsied.                          - Friability with contact bleeding in the rectum and                          in the recto-sigmoid colon. Biopsied R/O diversion                          colitis.                           - A possible fistula opening was seen at 18 cm from                          the anus.     Recommendation:        - Return patient to [facility] for ongoing care.                          - Resume previous diet.                          - Await pathology results.                          - Consider MRI pelvis R/O fistula.                          - Further recs per inaptient GI team. Will consider IV                          steroids.   Ramiro Bach MD   11/3/2022 5:59:45 PM                  Attestation signed by Jordan Colye MD at 5/8/2024  6:07 PM:  ATTENDING ATTESTATION:     DATE SEEN: 4/30/24    Patient was discussed, seen, and examined by me, Jordna Coyle. The plan of care and pertinent data/imaging were reviewed with Dr. Caldwell. I agree with the assessment and plan as delineated above with the following additions:     Doing so much better on current therapies. She does need APR and this will be scheduled with CRS. Continue IFX.    All questions answered. She agrees to the plan.    Thank you for this consultation.  It was a pleasure to participate in the care of this patient; please contact us with any further questions.  I spent a total of 30 minutes during the day of encounter performed chart review, meeting with patient, patient counseling, care coordination, and documentation.      Please contact me with any further questions.        Again, thank you for allowing me to participate in the care of your patient.      Sincerely,    Hayder Caldwell MD

## 2024-04-30 NOTE — TELEPHONE ENCOUNTER
Despite therapy plan orders, [facility] has not been drawing routine labs as ordered.     Therapy plan orders updated, with specific instructions included on the cover sheet to draw routine labs as ordered in the therapy plan. New standing lab orders sent with fax number included to send results to.     [employer] message sent to Ms. Lozada requesting orders be faxed to [phone].    Patient will be completing annual TB Quantiferon Gold today - 4/2024.    Hepatitis B serologies completed June 2023.

## 2024-05-01 ENCOUNTER — TELEPHONE (OUTPATIENT)
Dept: GASTROENTEROLOGY | Facility: CLINIC | Age: 64
End: 2024-05-01
Payer: COMMERCIAL

## 2024-05-01 LAB — PREALB SERPL-MCNC: 24.3 MG/DL (ref 20–40)

## 2024-05-01 NOTE — TELEPHONE ENCOUNTER
----- Message from Angle Kay RN sent at 4/30/2024  4:07 PM CDT -----  Regarding: Infusions  Hey!    Could we please fax this patient's therapy plan, along with standing lab orders to [facility] [phone]?    Please include on the cover form that the orders include routine standing labs that are supposed to be drawn with every other infusion.     Thank you!  Jeovany

## 2024-05-01 NOTE — TELEPHONE ENCOUNTER
Summary: Faxed Continuity of Care       Faxed documents per request.     Faxed:  - Therapy Plan   - Standing lab orders    Facility Information:  [facility]  [address] [address]   PH: [phone]  Fax: [phone]     Cleopatra Lozada LPN

## 2024-05-02 ENCOUNTER — MEDICAL CORRESPONDENCE (OUTPATIENT)
Dept: HEALTH INFORMATION MANAGEMENT | Facility: CLINIC | Age: 64
End: 2024-05-02
Payer: COMMERCIAL

## 2024-05-02 ENCOUNTER — DOCUMENTATION ONLY (OUTPATIENT)
Dept: GASTROENTEROLOGY | Facility: CLINIC | Age: 64
End: 2024-05-02
Payer: COMMERCIAL

## 2024-05-02 LAB
GAMMA INTERFERON BACKGROUND BLD IA-ACNC: 0.01 IU/ML
M TB IFN-G BLD-IMP: NEGATIVE
M TB IFN-G CD4+ BCKGRND COR BLD-ACNC: 9.99 IU/ML
MITOGEN IGNF BCKGRD COR BLD-ACNC: 0.02 IU/ML
MITOGEN IGNF BCKGRD COR BLD-ACNC: 0.04 IU/ML
QUANTIFERON MITOGEN: 10 IU/ML
QUANTIFERON NIL TUBE: 0.01 IU/ML
QUANTIFERON TB1 TUBE: 0.03 IU/ML
QUANTIFERON TB2 TUBE: 0.05

## 2024-05-02 NOTE — PROGRESS NOTES
INCOMING FAX:    Facility:   [facility]  Phone: [phone]  Fax:[phone]    What:   Physicians orders.     Given to Jeovany RAMIREZ and Dr. Coyle to to review and sign.     Form reviewed and signed by provider. Faxed back to [facility] at [phone]. Copy scanned into patient chart.    Cleopatra Lozada LPN

## 2024-05-22 ENCOUNTER — PRE VISIT (OUTPATIENT)
Dept: PLASTIC SURGERY | Facility: CLINIC | Age: 64
End: 2024-05-22

## 2024-05-22 ENCOUNTER — OFFICE VISIT (OUTPATIENT)
Dept: PLASTIC SURGERY | Facility: CLINIC | Age: 64
End: 2024-05-22
Attending: SURGERY
Payer: COMMERCIAL

## 2024-05-22 VITALS
OXYGEN SATURATION: 98 % | HEIGHT: 65 IN | BODY MASS INDEX: 29.59 KG/M2 | DIASTOLIC BLOOD PRESSURE: 76 MMHG | SYSTOLIC BLOOD PRESSURE: 188 MMHG | WEIGHT: 177.6 LBS | HEART RATE: 68 BPM

## 2024-05-22 DIAGNOSIS — K50.813 CROHN'S DISEASE OF BOTH SMALL AND LARGE INTESTINE WITH FISTULA (H): ICD-10-CM

## 2024-05-22 PROCEDURE — 99205 OFFICE O/P NEW HI 60 MIN: CPT | Performed by: PLASTIC SURGERY

## 2024-05-22 RX ORDER — CEFAZOLIN SODIUM 2 G/50ML
2 SOLUTION INTRAVENOUS
Status: CANCELLED | OUTPATIENT
Start: 2024-05-22

## 2024-05-22 RX ORDER — CEFAZOLIN SODIUM 2 G/50ML
2 SOLUTION INTRAVENOUS SEE ADMIN INSTRUCTIONS
Status: CANCELLED | OUTPATIENT
Start: 2024-05-22

## 2024-05-22 ASSESSMENT — PAIN SCALES - GENERAL: PAINLEVEL: NO PAIN (0)

## 2024-05-22 NOTE — NURSING NOTE
"Chief Complaint   Patient presents with    Consult     VRAM flap.       Vitals:    05/22/24 1136   BP: (!) 188/76   BP Location: Left arm   Patient Position: Sitting   Cuff Size: Adult Regular   Pulse: 68   SpO2: 98%   Weight: 177 lb 9.6 oz   Height: 5' 5\"       Body mass index is 29.55 kg/m .      Don Sanchez, EMT    "

## 2024-05-22 NOTE — LETTER
2024       RE: Nicol Aldrich  [address]  [facility] [address]       Dear Colleague,    Thank you for referring your patient, Nicol Aldrich, to the [facility] PLASTIC AND RECONSTRUCTIVE SURGERY CLINIC [place] at [facility]. Please see a copy of my visit note below.    Referring Provider:  Kunal Herrera MD  [address] [place] [address]     Primary Care Provider:  Janet Noe      RE: Nicol Aldrich.  : 1960.  [place]LAND: 2024.    Reason for visit: Perineal reconstruction    HPI: The patient has Crohn's disease.  She has significant complications from Crohn's disease.  These include fistulization to the anterior abdominal wall as well as in the perirectal area.  In  had anal cancer that was excised and had chemoradiation therapy for it.  Patient currently has a diverting ostomy in the left abdomen.  The patient is going to undergo an APR and requires pelvic filling and perineal skin reconstruction.  Here to discuss that with me.  It is also important to note the patient had an episode of pyoderma gangrenosum back in  treated with steroids to the anterior abdominal wall.  She has subsequently had surgery to her leg but has not had pyoderma there.    Medical history:  Past Medical History:   Diagnosis Date    Squamous cell carcinoma of skin, unspecified        Surgical history:  Past Surgical History:   Procedure Laterality Date     SECTION      COLONOSCOPY N/A 2024    Procedure: Colonoscopy with biopsies;  Surgeon: Jordan Coyle MD;  Location: [facility] OR    EXAM UNDER ANESTHESIA ANUS N/A 2024    Procedure: EXAM UNDER ANESTHESIA, ANUS, ANAL DILATION;  Surgeon: Kunal Herrera MD;  Location: [facility] OR    ostomy creation      SIGMOIDOSCOPY FLEXIBLE N/A 2024    Procedure: Sigmoidoscopy flexible, With Hegar Dilation;  Surgeon: Jordan Coyle MD;  Location: [facility] OR       Family  "history:  Family History   Problem Relation Age of Onset    Anesthesia Reaction No family hx of     Deep Vein Thrombosis (DVT) No family hx of        Medications:  Current Outpatient Medications   Medication Sig Dispense Refill    inFLIXimab Inject 4 mg into the vein every 30 days Last infusion 12/30      Ostomy Supplies MISC 1 each daily 1 each 11    trospium (SANCTURA XR) 60 MG CP24 24 hr capsule Take 60 mg by mouth every morning (before breakfast)         Allergies:  Allergies   Allergen Reactions    Penicillins Rash     On 07/10/18, pt states that she experienced a rash when she had this \"a while ago.\" This was not a reaction from childhood.   On 07/10/18, pt states that she experienced a rash when she had this \"a while ago.\" This was not a reaction from childhood.          Social history:   Social History     Tobacco Use    Smoking status: Never    Smokeless tobacco: Never   Substance Use Topics    Alcohol use: Yes     Alcohol/week: 2.0 standard drinks of alcohol     Types: 2 Glasses of wine per week         Physical Examination:  BP (!) 188/76 (BP Location: Left arm, Patient Position: Sitting, Cuff Size: Adult Regular)   Pulse 68   Ht 1.651 m (5' 5\")   Wt 80.6 kg (177 lb 9.6 oz)   SpO2 98%   BMI 29.55 kg/m    Body mass index is 29.55 kg/m .    General: No acute distress.    Anterior abdominal wall thickness is approximately 4 to 6 cm.  She has a left-sided ostomy and fistula.  Right side has no scars.  No scars on the thighs.        ASSESMENT and PLAN:     Based upon the above findings, a diagnosis of Crohn's disease, history of anal cancer, fistulization, requiring APR and perineal reconstruction was made.  I had a vickie, detailed discussion with the patient, in the presence of my nurse, who was present from beginning to end.  I discussed with the patient the pathophysiology behind the problem, the concept behind the procedure/treatment proposed, expectations of the planned procedure(s), and all " karen-operative steps.     I went over the entire concept of a flap for perineal reconstruction and pelvic filling.  Given the fact the patient has no scars and no surgeries to the right hemiabdomen, I would recommend that we do a right sided vertical rectus abdominis musculocutaneous transpelvic flap reconstruction of the pelvis and perineum.  The concept was explained to the patient.  I am happy to help out in the combined case.    I was very clear to the patient that she is at a high risk for wound complications, wound dehiscence, and chronic wound healing issues.  Major risks of the surgery including flap issues, infections, scarring, pain, requirement of further surgeries, and infectious complications were discussed in detail.  Systemic complications like DVT, PE, CVA, were also explained.  All risks, benefits and alternatives, including but not limited to (what applies), pain, infection, bleeding, scarring, asymmetry, seromas, hematomas, wound breakdown, wound dehiscence, loss of the implants/flaps, abdominal wall-healing issues, abdominal wall weakness, bulges, hernias, sensation loss, requirement of further staged procedures, Implant specific issues and complications as discussed above, removal of infected or exposed implants, pneumothoraces, contour abnormalities, cannula injuries to deeper structures, hernias, fat necrosis, lumps and bumps, loss of grafted material, DVT, PE, MI, CVA, pneumonia, renal failure and death, were explained. They were understood and agreed upon by the patient, they were acknowledged by the patient, all the patient's questions were answered in detail to the patient's fullest understanding that they acknowledged, the team approach for treatment in the operating room was agreed upon by the patient, and proceeding with surgery was agreed upon by the patient.      All questions were answered. The patient was happy with the visit. I look forward to helping the patient out in the near  future as indicated.       Total time spent in the encounter today including chart review, visit itself, and post-visit paperwork was 60 minutes.         Again, thank you for allowing me to participate in the care of your patient.      Sincerely,    ANEESH Mccarthy MD

## 2024-05-22 NOTE — PROGRESS NOTES
Referring Provider:  Kunal Herrera MD  [address] [place] [id number]     Primary Care Provider:  Janet Noe      RE: Nicol Aldrich.  : 1960.  RYLAND: 2024.    Reason for visit: Perineal reconstruction    HPI: The patient has Crohn's disease.  She has significant complications from Crohn's disease.  These include fistulization to the anterior abdominal wall as well as in the perirectal area.  In  had anal cancer that was excised and had chemoradiation therapy for it.  Patient currently has a diverting ostomy in the left abdomen.  The patient is going to undergo an APR and requires pelvic filling and perineal skin reconstruction.  Here to discuss that with me.  It is also important to note the patient had an episode of pyoderma gangrenosum back in  treated with steroids to the anterior abdominal wall.  She has subsequently had surgery to her leg but has not had pyoderma there.    Medical history:  Past Medical History:   Diagnosis Date    Squamous cell carcinoma of skin, unspecified        Surgical history:  Past Surgical History:   Procedure Laterality Date     SECTION      COLONOSCOPY N/A 2024    Procedure: Colonoscopy with biopsies;  Surgeon: Jordan Coyle MD;  Location: [facility] OR    EXAM UNDER ANESTHESIA ANUS N/A 2024    Procedure: EXAM UNDER ANESTHESIA, ANUS, ANAL DILATION;  Surgeon: Kunal Herrera MD;  Location: [facility] OR    ostomy creation      SIGMOIDOSCOPY FLEXIBLE N/A 2024    Procedure: Sigmoidoscopy flexible, With Hegar Dilation;  Surgeon: Jordan Coyle MD;  Location: [facility] OR       Family history:  Family History   Problem Relation Age of Onset    Anesthesia Reaction No family hx of     Deep Vein Thrombosis (DVT) No family hx of        Medications:  Current Outpatient Medications   Medication Sig Dispense Refill    inFLIXimab Inject 4 mg into the vein every 30 days Last infusion       Ostomy Supplies MISC 1 each  "daily 1 each 11    trospium (SANCTURA XR) 60 MG CP24 24 hr capsule Take 60 mg by mouth every morning (before breakfast)         Allergies:  Allergies   Allergen Reactions    Penicillins Rash     On 07/10/18, pt states that she experienced a rash when she had this \"a while ago.\" This was not a reaction from childhood.   On 07/10/18, pt states that she experienced a rash when she had this \"a while ago.\" This was not a reaction from childhood.          Social history:   Social History     Tobacco Use    Smoking status: Never    Smokeless tobacco: Never   Substance Use Topics    Alcohol use: Yes     Alcohol/week: 2.0 standard drinks of alcohol     Types: 2 Glasses of wine per week         Physical Examination:  BP (!) 188/76 (BP Location: Left arm, Patient Position: Sitting, Cuff Size: Adult Regular)   Pulse 68   Ht 1.651 m (5' 5\")   Wt 80.6 kg (177 lb 9.6 oz)   SpO2 98%   BMI 29.55 kg/m    Body mass index is 29.55 kg/m .    General: No acute distress.    Anterior abdominal wall thickness is approximately 4 to 6 cm.  She has a left-sided ostomy and fistula.  Right side has no scars.  No scars on the thighs.        ASSESMENT and PLAN:     Based upon the above findings, a diagnosis of Crohn's disease, history of anal cancer, fistulization, requiring APR and perineal reconstruction was made.  I had a vickie, detailed discussion with the patient, in the presence of my nurse, who was present from beginning to end.  I discussed with the patient the pathophysiology behind the problem, the concept behind the procedure/treatment proposed, expectations of the planned procedure(s), and all karen-operative steps.     I went over the entire concept of a flap for perineal reconstruction and pelvic filling.  Given the fact the patient has no scars and no surgeries to the right hemiabdomen, I would recommend that we do a right sided vertical rectus abdominis musculocutaneous transpelvic flap reconstruction of the pelvis and " perineum.  The concept was explained to the patient.  I am happy to help out in the combined case.    I was very clear to the patient that she is at a high risk for wound complications, wound dehiscence, and chronic wound healing issues.  Major risks of the surgery including flap issues, infections, scarring, pain, requirement of further surgeries, and infectious complications were discussed in detail.  Systemic complications like DVT, PE, CVA, were also explained.  All risks, benefits and alternatives, including but not limited to (what applies), pain, infection, bleeding, scarring, asymmetry, seromas, hematomas, wound breakdown, wound dehiscence, loss of the implants/flaps, abdominal wall-healing issues, abdominal wall weakness, bulges, hernias, sensation loss, requirement of further staged procedures, Implant specific issues and complications as discussed above, removal of infected or exposed implants, pneumothoraces, contour abnormalities, cannula injuries to deeper structures, hernias, fat necrosis, lumps and bumps, loss of grafted material, DVT, PE, MI, CVA, pneumonia, renal failure and death, were explained. They were understood and agreed upon by the patient, they were acknowledged by the patient, all the patient's questions were answered in detail to the patient's fullest understanding that they acknowledged, the team approach for treatment in the operating room was agreed upon by the patient, and proceeding with surgery was agreed upon by the patient.      All questions were answered. The patient was happy with the visit. I look forward to helping the patient out in the near future as indicated.       Total time spent in the encounter today including chart review, visit itself, and post-visit paperwork was 60 minutes.       Goyo Mccarthy MD    Chief, Division of Plastic Surgery  Department of Surgery  [facility]      CC: Kunal Herrera MD  [address] [place] [address]  [place] [address]  CC: Janet Noe

## 2024-05-23 ENCOUNTER — DOCUMENTATION ONLY (OUTPATIENT)
Dept: SURGERY | Facility: CLINIC | Age: 64
End: 2024-05-23
Payer: COMMERCIAL

## 2024-05-23 DIAGNOSIS — Z93.3 COLOSTOMY STATUS (H): Primary | ICD-10-CM

## 2024-05-24 ENCOUNTER — TELEPHONE (OUTPATIENT)
Dept: SURGERY | Facility: CLINIC | Age: 64
End: 2024-05-24
Payer: COMMERCIAL

## 2024-05-24 NOTE — TELEPHONE ENCOUNTER
Called patient to see if there is a timeframe that she would prefer for her combo surgery with Dr. Kunal Herrera and Dr. Mccarthy. Left [facility] for patient to call back regarding scheduling at [phone].    Once we have a timeframe in mind, will need to coordinate a date with Dr. Mccarthy's .    Krystyna Guevara  Shelby-op Coordinator  [place]  Direct Phone: [phone]

## 2024-05-24 NOTE — TELEPHONE ENCOUNTER
Spoke with patient about possible surgery dates 6/26/2024 vs 7/24/2024. Patient would prefer the sooner date if possible.    Explained to patient that I'd send a message to Dr. Mccarthy's  asking about his availability on those dates, and that I'd call her back next week after I hear back from Dr. Mccarthy's .    Message sent to Dr. Mccarthy's  asking about Dr. Mccarthy's availability.    Krystyna Guevara  Shelby-op Coordinator  [place]  Direct Phone: [phone]

## 2024-05-29 ENCOUNTER — PREP FOR PROCEDURE (OUTPATIENT)
Dept: PLASTIC SURGERY | Facility: CLINIC | Age: 64
End: 2024-05-29
Payer: COMMERCIAL

## 2024-05-30 ENCOUNTER — TRANSFERRED RECORDS (OUTPATIENT)
Dept: HEALTH INFORMATION MANAGEMENT | Facility: CLINIC | Age: 64
End: 2024-05-30
Payer: COMMERCIAL

## 2024-05-30 ENCOUNTER — DOCUMENTATION ONLY (OUTPATIENT)
Dept: GASTROENTEROLOGY | Facility: CLINIC | Age: 64
End: 2024-05-30
Payer: COMMERCIAL

## 2024-05-30 NOTE — PROGRESS NOTES
INCOMING FAX:    Facility:   [address] [address]  Phone: [phone]  Fax: [phone]     What:   Plan of treatment. Certification period 5/2/2024 to 11/2/2024.    Routed document Jeovany RAMIREZ and Dr. Coyle to to review and sign.     Form reviewed and signed by provider. Faxed back to [facility] and copy scanned into patient chart.    Cleopatra Lozada LPN

## 2024-05-31 ENCOUNTER — TELEPHONE (OUTPATIENT)
Dept: SURGERY | Facility: CLINIC | Age: 64
End: 2024-05-31
Payer: COMMERCIAL

## 2024-05-31 NOTE — TELEPHONE ENCOUNTER
Patient is scheduled for combo surgery with Dr. Kunal Herrera and Dr. Goyo Mccarthy    Spoke with: Nicol    Date of Surgery: Weds 7/10/2024    Location: [place] OR    Informed patient they will need an adult  (surgery admit)    Pre op with Provider both surgeons    Upcoming Related Appointments:     Tuesday June 25, 2024  9:00 AM  Pre-op Physical (H&P)  (Arrive by 8:45 AM)  anesthesiology clinic  PAC EVALUATION with Sari Huddleston PA-C  [facility] Preoperative Assessment Center [facility] [phone]    [address] [place] [id number]      Tuesday June 25, 2024 10:30 AM  (Arrive by 10:15 AM)  NEW OSTOMY NURSE VISIT with Sabiha Ventura RN  [facility] Wound Ostomy [facility] [phone]    [address] [place] [address]      Tuesday June 25, 2024 11:45 AM  LAB with [facility]  [facility] Lab [place] ([facility] ) [phone]    [address] [address]      Monday July 29, 2024 11:30 AM  (Arrive by 11:15 AM)  Post Op with Evelyn Perdue PA-C  [facility] Colon and Rectal Surgery [facility] [phone]    [address] [place] [address]      Thursday August 22, 2024 11:30 AM  (Arrive by 11:15 AM)  Post Op with Kunal Herrera MD  [facility] Colon and Rectal Surgery [facility] [phone]    [address] [place] [address]      Surgery packet: sent via [employer] Mail     Additional comments:   - forwarded medical question from patient to OMEGA Chapa regarding holding a medication before/after surgery    Krystyna Guevara  Shelby-op Coordinator  [place]  Direct Phone: [phone]

## 2024-06-03 NOTE — TELEPHONE ENCOUNTER
FUTURE VISIT INFORMATION      SURGERY INFORMATION:  Date: 7/10/24  [facility]  Surgeon:  Kunal Herrera MD Choudry, M Umar Hasan, MD   Anesthesia Type: General  Procedure: PROCTECTOMY, ABDOMINOPERINEAL, COLOSTOMY CREATION, Perineal/pelvic reconstruction with abdominal/thigh flap,     RECORDS REQUESTED FROM:       Primary Care Provider: [facility]    Most recent EKG+ Tracin22

## 2024-06-09 ENCOUNTER — HEALTH MAINTENANCE LETTER (OUTPATIENT)
Age: 64
End: 2024-06-09

## 2024-06-10 ENCOUNTER — TELEPHONE (OUTPATIENT)
Dept: GASTROENTEROLOGY | Facility: CLINIC | Age: 64
End: 2024-06-10

## 2024-06-10 NOTE — TELEPHONE ENCOUNTER
Received call from Tamara infusion RN at [facility] Infusion [facility] contacted the patient to schedule June infusion (due the week of 6/24) and was updated from the patient that surgery has been scheduled and the June infusion should be held.    Procedure scheduled for 7/10/24.     Infusion RN requesting clarification and written order to clarify when the patient should resume infusions.     Message routed to CRS team and Dr. Coyle.

## 2024-06-11 NOTE — TELEPHONE ENCOUNTER
Per Dr. Coyle and CRS - patient may resume infusions 4 weeks postoperatively - surgery scheduled 7/10/24. Infusions can resume ~8/7/24.    Contacted Tamara at [facility] and provided update.     Order written in therapy plan to hold and then resume 4 weeks postoperatively +/-4 days and faxed to [phone].     Inbasket message sent to clinic support pool.

## 2024-06-14 ENCOUNTER — TELEPHONE (OUTPATIENT)
Dept: GASTROENTEROLOGY | Facility: CLINIC | Age: 64
End: 2024-06-14
Payer: COMMERCIAL

## 2024-06-14 NOTE — TELEPHONE ENCOUNTER
"----- Message from Angle Kay RN sent at 6/12/2024 12:49 PM CDT -----  Regarding: RE: [facility]  It is in the therapy plan! You can send just the nursing communication portion!    Sorry, that is confusing.     Jeovany  ----- Message -----  From: Cleopatra Lozada LPN  Sent: 6/12/2024  12:05 PM CDT  To: Angle Kay RN  Subject: RE: [facility]                                  Sorry is that a tab? I only having nursing orders not nursing communications or is that the part in the therapy plan and you only want part of the therapy plan sent? Sorry little confused on where to find information.      Cleopatra Lozada LPN  ----- Message -----  From: Angle Kay RN  Sent: 6/11/2024  10:01 AM CDT  To: [facility] Support Pool  Subject: [facility]                                      Good morning,    Can we please fax the \"patient care order (nursing communication) that is in the patient's therapy plan regarding holding/resuming infusions to Tamara at [facility] - fax number: [phone]?    Thank you!!  Jeovany"

## 2024-06-14 NOTE — TELEPHONE ENCOUNTER
Spoke to Tamara if information was received. She was going to check with office. If she does not call they have and will call writer back if she did not get.    Cleopatra Lozada LPN

## 2024-06-24 LAB
ABO/RH(D): NORMAL
ANTIBODY SCREEN: NEGATIVE
SPECIMEN EXPIRATION DATE: NORMAL

## 2024-06-25 ENCOUNTER — TELEPHONE (OUTPATIENT)
Dept: SURGERY | Facility: CLINIC | Age: 64
End: 2024-06-25

## 2024-06-25 ENCOUNTER — OFFICE VISIT (OUTPATIENT)
Dept: WOUND CARE | Facility: CLINIC | Age: 64
End: 2024-06-25
Payer: COMMERCIAL

## 2024-06-25 ENCOUNTER — PRE VISIT (OUTPATIENT)
Dept: SURGERY | Facility: CLINIC | Age: 64
End: 2024-06-25

## 2024-06-25 ENCOUNTER — OFFICE VISIT (OUTPATIENT)
Dept: SURGERY | Facility: CLINIC | Age: 64
End: 2024-06-25
Payer: COMMERCIAL

## 2024-06-25 ENCOUNTER — LAB (OUTPATIENT)
Dept: LAB | Facility: CLINIC | Age: 64
End: 2024-06-25
Attending: SURGERY
Payer: COMMERCIAL

## 2024-06-25 ENCOUNTER — ANESTHESIA EVENT (OUTPATIENT)
Dept: SURGERY | Facility: CLINIC | Age: 64
End: 2024-06-25
Payer: COMMERCIAL

## 2024-06-25 VITALS
HEIGHT: 65 IN | TEMPERATURE: 97.5 F | RESPIRATION RATE: 16 BRPM | SYSTOLIC BLOOD PRESSURE: 134 MMHG | DIASTOLIC BLOOD PRESSURE: 84 MMHG | HEART RATE: 66 BPM | WEIGHT: 180.8 LBS | BODY MASS INDEX: 30.12 KG/M2 | OXYGEN SATURATION: 97 %

## 2024-06-25 DIAGNOSIS — Z01.818 PRE-OP EVALUATION: Primary | ICD-10-CM

## 2024-06-25 DIAGNOSIS — K50.813 CROHN'S DISEASE OF BOTH SMALL AND LARGE INTESTINE WITH FISTULA (H): ICD-10-CM

## 2024-06-25 DIAGNOSIS — Z01.818 PRE-OP EVALUATION: ICD-10-CM

## 2024-06-25 LAB
ANTIBODY, PREVIOUSLY IDENTIFIED: NORMAL
FY SUP(A) AG RBC QL: NEGATIVE
SPECIMEN EXPIRATION DATE: NORMAL
SPECIMEN EXPIRATION DATE: NORMAL

## 2024-06-25 PROCEDURE — 86905 BLOOD TYPING RBC ANTIGENS: CPT | Performed by: PHYSICIAN ASSISTANT

## 2024-06-25 PROCEDURE — 99213 OFFICE O/P EST LOW 20 MIN: CPT | Performed by: PHYSICIAN ASSISTANT

## 2024-06-25 PROCEDURE — 99211 OFF/OP EST MAY X REQ PHY/QHP: CPT

## 2024-06-25 PROCEDURE — 86900 BLOOD TYPING SEROLOGIC ABO: CPT | Performed by: PHYSICIAN ASSISTANT

## 2024-06-25 PROCEDURE — 36415 COLL VENOUS BLD VENIPUNCTURE: CPT | Performed by: PATHOLOGY

## 2024-06-25 RX ORDER — ACETAMINOPHEN 500 MG
1000 TABLET ORAL EVERY 4 HOURS PRN
COMMUNITY

## 2024-06-25 ASSESSMENT — PAIN SCALES - GENERAL: PAINLEVEL: NO PAIN (0)

## 2024-06-25 ASSESSMENT — LIFESTYLE VARIABLES: TOBACCO_USE: 0

## 2024-06-25 ASSESSMENT — ENCOUNTER SYMPTOMS: SEIZURES: 0

## 2024-06-25 NOTE — H&P
Pre-Operative H & P     CC:  Preoperative exam to assess for increased cardiopulmonary risk while undergoing surgery and anesthesia.    Date of Encounter: 2024  Primary Care Physician:  Janet Noe     Reason for visit:   Encounter Diagnosis   Name Primary?    Pre-op evaluation Yes       HPI  Niclo Aldrich is a 63 year old female who presents for pre-operative H & P in preparation for  Procedure Information       Case: [id number] Date/Time: 07/10/24 1035    Procedures:       PROCTECTOMY, ABDOMINOPERINEAL, COLOSTOMY CREATION, (Abdomen)      Perineal/pelvic reconstruction with abdominal/thigh flap, (Vulva)    Anesthesia type: General    Diagnosis: Crohn's disease of both small and large intestine with fistula (H) [K50.813]    Pre-op diagnosis: Crohn's disease of both small and large intestine with fistula (H) [K50.813]    Location:  OR  /  OR    Providers: Kunal Herrera MD; ANEESH Mccarthy MD            Patient is being evaluated for comorbid conditions of SCC    Ms. Aldrich follows with Dr. Coyle for Crohn's disease.  She was recently started on infliximab.  She is having stool output from EC fistulas.  She was seen by Dr. Herrera and Dr. Mccarthy to discuss the above procedure.  She is now scheduled for surgery.  Of note, she has been holding her infliximab in preparation for the procedure with a plan to resume 4 weeks postoperatively.    History is obtained from the patient and chart review    Hx of abnormal bleeding or anti-platelet use: denies    Menstrual history: No LMP recorded. Patient is postmenopausal.     Past Medical History  Past Medical History:   Diagnosis Date    Squamous cell carcinoma of skin, unspecified        Past Surgical History  Past Surgical History:   Procedure Laterality Date     SECTION      COLONOSCOPY N/A 2024    Procedure: Colonoscopy with biopsies;  Surgeon: Jordan Coyle MD;  Location: [facility] OR    EXAM UNDER ANESTHESIA ANUS N/A  "2/6/2024    Procedure: EXAM UNDER ANESTHESIA, ANUS, ANAL DILATION;  Surgeon: Kunal Herrera MD;  Location: [facility] OR    ostomy creation      SIGMOIDOSCOPY FLEXIBLE N/A 4/11/2024    Procedure: Sigmoidoscopy flexible, With Hegar Dilation;  Surgeon: Jordan Coyle MD;  Location: [facility] OR       Prior to Admission Medications  Current Outpatient Medications   Medication Sig Dispense Refill    acetaminophen (TYLENOL) 500 MG tablet Take 1,000 mg by mouth every 4 hours as needed      inFLIXimab Inject 4 mg into the vein every 30 days Last infusion 12/30      trospium (SANCTURA XR) 60 MG CP24 24 hr capsule Take 60 mg by mouth every morning (before breakfast)      Ostomy Supplies MISC 1 each daily 1 each 11       Allergies  Allergies   Allergen Reactions    Penicillins Rash     On 07/10/18, pt states that she experienced a rash when she had this \"a while ago.\" This was not a reaction from childhood.   On 07/10/18, pt states that she experienced a rash when she had this \"a while ago.\" This was not a reaction from childhood.          Social History  Social History     Socioeconomic History    Marital status:      Spouse name: Not on file    Number of children: Not on file    Years of education: Not on file    Highest education level: Not on file   Occupational History    Not on file   Tobacco Use    Smoking status: Never    Smokeless tobacco: Never   Vaping Use    Vaping status: Never Used   Substance and Sexual Activity    Alcohol use: Yes     Alcohol/week: 2.0 standard drinks of alcohol     Types: 2 Glasses of wine per week     Comment: Occasional    Drug use: Never    Sexual activity: Not on file   Other Topics Concern    Not on file   Social History Narrative    Not on file     Social Determinants of Health     Financial Resource Strain: Low Risk  (12/15/2022)    [facility]    Overall Financial Resource Strain (CARDIA)     Difficulty of Paying Living Expenses: Not hard at all "   Food Insecurity: No Food Insecurity (12/15/2022)    Received from [facility]    Hunger Vital Sign     Worried About Running Out of Food in the Last Year: Never true     Ran Out of Food in the Last Year: Never true   Transportation Needs: No Transportation Needs (12/15/2022)    Received from [facility]    PRAPARE - Transportation     Lack of Transportation (Medical): No     Lack of Transportation (Non-Medical): No   Physical Activity: Insufficiently Active (12/15/2022)    Received from [facility]    Exercise Vital Sign     Days of Exercise per Week: 2 days     Minutes of Exercise per Session: 60 min   Stress: No Stress Concern Present (12/15/2022)    Received from [facility]    [employer] [place] [employer] - Occupational Stress Questionnaire     Feeling of Stress : Only a little   Social Connections: Moderately Isolated (12/15/2022)    Received from [facility]    Social Connection and Isolation Panel [NHANES]     Frequency of Communication with Friends and Family: Once a week     Frequency of Social Gatherings with Friends and Family: Three times a week     Attends Zoroastrianism Services: Never     Active Member of Clubs or Organizations: No     Attends Club or Organization Meetings: Never     Marital Status:    Interpersonal Safety: Not At Risk (12/15/2022)    Received from [facility]    Humiliation, Afraid, Rape, and Kick questionnaire     Fear of Current or Ex-Partner: No     Emotionally Abused: No     Physically Abused: No     Sexually Abused: No   Housing Stability: Low Risk  (12/15/2022)    Received from [facility]    Housing Stability Vital Sign     Unable to Pay for Housing in the Last Year: No     Number of Places Lived in the Last Year: 1     Unstable Housing in the Last Year: No       Family History  Family History   Problem Relation Age of Onset    Anesthesia Reaction No family hx of     Deep  "Vein Thrombosis (DVT) No family hx of        Review of Systems  The complete review of systems is negative other than noted in the HPI or here.   Anesthesia Evaluation   Pt has had prior anesthetic.     History of anesthetic complications  - PONV.      ROS/MED HX  ENT/Pulmonary:     (+)     ODALIS risk factors, snores loudly,                               (-) tobacco use   Neurologic:  - neg neurologic ROS  (-) no seizures and no CVA   Cardiovascular:     (+)  - -   -  - -                                 Previous cardiac testing   Echo: Date: Results:    Stress Test:  Date: Results:    ECG Reviewed:  Date: 12/2022 Results:  Normal sinus rhythm   T wave abnormality, consider inferolateral ischemia   When compared with ECG of 09-DEC-2022 07:51,   Slight T wave changes     Cath:  Date: Results:   (-) taking anticoagulants/antiplatelets   METS/Exercise Tolerance: >4 METS Comment: Walk the dog 1-3 miles   Hematologic: Comments: Patient unsure if she has had a blood transfusion    (-) history of blood clots   Musculoskeletal:  - neg musculoskeletal ROS     GI/Hepatic: Comment: Intermittent heartburn symptoms that improve with tums     (+)       Inflammatory bowel disease,             Renal/Genitourinary:  - neg Renal ROS     Endo:  - neg endo ROS  (-) chronic steroid usage   Psychiatric/Substance Use:  - neg psychiatric ROS     Infectious Disease:  - neg infectious disease ROS     Malignancy:   (+) Malignancy, History of Other.Other CA SCC-anal Remission status post Surgery, Chemo and Radiation.    Other:            /84 (BP Location: Right arm, Patient Position: Sitting, Cuff Size: Adult Regular)   Pulse 66   Temp 97.5  F (36.4  C) (Oral)   Resp 16   Ht 1.651 m (5' 5\")   Wt 82 kg (180 lb 12.8 oz)   SpO2 97%   BMI 30.09 kg/m      Physical Exam   Constitutional: Awake, alert, cooperative, no apparent distress, and appears stated age.  Eyes: Pupils equal, round and reactive to light, extra ocular muscles intact, " sclera clear, conjunctiva normal.  HENT: Normocephalic, oral pharynx with moist mucus membranes, good dentition.   Respiratory: Clear to auscultation bilaterally, no crackles or wheezing.  Cardiovascular: Regular rate and rhythm, normal S1 and S2, and no murmur noted.  Carotids no bruits. No edema. Palpable pulses to radial arteries.   GI: Normal bowel sounds, soft, non-distended, non-tender, ostomy present  Genitourinary:  deferred  Skin: Warm and dry.   Musculoskeletal: Full ROM of neck. There is no redness, warmth, or swelling of the exposed joints. Gross motor strength is normal.    Neurologic: Awake, alert, oriented to name, place and time. Cranial nerves II-XII are grossly intact. Gait is normal.   Neuropsychiatric: Calm, cooperative. Normal affect.     Prior Labs/Diagnostic Studies   All labs and imaging personally reviewed     EKG/ stress test - if available please see in ROS above   No results found.       No data to display                  The patient's records and results personally reviewed by this provider.     Outside records reviewed from: Care Everywhere    LAB/DIAGNOSTIC STUDIES TODAY:  T&S    Assessment    Nicol Aldrich is a 63 year old female seen as a PAC referral for risk assessment and optimization for anesthesia.    Plan/Recommendations  Pt will be optimized for the proposed procedure.  See below for details on the assessment, risk, and preoperative recommendations    NEUROLOGY  - No history of TIA, CVA or seizure  -Post Op delirium risk factors:  No risk identified    ENT  - No current airway concerns.  Will need to be reassessed day of surgery.  Mallampati: I  TM: > 3    CARDIAC  - No history of CAD and Afib  -denies cardiac symptoms   - METS (Metabolic Equivalents)  Patient performs 4 or more METS exercise without symptoms             Total Score: 0      RCRI-Low risk: Class 2 0.9% complication rate             Total Score: 1    RCRI: High Risk Surgery        PULMONARY  ODALIS Low Risk        "      Total Score: 2    ODALIS: Snores loudly    ODALIS: Over 50 ys old      - Denies asthma or inhaler use  - Tobacco History    History   Smoking Status    Never   Smokeless Tobacco    Never       GI  -Crohn's using infliximab. Holding in preparation for the above procedure.   PONV High Risk  Total Score: 4           1 AN PONV: Pt is Female    1 AN PONV: Patient is not a current smoker    1 AN PONV: Patient has history of PONV    1 AN PONV: Intended Post Op Opioids        /RENAL  - Baseline Creatinine WNL    ENDOCRINE    - BMI: Estimated body mass index is 30.09 kg/m  as calculated from the following:    Height as of this encounter: 1.651 m (5' 5\").    Weight as of this encounter: 82 kg (180 lb 12.8 oz).  Overweight (BMI 25.0-29.9)  - No history of Diabetes Mellitus    HEME  VTE Low Risk 0.26%             Total Score: 1    VTE: Greater than 59 yrs old      - No history of abnormal bleeding or antiplatelet use.  -will update T&S today     Different anesthesia methods/types have been discussed with the patient, but they are aware that the final plan will be decided by the assigned anesthesia provider on the date of service.    The patient is optimized for their procedure. AVS with information on surgery time/arrival time, meds and NPO status given by nursing staff. No further diagnostic testing indicated.      On the day of service:     Prep time: 8 minutes  Visit time: 13 minutes  Documentation time: 6 minutes  ------------------------------------------  Total time: 27 minutes      Sari Huddleston PA-C  Preoperative Assessment Center  [facility]  Clinic and Surgery Center  Phone: [phone]  Fax: [phone]    "

## 2024-06-25 NOTE — PATIENT INSTRUCTIONS
Preparing for Your Surgery      Name:  Nicol Aldrich   MRN:  [id number]   :  1960   Today's Date:  2024       Arriving for surgery:  Surgery date:  7/10/24  Arrival time:  8:30 am  Surgery time: 10:30 am    Please come to:     Please come to:       [facility] [place] [facility]    [address] [place]  [address]     The [facility] [place] Patient/Visitor Ramp is at [address]. Patients and visitors who self-park will receive the reduced hospital parking rate. If the Patient /Visitor Ramp is full, please follow the signs to the [employer] car park located at the main hospital entrance.       parking is available (24 hours/ 7 days a week)      Discounted parking pass options are available for patients and visitors. They can be purchased at the [employer] desk at the main hospital entrance.     -    Stop at the security desk and they will direct surgery patients to the [facility] [phone]        - If you need directions, a wheelchair or an escort please stop at the Information/security desk in the lobby.     What can I eat or drink?  -  Follow instructions given to you by the Colon and rectal team regarding diet changes and bowel prep- Clear liquids the day before 24  -  You may have clear liquids until 2 hours prior to arrival time. (Until 6:30 am on 7/10/24)    Examples of clear liquids:  Water  Clear broth  Juices (apple, white grape, white cranberry  and cider) without pulp  Noncarbonated, powder based beverages  (lemonade and Rick-Aid)  Sodas (Sprite, 7-Up, ginger ale and seltzer)  Coffee or tea (without milk or cream)  Gatorade    -  No Alcohol or cannabis products for at least 24 hours before surgery.     Which medicines can I take?    Hold Ibuprofen (Advil, Motrin) for 5 day(s) before surgery--unless otherwise directed by surgeon.  Hold Naproxen (Aleve) for  5 days before surgery.        -  PLEASE TAKE these medications the day of surgery:   Acetaminophen (Tylenol) as needed   Trospium (Sanctura)     How do I prepare myself?  - Please take 2 showers (one the night prior to surgery and one the morning of surgery) using Scrubcare or Hibiclens soap.    Use this soap only from the neck to your toes.     Leave the soap on your skin for one minute--then rinse thoroughly.      You may use your own shampoo and conditioner. No other hair products.   - Please remove all jewelry and body piercings.  - No lotions, deodorants or fragrance.  - No makeup or fingernail polish.   - Bring your ID and insurance card.    -If you use a CPAP machine, please bring the CPAP machine, tubing, and mask to hospital.    -If you have a Deep Brain Stimulator, Spinal Cord Stimulator, or any Neuro Stimulator device---you must bring the remote control to the hospital.        Covid testing policy as of 12/06/2022  Your surgeon will notify and schedule you for a COVID test if one is needed before surgery--please direct any questions or COVID symptoms to your surgeon      Questions or Concerns:    - For any questions regarding the day of surgery or your hospital stay, please contact the Pre Admission Nursing Office at [phone].       - If you have health changes between today and your surgery, please call your surgeon.       - For questions after surgery, please call your surgeons office.           Current Visitor Guidelines    You may have 2 visitors in the pre op area.    Visiting hours: 8 a.m. to 8:30 p.m.    Patients confirmed or suspected to have symptoms of COVID 19 or flu:     No visitors allowed for adult patients.   Children (under age 18) can have 1 named visitor.     People who are sick or showing symptoms of COVID 19 or flu:    Are not allowed to visit patients--we can only make exceptions in special situations.       Please follow these guidelines for your visit:          Please maintain  social distance          Masking is optional--however at times you may be asked to wear a mask for the safety of yourself and others     Clean your hands with alcohol hand . Do this when you arrive at and leave the building and patient room,    And again after you touch your mask or anything in the room.     Go directly to and from the room you are visiting.     Stay in the patient s room during your visit. Limit going to other places in the hospital as much as possible     Leave bags and jackets at home or in the car.     For everyone s health, please don t come and go during your visit. That includes for smoking   during your visit.

## 2024-06-25 NOTE — PROGRESS NOTES
[facility] Preoperative Ostomy Consult    Referral from Dr. Herrera  Consult attended by patient   Diagnosis: Crohn's disease of both small and large intestine with fistula (H) Date of Surgery: 07/10/24   Type of Surgery: PROCTECTOMY, ABDOMINOPERINEAL, COLOSTOMY CREATION   Emotional readiness for surgery: no acute distress  Physical Limitations: Without limitations  Abdomen assessed for site by: Patient's ability to visiualize area, avoidance of skin creases and scars, palpating for rectus abdominus muscle, and clothing fit  Teaching: Pt has colostomy now no problems. .  Stoma site marking:  Marking pen and tegaderm   Location chosen:       Marry Guzmán NP was available for supervision of care if needed or if questions should arise and regarding plan of care.  Sabiha Ventura RN CWON

## 2024-06-25 NOTE — TELEPHONE ENCOUNTER
Caleb Camarena she will need another blood draw within 72 hours of surgery.  She's scheduled for the [facility] lab, July 8th at 2:00 pm.  She expressed understanding.  Kyleigh Beavesr RN

## 2024-06-27 DIAGNOSIS — Z01.818 PRE-OP EVALUATION: Primary | ICD-10-CM

## 2024-06-28 ENCOUNTER — TEAM CONFERENCE (OUTPATIENT)
Dept: SURGERY | Facility: CLINIC | Age: 64
End: 2024-06-28
Payer: COMMERCIAL

## 2024-06-28 DIAGNOSIS — Z93.3 COLOSTOMY STATUS (H): Primary | ICD-10-CM

## 2024-06-28 RX ORDER — POLYETHYLENE GLYCOL 3350 17 G/17G
238 POWDER, FOR SOLUTION ORAL SEE ADMIN INSTRUCTIONS
Qty: 14 PACKET | Refills: 0 | Status: ON HOLD | OUTPATIENT
Start: 2024-06-28 | End: 2024-07-15

## 2024-06-28 RX ORDER — ONDANSETRON 4 MG/1
4 TABLET, FILM COATED ORAL EVERY 6 HOURS
Qty: 3 TABLET | Refills: 0 | Status: ON HOLD | OUTPATIENT
Start: 2024-06-28 | End: 2024-07-15

## 2024-06-28 RX ORDER — NEOMYCIN SULFATE 500 MG/1
1000 TABLET ORAL EVERY 6 HOURS
Qty: 6 TABLET | Refills: 0 | Status: ON HOLD | OUTPATIENT
Start: 2024-06-28 | End: 2024-07-15

## 2024-06-28 RX ORDER — MAGNESIUM CARB/ALUMINUM HYDROX 105-160MG
296 TABLET,CHEWABLE ORAL ONCE
Qty: 296 ML | Refills: 0 | Status: SHIPPED | OUTPATIENT
Start: 2024-06-28 | End: 2024-06-28

## 2024-06-28 RX ORDER — METRONIDAZOLE 500 MG/1
500 TABLET ORAL EVERY 6 HOURS
Qty: 3 TABLET | Refills: 0 | Status: ON HOLD | OUTPATIENT
Start: 2024-06-28 | End: 2024-07-15

## 2024-06-28 NOTE — PROGRESS NOTES
COLON AND RECTAL SURGERY HUDDLE:    Patient was reviewed in preparation for their surgery the following was reviewed and has been completed:    Surgeon: Dr. Kunal Herrera    Surgery & Date: 7/11 APR    Last MD Note: reviewed    Anesthesia Type: General    Other Providers: N/A    PAC: Yes    WOC: Yes    Labs: Yes    Bowel Prep: Yes MiraLAX / Gatorade , Antibiotic, and Magnesium Citrate    Packet: Yes    Imaging: No    Post-Op Appointments: Yes    Pre op soap: Yes Questions about shower:     Is patient on TPN?: No   If yes, I contacted the TPN pharmacist by paging the  pharmacy at [phone] or calling [phone]. I also contacted Keyanna MISHRA with inpatient colon and rectal team.     Pre op call complete: Yes   Left message

## 2024-07-07 LAB
ABO/RH(D): NORMAL
ANTIBODY SCREEN: NEGATIVE
SPECIMEN EXPIRATION DATE: NORMAL

## 2024-07-08 ENCOUNTER — TELEPHONE (OUTPATIENT)
Dept: SURGERY | Facility: CLINIC | Age: 64
End: 2024-07-08

## 2024-07-08 ENCOUNTER — LAB (OUTPATIENT)
Dept: LAB | Facility: OTHER | Age: 64
End: 2024-07-08
Payer: COMMERCIAL

## 2024-07-08 DIAGNOSIS — Z01.818 PRE-OP EVALUATION: ICD-10-CM

## 2024-07-08 PROCEDURE — 86850 RBC ANTIBODY SCREEN: CPT

## 2024-07-08 PROCEDURE — 86901 BLOOD TYPING SEROLOGIC RH(D): CPT

## 2024-07-08 PROCEDURE — 86900 BLOOD TYPING SEROLOGIC ABO: CPT

## 2024-07-08 PROCEDURE — 36415 COLL VENOUS BLD VENIPUNCTURE: CPT

## 2024-07-08 NOTE — TELEPHONE ENCOUNTER
Patient confirmed scheduled appointment:  Date: 8/29/24  Time: 11:30 am  Visit type: Post-op  Provider: Dr. Herrera  Location: [facility]  Testing/imaging: n/a  Additional notes: rescheduled 8/22 appt due to the provider being out

## 2024-07-09 NOTE — ANESTHESIA PREPROCEDURE EVALUATION
"Anesthesia Pre-Procedure Evaluation    Patient: Nicol Aldrich   MRN: [id number] : 1960        Procedure : Procedure(s):  PROCTECTOMY, ABDOMINOPERINEAL, COLOSTOMY CREATION,  Perineal/pelvic reconstruction with abdominal/thigh flap,          Past Medical History:   Diagnosis Date     Squamous cell carcinoma of skin, unspecified       Past Surgical History:   Procedure Laterality Date      SECTION       COLONOSCOPY N/A 2024    Procedure: Colonoscopy with biopsies;  Surgeon: Jordan Coyle MD;  Location: [facility] OR     EXAM UNDER ANESTHESIA ANUS N/A 2024    Procedure: EXAM UNDER ANESTHESIA, ANUS, ANAL DILATION;  Surgeon: Kunal Herrera MD;  Location: [facility] OR     ostomy creation       SIGMOIDOSCOPY FLEXIBLE N/A 2024    Procedure: Sigmoidoscopy flexible, With Hegar Dilation;  Surgeon: Jrodan Coyle MD;  Location: [facility] OR      Allergies   Allergen Reactions     Blood Transfusion Related (Informational Only) Other (See Comments)     Patient has a history of a clinically significant antibody against RBC antigens.  A delay in compatible RBCs may occur.     Penicillins Rash     On 07/10/18, pt states that she experienced a rash when she had this \"a while ago.\" This was not a reaction from childhood.   On 07/10/18, pt states that she experienced a rash when she had this \"a while ago.\" This was not a reaction from childhood.         Social History     Tobacco Use     Smoking status: Never     Smokeless tobacco: Never   Substance Use Topics     Alcohol use: Yes     Alcohol/week: 2.0 standard drinks of alcohol     Types: 2 Glasses of wine per week     Comment: Occasional      Wt Readings from Last 1 Encounters:   24 82 kg (180 lb 12.8 oz)        Anesthesia Evaluation   Pt has had prior anesthetic.     History of anesthetic complications  - PONV.      ROS/MED HX  ENT/Pulmonary:  - neg pulmonary ROS     Neurologic:  - neg neurologic ROS     Cardiovascular: Comment: ECG " "Date: 12/2022 Results:  Normal sinus rhythm   T wave abnormality, consider inferolateral ischemia   When compared with ECG of 09-DEC-2022 07:51,   Slight T wave changes    - neg cardiovascular ROS     METS/Exercise Tolerance:     Hematologic:       Musculoskeletal:       GI/Hepatic: Comment: Crohn's disease      Renal/Genitourinary:       Endo:       Psychiatric/Substance Use:       Infectious Disease:       Malignancy:       Other:            Physical Exam    Airway        Mallampati: II   TM distance: > 3 FB   Neck ROM: full   Mouth opening: > 3 cm    Respiratory Devices and Support         Dental       (+) Minor Abnormalities - some fillings, tiny chips    B=Bridge, C=Chipped, L=Loose, M=Missing    Cardiovascular   cardiovascular exam normal       Rhythm and rate: regular and normal     Pulmonary   pulmonary exam normal        breath sounds clear to auscultation         OUTSIDE LABS:  CBC:   Lab Results   Component Value Date    WBC 8.6 04/30/2024    WBC 5.6 08/17/2023    HGB 13.3 04/30/2024    HGB 12.8 08/17/2023    HCT 39.6 04/30/2024    HCT 38.6 08/17/2023     04/30/2024     08/17/2023     BMP:   Lab Results   Component Value Date     04/30/2024     06/13/2023    POTASSIUM 4.5 04/30/2024    POTASSIUM 4.4 06/13/2023    CHLORIDE 104 04/30/2024    CHLORIDE 104 06/13/2023    CO2 27 04/30/2024    CO2 28 06/13/2023    BUN 14.4 04/30/2024    BUN 13.1 06/13/2023    CR 0.80 04/30/2024    CR 0.97 (H) 06/13/2023     (H) 04/30/2024    GLC 96 06/13/2023     COAGS:   Lab Results   Component Value Date    INR 1.06 04/30/2024     POC: No results found for: \"BGM\", \"HCG\", \"HCGS\"  HEPATIC:   Lab Results   Component Value Date    ALBUMIN 4.4 04/30/2024    PROTTOTAL 7.7 04/30/2024    ALT 9 04/30/2024    AST 22 04/30/2024    ALKPHOS 70 04/30/2024    BILITOTAL 0.3 04/30/2024     OTHER:   Lab Results   Component Value Date    MILO 9.7 04/30/2024    SED 21 04/30/2024       Anesthesia Plan    ASA Status: " " 3    NPO Status:  ELEVATED Aspiration Risk/Unknown    Anesthesia Type: General.     - Airway: ETT   Induction: RSI.   Maintenance: Balanced.   Techniques and Equipment:     - Lines/Monitors: BIS, 2nd IV, Arterial Line     Consents    Anesthesia Plan(s) and associated risks, benefits, and realistic alternatives discussed. Questions answered and patient/representative(s) expressed understanding.     - Discussed: Risks, Benefits and Alternatives for the PROCEDURE were discussed     - Discussed with:  Patient            Postoperative Care    Pain management: IV analgesics, Oral pain medications, Multi-modal analgesia.   PONV prophylaxis: Ondansetron (or other 5HT-3), Promethazine or metoclopramide, Dexamethasone or Solumedrol     Comments:               Chuy Martinez MD    I have reviewed the pertinent notes and labs in the chart from the past 30 days and (re)examined the patient.  Any updates or changes from those notes are reflected in this note.              # Obesity: Estimated body mass index is 30.09 kg/m  as calculated from the following:    Height as of 6/25/24: 1.651 m (5' 5\").    Weight as of 6/25/24: 82 kg (180 lb 12.8 oz).      " no

## 2024-07-10 ENCOUNTER — ANESTHESIA (OUTPATIENT)
Dept: SURGERY | Facility: CLINIC | Age: 64
End: 2024-07-10
Payer: COMMERCIAL

## 2024-07-10 ENCOUNTER — HOSPITAL ENCOUNTER (INPATIENT)
Facility: CLINIC | Age: 64
LOS: 5 days | Discharge: HOME HEALTH | End: 2024-07-15
Attending: SURGERY | Admitting: SURGERY
Payer: COMMERCIAL

## 2024-07-10 DIAGNOSIS — K50.819 CROHN'S DISEASE OF BOTH SMALL AND LARGE INTESTINE WITH COMPLICATION (H): ICD-10-CM

## 2024-07-10 DIAGNOSIS — Z93.3 S/P COLOSTOMY (H): Primary | ICD-10-CM

## 2024-07-10 DIAGNOSIS — G89.18 ACUTE POST-OPERATIVE PAIN: ICD-10-CM

## 2024-07-10 PROBLEM — K50.90 CROHN DISEASE (H): Status: ACTIVE | Noted: 2024-07-10

## 2024-07-10 LAB
ABO/RH(D): NORMAL
ANTIBODY SCREEN: NEGATIVE
CREAT SERPL-MCNC: 0.95 MG/DL (ref 0.51–0.95)
EGFRCR SERPLBLD CKD-EPI 2021: 68 ML/MIN/1.73M2
GLUCOSE BLDC GLUCOMTR-MCNC: 107 MG/DL (ref 70–99)
HOLD SPECIMEN: NORMAL
SPECIMEN EXPIRATION DATE: NORMAL

## 2024-07-10 PROCEDURE — 258N000003 HC RX IP 258 OP 636

## 2024-07-10 PROCEDURE — 250N000013 HC RX MED GY IP 250 OP 250 PS 637: Performed by: SURGERY

## 2024-07-10 PROCEDURE — 88304 TISSUE EXAM BY PATHOLOGIST: CPT | Mod: TC | Performed by: SURGERY

## 2024-07-10 PROCEDURE — 710N000010 HC RECOVERY PHASE 1, LEVEL 2, PER MIN: Performed by: SURGERY

## 2024-07-10 PROCEDURE — 45110 REMOVAL OF RECTUM: CPT | Mod: 62 | Performed by: SURGERY

## 2024-07-10 PROCEDURE — 250N000012 HC RX MED GY IP 250 OP 636 PS 637: Performed by: STUDENT IN AN ORGANIZED HEALTH CARE EDUCATION/TRAINING PROGRAM

## 2024-07-10 PROCEDURE — 120N000002 HC R&B MED SURG/OB UMMC

## 2024-07-10 PROCEDURE — 15734 MUSCLE-SKIN GRAFT TRUNK: CPT | Mod: GC | Performed by: PLASTIC SURGERY

## 2024-07-10 PROCEDURE — 36415 COLL VENOUS BLD VENIPUNCTURE: CPT | Performed by: ANESTHESIOLOGY

## 2024-07-10 PROCEDURE — 88302 TISSUE EXAM BY PATHOLOGIST: CPT | Mod: 26 | Performed by: PATHOLOGY

## 2024-07-10 PROCEDURE — 272N000001 HC OR GENERAL SUPPLY STERILE: Performed by: SURGERY

## 2024-07-10 PROCEDURE — 88305 TISSUE EXAM BY PATHOLOGIST: CPT | Mod: 26 | Performed by: PATHOLOGY

## 2024-07-10 PROCEDURE — 250N000011 HC RX IP 250 OP 636: Performed by: SURGERY

## 2024-07-10 PROCEDURE — 258N000003 HC RX IP 258 OP 636: Performed by: ANESTHESIOLOGY

## 2024-07-10 PROCEDURE — 250N000009 HC RX 250

## 2024-07-10 PROCEDURE — 88304 TISSUE EXAM BY PATHOLOGIST: CPT | Mod: 26 | Performed by: PATHOLOGY

## 2024-07-10 PROCEDURE — 0KXK0ZZ TRANSFER RIGHT ABDOMEN MUSCLE, OPEN APPROACH: ICD-10-PCS | Performed by: PLASTIC SURGERY

## 2024-07-10 PROCEDURE — 360N000078 HC SURGERY LEVEL 5, PER MIN: Performed by: SURGERY

## 2024-07-10 PROCEDURE — 250N000011 HC RX IP 250 OP 636

## 2024-07-10 PROCEDURE — 88307 TISSUE EXAM BY PATHOLOGIST: CPT | Mod: 26 | Performed by: PATHOLOGY

## 2024-07-10 PROCEDURE — 258N000003 HC RX IP 258 OP 636: Performed by: SURGERY

## 2024-07-10 PROCEDURE — 999N000141 HC STATISTIC PRE-PROCEDURE NURSING ASSESSMENT: Performed by: SURGERY

## 2024-07-10 PROCEDURE — 250N000011 HC RX IP 250 OP 636: Performed by: ANESTHESIOLOGY

## 2024-07-10 PROCEDURE — 250N000025 HC SEVOFLURANE, PER MIN: Performed by: SURGERY

## 2024-07-10 PROCEDURE — 86900 BLOOD TYPING SEROLOGIC ABO: CPT | Performed by: ANESTHESIOLOGY

## 2024-07-10 PROCEDURE — 250N000009 HC RX 250: Performed by: ANESTHESIOLOGY

## 2024-07-10 PROCEDURE — 45110 REMOVAL OF RECTUM: CPT | Performed by: ANESTHESIOLOGY

## 2024-07-10 PROCEDURE — 82565 ASSAY OF CREATININE: CPT | Performed by: SURGERY

## 2024-07-10 PROCEDURE — 36415 COLL VENOUS BLD VENIPUNCTURE: CPT | Performed by: SURGERY

## 2024-07-10 PROCEDURE — 0DTQ0ZZ RESECTION OF ANUS, OPEN APPROACH: ICD-10-PCS | Performed by: SURGERY

## 2024-07-10 PROCEDURE — 0DTP0ZZ RESECTION OF RECTUM, OPEN APPROACH: ICD-10-PCS | Performed by: SURGERY

## 2024-07-10 PROCEDURE — 0WBF0ZZ EXCISION OF ABDOMINAL WALL, OPEN APPROACH: ICD-10-PCS | Performed by: SURGERY

## 2024-07-10 PROCEDURE — 370N000017 HC ANESTHESIA TECHNICAL FEE, PER MIN: Performed by: SURGERY

## 2024-07-10 RX ORDER — NALOXONE HYDROCHLORIDE 0.4 MG/ML
0.4 INJECTION, SOLUTION INTRAMUSCULAR; INTRAVENOUS; SUBCUTANEOUS
Status: DISCONTINUED | OUTPATIENT
Start: 2024-07-10 | End: 2024-07-15 | Stop reason: HOSPADM

## 2024-07-10 RX ORDER — CEFAZOLIN SODIUM/WATER 2 G/20 ML
2 SYRINGE (ML) INTRAVENOUS SEE ADMIN INSTRUCTIONS
Status: DISCONTINUED | OUTPATIENT
Start: 2024-07-10 | End: 2024-07-10

## 2024-07-10 RX ORDER — ENOXAPARIN SODIUM 100 MG/ML
40 INJECTION SUBCUTANEOUS
Status: COMPLETED | OUTPATIENT
Start: 2024-07-10 | End: 2024-07-10

## 2024-07-10 RX ORDER — HYDROMORPHONE HCL IN WATER/PF 6 MG/30 ML
0.2 PATIENT CONTROLLED ANALGESIA SYRINGE INTRAVENOUS EVERY 5 MIN PRN
Status: DISCONTINUED | OUTPATIENT
Start: 2024-07-10 | End: 2024-07-10 | Stop reason: HOSPADM

## 2024-07-10 RX ORDER — ONDANSETRON 2 MG/ML
4 INJECTION INTRAMUSCULAR; INTRAVENOUS EVERY 30 MIN PRN
Status: DISCONTINUED | OUTPATIENT
Start: 2024-07-10 | End: 2024-07-10 | Stop reason: HOSPADM

## 2024-07-10 RX ORDER — SODIUM CHLORIDE, SODIUM LACTATE, POTASSIUM CHLORIDE, CALCIUM CHLORIDE 600; 310; 30; 20 MG/100ML; MG/100ML; MG/100ML; MG/100ML
INJECTION, SOLUTION INTRAVENOUS CONTINUOUS PRN
Status: DISCONTINUED | OUTPATIENT
Start: 2024-07-10 | End: 2024-07-10

## 2024-07-10 RX ORDER — OXYCODONE HYDROCHLORIDE 5 MG/1
5 TABLET ORAL
Status: DISCONTINUED | OUTPATIENT
Start: 2024-07-10 | End: 2024-07-10 | Stop reason: HOSPADM

## 2024-07-10 RX ORDER — PROPOFOL 10 MG/ML
INJECTION, EMULSION INTRAVENOUS PRN
Status: DISCONTINUED | OUTPATIENT
Start: 2024-07-10 | End: 2024-07-10

## 2024-07-10 RX ORDER — PREDNISONE 5 MG/1
5 TABLET ORAL DAILY
Status: DISCONTINUED | OUTPATIENT
Start: 2024-07-10 | End: 2024-07-15 | Stop reason: HOSPADM

## 2024-07-10 RX ORDER — LIDOCAINE 40 MG/G
CREAM TOPICAL
Status: CANCELLED | OUTPATIENT
Start: 2024-07-10

## 2024-07-10 RX ORDER — ONDANSETRON 4 MG/1
4 TABLET, ORALLY DISINTEGRATING ORAL EVERY 30 MIN PRN
Status: DISCONTINUED | OUTPATIENT
Start: 2024-07-10 | End: 2024-07-10 | Stop reason: HOSPADM

## 2024-07-10 RX ORDER — ONDANSETRON 2 MG/ML
4 INJECTION INTRAMUSCULAR; INTRAVENOUS ONCE
Status: DISCONTINUED | OUTPATIENT
Start: 2024-07-10 | End: 2024-07-11

## 2024-07-10 RX ORDER — ONDANSETRON 4 MG/1
4 TABLET, ORALLY DISINTEGRATING ORAL EVERY 6 HOURS PRN
Status: DISCONTINUED | OUTPATIENT
Start: 2024-07-10 | End: 2024-07-12

## 2024-07-10 RX ORDER — FENTANYL CITRATE 50 UG/ML
INJECTION, SOLUTION INTRAMUSCULAR; INTRAVENOUS PRN
Status: DISCONTINUED | OUTPATIENT
Start: 2024-07-10 | End: 2024-07-10

## 2024-07-10 RX ORDER — FENTANYL CITRATE 50 UG/ML
50 INJECTION, SOLUTION INTRAMUSCULAR; INTRAVENOUS EVERY 5 MIN PRN
Status: DISCONTINUED | OUTPATIENT
Start: 2024-07-10 | End: 2024-07-10 | Stop reason: HOSPADM

## 2024-07-10 RX ORDER — LIDOCAINE 40 MG/G
CREAM TOPICAL
Status: DISCONTINUED | OUTPATIENT
Start: 2024-07-10 | End: 2024-07-15 | Stop reason: HOSPADM

## 2024-07-10 RX ORDER — METHOCARBAMOL 500 MG/1
500 TABLET, FILM COATED ORAL 4 TIMES DAILY
Status: DISCONTINUED | OUTPATIENT
Start: 2024-07-10 | End: 2024-07-13

## 2024-07-10 RX ORDER — DEXAMETHASONE SODIUM PHOSPHATE 4 MG/ML
4 INJECTION, SOLUTION INTRA-ARTICULAR; INTRALESIONAL; INTRAMUSCULAR; INTRAVENOUS; SOFT TISSUE
Status: DISCONTINUED | OUTPATIENT
Start: 2024-07-10 | End: 2024-07-10 | Stop reason: HOSPADM

## 2024-07-10 RX ORDER — GABAPENTIN 100 MG/1
100 CAPSULE ORAL 3 TIMES DAILY
Status: DISCONTINUED | OUTPATIENT
Start: 2024-07-10 | End: 2024-07-15 | Stop reason: HOSPADM

## 2024-07-10 RX ORDER — HYDROMORPHONE HCL IN WATER/PF 6 MG/30 ML
0.4 PATIENT CONTROLLED ANALGESIA SYRINGE INTRAVENOUS EVERY 5 MIN PRN
Status: DISCONTINUED | OUTPATIENT
Start: 2024-07-10 | End: 2024-07-10 | Stop reason: HOSPADM

## 2024-07-10 RX ORDER — ACETAMINOPHEN 325 MG/1
975 TABLET ORAL ONCE
Status: COMPLETED | OUTPATIENT
Start: 2024-07-10 | End: 2024-07-10

## 2024-07-10 RX ORDER — NALOXONE HYDROCHLORIDE 0.4 MG/ML
0.1 INJECTION, SOLUTION INTRAMUSCULAR; INTRAVENOUS; SUBCUTANEOUS
Status: DISCONTINUED | OUTPATIENT
Start: 2024-07-10 | End: 2024-07-10 | Stop reason: HOSPADM

## 2024-07-10 RX ORDER — NALOXONE HYDROCHLORIDE 0.4 MG/ML
0.2 INJECTION, SOLUTION INTRAMUSCULAR; INTRAVENOUS; SUBCUTANEOUS
Status: DISCONTINUED | OUTPATIENT
Start: 2024-07-10 | End: 2024-07-15 | Stop reason: HOSPADM

## 2024-07-10 RX ORDER — FENTANYL CITRATE 50 UG/ML
25 INJECTION, SOLUTION INTRAMUSCULAR; INTRAVENOUS EVERY 5 MIN PRN
Status: DISCONTINUED | OUTPATIENT
Start: 2024-07-10 | End: 2024-07-10 | Stop reason: HOSPADM

## 2024-07-10 RX ORDER — OXYCODONE HYDROCHLORIDE 10 MG/1
10 TABLET ORAL
Status: DISCONTINUED | OUTPATIENT
Start: 2024-07-10 | End: 2024-07-10 | Stop reason: HOSPADM

## 2024-07-10 RX ORDER — ONDANSETRON 2 MG/ML
INJECTION INTRAMUSCULAR; INTRAVENOUS PRN
Status: DISCONTINUED | OUTPATIENT
Start: 2024-07-10 | End: 2024-07-10

## 2024-07-10 RX ORDER — ACETAMINOPHEN 500 MG
1000 TABLET ORAL EVERY 6 HOURS
Status: DISCONTINUED | OUTPATIENT
Start: 2024-07-10 | End: 2024-07-13

## 2024-07-10 RX ORDER — GABAPENTIN 300 MG/1
600 CAPSULE ORAL
Status: COMPLETED | OUTPATIENT
Start: 2024-07-10 | End: 2024-07-10

## 2024-07-10 RX ORDER — DEXAMETHASONE SODIUM PHOSPHATE 4 MG/ML
INJECTION, SOLUTION INTRA-ARTICULAR; INTRALESIONAL; INTRAMUSCULAR; INTRAVENOUS; SOFT TISSUE PRN
Status: DISCONTINUED | OUTPATIENT
Start: 2024-07-10 | End: 2024-07-10

## 2024-07-10 RX ORDER — SCOLOPAMINE TRANSDERMAL SYSTEM 1 MG/1
1 PATCH, EXTENDED RELEASE TRANSDERMAL ONCE
Status: COMPLETED | OUTPATIENT
Start: 2024-07-10 | End: 2024-07-11

## 2024-07-10 RX ORDER — CEFAZOLIN SODIUM/WATER 2 G/20 ML
2 SYRINGE (ML) INTRAVENOUS
Status: COMPLETED | OUTPATIENT
Start: 2024-07-10 | End: 2024-07-10

## 2024-07-10 RX ORDER — PROPOFOL 10 MG/ML
INJECTION, EMULSION INTRAVENOUS CONTINUOUS PRN
Status: DISCONTINUED | OUTPATIENT
Start: 2024-07-10 | End: 2024-07-10

## 2024-07-10 RX ORDER — LIDOCAINE HYDROCHLORIDE 20 MG/ML
INJECTION, SOLUTION INFILTRATION; PERINEURAL PRN
Status: DISCONTINUED | OUTPATIENT
Start: 2024-07-10 | End: 2024-07-10

## 2024-07-10 RX ORDER — ENOXAPARIN SODIUM 100 MG/ML
40 INJECTION SUBCUTANEOUS EVERY 24 HOURS
Status: DISCONTINUED | OUTPATIENT
Start: 2024-07-11 | End: 2024-07-15 | Stop reason: HOSPADM

## 2024-07-10 RX ORDER — METRONIDAZOLE 500 MG/100ML
500 INJECTION, SOLUTION INTRAVENOUS
Status: COMPLETED | OUTPATIENT
Start: 2024-07-10 | End: 2024-07-10

## 2024-07-10 RX ORDER — SODIUM CHLORIDE, SODIUM LACTATE, POTASSIUM CHLORIDE, CALCIUM CHLORIDE 600; 310; 30; 20 MG/100ML; MG/100ML; MG/100ML; MG/100ML
INJECTION, SOLUTION INTRAVENOUS CONTINUOUS
Status: DISCONTINUED | OUTPATIENT
Start: 2024-07-10 | End: 2024-07-13

## 2024-07-10 RX ORDER — ONDANSETRON 2 MG/ML
4 INJECTION INTRAMUSCULAR; INTRAVENOUS EVERY 6 HOURS PRN
Status: DISCONTINUED | OUTPATIENT
Start: 2024-07-10 | End: 2024-07-12

## 2024-07-10 RX ORDER — SODIUM CHLORIDE, SODIUM LACTATE, POTASSIUM CHLORIDE, CALCIUM CHLORIDE 600; 310; 30; 20 MG/100ML; MG/100ML; MG/100ML; MG/100ML
INJECTION, SOLUTION INTRAVENOUS CONTINUOUS
Status: DISCONTINUED | OUTPATIENT
Start: 2024-07-10 | End: 2024-07-10 | Stop reason: HOSPADM

## 2024-07-10 RX ORDER — KETAMINE HYDROCHLORIDE 10 MG/ML
INJECTION INTRAMUSCULAR; INTRAVENOUS PRN
Status: DISCONTINUED | OUTPATIENT
Start: 2024-07-10 | End: 2024-07-10

## 2024-07-10 RX ADMIN — FENTANYL CITRATE 50 MCG: 50 INJECTION INTRAMUSCULAR; INTRAVENOUS at 12:22

## 2024-07-10 RX ADMIN — Medication 2 G: at 16:14

## 2024-07-10 RX ADMIN — PROPOFOL 30 MCG/KG/MIN: 10 INJECTION, EMULSION INTRAVENOUS at 12:21

## 2024-07-10 RX ADMIN — Medication 30 MG: at 12:02

## 2024-07-10 RX ADMIN — Medication 100 MG: at 17:34

## 2024-07-10 RX ADMIN — FENTANYL CITRATE 25 MCG: 50 INJECTION, SOLUTION INTRAMUSCULAR; INTRAVENOUS at 19:32

## 2024-07-10 RX ADMIN — ONDANSETRON 4 MG: 2 INJECTION INTRAMUSCULAR; INTRAVENOUS at 17:06

## 2024-07-10 RX ADMIN — GABAPENTIN 600 MG: 300 CAPSULE ORAL at 09:43

## 2024-07-10 RX ADMIN — SODIUM CHLORIDE, POTASSIUM CHLORIDE, SODIUM LACTATE AND CALCIUM CHLORIDE: 600; 310; 30; 20 INJECTION, SOLUTION INTRAVENOUS at 20:07

## 2024-07-10 RX ADMIN — LIDOCAINE HYDROCHLORIDE 100 MG: 20 INJECTION, SOLUTION INFILTRATION; PERINEURAL at 12:02

## 2024-07-10 RX ADMIN — SODIUM CHLORIDE, POTASSIUM CHLORIDE, SODIUM LACTATE AND CALCIUM CHLORIDE: 600; 310; 30; 20 INJECTION, SOLUTION INTRAVENOUS at 17:51

## 2024-07-10 RX ADMIN — PHENYLEPHRINE HYDROCHLORIDE 100 MCG: 10 INJECTION INTRAVENOUS at 15:51

## 2024-07-10 RX ADMIN — FENTANYL CITRATE 50 MCG: 50 INJECTION INTRAMUSCULAR; INTRAVENOUS at 12:57

## 2024-07-10 RX ADMIN — PREDNISONE 5 MG: 5 TABLET ORAL at 20:22

## 2024-07-10 RX ADMIN — METHOCARBAMOL 500 MG: 500 TABLET ORAL at 20:21

## 2024-07-10 RX ADMIN — SODIUM CHLORIDE, SODIUM LACTATE, POTASSIUM CHLORIDE, CALCIUM CHLORIDE: 600; 310; 30; 20 INJECTION, SOLUTION INTRAVENOUS at 12:21

## 2024-07-10 RX ADMIN — SODIUM CHLORIDE, POTASSIUM CHLORIDE, SODIUM LACTATE AND CALCIUM CHLORIDE: 600; 310; 30; 20 INJECTION, SOLUTION INTRAVENOUS at 15:05

## 2024-07-10 RX ADMIN — Medication 100 MG: at 17:46

## 2024-07-10 RX ADMIN — METRONIDAZOLE 500 MG: 500 INJECTION, SOLUTION INTRAVENOUS at 10:29

## 2024-07-10 RX ADMIN — SODIUM CHLORIDE, POTASSIUM CHLORIDE, SODIUM LACTATE AND CALCIUM CHLORIDE: 600; 310; 30; 20 INJECTION, SOLUTION INTRAVENOUS at 16:44

## 2024-07-10 RX ADMIN — SODIUM CHLORIDE, POTASSIUM CHLORIDE, SODIUM LACTATE AND CALCIUM CHLORIDE: 600; 310; 30; 20 INJECTION, SOLUTION INTRAVENOUS at 18:40

## 2024-07-10 RX ADMIN — GABAPENTIN 100 MG: 100 CAPSULE ORAL at 20:21

## 2024-07-10 RX ADMIN — Medication 10 MG: at 14:45

## 2024-07-10 RX ADMIN — Medication 50 MG: at 12:02

## 2024-07-10 RX ADMIN — ENOXAPARIN SODIUM 40 MG: 40 INJECTION SUBCUTANEOUS at 10:37

## 2024-07-10 RX ADMIN — FENTANYL CITRATE 25 MCG: 50 INJECTION, SOLUTION INTRAMUSCULAR; INTRAVENOUS at 18:37

## 2024-07-10 RX ADMIN — Medication: at 20:56

## 2024-07-10 RX ADMIN — Medication 20 MG: at 12:57

## 2024-07-10 RX ADMIN — DEXAMETHASONE SODIUM PHOSPHATE 4 MG: 4 INJECTION, SOLUTION INTRA-ARTICULAR; INTRALESIONAL; INTRAMUSCULAR; INTRAVENOUS; SOFT TISSUE at 12:02

## 2024-07-10 RX ADMIN — SCOPALAMINE 1 PATCH: 1 PATCH, EXTENDED RELEASE TRANSDERMAL at 09:44

## 2024-07-10 RX ADMIN — Medication 20 MG: at 16:12

## 2024-07-10 RX ADMIN — PROPOFOL 80 MG: 10 INJECTION, EMULSION INTRAVENOUS at 12:02

## 2024-07-10 RX ADMIN — Medication 10 MG: at 14:10

## 2024-07-10 RX ADMIN — Medication 20 MG: at 15:21

## 2024-07-10 RX ADMIN — ACETAMINOPHEN 975 MG: 325 TABLET, FILM COATED ORAL at 09:42

## 2024-07-10 RX ADMIN — FENTANYL CITRATE 50 MCG: 50 INJECTION, SOLUTION INTRAMUSCULAR; INTRAVENOUS at 18:11

## 2024-07-10 RX ADMIN — Medication 10 MG: at 13:43

## 2024-07-10 RX ADMIN — HYDROMORPHONE HYDROCHLORIDE 0.5 MG: 1 INJECTION, SOLUTION INTRAMUSCULAR; INTRAVENOUS; SUBCUTANEOUS at 16:53

## 2024-07-10 RX ADMIN — SODIUM CHLORIDE, POTASSIUM CHLORIDE, SODIUM LACTATE AND CALCIUM CHLORIDE: 600; 310; 30; 20 INJECTION, SOLUTION INTRAVENOUS at 11:50

## 2024-07-10 RX ADMIN — ACETAMINOPHEN 1000 MG: 325 TABLET, FILM COATED ORAL at 20:22

## 2024-07-10 RX ADMIN — Medication 2 G: at 12:11

## 2024-07-10 RX ADMIN — PHENYLEPHRINE HYDROCHLORIDE 100 MCG: 10 INJECTION INTRAVENOUS at 16:21

## 2024-07-10 ASSESSMENT — ACTIVITIES OF DAILY LIVING (ADL)
ADLS_ACUITY_SCORE: 20
ADLS_ACUITY_SCORE: 31
ADLS_ACUITY_SCORE: 20
ADLS_ACUITY_SCORE: 29
ADLS_ACUITY_SCORE: 20

## 2024-07-10 NOTE — ANESTHESIA POSTPROCEDURE EVALUATION
Patient: Nicol Aldrich    Procedure: Procedure(s):  exploratory laparotomy, abdominal perineal resection, colostomy revision, take down of colocutaneous fistula  Right Vertical Rectus Abdominis Musculocutaneous transpelvic flap reconstruction of perineum.       Anesthesia Type:  General    Note:  Disposition: Admission   Postop Pain Control: Uneventful            Sign Out: Well controlled pain   PONV: No   Neuro/Psych: Uneventful            Sign Out: Acceptable/Baseline neuro status   Airway/Respiratory: Uneventful            Sign Out: Acceptable/Baseline resp. status   CV/Hemodynamics: Uneventful            Sign Out: Acceptable CV status; No obvious hypovolemia; No obvious fluid overload   Other NRE: NONE   DID A NON-ROUTINE EVENT OCCUR? No           Last vitals:  Vitals Value Taken Time   /68 07/10/24 1845   Temp 36.4  C (97.6  F) 07/10/24 1800   Pulse 85 07/10/24 1858   Resp 9 07/10/24 1858   SpO2 95 % 07/10/24 1858   Vitals shown include unfiled device data.    Electronically Signed By: Don Fajardo MD  July 10, 2024  6:59 PM   Please advise.

## 2024-07-10 NOTE — OP NOTE
PREOPERATIVE DIAGNOSIS: History of Crohn's disease and squamous cell carcinoma of the anus with history of radiation therapy now requiring APR and pelvic reconstruction    POSTOPERATIVE DIAGNOSIS: As above    PROCEDURES:   1.  Right rectus abdominis vertical transpelvic musculocutaneous flap reconstruction of perineum     SURGEON: Goyo Mccarthy MD      RESIDENTS: Roro Muniz MD; and Sahra Hennessy MD     ANESTHESIA: General anesthesia with endotracheal intubation.      COMPLICATIONS: Nil.      DRAINS: One 15-Icelandic channel MACKENZIE drain in the abdominal subcutaneous space     SPECIMENS: None     BLOOD LOSS: 25 mL        DESCRIPTION OF PROCEDURE: After informed consent was taken from the patient, the proper site and procedure was ascertained with them and they were appropriately marked, they were taken to the operating room. They were placed in a supine position with their knees comfortably flexed with pillows underneath them, and pneumoboots placed and running prior to induction of anesthesia. Preoperative antibiotics were given in the OR and appropriately redosed during the case. General anesthesia was administered without any complications.  He was prepped and draped in standard surgical fashion.  Colorectal surgery began the case and call me when they were done with their portion and required my portion.  On evaluation his abdomen was opened and through a midline incision just above the umbilicus and down to the pubis.  The right hemiabdomen had no ostomy or scars.  I palpated the deep inferior epigastric vessels through the open abdomen and it was intact.  I then dopplered out to periumbilical perforators and marked out a vertical rectus abdominis musculocutaneous flap on the abdomen such that primary closure of the skin was possible.  I then went ahead and made the cuts and dissected using electrocautery down to the anterior rectus sheath.  I then opened the anterior rectus sheath and identified the rectus  muscle.  Distally I took down the muscle and removed it from within the rectus sheath all the way to caudad to where the arcuate line.  I elevated the skin and subcutaneous tissue off of the anterior rectus sheath distally up to the arcuate line.  I then cut across the anterior rectus sheath such that the anterior rectus sheath caudal to the arcuate line was still intact and the proximal anterior rectus sheath went with the flap.  This was ensured.  The entire flap was elevated and ready for transpelvic transposition.  I then passed the flap through and marked out the point where the skin island would be needed the rest of which needed to be removed.  The flap was rebrought into the abdomen and the distal end of the flap skin was removed sure.  The distal end of the flap bled red blood.  Then passed the flap through the pelvis into the perineum.  The anterior rectus sheath in the lower abdomen was sewn to the posterior rectus sheath in the upper abdomen in preparation for closure of the abdominal wall.  The subcutaneous 15 Italian MACKENZIE drain was placed in the abdomen and the abdomen was closed by colorectal surgery.  The flap was then inset.  0- PDS suture in a deep fashion followed by 2-0 Vicryl suture in a horizontal mattress fashion for the skin.  Xeroform and dry gauze dressings were placed.Appropriate dressings were placed over the drain sites. The patient tolerated the procedure well. All counts were correct at the end of the case.  The patient was extubated and sent to recovery room in stable condition.

## 2024-07-10 NOTE — BRIEF OP NOTE
[facility]    Brief Operative Note    Pre-operative diagnosis: Crohn's disease of both small and large intestine with fistula (H) [K50.813]  Post-operative diagnosis Same as pre-operative diagnosis    Procedure: exploratory laparotomy, abdominal perineal resection, colostomy revision, take down of colocutaneous fistula, N/A - Abdomen  Right Vertical Rectus Abdominis Musculocutaneous transpelvic flap reconstruction of perineum., N/A - Vulva    Surgeon: Surgeons and Role:  Panel 1:     * Kunal Herrera MD - Primary     * Roger Dietrich MD - Resident - Assisting     * Judith Wood MD - Fellow - Assisting     * Severiano Santana MD  Panel 2:     * ANEESH Mccarthy MD - Primary     * Roro Muniz MD - Resident - Assisting  Anesthesia: General   Estimated Blood Loss: 300 ml  IVF 3500 mL  UOP: 300 mL    Drains: Jason-Newton x1 in LLQ in pelvis, x1 RLQ in subcutaneous tissue  Specimens:   ID Type Source Tests Collected by Time Destination   1 : Omentum Tissue Omentum SURGICAL PATHOLOGY EXAM Kunal Herrera MD 7/10/2024  1:21 PM    2 : Ascending Colon Tissue Large Intestine, Colon, Ascending SURGICAL PATHOLOGY EXAM Kunal Herrera MD 7/10/2024  1:26 PM    3 : Fistula Tract Tissue Other SURGICAL PATHOLOGY EXAM Kunal Herrera MD 7/10/2024  1:52 PM    4 : Ostomy Tissue Small Intestine, Ileum SURGICAL PATHOLOGY EXAM Kunal Herrera MD 7/10/2024  1:55 PM    5 : SIGMOID COLON ANUS Tissue Large Intestine, Colon, Sigmoid SURGICAL PATHOLOGY EXAM Kunal Herrera MD 7/10/2024  2:05 PM    6 : Omentum #2 Tissue Omentum SURGICAL PATHOLOGY EXAM Kunal Herrera MD 7/10/2024  2:49 PM    7 : End Colostomy Tissue Large Intestine, Colon SURGICAL PATHOLOGY EXAM Kunal Herrera MD 7/10/2024  2:49 PM    8 : Hernia Sac Tissue Other SURGICAL PATHOLOGY EXAM Kunal Herrera MD 7/10/2024  2:52 PM      Findings:     No evidence of metastatic disease  Bilateral ureters  identified and preserved.  Same stoma site used after revision.   LLQ drain in pelvis, RLQ drain in subcutaneous space  Complications: None.  Implants:  None      Kunal Herrera MD  Division of Colon and Rectal Surgery  [facility]  [phone]

## 2024-07-10 NOTE — ANESTHESIA PROCEDURE NOTES
Airway       Patient location during procedure: OR       Procedure Start/Stop Times: 7/10/2024 12:07 PM  Staff -        Anesthesiologist:  Shana Rivera MD       Resident/Fellow: Faheem Gonsalez MD       Performed By: resident and with residents       Procedure performed by resident/fellow/CRNA in presence of a teaching physician.    Consent for Airway        Urgency: elective  Indications and Patient Condition       Indications for airway management: karen-procedural and airway protection       Induction type:intravenous       Mask difficulty assessment: 1 - vent by mask    Final Airway Details       Final airway type: endotracheal airway       Successful airway: ETT - single  Endotracheal Airway Details        ETT size (mm): 6.5       Cuffed: yes       Cuff volume (mL): 6       Successful intubation technique: video laryngoscopy       VL Blade Size: Glidescope 3       Grade View of Cords: 1       Adjucts: stylet       Position: Right       Measured from: gums/teeth       Secured at (cm): 21       Bite block used: None    Post intubation assessment        Placement verified by: capnometry, equal breath sounds and chest rise        Number of attempts at approach: 1       Number of other approaches attempted: 0       Secured with: pink tape       Ease of procedure: easy       Dentition: Unchanged and Intact    Medication(s) Administered   Medication Administration Time: 7/10/2024 12:07 PM

## 2024-07-10 NOTE — ANESTHESIA CARE TRANSFER NOTE
Patient: Nicol Aldrich    Procedure: Procedure(s):  exploratory laparotomy, abdominal perineal resection, colostomy revision, take down of colocutaneous fistula  Right Vertical Rectus Abdominis Musculocutaneous transpelvic flap reconstruction of perineum.       Diagnosis: Crohn's disease of both small and large intestine with fistula (H) [K50.813]  Diagnosis Additional Information: No value filed.    Anesthesia Type:   General     Note:    Oropharynx: oropharynx clear of all foreign objects and spontaneously breathing  Level of Consciousness: drowsy  Oxygen Supplementation: face mask  Level of Supplemental Oxygen (L/min / FiO2): 4  Independent Airway: airway patency satisfactory and stable  Dentition: dentition unchanged  Vital Signs Stable: post-procedure vital signs reviewed and stable  Report to RN Given: handoff report given  Patient transferred to: PACU    Handoff Report: Identifed the Patient, Identified the Reponsible Provider, Reviewed the pertinent medical history, Discussed the surgical course, Reviewed Intra-OP anesthesia mangement and issues during anesthesia, Set expectations for post-procedure period and Allowed opportunity for questions and acknowledgement of understanding    Vitals:  Vitals Value Taken Time   /73 07/10/24 1800   Temp     Pulse 79 07/10/24 1803   Resp 12 07/10/24 1803   SpO2 100 % 07/10/24 1803   Vitals shown include unfiled device data.    Electronically Signed By: ANUP Quiles CRNA  July 10, 2024  6:04 PM

## 2024-07-10 NOTE — OP NOTE
[facility] Colorectal Surgery Operative Report  July 10, 2024    PREOPERATIVE DIAGNOSIS:  1. Crohn disease with distal colonic involvement - now infliximab and entecort  2. History of squamous cell carcinoma of the anus, previous radiation and chemotherapy in 2014  3. Fecal incontinence, recurrent perianal skin infection  4. APR in 2021  5. Pyoderma Gangrenosum  6. Colostomy status    POSTOPERATIVE DIAGNOSIS:   1. Crohn disease with distal colonic involvement - now infliximab and entecort  2. History of squamous cell carcinoma of the anus, previous radiation and chemotherapy in 2014  3. Fecal incontinence, recurrent perianal skin infection  4. APR in 2021  5. Pyoderma Gangrenosum  6. Colostomy status    PROCEDURE:  1. Exploratory laparotomy  2. APR  3. Colostomy revision  4. Takedown of colocutaneous fistula  4. VRAM flap(Dr Mccarthy)    ANESTHESIA: General endotracheal anesthesia plus local anesthesia.    SURGEONS:  Kunal Herrera M.D.; Severiano Santana M.D.    Both Dr Santana and CLAUDIA were necessary as we operated in two different fields (abdominal vs perineal). This made the operation proceed much faster and shortened the total anesthesia time     ASSISTANT(S): Judith Wood CRS Fellow    INDICATIONS FOR PROCEDURE  Nicol Aldrich is a 63 year old female with severe Crohns proctitis s/p diversion, and anal cancer s/p Win protocol. Given the severe extent of her proctitis, I recommended an APR. I thoroughly discussed the risks, benefits, and alternatives of operative treatment with the patient and she agreed to proceed.    General risks related to abdominal surgery were reviewed with the patient. These include, but are not limited to, death, myocardial infarction, pneumonia, urinary tract infection, deep venous thrombosis with or without pulmonary embolus, abdominal infection from bowel injury or abscess, fistula, anastomotic leak that may require reoperation and a stoma, ureteral injury, bowel obstruction, wound infection,  "and bleeding.    OPERATIVE PROCEDURE:  After obtaining informed consent, the patient was brought to the operating room and placed in the lithotomy position. Appropriate preoperative mechanical and chemical deep venous thrombosis prophylaxis, as well as preoperative prophylactic parenteral antibiotics were given. General endotracheal anesthesia was gently induced. Bilateral lower extremity pneumatic compression devices were applied and all pressure points were cushioned. A Cronin catheter was inserted without difficulty. The abdomen and perineum was then preped and draped in the standard sterile fashion. After a \"time-out\" was performed, Dr. Santana proceeded with the abdominal portion of the operation while I performed the perineal portion of the surgery. Please see his operative report for full details.    I created a circumferential incision at the perineum around the fibrotic and scarred perianal skin. This incision was carried superficially aiming towards towards the ischiorectal tuberosities bilaterally, the coccyx posterior, and protecting the vagina anteriorly. We carried this dissection to avoid disrupting the rectovaginal fascia. Our dissection proceeded superiorly until we met Dr. Santana's dissection. The specimen was completely dissected free, and then removed via the perineal incision. The wound bed was then carefully inspected and hemostasis was confirmed, and irrigated. Dr. Mccarthy's team was then called to perform the VRAM flap. Please see his operative report for full details.     I then returned to the operating room. Given the size of the stoma site, the site was tightened at the level of the fascia with 0 prolene in figure of 8 fashion until it accommodated two fingerbreadths. The left upper quadrant end colon conduit was passed through the defect straight up not twisted. A 19F Andrea drain was placed into the pelvis via the left lower quadrant and secured at the skin with 2-0 prolene, as well as " a 19F into the subcutaneous tissue via a right lower quadrant incision. The midline fascia was then closed with running 0 PDS, and at the skin with staples and intermittent interrupted 2-0 nylon.     Sterile dressings were applied.  The patient tolerated the procedure well. I was scrubbed for all components of the operation. All sponge and needle counts were correct x 2 at the end of the procedure.    COMPLICATIONS: none.    ESTIMATED BLOOD LOSS: 300 mL total.    REPLACEMENT:     - Crystalloid: 3500 mL.     - Colloid: 0 mL.     - Blood products: 0 .    URINE OUTPUT: 300 mL    SPECIMEN(S): sigmoid colon, rectum, anus, omentum    OPERATIVE COUNT: Complete.    OPERATIVE FINDINGS:   No evidence of metastatic disease  Bilateral ureters identified and preserved.  Same stoma site used after revision.   LLQ drain in pelvis, RLQ drain in subcutaneous space    Kunal Herrera MD  Division of Colon and Rectal Surgery  [facility]  [phone]

## 2024-07-10 NOTE — LETTER
Transition Communication Hand-off for Care Transitions to Next Level of Care Provider    Name: Nicol Aldrich  : 1960  MRN #: [id number]  Primary Care Provider: Janet Noe     Primary Clinic: [address]  [facility] [address]     Reason for Hospitalization:  Crohn's disease of both small and large intestine with fistula (H) [K50.813]  Admit Date/Time: 7/10/2024  8:53 AM  Discharge Date: 7/15/24  Payor Source: Payor: BCBS / Plan: [place]BS [place] / Product Type: Indemnity /     Reason for Communication Hand-off Referral: Other inpatient to outpatient    Discharge Plan: Home Care for new ostomy    Concern for non-adherence with plan of care:   No  Discharge Needs Assessment:  Needs      Flowsheet Row Most Recent Value   Equipment Currently Used at Home none   # of Referrals Placed by  Homecare  [via [facility] hub]          Follow-up specialty is recommended: Yes    Follow-up plan:    Future Appointments   Date Time Provider Department Center   2024 10:40 AM Lesa Fortune PA-C [facility]   2024 10:30 AM Sabiha Ventura RN [facility]   2024 11:30 AM Evelyn Perdue PA-C [facility]   2024 11:30 AM Kunal Herrera MD [facility]   10/16/2024  9:20 AM Torsten Perez PA-C [facility]       Any outstanding tests or procedures:        Referrals       Future Labs/Procedures    Home Care Referral     Comments:    Your provider has ordered home health services. If you have not been contacted within 2 days of your discharge please call the selected Home Care agency listed on your Discharge document.  If a Home Care agency is NOT listed, please call [phone].            Supplies       Future Labs/Procedures    Miscellaneous DME Order     Process Instructions:    Please limit the use of the Miscellaneous Order as a replacement for already existing DME orders. Please use an existing DME order when ever possible.      Comments:    Your Medical Supplier will need your  surgeon's name, phone and fax number    Clinic:                     Phone                            Fax  Colorectal Surgery:    [phone]  Verbal Order for ostomy supplies for 1 Month per:                                          Roger Dietrich MD                                                                  Authorizing MD: Dr. Kunal Herrera            [place] Recommendations:  PLEASE SCHEDULE POST HOSPITAL APPT with PCP.    Annette Torres    AVS/Discharge Summary is the source of truth; this is a helpful guide for improved communication of patient story

## 2024-07-10 NOTE — OP NOTE
"[facility] Colorectal Surgery Operative Report  July 10, 2024    PREOPERATIVE DIAGNOSIS:  1. Crohn disease with distal colonic involvement - now infliximab and entecort  2. History of squamous cell carcinoma of the anus, previous radiation and chemotherapy in 2014  3. Fecal incontinence, recurrent perianal skin infection  4. APR in 2021  5. Pyoderma Gangrenosum  6. Colostomy status    POSTOPERATIVE DIAGNOSIS:   Same    PROCEDURE:  1. Exploratory laparotomy  2. APR  3. Colostomy revision  4. VRAM flap(Dr Mccarthy)    ANESTHESIA: General endotracheal anesthesia plus local anesthesia.    CO-SURGEON:  Severiano Santana M.D.; Kunal Herrera MD; Dr Mccarthy (plastics)     Both Dr Herrera and CLAUDIA were necessary as we operated in two different fields (abdominal vs perineal). This made the operation proceed much faster and shortened the total anesthesia time    ASSISTANT(S): MICHEL Hunt Fellow    INDICATIONS FOR PROCEDURE  I was asked to co-operate on this case with dr Herrera given the technical challenges and the need for two surgical team operating at the same time.    OPERATIVE PROCEDURE:  After obtaining informed consent, the patient was brought to the operating room and placed in the supine position. Appropriate preoperative mechanical and chemical deep venous thrombosis prophylaxis, as well as preoperative prophylactic parenteral antibiotics were given. General endotracheal anesthesia was gently induced. Bilateral lower extremity pneumatic compression devices were applied and all pressure points were cushioned. A Cronin catheter was inserted without difficulty.     The abdomen was then preped and draped in the standard sterile fashion. After a \"time-out\" was performed, the peritoneal cavity was entered through a sharp midline incision. Subcutaneous tissues were divided with electrocautery.  Minimal adhesions were encountered and lysed.  An extra-large Daniel wound protector was inserted and the mechanical retractor was set " up.  The small bowel was retracted cephalad. The sigmoid colon was mobilized in a medial to lateral fashion identified the left ureter and protecting it.  We then turned our attention to the medial side and entered the TME plane sharply, joining the lateral and medial planes.    A mesenteric window was created at the level of the descending limb of the colostomy and the mesentery was divided with sequential fires of the ligature. The colon was divided with a firing of the MORENA 75 stapler blue load.  The TME plane was dissected with electrocautery in a circumferential fashion focusing on the posterior side first until the pelvic floor was reached.    Meanwhile, Dr. Herrera was working on the peritoneal side to join the 2 planes. See his note for further details.    Next, I focused the attention on the existing colostomy. There was a fistula extending to the lateral aspect of it.  A sharp incision was made on the mucocutaneous junction and I dissected circumferentially both limbs of the colostomy.  I transected sharply the fistula which was completely above the fascial level.  At the this point, was able to exteriorized the healthy colon which was divided with the MORENA stapler just below the fistula site. Next, I cored out the external opening of the fistula.    At this point, the specimen was extracted from the perineum and plastic surgery was called to the operating room to close the patient.  I left the OR at the time, leaving the patient in the cares of Dr. Herrera.    COMPLICATIONS: none.    ESTIMATED BLOOD LOSS: 50mL.      Severiano Santana MD    Division of Colon & Rectal Surgery  Department of Surgery  [facility]  [phone]

## 2024-07-11 ENCOUNTER — APPOINTMENT (OUTPATIENT)
Dept: OCCUPATIONAL THERAPY | Facility: CLINIC | Age: 64
End: 2024-07-11
Attending: SURGERY
Payer: COMMERCIAL

## 2024-07-11 LAB
ANION GAP SERPL CALCULATED.3IONS-SCNC: 7 MMOL/L (ref 7–15)
BUN SERPL-MCNC: 13.6 MG/DL (ref 8–23)
CALCIUM SERPL-MCNC: 8.2 MG/DL (ref 8.8–10.2)
CHLORIDE SERPL-SCNC: 102 MMOL/L (ref 98–107)
CREAT SERPL-MCNC: 0.96 MG/DL (ref 0.51–0.95)
DEPRECATED HCO3 PLAS-SCNC: 25 MMOL/L (ref 22–29)
EGFRCR SERPLBLD CKD-EPI 2021: 67 ML/MIN/1.73M2
ERYTHROCYTE [DISTWIDTH] IN BLOOD BY AUTOMATED COUNT: 14 % (ref 10–15)
ERYTHROCYTE [DISTWIDTH] IN BLOOD BY AUTOMATED COUNT: 14.1 % (ref 10–15)
GLUCOSE BLDC GLUCOMTR-MCNC: 126 MG/DL (ref 70–99)
GLUCOSE SERPL-MCNC: 129 MG/DL (ref 70–99)
HCT VFR BLD AUTO: 27.7 % (ref 35–47)
HCT VFR BLD AUTO: 31.5 % (ref 35–47)
HGB BLD-MCNC: 10.7 G/DL (ref 11.7–15.7)
HGB BLD-MCNC: 9.4 G/DL (ref 11.7–15.7)
MAGNESIUM SERPL-MCNC: 1.9 MG/DL (ref 1.7–2.3)
MCH RBC QN AUTO: 29.6 PG (ref 26.5–33)
MCH RBC QN AUTO: 29.8 PG (ref 26.5–33)
MCHC RBC AUTO-ENTMCNC: 33.9 G/DL (ref 31.5–36.5)
MCHC RBC AUTO-ENTMCNC: 34 G/DL (ref 31.5–36.5)
MCV RBC AUTO: 87 FL (ref 78–100)
MCV RBC AUTO: 88 FL (ref 78–100)
PHOSPHATE SERPL-MCNC: 4.6 MG/DL (ref 2.5–4.5)
PLATELET # BLD AUTO: 225 10E3/UL (ref 150–450)
PLATELET # BLD AUTO: 274 10E3/UL (ref 150–450)
POTASSIUM SERPL-SCNC: 4.9 MMOL/L (ref 3.4–5.3)
RBC # BLD AUTO: 3.15 10E6/UL (ref 3.8–5.2)
RBC # BLD AUTO: 3.61 10E6/UL (ref 3.8–5.2)
SODIUM SERPL-SCNC: 134 MMOL/L (ref 135–145)
WBC # BLD AUTO: 10.8 10E3/UL (ref 4–11)
WBC # BLD AUTO: 13.5 10E3/UL (ref 4–11)

## 2024-07-11 PROCEDURE — G0463 HOSPITAL OUTPT CLINIC VISIT: HCPCS

## 2024-07-11 PROCEDURE — 258N000003 HC RX IP 258 OP 636: Performed by: PHYSICIAN ASSISTANT

## 2024-07-11 PROCEDURE — 97530 THERAPEUTIC ACTIVITIES: CPT | Mod: GO

## 2024-07-11 PROCEDURE — 250N000011 HC RX IP 250 OP 636: Performed by: SURGERY

## 2024-07-11 PROCEDURE — 85027 COMPLETE CBC AUTOMATED: CPT | Performed by: SURGERY

## 2024-07-11 PROCEDURE — 120N000002 HC R&B MED SURG/OB UMMC

## 2024-07-11 PROCEDURE — 258N000003 HC RX IP 258 OP 636: Performed by: SURGERY

## 2024-07-11 PROCEDURE — 84100 ASSAY OF PHOSPHORUS: CPT | Performed by: SURGERY

## 2024-07-11 PROCEDURE — 250N000013 HC RX MED GY IP 250 OP 250 PS 637: Performed by: SURGERY

## 2024-07-11 PROCEDURE — 85014 HEMATOCRIT: CPT | Performed by: STUDENT IN AN ORGANIZED HEALTH CARE EDUCATION/TRAINING PROGRAM

## 2024-07-11 PROCEDURE — 36415 COLL VENOUS BLD VENIPUNCTURE: CPT | Performed by: SURGERY

## 2024-07-11 PROCEDURE — 83735 ASSAY OF MAGNESIUM: CPT | Performed by: SURGERY

## 2024-07-11 PROCEDURE — 250N000012 HC RX MED GY IP 250 OP 636 PS 637: Performed by: STUDENT IN AN ORGANIZED HEALTH CARE EDUCATION/TRAINING PROGRAM

## 2024-07-11 PROCEDURE — 97165 OT EVAL LOW COMPLEX 30 MIN: CPT | Mod: GO

## 2024-07-11 PROCEDURE — 80048 BASIC METABOLIC PNL TOTAL CA: CPT | Performed by: SURGERY

## 2024-07-11 PROCEDURE — 36415 COLL VENOUS BLD VENIPUNCTURE: CPT | Performed by: STUDENT IN AN ORGANIZED HEALTH CARE EDUCATION/TRAINING PROGRAM

## 2024-07-11 RX ORDER — TROSPIUM CHLORIDE ER 60 MG/1
60 CAPSULE ORAL
Status: DISCONTINUED | OUTPATIENT
Start: 2024-07-12 | End: 2024-07-15 | Stop reason: HOSPADM

## 2024-07-11 RX ORDER — TOLTERODINE 2 MG/1
4 CAPSULE, EXTENDED RELEASE ORAL
Status: DISCONTINUED | OUTPATIENT
Start: 2024-07-12 | End: 2024-07-11

## 2024-07-11 RX ADMIN — METHOCARBAMOL 500 MG: 500 TABLET ORAL at 15:08

## 2024-07-11 RX ADMIN — SODIUM CHLORIDE, POTASSIUM CHLORIDE, SODIUM LACTATE AND CALCIUM CHLORIDE: 600; 310; 30; 20 INJECTION, SOLUTION INTRAVENOUS at 15:29

## 2024-07-11 RX ADMIN — ACETAMINOPHEN 1000 MG: 325 TABLET, FILM COATED ORAL at 08:21

## 2024-07-11 RX ADMIN — METHOCARBAMOL 500 MG: 500 TABLET ORAL at 20:07

## 2024-07-11 RX ADMIN — SODIUM CHLORIDE, POTASSIUM CHLORIDE, SODIUM LACTATE AND CALCIUM CHLORIDE: 600; 310; 30; 20 INJECTION, SOLUTION INTRAVENOUS at 03:45

## 2024-07-11 RX ADMIN — ACETAMINOPHEN 1000 MG: 325 TABLET, FILM COATED ORAL at 20:07

## 2024-07-11 RX ADMIN — METHOCARBAMOL 500 MG: 500 TABLET ORAL at 08:21

## 2024-07-11 RX ADMIN — ENOXAPARIN SODIUM 40 MG: 40 INJECTION SUBCUTANEOUS at 11:36

## 2024-07-11 RX ADMIN — PREDNISONE 5 MG: 5 TABLET ORAL at 08:21

## 2024-07-11 RX ADMIN — ACETAMINOPHEN 1000 MG: 325 TABLET, FILM COATED ORAL at 02:54

## 2024-07-11 RX ADMIN — GABAPENTIN 100 MG: 100 CAPSULE ORAL at 08:21

## 2024-07-11 RX ADMIN — METHOCARBAMOL 500 MG: 500 TABLET ORAL at 11:36

## 2024-07-11 RX ADMIN — GABAPENTIN 100 MG: 100 CAPSULE ORAL at 15:08

## 2024-07-11 RX ADMIN — ACETAMINOPHEN 1000 MG: 325 TABLET, FILM COATED ORAL at 15:08

## 2024-07-11 RX ADMIN — GABAPENTIN 100 MG: 100 CAPSULE ORAL at 20:07

## 2024-07-11 ASSESSMENT — ACTIVITIES OF DAILY LIVING (ADL)
ADLS_ACUITY_SCORE: 28
ADLS_ACUITY_SCORE: 20
ADLS_ACUITY_SCORE: 28
ADLS_ACUITY_SCORE: 20
ADLS_ACUITY_SCORE: 28
ADLS_ACUITY_SCORE: 28
ADLS_ACUITY_SCORE: 20
ADLS_ACUITY_SCORE: 28
PREVIOUS_RESPONSIBILITIES: MEAL PREP;HOUSEKEEPING;LAUNDRY;SHOPPING;YARDWORK;MEDICATION MANAGEMENT;FINANCES;DRIVING
ADLS_ACUITY_SCORE: 28
ADLS_ACUITY_SCORE: 20
ADLS_ACUITY_SCORE: 28
ADLS_ACUITY_SCORE: 28

## 2024-07-11 NOTE — CONSULTS
Care Management Initial Consult    General Information  Assessment completed with: Care Team Member, -chart review,    Type of CM/SW Visit: Initial Assessment    Primary Care Provider verified and updated as needed: Yes (Dr. Janet Noe M.D./[place])   Readmission within the last 30 days: no previous admission in last 30 days      Reason for Consult: discharge planning  Advance Care Planning: Advance Care Planning Reviewed: no concerns identified        Communication Assessment  Patient's communication style: spoken language (English or Bilingual)    Hearing Difficulty or Deaf: no   Wear Glasses or Blind: yes    Cognitive  Cognitive/Neuro/Behavioral: WDL                      Living Environment:   People in home: spouse (John Aldrich)     Current living Arrangements:        Able to return to prior arrangements:       Family/Social Support:  Care provided by: self, spouse/significant other (, as needed.)  Provides care for:    Marital Status:              Description of Support System:    Supportive     Current Resources:   Patient receiving home care services:  (Was following up in the outpatient clinic with multiple MD team members.)     Community Resources:    Equipment currently used at home: none  Supplies currently used at home:      Employment/Financial:  Employment Status:          Financial Concerns:   none known      Does the patient's insurance plan have a 3 day qualifying hospital stay waiver?  No    Lifestyle & Psychosocial Needs:  (From Inpatient Chart Flowsheets)  Social Determinants of Health     Food Insecurity: No Food Insecurity (12/15/2022)    [facility]    Hunger Vital Sign     Worried About Running Out of Food in the Last Year: Never true     Ran Out of Food in the Last Year: Never true   Depression: Not at risk (6/25/2024)    PHQ-2     PHQ-2 Score: 0   Housing Stability: Low Risk  (12/15/2022)    [facility]    Housing Stability  Vital Sign     Unable to Pay for Housing in the Last Year: No     Number of Places Lived in the Last Year: 1     Unstable Housing in the Last Year: No   Tobacco Use: Low Risk  (7/10/2024)    Patient History     Smoking Tobacco Use: Never     Smokeless Tobacco Use: Never     Passive Exposure: Not on file   Financial Resource Strain: Low Risk  (12/15/2022)    Received from [facility]    Overall Financial Resource Strain (CARDIA)     Difficulty of Paying Living Expenses: Not hard at all   Alcohol Use: Not At Risk (12/15/2022)    Received from [facility]    AUDIT-C     Frequency of Alcohol Consumption: 2-4 times a month     Average Number of Drinks: 1 or 2     Frequency of Binge Drinking: Never   Transportation Needs: No Transportation Needs (12/15/2022)    Received from [facility]    PRAPARE - Transportation     Lack of Transportation (Medical): No     Lack of Transportation (Non-Medical): No   Physical Activity: Insufficiently Active (12/15/2022)    Received from [facility]    Exercise Vital Sign     Days of Exercise per Week: 2 days     Minutes of Exercise per Session: 60 min   Interpersonal Safety: Not At Risk (12/15/2022)    Received from [facility]    Humiliation, Afraid, Rape, and Kick questionnaire     Fear of Current or Ex-Partner: No     Emotionally Abused: No     Physically Abused: No     Sexually Abused: No   Stress: No Stress Concern Present (12/15/2022)    Received from [facility]    [employer] - Occupational Stress Questionnaire     Feeling of Stress : Only a little   Social Connections: Moderately Isolated (12/15/2022)    Received from [facility]    Social Connection and Isolation Panel [NHANES]     Frequency of Communication with Friends and Family: Once a week     Frequency of Social Gatherings with Friends and Family: Three times a week     Attends Worship Services: Never      Active Member of Clubs or Organizations: No     Attends Club or Organization Meetings: Never     Marital Status:    Health Literacy: Not on file     Functional Status:  Prior to admission patient needed assistance: independent with assist from spouse as needed.     Mental Health Status:  Mental Health Status: No Current Concerns       Chemical Dependency Status:  See above flow sheets prn/.        Values/Beliefs:  Spiritual, Cultural Beliefs, Sabianism Practices, Values that affect care: no               Additional Information:    Home and follow up in clinic is initial plan per MD team during discussion in Interdisciplinary rounding. OT with recommendations for home with assist at this time. WOC RN Consult pending.  Patient has cared for ostomy in the past independently per MD team.  Care Mgmt.team will follow to assist with updated transition needs prn. Patient will discharge with a revision of a colostomy and will likely go home with J pelayo drain(s) all TBD. TBD if patient will benefit from home care services closer to day of discharge. No sitting for 2 weeks per the Plastic Surgery Team.     Inpatient cares continue this post operative period and the Department of Care Mgmt.will continue to follow to assist with transition needs prn.    __________________    ASHLEY Ramirez.S.N., R.N., P.H.N.  Administrative Nurse Supervisor [facility]  Care Coordinator Nurse [facility] Today Only via [facility]

## 2024-07-11 NOTE — PROGRESS NOTES
Admitted/transferred from:   2 RN full   skin assessment completed by Hector Bazzi RN and Salazar DUFF.  Skin assessment finding: issues found red spots on lips and mid chest, Right shin scar from old surgery. UTV surgery site, Midline dressing has scanty serosanguinous drain    Interventions/actions: other none      Will continue to monitor.

## 2024-07-11 NOTE — PLAN OF CARE
"Goal Outcome Evaluation:    Time    /79 (BP Location: Left arm)   Pulse 96   Temp 97.6  F (36.4  C) (Oral)   Resp 10   Ht 1.651 m (5' 5\")   Wt 78.8 kg (173 lb 11.6 oz)   SpO2 98%   BMI 28.91 kg/m      Reason for admission: exploratory laparotomy, abdominal perineal resection, colostomy revision, take down of colocutaneous fistula, N/A - Abdomen  Right Vertical Rectus Abdominis Musculocutaneous transpelvic flap reconstruction of perineum.,  Activity: bedfast, assist of 1 with repositioning. NO SITTING. HOB 30 degree.  Pain: abdominal pain, managed with PCA. Pt reports good relieve  Neuro: alert and oriented  Cardiac: wnl, denies chest pain  Respiratory: denies SOB,no distress observed, on CAPNO 1L O2. Pt is a mouth breather, capno zehra Apnea frequently.  GI/: colostomy bag, no output. Cronin cath. No flatus yet  Diet: clear Liquid diet  Lines: PIV x 2  Wounds: abdominal incision EUSEBIO, dressing has minimal drain that is marked. MACKENZIE site x 2, cdi. Perineum packing is not saturated.  Labs/imaging: please review lab in the chart      New changes this shift:     Plan:       Continue to monitor and follow POC'  "

## 2024-07-11 NOTE — PROGRESS NOTES
Colorectal Surgery Progress Note  [facility]  POD#1      Subjective:  doing ok.  Pain relatively controlled.  No nausea.  No significant gas/stool yet.      Vitals:  Vitals:    07/11/24 0010 07/11/24 0218 07/11/24 0534 07/11/24 0550   BP: 110/76 110/74 112/63 113/79   BP Location: Left arm Left arm Left arm Left arm   Pulse: 92 95 93 96   Resp: 15 10 10 10   Temp: 98.1  F (36.7  C) 98.1  F (36.7  C) 97.6  F (36.4  C)    TempSrc: Oral Oral Oral    SpO2: 100% 98% 97% 98%   Weight:       Height:         I/O:  I/O last 3 completed shifts:  In: 3392.5 [P.O.:160; I.V.:3232.5]  Out: 820 [Urine:425; Drains:70; Blood:325]    Physical Exam:  Gen: AAOx3, NAD  Pulm: Non-labored breathing  Abd: Soft, mildly distended, appropriately tender, no guarding   Incision C/D/I   Stoma pink and viable, no stool and gas in bag but possibly starting   MACKENZIE drains in right and left lower quadrant with serosanginous output.    Flap - was just assessed by plastic surgery - refer to picture  Ext:  Warm and well-perfused    BMP  Recent Labs   Lab 07/11/24  0533 07/11/24  0450 07/10/24  2033 07/10/24  0940   NA  --  134*  --   --    POTASSIUM  --  4.9  --   --    CHLORIDE  --  102  --   --    CO2  --  25  --   --    BUN  --  13.6  --   --    CR  --  0.96* 0.95  --    * 129*  --  107*   MAG  --  1.9  --   --    PHOS  --  4.6*  --   --      CBC  Recent Labs   Lab 07/11/24  0450   WBC 13.5*   HGB 10.7*   HCT 31.5*            ASSESSMENT: This is a 63 year old female with past medical history of C. 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 She underwent APR and end colostomy revision by Dr. Herrera 7/10/2024 at [facility]. During the operation, VRAM flap was taken down and placed into pelvic dead space by Plastic Surgery team and Dr. Mccarthy.       Neuro/Pain: Multimodal pain  "medication plan.   CV: No concerns  PULM: encourage IS.  GI/FEN: CLD. Ondansatron prn, LR @100.   : Duran in place until time of discharge.   Heme: Hgb stable.   ID: No infection concerns  Endocrine: No concerns   Activity: No sitting for more than 1 minute. Head of bed less than 30 degrees. For 2 weeks s/p surgery.   Ppx: Enoxaparin 40 daily.   Dispo: 3-4 more days pending ADL's, pain plan, and diet.     Clinically Significant Risk Factors Present on Admission                     # Anemia: based on hgb <11           # Overweight: Estimated body mass index is 28.91 kg/m  as calculated from the following:    Height as of this encounter: 1.651 m (5' 5\").    Weight as of this encounter: 78.8 kg (173 lb 11.6 oz).              Marry Nicole, MS4      Addendum:     - agree with note above.   CLD.  Will clarify duration of duran.  Plastics defer duran to CRS.  No sitting x2 weeks but ok to perch. Per plastics should keep subcutaneous MACKENZIE drain on discharge.  Pelvic MACKENZIE drain - TBD. Likely lovenox ppx on discharge x30 days.   Ppx: lovenox ppx  Dispo: in 2-3 days pending ROBF, duran removal, likely lovenox injections.       Carmela Bach PA-C ..................7/11/2024   11:34 AM  Colon and Rectal Surgery    Patient was seen and discussed with Dr. Wood    The above plan of care was performed and communicated to me by Dr. Herrera    I spent 30 minute face-to-face or coordinating care of Nicol Aldrich. Over 50% of our time on the unit was spent counseling the patient and/or coordinating care as documented in the assessment and plan.     [id number] post op hospital visit    "

## 2024-07-11 NOTE — PROGRESS NOTES
Nursing Focus: Admission    D: Patient admitted/transferred from  via bed for Post OP observation.      I: Upon arrival to the unit patient was oriented to room, unit, and call light. Patient s height, weight, and vital signs were obtained. Allergies reviewed and allergy band applied. MD notified of patient s arrival on the unit. Adult AVS completed. Head to toe assessment completed. Education assessment completed. Care plan initiated.     A: Vital signs stable upon admission. Patient rates pain at 5/10. Two RN skin assessment completed Yes. Second RN was Salazar DUFF. Significant Skin Findings include red spoton lips and mid chest, Right shin scar. [facility] Nurse Consult Ordered No. Bed Algorithm can be found in PCS flow sheets (Support Surface Algorithm) and on IP [facility] NURSE RESOURCE TAB, was this used during this assessment?  Yes/No. Was a pulsate mattress ordered ** Yes/No.     P: Continue to monitor patient s pains, dressing, and temperature and intervene as needed. Continue with plan of care. Notify MD with any concerns or changes in patient status.

## 2024-07-11 NOTE — PROVIDER NOTIFICATION
Provider Ed Pineda paged    fyi pt is taking Trospium at home which she came with? she takes once daily in the morning. could you please order it for the patient?    Hector Bazzi RN on 7/10/2024 at 9:26 PM

## 2024-07-11 NOTE — PROGRESS NOTES
"  Colorectal Surgery Progress Note  Surgery Cross-Cover  Post Op Check    07/11/2024    Nicol Aldrich is a 63 year old female POD#0 s/p Procedure(s):  exploratory laparotomy, abdominal perineal resection, colostomy revision, take down of colocutaneous fistula  Right Vertical Rectus Abdominis Musculocutaneous transpelvic flap reconstruction of perineum. for Pre-Op Diagnosis Codes:     * Crohn's disease of both small and large intestine with fistula (H) [K50.813]    Pt reports their pain is controlled with current regimen. Denies nausea, SOB, chest pain, or dizziness. Patient is not passing flatus or having bowel movements and Is voiding via urinary catheter.     /76 (BP Location: Left arm)   Pulse 92   Temp 98.1  F (36.7  C) (Oral)   Resp 15   Ht 1.651 m (5' 5\")   Wt 78.8 kg (173 lb 11.6 oz)   SpO2 100%   BMI 28.91 kg/m      Gen: A&O x4, NAD   Chest: breathing non-labored on nasal cannula at 1 liters per minute   Abdomen: soft, non-tender, non-distended  Incision: clean, dry, intact covered by dressings  Extremities: warm and well perfused  Devices: Left and right MACKENZIE drain w/ serosanguineous output, ostomy that is pink/viable/and with minimal output, urinary catheter     A/P: No acute post-op issues. Continue plan of care per primary team. Please call with any questions.    Miriam Ward MD    "

## 2024-07-11 NOTE — CARE PLAN
"/61 (BP Location: Right arm)   Pulse 87   Temp 98  F (36.7  C) (Oral)   Resp 14   Ht 1.651 m (5' 5\")   Wt 78.8 kg (173 lb 11.6 oz)   SpO2 96%   BMI 28.91 kg/m    Reason for admission: POD 1 APR and end colostomy revision by Dr. Herrera, VRAM flap was taken down and placed into pelvic dead space by Plastic Surgery team and Dr. Mccarthy   Neuro: AOx4  Pain: mild-moderate pain treated with PCA dilaudid and oral adjuvants   CMS: intact  Cardiac: WDL , RR regular, S1S2  Resp: O2>90 on 1 LPM, denies SOB  GI/: AUOP per duran, scant green/brown stool in the ostomy, -flatus, denies n/v, +BS  Skin/Wound/Incision: R abd incision covered in primapore with marked dry drainage, old fistula site packed and covered in primapore, ostomy bag changed per WOC, flap pink and well perfused dressing changed by writer   Access: 2 PIV infusing TKO with PCA and IVMF  Drains: 2 MACKENZIE dressings CDI  Labs: hbg drop 10.7 to 9.4 today  Activity: up ax1 standing EOB with OT but patient felt dizzy, HOB 30 degrees or less d/t flap, educated to offload and turn q2h    Nutrition: clears  Changes this shift: IVMF decreased  Plan:  Per colorectal note: Dispo pending, 3-4 more days pending ADL's, pain plan, and diet.   "

## 2024-07-11 NOTE — PROGRESS NOTES
Plastic Surgery Progress Note    TRISHA. Doing well. Sore in abdomen when moving around. Pain controlled with PCA. No lightheadedness or dizziness, no pain or swelling in extremities. Feels numb in perineum.     Temp:  [97.5  F (36.4  C)-98.1  F (36.7  C)] 97.6  F (36.4  C)  Pulse:  [75-96] 96  Resp:  [10-17] 10  BP: ()/(63-91) 113/79  SpO2:  [84 %-100 %] 98 %    LLQ (pelvic drain) - 50 (65)  RLQ (subcutaneous drain) - 20 (55)    General: Alert, well-appearing in no acute distress.  Respiratory: Non-labored breathing on 1L NC  Cardiovascular: Regular rate and rhythm.   Gastrointestinal: Abdomen non-distended, soft, appropriately tender. Dressing left in place, minimal strikethrough on dressing, dried. Stoma pink and patent, no gas or stool. Drains serosang.   : Flap is pink, warm, and perfused with 2 sec cap refill. Incision intact. Distal end of flap (closest to vagina) is eccymotic and macerated but appears perfused. Dressing changed today.  Extremities: wwp, no tenderness.  Skin: No rashes or lesions appreciated.            Hb 10.7 (13.3)  WBC 13.5 (8.6)    Nicol Aldrich is a 63 year old female w/ Crohn's disease c/b fistulas to abdominal wall and perirectal area and anal cancer s/p chemoradiation now s/p APR and reconstruction with pedicled R VRAM flap. She is doing well post operatively. Mild ecchymosis to distal tip of flap but appears well perfused.     - No sitting for 2 weeks. HOB no greater than 30 degrees. Ok to sit transiently < 1 min to transition to standing and ambulate. Ok to sit for a few minutes on commode when duran comes out.  - Change dressing daily to perineum with xeroform and fluffs held in place with mesh panties  - Strip and record drain output qshift. Likely will discharge with drains.  - Abdominal binder for comfort  - No heavy lifting 6wks  - PT/OT for assistance with ambulation and discharge planning  - Remainder of care per CRS  - Follow up scheduled with plastics PA 7/26    D/w  Dr. Fabio Muniz MD  Plastic Surgery PGY6

## 2024-07-11 NOTE — CONSULTS
[facility] Nurse Inpatient Assessment     Consulted for: end colostomy, revised from loop colostomy     Patient History (according to provider note(s):      This is a 63 year old female with past medical history of C. 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 She underwent APR and end colostomy revision by Dr. Herrera 7/10/2024 at [facility]. During the operation, VRAM flap was taken down and placed into pelvic dead space by Plastic Surgery team and Dr. Mccarthy.     Assessment:      Areas visualized during today's visit: Abdomen    Assessment of new end Colostomy:  Pouching system in place on assessment today: Roxy two piece OR pouch   Pouch barrier status:  25% melted  Pouch last changed/wear time: less than 12 hours   Reason for pouch change today: initial post-op assessment, pouch was over stoma a little leading to melting of pouch   Effectiveness of current pouching/ supply plan: Attempting new system, will re-evaluate next assessment  Change made with ostomy management today: Yes, switched to 57mm and added barrier ring   Pouching system placed today: Benjamin two piece, flat, and barrier ring   Supplies: gathered and at bedside    Stoma location: LUQ  Stoma size: about 1 x 1 5/8th inches, oval   Stoma appearance: pink, oval, moist, and flat  Mucocutaneous junction:  intact  Peristomal complication(s): none   Output: bowel sweat   Output volume emptied during visit: 5ml  Abdominal assessment: Distended  Surgical site(s): dressing dry and intact  NG still in place? No  Pain: Fullness  Is patient still on a PCA? Yes    Ostomy education assessment:  Participant of teaching session today: patient   Education completed today: Initial fitting, Stoma assessment, Pouching system assessment , Refitting of appliance , and Pouch  "change demonstration  Educational materials/methods: Demonstration  Education still needed: Adjustment of pouching plan, Low fiber diet , and Discharge instructions  Learning Comprehension:   Psychosocial assessment: lethargic but attentive   Patient readiness for education today: lethargic and attentive  Following today's visit: patient  is able to demonstrate;         1. How to empty their pouch? Yes, had ostomy prior        2. How to change their pouch? Yes, had ostomy prior           Preparation for discharge completed: No discharge preparation started yet  Preparation for discharge still needed: Placed prescription recommendations in discharge navigator for MD to sign, Ensured patient has extra supplies for discharge, Discussed how to order supplies after discharge , Ordered samples from  after gaining consent from patient/caregiver, Discussed how and when to make an outpatient WO nurse appointment after discharge, Prepared for discharge home with home care, and Discuss signs/symptoms of when to seek medical attention  Pt support system on discharge: spouse  WO recommend home care? No  Face to face time: 30 minutes          Treatment Plan:     LUQ Colostomy pouching plan:   Pouching system: ostomy supplies pouches: Roxy 57 FECAL (834539) ostomy supplies barrier: Roxy 57mm FLAT ([id number])  Accessories used: WO ostomy accessories: 2\" Cera Barrier Ring ([id number])   Frequency of pouch changes: Twice weekly and PRN leakage  WOC follow up plan: Daily Monday-Friday (as able)  Bedside RN interventions: Change pouch PRN if leaking using the supplies above, Empty pouch when 1/3 to 1/2 full, ensure to clean pouch outlet after emptying to prevent odor, Notify WOC for ongoing pouch leakage, and Document stoma appearance and output volume, color, and consistency every shift      Orders: Written    RECOMMEND PRIMARY TEAM ORDER: None, at this time  Education provided: plan of care  Discussed plan of care " with: Patient  WOC nurse follow-up plan: daily M-F  Notify WOC if wound(s) deteriorate.  Nursing to notify the Provider(s) and re-consult the WOC Nurse if new skin concern.    DATA:     Current support surface: Standard  Standard Isoflex gel  Containment of urine/stool: Indwelling catheter  BMI: Body mass index is 28.91 kg/m .   Active diet order: Orders Placed This Encounter      Clear Liquid Diet     Output: I/O last 3 completed shifts:  In: 4388.17 [P.O.:960; I.V.:3428.17]  Out: 1955 [Urine:1325; Drains:305; Blood:325]     Labs:   Recent Labs   Lab 07/11/24  0450   HGB 10.7*   WBC 13.5*     Pressure injury risk assessment:   Sensory Perception: 4-->no impairment  Moisture: 4-->rarely moist  Activity: 1-->bedfast  Mobility: 2-->very limited  Nutrition: 3-->adequate  Friction and Shear: 3-->no apparent problem  Rayray Score: 17      Pager no longer is use, please contact through [employer]   Juan Ramon group: [facility] Nurse [place]   Dept. Office Number: [phone]

## 2024-07-11 NOTE — PROGRESS NOTES
07/11/24 1101   Appointment Info   Signing Clinician's Name / Credentials (OT) RASHI Jones  (Simultaneous filing. User may not have seen previous data.)   Student Supervision Direct supervision provided  (Simultaneous filing. User may not have seen previous data.)   Rehab Comments (OT) sitting & abd precautions  (Simultaneous filing. User may not have seen previous data.)   Living Environment   People in Home spouse;child(maria de jesus), adult  (Simultaneous filing. User may not have seen previous data.)   Current Living Arrangements house  (Simultaneous filing. User may not have seen previous data.)   Home Accessibility stairs to enter home;stairs within home  (Simultaneous filing. User may not have seen previous data.)   Number of Stairs, Main Entrance 1  (Simultaneous filing. User may not have seen previous data.)   Stair Railings, Main Entrance railings safe and in good condition  (Simultaneous filing. User may not have seen previous data.)   Number of Stairs, Within Home, Primary seven  (Simultaneous filing. User may not have seen previous data.)   Stair Railings, Within Home, Primary railings safe and in good condition  (Simultaneous filing. User may not have seen previous data.)   Transportation Anticipated family or friend will provide  (Simultaneous filing. User may not have seen previous data.)   Living Environment Comments Pt lives with  and adult daughter. Split level home with needs met on second level. Bathroom with suction grab bar on lower level. Pt is retired [employer].  (Simultaneous filing. User may not have seen previous data.)   Self-Care   Usual Activity Tolerance moderate  (Simultaneous filing. User may not have seen previous data.)   Current Activity Tolerance poor  (Simultaneous filing. User may not have seen previous data.)   Regular Exercise No  (Simultaneous filing. User may not have seen previous data.)   Equipment Currently Used at Home none  (Simultaneous filing. User may not have  seen previous data.)   Fall history within last six months no  (Simultaneous filing. User may not have seen previous data.)   Instrumental Activities of Daily Living (IADL)   Previous Responsibilities meal prep;housekeeping;laundry;shopping;yardwork;medication management;finances;driving  (Simultaneous filing. User may not have seen previous data.)   IADL Comments Pt IND at baseline with ADLs/IADLs  (Simultaneous filing. User may not have seen previous data.)   General Information   Onset of Illness/Injury or Date of Surgery 07/10/24  (Simultaneous filing. User may not have seen previous data.)   Referring Physician Kunal Herrera MD  (Simultaneous filing. User may not have seen previous data.)   Patient/Family Therapy Goal Statement (OT) return home  (Simultaneous filing. User may not have seen previous data.)   Additional Occupational Profile Info/Pertinent History of Current Problem Per chart: Nicol Aldrich is a 63 year old female w/ Crohn's disease c/b fistulas to abdominal wall and perirectal area and anal cancer s/p chemoradiation now s/p APR and reconstruction with pedicled R VRAM flap. She is doing well post operatively. Mild ecchymosis to distal tip of flap but appears well perfused.  (Simultaneous filing. User may not have seen previous data.)   Existing Precautions/Restrictions abdominal;fall;other (see comments)  (sitting < 1min.  Simultaneous filing. User may not have seen previous data.)   Left Upper Extremity (Weight-bearing Status) other (see comments)  (10 lbs  Simultaneous filing. User may not have seen previous data.)   Right Upper Extremity (Weight-bearing Status) other (see comments)  (10 lbs  Simultaneous filing. User may not have seen previous data.)   Left Lower Extremity (Weight-bearing Status) full weight-bearing (FWB)  (Simultaneous filing. User may not have seen previous data.)   Right Lower Extremity (Weight-bearing Status) full weight-bearing (FWB)  (Simultaneous filing. User may  "not have seen previous data.)   Cognitive Status Examination   Orientation Status orientation to person, place and time  (Simultaneous filing. User may not have seen previous data.)   Affect/Mental Status (Cognitive) WNL  (Simultaneous filing. User may not have seen previous data.)   Follows Commands WNL  (Simultaneous filing. User may not have seen previous data.)   Cognitive Status Comments Cognition intact, no concerns noted  (Simultaneous filing. User may not have seen previous data.)   Visual Perception   Visual Impairment/Limitations corrective lenses full-time  (Simultaneous filing. User may not have seen previous data.)   Sensory   Sensory Quick Adds sensation intact  (Simultaneous filing. User may not have seen previous data.)   Sensory Comments Pt noted taking some shuffled steps \"feels weird.\"  (Simultaneous filing. User may not have seen previous data.)   Pain Assessment   Patient Currently in Pain Yes, see Vital Sign flowsheet  (Simultaneous filing. User may not have seen previous data.)   Posture   Posture not impaired  (Simultaneous filing. User may not have seen previous data.)   Muscle Tone Assessment   Muscle Tone Comments BUEs WFL, at least 3/5  (Simultaneous filing. User may not have seen previous data.)   Coordination   Upper Extremity Coordination No deficits were identified  (Simultaneous filing. User may not have seen previous data.)   Gross Motor Coordination No deficits identified  (Simultaneous filing. User may not have seen previous data.)   Bed Mobility   Comment (Bed Mobility) Sabra  (Simultaneous filing. User may not have seen previous data.)   Transfers   Transfer Comments Sabra  (Simultaneous filing. User may not have seen previous data.)   Balance   Balance Assessment standing static balance  (Simultaneous filing. User may not have seen previous data.)   Standing Balance: Static minimal assist  (Simultaneous filing. User may not have seen previous data.)   Position/Device Used, " Standing Balance other (see comments)  (IV pole  Simultaneous filing. User may not have seen previous data.)   Balance Comments Sabra for standing/small steps with IV pole. Pt c/o dizziness.  (Simultaneous filing. User may not have seen previous data.)   Activities of Daily Living   BADL Assessment/Intervention bathing;lower body dressing;grooming;toileting  (Simultaneous filing. User may not have seen previous data.)   Bathing Assessment/Intervention   Clayton Level (Bathing) minimum assist (75% patient effort)  (Simultaneous filing. User may not have seen previous data.)   Comment, (Bathing) Per clinical judgement  (Simultaneous filing. User may not have seen previous data.)   Lower Body Dressing Assessment/Training   Comment, (Lower Body Dressing) Per clinical judgement  (Simultaneous filing. User may not have seen previous data.)   Clayton Level (Lower Body Dressing) contact guard assist;verbal cues;set up  (Simultaneous filing. User may not have seen previous data.)   Grooming Assessment/Training   Position (Grooming) sitting up in bed   Clayton Level (Grooming) set up  (Simultaneous filing. User may not have seen previous data.)   Toileting   Comment, (Toileting) Per clinical judgement  (Simultaneous filing. User may not have seen previous data.)   Clayton Level (Toileting) verbal cues  (Simultaneous filing. User may not have seen previous data.)   Clinical Impression   Criteria for Skilled Therapeutic Interventions Met (OT) Yes, treatment indicated  (Simultaneous filing. User may not have seen previous data.)   OT Diagnosis Pt below baseline for ADLs/IADLs.  (Simultaneous filing. User may not have seen previous data.)   OT Problem List-Impairments impacting ADL problems related to;activity tolerance impaired;balance;strength;post-surgical precautions  (Simultaneous filing. User may not have seen previous data.)   Assessment of Occupational Performance 3-5 Performance Deficits   Identified  Performance Deficits functional mobility/transfers, toileting, bathing, dressing   Planned Therapy Interventions (OT) ADL retraining;IADL retraining;strengthening;transfer training;progressive activity/exercise  (Simultaneous filing. User may not have seen previous data.)   Clinical Decision Making Complexity (OT) problem focused assessment/low complexity  (Simultaneous filing. User may not have seen previous data.)   Risk & Benefits of therapy have been explained evaluation/treatment results reviewed;care plan/treatment goals reviewed;risks/benefits reviewed;current/potential barriers reviewed;participants voiced agreement with care plan;participants included;patient  (Simultaneous filing. User may not have seen previous data.)   Clinical Impression Comments Pt below baseline for ADLs/IADLs, limited by post-op precautions, dizziness/low BP. Pt will benefit from OT to progress to PLOF.  (Simultaneous filing. User may not have seen previous data.)   OT Total Evaluation Time   OT Eval, Low Complexity Minutes (86884) 5  (Simultaneous filing. User may not have seen previous data.)   OT Goals   Therapy Frequency (OT) 6 times/week  (Simultaneous filing. User may not have seen previous data.)   OT Predicted Duration/Target Date for Goal Attainment 07/18/24  (Simultaneous filing. User may not have seen previous data.)   OT Goals Hygiene/Grooming;Lower Body Dressing;Lower Body Bathing;Toilet Transfer/Toileting  (Simultaneous filing. User may not have seen previous data.)   OT: Hygiene/Grooming independent  (Simultaneous filing. User may not have seen previous data.)   OT: Lower Body Dressing Independent  (Simultaneous filing. User may not have seen previous data.)   OT: Lower Body Bathing Modified independent  (Simultaneous filing. User may not have seen previous data.)   OT: Toilet Transfer/Toileting Independent  (Simultaneous filing. User may not have seen previous data.)   Interventions   Interventions Quick Adds  Self-Care/Home Management;Therapeutic Activity  (Simultaneous filing. User may not have seen previous data.)   Therapeutic Activities   Therapeutic Activity Minutes (43398) 23  (Simultaneous filing. User may not have seen previous data.)   Symptoms noted during/after treatment dizziness  (Simultaneous filing. User may not have seen previous data.)   Treatment Detail/Skilled Intervention OT: Pt greeted asleep, supine in bed.  Agreeable to therapy session. Th educated on post-op abdominal and sitting precautions. Pt supine>sit with Elizabeth using logroll technique, and sit>stand with CGA. Pt c/o dizziness, BP low, 90s/40s. Pt static stand ~3 min with CGA/Elizabeth for occasional posterior rocking. Pt ambulated using short, shuffled steps ~5' forward and back. BP retaken, no improvement - outside of parameters for continued activity. Pt stand>sit>supine using Elizabeth for legs. After mobilization, pt participated in reclined ADLs in bed. Pt brushed hair IND and brushed teeth with set-up and VCs to remain within sitting precautions. Additional time needed for environmental setup for safe task completion. Pt left with all needs met on 1L O2.  (Simultaneous filing. User may not have seen previous data.)   OT Discharge Planning   OT Plan progress mobility to screen for PT needs, standing ADLs, toilet transfer  (Simultaneous filing. User may not have seen previous data.)   OT Discharge Recommendation (DC Rec) home with assist  (Simultaneous filing. User may not have seen previous data.)   OT Rationale for DC Rec Pt below baseline on I/ADLs. Limited by low BP and activity tolerance. Pending improvement of listed deficits, anticipate progression to home with assist when medically ready.  (Simultaneous filing. User may not have seen previous data.)   OT Brief overview of current status CGA-Elizabeth for sit>stand  (Simultaneous filing. User may not have seen previous data.)   Total Session Time   Timed Code Treatment Minutes 23   Total Session  Time (sum of timed and untimed services) 28

## 2024-07-11 NOTE — PHARMACY-ADMISSION MEDICATION HISTORY
"Pharmacist Admission Medication History    Admission medication history is complete. The information provided in this note is only as accurate as the sources available at the time of the update.    Information Source(s):  Pre-op RN assessment, Dispense history (Surescripts)       Medication History Completed By: Alicia Diggs [facility] 7/10/2024 7:29 PM    PTA Med List   Medication Sig Last Dose    acetaminophen (TYLENOL) 500 MG tablet Take 1,000 mg by mouth every 4 hours as needed Past Week    metroNIDAZOLE (FLAGYL) 500 MG tablet Take 1 tablet (500 mg) by mouth every 6 hours At 8:00 am, 2:00 pm, 8:00 pm the day prior to your surgery with neomycin and zofran. 7/9/2024 at 2000    neomycin (MYCIFRADIN) 500 MG tablet Take 2 tablets (1,000 mg) by mouth every 6 hours At 8:00 am, 2:00 pm, 8:00 pm the day prior to your surgery with flagyl and zofran. 7/9/2024 at 2000    ondansetron (ZOFRAN) 4 MG tablet Take 1 tablet (4 mg) by mouth every 6 hours At 8:00 am, 2:00 pm, 8:00 pm the day prior to your surgery with neomycin and flagyl. 7/9/2024 at 2000    Ostomy Supplies MISC 1 each daily Past Week    polyethylene glycol (MIRALAX) 17 g packet Take 238 g by mouth See Admin Instructions Starting at 4 pm night prior to surgery. Refer to \"Getting Ready for Surgery\" instructions. 7/9/2024    trospium (SANCTURA XR) 60 MG CP24 24 hr capsule Take 60 mg by mouth every morning (before breakfast) 7/10/2024 at 0700      "

## 2024-07-11 NOTE — PROVIDER NOTIFICATION
Purpose of Notification: order clarification  Notified Person: resident colorectal  Notification Time: 0946  Notification Interaction: page    [id number] [address]  POD 1 ostomy revision with flap, orders were to remove duran in PACU but  it is still in place, can you update orders please   Kami DUFF    Confirmed with the team that duran should remain in place, team will update orders

## 2024-07-11 NOTE — PROVIDER NOTIFICATION
Provider Ed Pineda paged    fyi, pt is settled in the room. VS is stable. 5/10 pain reported, PCA is running [id number]. dressing is CDI. thanks     Hector Bazzi RN on 7/10/2024 at 9:10 PM

## 2024-07-12 ENCOUNTER — APPOINTMENT (OUTPATIENT)
Dept: OCCUPATIONAL THERAPY | Facility: CLINIC | Age: 64
End: 2024-07-12
Attending: SURGERY
Payer: COMMERCIAL

## 2024-07-12 LAB
GLUCOSE BLDC GLUCOMTR-MCNC: 110 MG/DL (ref 70–99)
GLUCOSE BLDC GLUCOMTR-MCNC: 113 MG/DL (ref 70–99)
GLUCOSE BLDC GLUCOMTR-MCNC: 91 MG/DL (ref 70–99)
HGB BLD-MCNC: 8.3 G/DL (ref 11.7–15.7)
MAGNESIUM SERPL-MCNC: 2 MG/DL (ref 1.7–2.3)

## 2024-07-12 PROCEDURE — 120N000002 HC R&B MED SURG/OB UMMC

## 2024-07-12 PROCEDURE — 999N000147 HC STATISTIC PT IP EVAL DEFER

## 2024-07-12 PROCEDURE — 36415 COLL VENOUS BLD VENIPUNCTURE: CPT | Performed by: PHYSICIAN ASSISTANT

## 2024-07-12 PROCEDURE — 250N000013 HC RX MED GY IP 250 OP 250 PS 637: Performed by: SURGERY

## 2024-07-12 PROCEDURE — 258N000003 HC RX IP 258 OP 636: Performed by: PHYSICIAN ASSISTANT

## 2024-07-12 PROCEDURE — 83735 ASSAY OF MAGNESIUM: CPT | Performed by: PHYSICIAN ASSISTANT

## 2024-07-12 PROCEDURE — 36415 COLL VENOUS BLD VENIPUNCTURE: CPT | Performed by: STUDENT IN AN ORGANIZED HEALTH CARE EDUCATION/TRAINING PROGRAM

## 2024-07-12 PROCEDURE — 97535 SELF CARE MNGMENT TRAINING: CPT | Mod: GO

## 2024-07-12 PROCEDURE — 250N000011 HC RX IP 250 OP 636: Performed by: STUDENT IN AN ORGANIZED HEALTH CARE EDUCATION/TRAINING PROGRAM

## 2024-07-12 PROCEDURE — G0463 HOSPITAL OUTPT CLINIC VISIT: HCPCS

## 2024-07-12 PROCEDURE — 250N000011 HC RX IP 250 OP 636: Performed by: SURGERY

## 2024-07-12 PROCEDURE — 97530 THERAPEUTIC ACTIVITIES: CPT | Mod: GO

## 2024-07-12 PROCEDURE — 250N000012 HC RX MED GY IP 250 OP 636 PS 637: Performed by: STUDENT IN AN ORGANIZED HEALTH CARE EDUCATION/TRAINING PROGRAM

## 2024-07-12 PROCEDURE — 85018 HEMOGLOBIN: CPT | Performed by: STUDENT IN AN ORGANIZED HEALTH CARE EDUCATION/TRAINING PROGRAM

## 2024-07-12 RX ORDER — PROCHLORPERAZINE 25 MG
25 SUPPOSITORY, RECTAL RECTAL EVERY 12 HOURS PRN
Status: DISCONTINUED | OUTPATIENT
Start: 2024-07-12 | End: 2024-07-12

## 2024-07-12 RX ORDER — HYDROMORPHONE HCL IN WATER/PF 6 MG/30 ML
.2-.3 PATIENT CONTROLLED ANALGESIA SYRINGE INTRAVENOUS
Status: DISCONTINUED | OUTPATIENT
Start: 2024-07-12 | End: 2024-07-14

## 2024-07-12 RX ORDER — OXYCODONE HYDROCHLORIDE 5 MG/1
5 TABLET ORAL EVERY 4 HOURS PRN
Status: DISCONTINUED | OUTPATIENT
Start: 2024-07-12 | End: 2024-07-15 | Stop reason: HOSPADM

## 2024-07-12 RX ORDER — ONDANSETRON 4 MG/1
4-8 TABLET, ORALLY DISINTEGRATING ORAL EVERY 6 HOURS PRN
Status: DISCONTINUED | OUTPATIENT
Start: 2024-07-12 | End: 2024-07-15 | Stop reason: HOSPADM

## 2024-07-12 RX ORDER — OXYCODONE HYDROCHLORIDE 10 MG/1
10 TABLET ORAL EVERY 4 HOURS PRN
Status: DISCONTINUED | OUTPATIENT
Start: 2024-07-12 | End: 2024-07-15 | Stop reason: HOSPADM

## 2024-07-12 RX ORDER — PROCHLORPERAZINE MALEATE 5 MG
5-10 TABLET ORAL EVERY 6 HOURS PRN
Status: DISCONTINUED | OUTPATIENT
Start: 2024-07-12 | End: 2024-07-15 | Stop reason: HOSPADM

## 2024-07-12 RX ORDER — HYDROMORPHONE HCL IN WATER/PF 6 MG/30 ML
0.2 PATIENT CONTROLLED ANALGESIA SYRINGE INTRAVENOUS
Status: DISCONTINUED | OUTPATIENT
Start: 2024-07-12 | End: 2024-07-12

## 2024-07-12 RX ORDER — ONDANSETRON 2 MG/ML
4-8 INJECTION INTRAMUSCULAR; INTRAVENOUS EVERY 6 HOURS PRN
Status: DISCONTINUED | OUTPATIENT
Start: 2024-07-12 | End: 2024-07-15 | Stop reason: HOSPADM

## 2024-07-12 RX ADMIN — ACETAMINOPHEN 1000 MG: 325 TABLET, FILM COATED ORAL at 22:50

## 2024-07-12 RX ADMIN — SODIUM CHLORIDE, POTASSIUM CHLORIDE, SODIUM LACTATE AND CALCIUM CHLORIDE: 600; 310; 30; 20 INJECTION, SOLUTION INTRAVENOUS at 14:36

## 2024-07-12 RX ADMIN — METHOCARBAMOL 500 MG: 500 TABLET ORAL at 20:52

## 2024-07-12 RX ADMIN — ACETAMINOPHEN 1000 MG: 325 TABLET, FILM COATED ORAL at 02:28

## 2024-07-12 RX ADMIN — ACETAMINOPHEN 1000 MG: 325 TABLET, FILM COATED ORAL at 16:17

## 2024-07-12 RX ADMIN — HYDROMORPHONE HYDROCHLORIDE 0.2 MG: 0.2 INJECTION, SOLUTION INTRAMUSCULAR; INTRAVENOUS; SUBCUTANEOUS at 14:36

## 2024-07-12 RX ADMIN — PROCHLORPERAZINE EDISYLATE 10 MG: 5 INJECTION INTRAMUSCULAR; INTRAVENOUS at 22:45

## 2024-07-12 RX ADMIN — PREDNISONE 5 MG: 5 TABLET ORAL at 08:34

## 2024-07-12 RX ADMIN — HYDROMORPHONE HYDROCHLORIDE 0.3 MG: 0.2 INJECTION, SOLUTION INTRAMUSCULAR; INTRAVENOUS; SUBCUTANEOUS at 22:50

## 2024-07-12 RX ADMIN — GABAPENTIN 100 MG: 100 CAPSULE ORAL at 08:34

## 2024-07-12 RX ADMIN — METHOCARBAMOL 500 MG: 500 TABLET ORAL at 16:17

## 2024-07-12 RX ADMIN — GABAPENTIN 100 MG: 100 CAPSULE ORAL at 20:52

## 2024-07-12 RX ADMIN — HYDROMORPHONE HYDROCHLORIDE 0.2 MG: 0.2 INJECTION, SOLUTION INTRAMUSCULAR; INTRAVENOUS; SUBCUTANEOUS at 11:23

## 2024-07-12 RX ADMIN — METHOCARBAMOL 500 MG: 500 TABLET ORAL at 11:23

## 2024-07-12 RX ADMIN — TROSPIUM CHLORIDE ER 60 MG: 60 CAPSULE ORAL at 08:34

## 2024-07-12 RX ADMIN — HYDROMORPHONE HYDROCHLORIDE 0.2 MG: 0.2 INJECTION, SOLUTION INTRAMUSCULAR; INTRAVENOUS; SUBCUTANEOUS at 20:56

## 2024-07-12 RX ADMIN — GABAPENTIN 100 MG: 100 CAPSULE ORAL at 14:22

## 2024-07-12 RX ADMIN — METHOCARBAMOL 500 MG: 500 TABLET ORAL at 08:34

## 2024-07-12 RX ADMIN — HYDROMORPHONE HYDROCHLORIDE 0.2 MG: 0.2 INJECTION, SOLUTION INTRAMUSCULAR; INTRAVENOUS; SUBCUTANEOUS at 16:30

## 2024-07-12 RX ADMIN — ONDANSETRON 4 MG: 2 INJECTION INTRAMUSCULAR; INTRAVENOUS at 17:18

## 2024-07-12 RX ADMIN — ACETAMINOPHEN 1000 MG: 325 TABLET, FILM COATED ORAL at 08:34

## 2024-07-12 ASSESSMENT — ACTIVITIES OF DAILY LIVING (ADL)
ADLS_ACUITY_SCORE: 28
ADLS_ACUITY_SCORE: 32
ADLS_ACUITY_SCORE: 32
ADLS_ACUITY_SCORE: 28
ADLS_ACUITY_SCORE: 28
ADLS_ACUITY_SCORE: 32
ADLS_ACUITY_SCORE: 28
ADLS_ACUITY_SCORE: 32
ADLS_ACUITY_SCORE: 32
ADLS_ACUITY_SCORE: 28
ADLS_ACUITY_SCORE: 32
ADLS_ACUITY_SCORE: 28
ADLS_ACUITY_SCORE: 32
ADLS_ACUITY_SCORE: 28
ADLS_ACUITY_SCORE: 28
ADLS_ACUITY_SCORE: 32
ADLS_ACUITY_SCORE: 28

## 2024-07-12 NOTE — PROGRESS NOTES
Care Management Follow Up    Length of Stay (days): 2    Expected Discharge Date: 07/15/2024     Concerns to be Addressed: discharge planning     Patient plan of care discussed at interdisciplinary rounds: Yes    Anticipated Discharge Disposition: Home with family     Anticipated Discharge Services: Home Care (RN, PT, OT)  Anticipated Discharge DME:  ostomy supplies, WOC RN works with patient on where/how to obtain these outpatient    Patient/family educated on Medicare website which has current facility and service quality ratings: yes  Education Provided on the Discharge Plan: Yes  Patient/Family in Agreement with the Plan: yes    Referrals Placed by CM/SW: Homecare (via [facility] hub)  Private pay costs discussed: Not applicable    Additional Information:  Colorectal team requested home care for patient.  Writer referred for home RN, PT, OT.  Patient accepted by [facility].      [facility]      Address   [address] [place] [address]             Contact Information    [phone]            RNCC to call [facility] upon discharge and provide any needed documentation.       Care management will continue to follow for discharge planning.       Lynsey Damon, RN, BSN  RN Care Coordinator    5A Medical Oncology/5C non-BMT  5A beds 4029-5173  5C beds 4784-2137 (non-BMT)  [address] [address]  [email or web address]  Gender pronouns: she/her  Units: 5A Onc Vocera & 5C Vocera

## 2024-07-12 NOTE — PROVIDER NOTIFICATION
Provider Ashwini Pineda paged     fyi, pt was hypotensive, 86/56. she denies dizziness. there is no sign of bleeding on any of her dressings. recheck was 93/68 Map 77. still has LR going at 50ml/hour.    Hector Bazzi RN on 7/12/2024 at 4:56 AM

## 2024-07-12 NOTE — CONSULTS
Brief Care Coordination Note:     Care management consult already completed yesterday.  See note.             Lynsey Damon, RN, BSN  RN Care Coordinator    5A Medical Oncology/5C non-BMT  5A beds 9231-1729  5C beds 8848-0793 (non-BMT)  [address] [address]  [email or web address]  Gender pronouns: she/her  Units: 5A Onc Vocera & 5C Vocera

## 2024-07-12 NOTE — PROGRESS NOTES
[facility]  WO Nurse Inpatient Assessment     Consulted for: end colostomy, revised from loop colostomy  7/12: pouch placed yesterday is intact, pt starting to get ostomy output, will plan to refit ostomy appliance on Monday for plan for discharge      Patient History (according to provider note(s):      This is a 63 year old female with past medical history of C. 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 She underwent APR and end colostomy revision by Dr. Herrera 7/10/2024 at [facility]. During the operation, VRAM flap was taken down and placed into pelvic dead space by Plastic Surgery team and Dr. Mccarthy.     Assessment:      Areas visualized during today's visit: Abdomen    Assessment of new end Colostomy:  Pouching system in place on assessment today: Stollings two piece 57mm with barrier ring   Pouch barrier status: Minimal melting  Pouch last changed/wear time: 24 hours   Reason for pouch change today: pouch not changed today,   Effectiveness of current pouching/ supply plan: Effective  Change made with ostomy management today: No, continue 57mm and added barrier ring   Pouching system placed today: Roxy two piece, flat, and barrier ring   Supplies: gathered and at bedside    Stoma location: LUQ  Stoma size: about 1 x 1 5/8th inches, oval   Stoma appearance: pink, oval, moist, and flat  Mucocutaneous junction:  intact  Peristomal complication(s): none   Output: bowel sweat   Output volume emptied during visit: 0ml  Abdominal assessment: Distended  Surgical site(s): dressing dry and intact  NG still in place? No  Pain: Fullness  Is patient still on a PCA? No    Ostomy education assessment:  Participant of teaching session today: patient   Education completed today: Stoma assessment, Pouching system assessment , Low fiber diet ,  "and Lifestyle adjustments   Educational materials/methods: Demonstration  Education still needed: Adjustment of pouching plan and Discharge instructions  Learning Comprehension:   Psychosocial assessment: lethargic but attentive   Patient readiness for education today: lethargic and attentive  Following today's visit: patient  is able to demonstrate;         1. How to empty their pouch? Yes, had ostomy prior        2. How to change their pouch? Yes, had ostomy prior           Preparation for discharge completed: No discharge preparation started yet  Preparation for discharge still needed: Placed prescription recommendations in discharge navigator for MD to sign, Ensured patient has extra supplies for discharge, Discussed how to order supplies after discharge , Ordered samples from  after gaining consent from patient/caregiver, Discussed how and when to make an outpatient WO nurse appointment after discharge, Prepared for discharge home with home care, and Discuss signs/symptoms of when to seek medical attention  Pt support system on discharge: spouse  WO recommend home care? No  Face to face time: 15 minutes          Treatment Plan:     LUQ Colostomy pouching plan:   Pouching system: ostomy supplies pouches: Roxy 57 FECAL (864531) ostomy supplies barrier: [place] 57mm FLAT (045295)  Accessories used: [facility] ostomy accessories: 2\" Cera Barrier Ring (632696)   Frequency of pouch changes: Twice weekly and PRN leakage  WO follow up plan: Daily Monday-Friday (as able)  Bedside RN interventions: Change pouch PRN if leaking using the supplies above, Empty pouch when 1/3 to 1/2 full, ensure to clean pouch outlet after emptying to prevent odor, Notify WOC for ongoing pouch leakage, and Document stoma appearance and output volume, color, and consistency every shift      Orders: Written    RECOMMEND PRIMARY TEAM ORDER: None, at this time  Education provided: plan of care  Discussed plan of care with: " Patient  WOC nurse follow-up plan: daily M-F  Notify WOC if wound(s) deteriorate.  Nursing to notify the Provider(s) and re-consult the WOC Nurse if new skin concern.    DATA:     Current support surface: Standard  Standard Isoflex gel  Containment of urine/stool: Indwelling catheter  BMI: Body mass index is 28.91 kg/m .   Active diet order: Orders Placed This Encounter      Regular Diet Adult     Output: I/O last 3 completed shifts:  In: 3422.84 [P.O.:2000; I.V.:1422.84]  Out: 5090 [Urine:4800; Drains:290]     Labs:   Recent Labs   Lab 07/12/24  0533 07/11/24  1620   HGB 8.3* 9.4*   WBC  --  10.8     Pressure injury risk assessment:   Sensory Perception: 4-->no impairment  Moisture: 4-->rarely moist  Activity: 3-->walks occasionally  Mobility: 3-->slightly limited  Nutrition: 3-->adequate  Friction and Shear: 3-->no apparent problem  Rayray Score: 20      Pager no longer is use, please contact through [employer] [facility]: WOC Nurse [place]   Dept. Office Number: [phone]

## 2024-07-12 NOTE — DISCHARGE INSTRUCTIONS
VR PLASTIC SURGERY POST-OPERATIVE INSTRUCTIONS     No sitting for 2 weeks after surgery.  After this, start gradually increasing how much you sit each day.   Strip and record drain output twice per day. Bring this record of outputs to clinic with you.  Change dressing to bottom with gauze and mesh panties to keep the area dry.  Follow up as scheduled with Dr. Mccarthy.       FOLLOW-UP Colorectal Surgery  You will need to follow-up with in the Colon and Rectal Surgery clinic in 2-3 weeks with an Advanced Practice Provider and then with Dr. Herrera 4-5 weeks after. Our clinic will reach out to you to schedule these appointments. If you have not heard from our clinic within 3 days after your discharge, please contact us at [phone].      [facility]     Name: Nicol Aldrich : 1960  Date: 07/15/2024    To order your ostomy supplies    The ostomy Supplier needs this supply list  to process your order. You will need to fax/deliver this list, along with your Insurance information. Your home care nurse can assist with this process.    List of Ostomy Distributors      [facility]  Ph. [phone] [phone] Fax # [phone]  [facility]   Ph. [phone] [phone]  [employer] [facility]   Ph. [phone]  [facility]   Ph. [phone] [phone]  Or Call your insurance provider for their preferred supplier    Your Medical Supplier will need your surgeon's name, phone and fax number    Clinic:                     Phone                            Fax  Colorectal Surgery:    [phone]    Verbal Order for ostomy supplies for 1 Month per:                                       Mary Corey RN, CWOCN                                                                                                       Authorizing MD: Kunal Herrera MD    Quantity of pouches:    20 pouches/month     Request the following supplies:      Roxy     Flat ceraplus two piece 57mm wafer without tape #30499    Two piece 57mm fecal pouch, beige with gas filter #91982     Accessories  2  barrier ring #2205     Small convex oval rings #80987    Adapt powder #7906    No sting film barrier # 4002                          Adapt odor eliminator and lubricant 236ml bottle # 01055     Adapt universal adhesive remover #1469    Roxy belt medium #3198 (23-43 )                              Change your pouch 2 to 3 times a week, more often if leaking.   If you are cutting a hole in the wafer of your pouch, recheck stoma size and adjust pouch opening as needed every week    . Call the Ostomy Nurse at [facility]       [address] [place] [address], [place] : [phone]   Schedule a follow-up visit in 2 to 4 weeks after your surgery, sooner if having problems Bring a complete set of pouch-changing supplies to this visit     Problems you should Report  - The stoma turns blue or darker in color.  - Cuts or sores around the stoma.  - Red, raw or painful skin around the stoma.  - Any bulging of the skin around the stoma.  - A pouch that leaks every day.  - Problems making the right size hole in the pouch wafer.   Please call with any questions or concerns.

## 2024-07-12 NOTE — PROGRESS NOTES
Plastic Surgery Progress Note    TRISHA. Pain controlled. Worse with movement. Got up to ambulate yesterday. Passing flatus and liquid stool present in ostomy bag. No n/v. OT recommending home with assist for discharge but patient is concerned she doesn't have adequate assist at home and would prefer to go to TCU.     Temp:  [97.8  F (36.6  C)-98.7  F (37.1  C)] 98.2  F (36.8  C)  Pulse:  [76-96] 80  Resp:  [12-17] 17  BP: ()/(56-85) 103/59  SpO2:  [95 %-100 %] 100 %    LLQ (pelvic drain) - 80 (180)  RLQ (subcutaneous drain) - 35 (65)    General: Alert, well-appearing in no acute distress.  Respiratory: Non-labored breathing on RA  Cardiovascular: Regular rate and rhythm.   Gastrointestinal: Abdomen non-distended, soft, appropriately tender. Dressing left in place, minimal strikethrough on dressing, dried. Stoma pink and patent, small amount of gas and liquid stool present. Drains serosang.   : Flap is pink, warm, and perfused with 2 sec cap refill. Incision intact. Distal end of flap (closest to vagina) is eccymotic and macerated but appears perfused, appears stable today. Dressing changed today.  Extremities: wwp, no tenderness.  Skin: No rashes or lesions appreciated.    Hb 8.3 (9.4)    Nicol Aldrich is a 63 year old female w/ Crohn's disease c/b fistulas to abdominal wall and perirectal area and anal cancer s/p chemoradiation now s/p APR and reconstruction with pedicled R VRAM flap 7/10. She is doing well post operatively. Mild ecchymosis to distal tip of flap but appears well perfused.     - No sitting for 2 weeks. HOB no greater than 30 degrees. Ok to sit transiently < 1 min to transition to standing and ambulate. Ok to sit for a few minutes on commode when duran comes out.  - Change dressing daily to perineum with fluffs held in place with mesh panties  - Strip and record drain output qshift  - Abdominal binder for comfort  - No heavy lifting 6wks  - PT/OT  - Remainder of care per CRS  - Ok for DVT ppx  from plastics standpoint. Will defer to CRS.   - Follow up scheduled with plastics PA 7/26  - Will follow peripherally over the weekend. Please let us know if any concerns. Will plan to see again early next week.    D/w Dr. Fabio Muniz MD  Plastic Surgery PGY6

## 2024-07-12 NOTE — PROVIDER NOTIFICATION
Provider Ed Pineda paged    please can you put in an order for the pt home medication. it is called Trospium. she takes one every morning.    Hector Bazzi RN on 7/11/2024 at 8:54 PM

## 2024-07-12 NOTE — PLAN OF CARE
"Blood pressure 97/55, pulse 90, temperature 98  F (36.7  C), temperature source Oral, resp. rate 18, height 1.651 m (5' 5\"), weight 78.8 kg (173 lb 11.6 oz), SpO2 93%, not currently breastfeeding.  Goal Outcome Evaluation:A&Ox4 up with assist of one ,pt. has sitting  restriction: no sitting for 2 wks ,and HOB must be below 30 degrees.Pt. c/o abdominal pain 7/10 IV dilaudid 0.2 mg given x 2 ,which was effective denied nausea tolerated well regular diet,BS+,colostomy with small amount of liquid green stool,and large amount of gas,duran was discontinued this morning ,pt. voided one time ,abdominal incision covered with prima pore dressing which was changed staples intact,left side fistula site packed with wet to dry dressing,changed one time by this writer and second time changed by medical student.Rectal incision clean and dry stiches intact,gauze changed x 1.2 MACKENZIE's sites clean and dry ,and had about 50 ml's from left MACKENZIE and about 25 ml's from right MACKENZIE  serosanguinous drainage . LS clear,denied SOB and chest pain.Will continue to monitor.                        "

## 2024-07-12 NOTE — PLAN OF CARE
PT Unit 5A: PT orders received and acknowledged. Per discussion with OT, pt is mobilizing well and her mobility needs can appropriately be met by 1 rehab discipline at this time. Defer to OT for mobility and discharge recommendations, no acute IP PT needs indicated. Will complete PT orders, please re-consult if new needs arise. Thank you for this referral!

## 2024-07-12 NOTE — PROGRESS NOTES
"Colorectal Surgery Progress Note  [facility]  POD#3      Subjective: Doing okay. Feeling \"icky\" but not able to put her finger on what is not going well. Nausea overnight, better controlled now. Dizziness better. Denies shortness of breath. Has been up to the bathroom.     Vitals:  Vitals:    07/12/24 0804 07/12/24 1100 07/12/24 1116 07/12/24 1226   BP: 103/59 (!) 80/68 95/68 97/55   BP Location: Right arm   Right arm   Pulse: 80   90   Resp: 17   18   Temp: 98.2  F (36.8  C)   98  F (36.7  C)   TempSrc: Oral   Oral   SpO2: 100%   93%   Weight:       Height:         I/O:  I/O last 3 completed shifts:  In: 3422.84 [P.O.:2000; I.V.:1422.84]  Out: 5090 [Urine:4800; Drains:290]    Physical Exam:  Gen: AAOx3, NAD  Pulm: Non-labored breathing on room air  Abd: Soft, non distended, appropriately tender, no guarding   Incision C/D/I open to air   Stoma pink and viable, some stool in bag    MACKENZIE drains in right and left lower quadrant with serosanginous output.   Ext:  Warm and well-perfused    BMP  Recent Labs   Lab 07/12/24  1230 07/12/24  0704 07/12/24  0609 07/11/24  0533 07/11/24  0450 07/10/24  2033   NA  --   --   --   --  134*  --    POTASSIUM  --   --   --   --  4.9  --    CHLORIDE  --   --   --   --  102  --    CO2  --   --   --   --  25  --    BUN  --   --   --   --  13.6  --    CR  --   --   --   --  0.96* 0.95   *  --  91 126* 129*  --    MAG  --  2.0  --   --  1.9  --    PHOS  --   --   --   --  4.6*  --      CBC  Recent Labs   Lab 07/12/24  0533 07/11/24  1620 07/11/24  0450   WBC  --  10.8 13.5*   HGB 8.3* 9.4* 10.7*   HCT  --  27.7* 31.5*   PLT  --  225 274         ASSESSMENT: This is a 63 year old female with past medical history of C. 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 She underwent APR and end colostomy revision by Dr." "Javier 7/10/2024 at [facility]. During the operation, VRAM flap was taken down and placed into pelvic dead space by Plastic Surgery team and Dr. Mccarthy.     Added nausea medications overnight. Having stool output. Infliximab okay to restart week of August 12th, 2024.     H/H stable. Will restart her lovenox for dvt ppx.    Neuro/Pain: Multimodal pain medication plan with IV dilaudid pushes.   CV: Hypotension resolving.  PULM: encourage IS and ambulation today.  GI/FEN: Regular diet. Ondansatron and Compazine prn, LR @50.   : Cronin out. Patient urinating without difficulty   Heme: stabilized   ID: No infection concerns  Endocrine: No concerns   Activity: No sitting for more than just transfers out of bed. Head of bed less than 30 degrees. For 2 weeks s/p surgery.   Ppx: Restart enoxaparin 40 daily  Dispo: 2-3 more days pending ADL's, pain plan, and diet.     Clinically Significant Risk Factors                              #Precipitous drop in Hgb/Hct: Lowest Hgb this hospitalization: 8.3 g/dL. Will continue to monitor and treat/transfuse as appropriate.     # Overweight: Estimated body mass index is 28.91 kg/m  as calculated from the following:    Height as of this encounter: 1.651 m (5' 5\").    Weight as of this encounter: 78.8 kg (173 lb 11.6 oz)., PRESENT ON ADMISSION            Marry Nicole MS4    And    Nico Pearce MD, MS  Fellow, Colon & Rectal Surgery  [facility]  07/13/2024  10:51 AM    "

## 2024-07-12 NOTE — PLAN OF CARE
"Goal Outcome Evaluation:    Time    BP 93/68   Pulse 76   Temp 98.1  F (36.7  C) (Oral)   Resp 16   Ht 1.651 m (5' 5\")   Wt 78.8 kg (173 lb 11.6 oz)   SpO2 100%   BMI 28.91 kg/m      Reason for admission: APR and end colostomy revision   Activity: pt was not OOB during the shift. No sitting, HOB 30 de  Pain: abdominal pain, managed with PCA Dilaudid.   Neuro: alert and oriented  Cardiac: Hypotensive, provider paged, denies Dizziness and no active bleeding noted. MAP 77.   Respiratory: denies SOB, no distress observed, on RA.  GI/: colostomy bag, gas in the bed. Pt emptied the gas. Cronin cath  Diet: CLD  Lines: PIV x 2  Wounds: no new skin issue  Labs/imaging: please review lab in the chart      New changes this shift:     Plan:       Continue to monitor and follow POC    "

## 2024-07-12 NOTE — PROGRESS NOTES
Colorectal Surgery Progress Note  [facility]  POD#2      Subjective:  Concerned about her low blood pressures and noted that she is pretty dizzy. Has not been up walking due to the dizziness. Having some pain, but able to be controlled with PCA pump.     Vitals:  Vitals:    07/11/24 1900 07/12/24 0011 07/12/24 0436 07/12/24 0450   BP: 102/85  (!) 86/56 93/68   BP Location: Right arm  Right arm    Pulse: 86 84 76    Resp: 14 16 16    Temp: 98.7  F (37.1  C) 97.9  F (36.6  C) 98.1  F (36.7  C)    TempSrc: Oral Oral Oral    SpO2: 100%      Weight:       Height:         I/O:  I/O last 3 completed shifts:  In: 3612.34 [P.O.:1760; I.V.:1852.34]  Out: 4095 [Urine:3450; Drains:295; Stool:350]    Physical Exam:  Gen: AAOx3, NAD  Pulm: Non-labored breathing  Abd: Soft, mildly distended, appropriately tender, no guarding   Incision C/D/I   Stoma pink and viable, some stool in bag    MACKENZIE drains in right and left lower quadrant with serosanginous output.    Flap - was just assessed by plastic surgery   Ext:  Warm and well-perfused    BMP  Recent Labs   Lab 07/11/24  0533 07/11/24  0450 07/10/24  2033 07/10/24  0940   NA  --  134*  --   --    POTASSIUM  --  4.9  --   --    CHLORIDE  --  102  --   --    CO2  --  25  --   --    BUN  --  13.6  --   --    CR  --  0.96* 0.95  --    * 129*  --  107*   MAG  --  1.9  --   --    PHOS  --  4.6*  --   --      CBC  Recent Labs   Lab 07/11/24  1620 07/11/24  0450   WBC 10.8 13.5*   HGB 9.4* 10.7*   HCT 27.7* 31.5*    274         ASSESSMENT: This is a 63 year old female with past medical history of C. 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 She underwent APR and end colostomy revision by Dr. Herrera 7/10/2024 at [facility]. During the operation, VRAM flap was taken down and placed into pelvic dead space by Plastic  "Surgery team and Dr. Mccarthy.     Hypotensive overnight with BP at 86/56 without any obvious bleeding from operative sites. Hgb dropped post operatively; will continue to follow today. Discontinuing PCA, also PCA possible suspicion for hypotensive episode    Neuro/Pain: Multimodal pain medication plan  CV: Hypotension, likely related to pain medication as does not appear hypovolemic given adequate urine output.   PULM: encourage IS and ambulation today.  GI/FEN: CLD -> regular diet. Ondansatron prn, LR @50. Right superficial MACKENZIE drain to stay in place until Discharge. Left deep pelvic MACKENZIE drain TBD by CRS team. Repack with saline moist Kerlex BID to keep skin site open.   : Cronin in place likely will discontinue tomorrow morning.   Heme: Hgb continues to decrease after surgery. Following tomorrow AM. Hypotension possibly due to blood loss from large surgery, though adequate urine output more likely from IV pain medication reaction.   ID: No infection concerns  Endocrine: No concerns   Activity: No sitting for more than just transfers out of bed. Head of bed less than 30 degrees. For 2 weeks s/p surgery.   Ppx: Enoxaparin 40 daily held given low hgb.   Dispo: 2-3 more days pending ADL's, pain plan, and diet.     Clinically Significant Risk Factors                                 # Overweight: Estimated body mass index is 28.91 kg/m  as calculated from the following:    Height as of this encounter: 1.651 m (5' 5\").    Weight as of this encounter: 78.8 kg (173 lb 11.6 oz)., PRESENT ON ADMISSION            Marry Nicole, MS4    Note reviewed by:    Roger Dietrich MD  General Surgery, PGY-1  [facility]    Patient was seen and discussed with Dr. Wood and plan was discussed with staff.  "

## 2024-07-13 ENCOUNTER — APPOINTMENT (OUTPATIENT)
Dept: OCCUPATIONAL THERAPY | Facility: CLINIC | Age: 64
End: 2024-07-13
Attending: SURGERY
Payer: COMMERCIAL

## 2024-07-13 LAB
HGB BLD-MCNC: 8.3 G/DL (ref 11.7–15.7)
MAGNESIUM SERPL-MCNC: 2.2 MG/DL (ref 1.7–2.3)
PHOSPHATE SERPL-MCNC: 3.2 MG/DL (ref 2.5–4.5)
PLATELET # BLD AUTO: 213 10E3/UL (ref 150–450)

## 2024-07-13 PROCEDURE — 250N000013 HC RX MED GY IP 250 OP 250 PS 637: Performed by: COLON & RECTAL SURGERY

## 2024-07-13 PROCEDURE — 250N000011 HC RX IP 250 OP 636: Performed by: STUDENT IN AN ORGANIZED HEALTH CARE EDUCATION/TRAINING PROGRAM

## 2024-07-13 PROCEDURE — 250N000012 HC RX MED GY IP 250 OP 636 PS 637: Performed by: STUDENT IN AN ORGANIZED HEALTH CARE EDUCATION/TRAINING PROGRAM

## 2024-07-13 PROCEDURE — 120N000002 HC R&B MED SURG/OB UMMC

## 2024-07-13 PROCEDURE — 83735 ASSAY OF MAGNESIUM: CPT | Performed by: STUDENT IN AN ORGANIZED HEALTH CARE EDUCATION/TRAINING PROGRAM

## 2024-07-13 PROCEDURE — 97530 THERAPEUTIC ACTIVITIES: CPT | Mod: GO

## 2024-07-13 PROCEDURE — 36415 COLL VENOUS BLD VENIPUNCTURE: CPT | Performed by: STUDENT IN AN ORGANIZED HEALTH CARE EDUCATION/TRAINING PROGRAM

## 2024-07-13 PROCEDURE — 85018 HEMOGLOBIN: CPT | Performed by: STUDENT IN AN ORGANIZED HEALTH CARE EDUCATION/TRAINING PROGRAM

## 2024-07-13 PROCEDURE — 258N000003 HC RX IP 258 OP 636: Performed by: PHYSICIAN ASSISTANT

## 2024-07-13 PROCEDURE — 250N000013 HC RX MED GY IP 250 OP 250 PS 637: Performed by: SURGERY

## 2024-07-13 PROCEDURE — 85049 AUTOMATED PLATELET COUNT: CPT | Performed by: SURGERY

## 2024-07-13 PROCEDURE — 84100 ASSAY OF PHOSPHORUS: CPT | Performed by: STUDENT IN AN ORGANIZED HEALTH CARE EDUCATION/TRAINING PROGRAM

## 2024-07-13 RX ORDER — ACETAMINOPHEN 500 MG
1000 TABLET ORAL 4 TIMES DAILY
Status: DISCONTINUED | OUTPATIENT
Start: 2024-07-13 | End: 2024-07-15 | Stop reason: HOSPADM

## 2024-07-13 RX ORDER — IBUPROFEN 200 MG
600 TABLET ORAL 3 TIMES DAILY
Status: DISCONTINUED | OUTPATIENT
Start: 2024-07-13 | End: 2024-07-15

## 2024-07-13 RX ORDER — ENOXAPARIN SODIUM 100 MG/ML
40 INJECTION SUBCUTANEOUS EVERY 24 HOURS
Status: CANCELLED | OUTPATIENT
Start: 2024-07-13

## 2024-07-13 RX ORDER — METHOCARBAMOL 500 MG/1
500 TABLET, FILM COATED ORAL 4 TIMES DAILY PRN
Status: DISCONTINUED | OUTPATIENT
Start: 2024-07-13 | End: 2024-07-15 | Stop reason: HOSPADM

## 2024-07-13 RX ADMIN — IBUPROFEN 600 MG: 200 TABLET, FILM COATED ORAL at 13:47

## 2024-07-13 RX ADMIN — PREDNISONE 5 MG: 5 TABLET ORAL at 08:42

## 2024-07-13 RX ADMIN — SODIUM CHLORIDE, POTASSIUM CHLORIDE, SODIUM LACTATE AND CALCIUM CHLORIDE: 600; 310; 30; 20 INJECTION, SOLUTION INTRAVENOUS at 06:14

## 2024-07-13 RX ADMIN — ACETAMINOPHEN 1000 MG: 500 TABLET ORAL at 20:56

## 2024-07-13 RX ADMIN — TROSPIUM CHLORIDE ER 60 MG: 60 CAPSULE ORAL at 08:42

## 2024-07-13 RX ADMIN — GABAPENTIN 100 MG: 100 CAPSULE ORAL at 20:56

## 2024-07-13 RX ADMIN — HYDROMORPHONE HYDROCHLORIDE 0.2 MG: 0.2 INJECTION, SOLUTION INTRAMUSCULAR; INTRAVENOUS; SUBCUTANEOUS at 06:14

## 2024-07-13 RX ADMIN — GABAPENTIN 100 MG: 100 CAPSULE ORAL at 08:42

## 2024-07-13 RX ADMIN — ACETAMINOPHEN 1000 MG: 500 TABLET ORAL at 16:35

## 2024-07-13 RX ADMIN — ONDANSETRON 4 MG: 2 INJECTION INTRAMUSCULAR; INTRAVENOUS at 02:35

## 2024-07-13 RX ADMIN — HYDROMORPHONE HYDROCHLORIDE 0.3 MG: 0.2 INJECTION, SOLUTION INTRAMUSCULAR; INTRAVENOUS; SUBCUTANEOUS at 10:52

## 2024-07-13 RX ADMIN — HYDROMORPHONE HYDROCHLORIDE 0.2 MG: 0.2 INJECTION, SOLUTION INTRAMUSCULAR; INTRAVENOUS; SUBCUTANEOUS at 02:35

## 2024-07-13 RX ADMIN — ENOXAPARIN SODIUM 40 MG: 40 INJECTION SUBCUTANEOUS at 10:52

## 2024-07-13 RX ADMIN — METHOCARBAMOL 500 MG: 500 TABLET ORAL at 08:42

## 2024-07-13 RX ADMIN — GABAPENTIN 100 MG: 100 CAPSULE ORAL at 13:47

## 2024-07-13 RX ADMIN — IBUPROFEN 600 MG: 200 TABLET, FILM COATED ORAL at 20:56

## 2024-07-13 ASSESSMENT — ACTIVITIES OF DAILY LIVING (ADL)
ADLS_ACUITY_SCORE: 25
ADLS_ACUITY_SCORE: 28
ADLS_ACUITY_SCORE: 32
ADLS_ACUITY_SCORE: 33
ADLS_ACUITY_SCORE: 28
ADLS_ACUITY_SCORE: 32
ADLS_ACUITY_SCORE: 25
ADLS_ACUITY_SCORE: 32
ADLS_ACUITY_SCORE: 32
ADLS_ACUITY_SCORE: 27
ADLS_ACUITY_SCORE: 28
ADLS_ACUITY_SCORE: 25
ADLS_ACUITY_SCORE: 27
ADLS_ACUITY_SCORE: 32
ADLS_ACUITY_SCORE: 25
ADLS_ACUITY_SCORE: 27
ADLS_ACUITY_SCORE: 27
ADLS_ACUITY_SCORE: 25
ADLS_ACUITY_SCORE: 25
ADLS_ACUITY_SCORE: 27

## 2024-07-13 NOTE — PLAN OF CARE
"Plan of Care Reviewed With: patient    Overall Patient Progress: no change    Status: Pt reported doing better - just feeling \"icky.\" Got better throughout the day. HOB to stay at 30 degrees or less and no sitting for two weeks. Worked with OT- ambulated around room/to bathroom- good urine output. Colostomy in place - BM charted. MACKENZIE x2. L incision packed and changed x2. Rectal flap - gauze in place. Abdominal binder in place - for midline incision. Reg diet. Minimal appetite - ate some lunch. AO x4. RA. Calls appropriately.     Labs: Reviewed.    Pain/Nausea: Pain rated as high as 9/10 in R lower abdomen. PRN IV dilaudid given x1. Scheduled tylenol, gabapentin, and ibuprofen given. Pain brought to a 4/10. Denies nausea (2628-5799).     New Changes: LR infusion stopped. Wounds changed/monitored.     Plan: Monitor pain. Encourage activity. Monitor dressings/incisions. Continue POC.     Vital signs:  Temp: 98.6  F (37  C) Temp src: Oral BP: 124/75 Pulse: 92   Resp: 12 SpO2: 96 % O2 Device: None (Room air) Oxygen Delivery: 1 LPM Height: 165.1 cm (5' 5\") Weight: 78.8 kg (173 lb 11.6 oz)  Estimated body mass index is 28.91 kg/m  as calculated from the following:    Height as of this encounter: 1.651 m (5' 5\").    Weight as of this encounter: 78.8 kg (173 lb 11.6 oz).           "

## 2024-07-13 NOTE — PLAN OF CARE
D AVSS with sat's 94% on room air. Heart regular and lungs decreased slightly in bases otherwise clear. Last evening Nicol was having a lot of difficulty with nausea. MD called and Zofran was increased along with adding Compazine which help reduce her nausea significantly. IV Dilaudid was also increased which allowed her to slept well for most of the night. Up to bedside commode to void good amounts with small outputs from MACKENZIE and Colostomy. Rectal flap leaking a small to scant amounts and changing Kerlix over area PRN. MD changed fistula site last evening.   I Vital's, assessment and med's per order.  A Resting in bed with call light in reach and bed alarm on for safety.  P Continue to monitor and update MD with changes.

## 2024-07-14 LAB
HGB BLD-MCNC: 8.3 G/DL (ref 11.7–15.7)
HOLD SPECIMEN: NORMAL

## 2024-07-14 PROCEDURE — 85018 HEMOGLOBIN: CPT | Performed by: COLON & RECTAL SURGERY

## 2024-07-14 PROCEDURE — 36415 COLL VENOUS BLD VENIPUNCTURE: CPT | Performed by: COLON & RECTAL SURGERY

## 2024-07-14 PROCEDURE — 250N000011 HC RX IP 250 OP 636: Performed by: STUDENT IN AN ORGANIZED HEALTH CARE EDUCATION/TRAINING PROGRAM

## 2024-07-14 PROCEDURE — 250N000012 HC RX MED GY IP 250 OP 636 PS 637: Performed by: STUDENT IN AN ORGANIZED HEALTH CARE EDUCATION/TRAINING PROGRAM

## 2024-07-14 PROCEDURE — 250N000013 HC RX MED GY IP 250 OP 250 PS 637: Performed by: COLON & RECTAL SURGERY

## 2024-07-14 PROCEDURE — 120N000002 HC R&B MED SURG/OB UMMC

## 2024-07-14 PROCEDURE — 250N000013 HC RX MED GY IP 250 OP 250 PS 637: Performed by: SURGERY

## 2024-07-14 RX ADMIN — IBUPROFEN 600 MG: 200 TABLET, FILM COATED ORAL at 08:35

## 2024-07-14 RX ADMIN — TROSPIUM CHLORIDE ER 60 MG: 60 CAPSULE ORAL at 08:35

## 2024-07-14 RX ADMIN — ENOXAPARIN SODIUM 40 MG: 40 INJECTION SUBCUTANEOUS at 09:59

## 2024-07-14 RX ADMIN — GABAPENTIN 100 MG: 100 CAPSULE ORAL at 20:21

## 2024-07-14 RX ADMIN — ACETAMINOPHEN 1000 MG: 500 TABLET ORAL at 08:35

## 2024-07-14 RX ADMIN — ACETAMINOPHEN 1000 MG: 500 TABLET ORAL at 17:25

## 2024-07-14 RX ADMIN — IBUPROFEN 600 MG: 200 TABLET, FILM COATED ORAL at 20:21

## 2024-07-14 RX ADMIN — GABAPENTIN 100 MG: 100 CAPSULE ORAL at 08:35

## 2024-07-14 RX ADMIN — IBUPROFEN 600 MG: 200 TABLET, FILM COATED ORAL at 13:16

## 2024-07-14 RX ADMIN — ACETAMINOPHEN 1000 MG: 500 TABLET ORAL at 13:15

## 2024-07-14 RX ADMIN — GABAPENTIN 100 MG: 100 CAPSULE ORAL at 13:15

## 2024-07-14 RX ADMIN — PREDNISONE 5 MG: 5 TABLET ORAL at 08:35

## 2024-07-14 RX ADMIN — ACETAMINOPHEN 1000 MG: 500 TABLET ORAL at 22:21

## 2024-07-14 ASSESSMENT — ACTIVITIES OF DAILY LIVING (ADL)
ADLS_ACUITY_SCORE: 24
ADLS_ACUITY_SCORE: 25
ADLS_ACUITY_SCORE: 24
ADLS_ACUITY_SCORE: 25
ADLS_ACUITY_SCORE: 24
ADLS_ACUITY_SCORE: 25

## 2024-07-14 NOTE — PROGRESS NOTES
Colorectal Surgery Progress Note  [facility]      Subjective: Feeling much better today. Nausea improved and able to eat some yesterday. Has been able to get out of bed. She is wondering if she really needs to go to the TCU, or if she can do her dressing changes at home.     Vitals:  Vitals:    07/13/24 0629 07/13/24 0900 07/13/24 1634 07/14/24 0039   BP:  127/71 124/75 107/70   BP Location:  Right arm Right arm Right arm   Patient Position:  Semi-Snow's     Cuff Size:  Adult Regular Adult Regular    Pulse:  95 92 79   Resp:  16 12 18   Temp:  98  F (36.7  C) 98.6  F (37  C) 97.7  F (36.5  C)   TempSrc:  Oral Oral Oral   SpO2: 99% 95% 96% 95%   Weight:       Height:         I/O:  I/O last 3 completed shifts:  In: 1370 [P.O.:970; I.V.:400]  Out: 2060 [Urine:1600; Drains:265; Stool:195]    Physical Exam:  Gen: AAOx3, NAD  Pulm: Non-labored breathing on room air  Abd: Soft, non distended, appropriately tender, no guarding   Incision C/D/I open to air   Stoma pink and viable, some stool in bag. Bag changed at bedside.    MACKENZIE drains in right and left lower quadrant with serosanginous output.    Old fistula site lateral to new stoma with no evidence of active infection.  Dressing was changed.  Ext:  Warm and well-perfused    BMP  Recent Labs   Lab 07/13/24  0618 07/12/24  1425 07/12/24  1230 07/12/24  0704 07/12/24  0609 07/11/24  0533 07/11/24  0450 07/10/24  2033   NA  --   --   --   --   --   --  134*  --    POTASSIUM  --   --   --   --   --   --  4.9  --    CHLORIDE  --   --   --   --   --   --  102  --    CO2  --   --   --   --   --   --  25  --    BUN  --   --   --   --   --   --  13.6  --    CR  --   --   --   --   --   --  0.96* 0.95   GLC  --  113* 110*  --  91 126* 129*  --    MAG 2.2  --   --  2.0  --   --  1.9  --    PHOS 3.2  --   --   --   --   --  4.6*  --      CBC  Recent Labs   Lab 07/13/24  0618 07/12/24  0533 07/11/24  1620 07/11/24  0450   WBC  --   --  10.8 13.5*   HGB  "8.3* 8.3* 9.4* 10.7*   HCT  --   --  27.7* 31.5*     --  225 274         ASSESSMENT: This is a 63 year old female with past medical history of C. 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 She underwent APR and end colostomy revision by Dr. Herrera 7/10/2024 at [facility]. During the operation, VRAM flap was taken down and placed into pelvic dead space by Plastic Surgery team and Dr. Mccarthy.     Meeting discharge milestones. Patient preference if going to TCU vs home for wound care management.     Neuro/Pain: Multimodal pain medication. Discontinued IV dilaudid pushes. Oral pain medications ordered.  CV: Hypotension resolving.  PULM: encourage IS and ambulation today.  GI/FEN: Regular diet. Ondansatron and Compazine prn. Right superficial MACKENZIE drain to stay in place until Discharge. LLQ drain TBD.   : Patient urinating without difficulty   Heme: stabilized   ID: No infection concerns  Endocrine: No concerns   Activity: No sitting for more than just transfers out of bed. Head of bed less than 30 degrees. For 2 weeks s/p surgery.   Ppx: Enoxaparin 40 daily  Dispo: Pending patient ability to perform wound cares TCU vs home in next 1-2 days.     Clinically Significant Risk Factors                              #Precipitous drop in Hgb/Hct: Lowest Hgb this hospitalization: 8.3 g/dL. Will continue to monitor and treat/transfuse as appropriate.     # Overweight: Estimated body mass index is 28.91 kg/m  as calculated from the following:    Height as of this encounter: 1.651 m (5' 5\").    Weight as of this encounter: 78.8 kg (173 lb 11.6 oz)., PRESENT ON ADMISSION            Marry Nicole, MS4      Needs to rely more on p.o. pain medications.  She already has the stoma appliance changing down but needs to learn how to take care of drains and incision as well as Lovenox " teaching.  Likely discharge in the next 1-2 days.  May still need TCU depending on her comfort level.    Cezar Juarez M.D., M.Sc.    Chief, Division of Colon and Rectal Surgery  Stanley M. Goldberg, MD Endowed Chair, Colon & Rectal Surgery  Department of Surgery  [facility]  Pager: [phone].

## 2024-07-14 NOTE — PLAN OF CARE
"/82 (BP Location: Right arm)   Pulse 81   Temp 98  F (36.7  C) (Oral)   Resp 18   Ht 1.651 m (5' 5\")   Wt 78.8 kg (173 lb 11.6 oz)   SpO2 99%   BMI 28.91 kg/m      Pt is A&Ox4. VSS. On room air. Tolerating regular diet with no reports of nausea. Colostomy with some output. Fistula with dressing. Rectal incision BILL. Up ad mignon. PIV x2. Will continue plan of care.  "

## 2024-07-14 NOTE — PLAN OF CARE
"Goal Outcome Evaluation:      Plan of Care Reviewed With: patient    Overall Patient Progress: no changeOverall Patient Progress: no change    Vitals: Blood pressure 107/70, pulse 79, temperature 97.7  F (36.5  C), temperature source Oral, resp. rate 18, height 1.651 m (5' 5\"), weight 78.8 kg (173 lb 11.6 oz), SpO2 95%, not currently breastfeeding.  Time: 1434-3357  Neuro: A/O x 4, calls appropriately and makes needs known.  Activity: up with SBA.    Pain and N/V: Denies Pain and n/v at this time.  GI/: Colostomy with good ouput, Voiding spontaneously. AUOP.   Cardiac: Denies cardiac chest pain.   Diet: Regular.   Respiratory: Denies SOB, on RA  Lines/Drains: Colostomy, MACKENZIE x2, and PIV X2.   Incisions/Skin: Abdominal incisions CDI. Fistula packing changed x1 overnight.   Lab: Reviewed.  New changes this shift: No significant changes this shift, Continue POC.         "

## 2024-07-15 ENCOUNTER — APPOINTMENT (OUTPATIENT)
Dept: OCCUPATIONAL THERAPY | Facility: CLINIC | Age: 64
End: 2024-07-15
Attending: SURGERY
Payer: COMMERCIAL

## 2024-07-15 VITALS
HEIGHT: 65 IN | DIASTOLIC BLOOD PRESSURE: 76 MMHG | BODY MASS INDEX: 28.94 KG/M2 | WEIGHT: 173.72 LBS | OXYGEN SATURATION: 94 % | SYSTOLIC BLOOD PRESSURE: 134 MMHG | HEART RATE: 75 BPM | TEMPERATURE: 97.9 F | RESPIRATION RATE: 16 BRPM

## 2024-07-15 PROCEDURE — 250N000013 HC RX MED GY IP 250 OP 250 PS 637: Performed by: SURGERY

## 2024-07-15 PROCEDURE — 250N000013 HC RX MED GY IP 250 OP 250 PS 637: Performed by: COLON & RECTAL SURGERY

## 2024-07-15 PROCEDURE — 250N000011 HC RX IP 250 OP 636: Performed by: STUDENT IN AN ORGANIZED HEALTH CARE EDUCATION/TRAINING PROGRAM

## 2024-07-15 PROCEDURE — 97535 SELF CARE MNGMENT TRAINING: CPT | Mod: GO

## 2024-07-15 PROCEDURE — 250N000012 HC RX MED GY IP 250 OP 636 PS 637: Performed by: STUDENT IN AN ORGANIZED HEALTH CARE EDUCATION/TRAINING PROGRAM

## 2024-07-15 PROCEDURE — G0463 HOSPITAL OUTPT CLINIC VISIT: HCPCS

## 2024-07-15 RX ORDER — ENOXAPARIN SODIUM 100 MG/ML
40 INJECTION SUBCUTANEOUS EVERY 24 HOURS
Qty: 10.4 ML | Refills: 0 | Status: SHIPPED | OUTPATIENT
Start: 2024-07-16 | End: 2024-08-11

## 2024-07-15 RX ORDER — GABAPENTIN 100 MG/1
100 CAPSULE ORAL 3 TIMES DAILY
Qty: 30 CAPSULE | Refills: 0 | Status: SHIPPED | OUTPATIENT
Start: 2024-07-15 | End: 2024-07-25

## 2024-07-15 RX ORDER — METHOCARBAMOL 500 MG/1
500 TABLET, FILM COATED ORAL 4 TIMES DAILY PRN
Qty: 30 TABLET | Refills: 0 | Status: SHIPPED | OUTPATIENT
Start: 2024-07-15 | End: 2024-07-29

## 2024-07-15 RX ADMIN — TROSPIUM CHLORIDE ER 60 MG: 60 CAPSULE ORAL at 07:51

## 2024-07-15 RX ADMIN — GABAPENTIN 100 MG: 100 CAPSULE ORAL at 07:50

## 2024-07-15 RX ADMIN — ACETAMINOPHEN 1000 MG: 500 TABLET ORAL at 07:07

## 2024-07-15 RX ADMIN — PREDNISONE 5 MG: 5 TABLET ORAL at 07:50

## 2024-07-15 RX ADMIN — ENOXAPARIN SODIUM 40 MG: 40 INJECTION SUBCUTANEOUS at 10:24

## 2024-07-15 RX ADMIN — IBUPROFEN 600 MG: 200 TABLET, FILM COATED ORAL at 07:50

## 2024-07-15 RX ADMIN — GABAPENTIN 100 MG: 100 CAPSULE ORAL at 13:37

## 2024-07-15 ASSESSMENT — ACTIVITIES OF DAILY LIVING (ADL)
ADLS_ACUITY_SCORE: 24

## 2024-07-15 NOTE — PROGRESS NOTES
[facility]  WO Nurse Inpatient Assessment     Consulted for: end colostomy, revised from loop colostomy  7/12: pouch placed yesterday is intact, pt starting to get ostomy output, will plan to refit ostomy appliance on Monday for plan for discharge      Patient History (according to provider note(s):      This is a 63 year old female with past medical history of C. 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 She underwent APR and end colostomy revision by Dr. Herrera 7/10/2024 at [facility]. During the operation, VRAM flap was taken down and placed into pelvic dead space by Plastic Surgery team and Dr. Mccarthy.     Assessment:      Areas visualized during today's visit: Abdomen    Assessment of new end Colostomy:  Pouching system in place on assessment today: [place] two piece 57mm with barrier ring   Pouch barrier status:  25% melted  Pouch last changed/wear time: 2 days   Reason for pouch change today: routine schedule,   Effectiveness of current pouching/ supply plan: Effective  Change made with ostomy management today: Yes added convex oval barrier ring   Pouching system placed today: [place] two piece, flat, and convex oval barrier ring   Supplies: gathered and at bedside    Stoma location: LUQ  Stoma size: about 1 x 1 5/8th inches, oval   Stoma appearance: pink, oval, moist, and flat  Mucocutaneous junction:  intact  Peristomal complication(s): none   Output: bowel sweat   Output volume emptied during visit: 0ml  Abdominal assessment: Soft  Surgical site(s): staples intact  NG still in place? No  Pain: Fullness  Is patient still on a PCA? No    Ostomy education assessment:  Participant of teaching session today: patient   Education completed today: Stoma assessment, Pouching system assessment , Refitting of appliance , and  "Discharge instructions  Educational materials/methods: Demonstration  Education still needed:  completed   Learning Comprehension:   Psychosocial assessment: active participation   Patient readiness for education today: attentive and active participation  Following today's visit: patient  is able to demonstrate;         1. How to empty their pouch? Yes, had ostomy prior        2. How to change their pouch? Yes, had ostomy prior           Preparation for discharge completed: Placed prescription recommendations in discharge navigator for MD to sign, Ensured patient has extra supplies for discharge, Discussed how to order supplies after discharge , Discussed how and when to make an outpatient WOC nurse appointment after discharge, and Discuss signs/symptoms of when to seek medical attention  Preparation for discharge still needed: completed   Pt support system on discharge: spouse  WOC recommend home care? No  Face to face time: 40 minutes          Treatment Plan:     LUQ Colostomy pouching plan:   Pouching system: ostomy supplies pouches: Roxy 57 FECAL ([id number]) ostomy supplies barrier: [place] 57mm FLAT ([id number])  Accessories used: [facility] ostomy accessories: 2\" Cera Barrier Ring ([id number]) and SMALL Convex Oval Ring ([id number])   Frequency of pouch changes: Twice weekly and PRN leakage  WOC follow up plan: Daily Monday-Friday (as able)  Bedside RN interventions: Change pouch PRN if leaking using the supplies above, Empty pouch when 1/3 to 1/2 full, ensure to clean pouch outlet after emptying to prevent odor, Notify WOC for ongoing pouch leakage, and Document stoma appearance and output volume, color, and consistency every shift      Orders: Written    RECOMMEND PRIMARY TEAM ORDER: None, at this time  Education provided: plan of care  Discussed plan of care with: Patient  WOC nurse follow-up plan: daily M-F  Notify WOC if wound(s) deteriorate.  Nursing to notify the Provider(s) and re-consult the WOC Nurse if new skin " concern.    DATA:     Current support surface: Standard  Standard Isoflex gel  Containment of urine/stool: Indwelling catheter  BMI: Body mass index is 28.91 kg/m .   Active diet order: Orders Placed This Encounter      Regular Diet Adult      Diet     Output: I/O last 3 completed shifts:  In: 690 [P.O.:690]  Out: 2525 [Urine:2350; Drains:125; Stool:50]     Labs:   Recent Labs   Lab 07/14/24  0702 07/12/24  0533 07/11/24  1620   HGB 8.3*   < > 9.4*   WBC  --   --  10.8    < > = values in this interval not displayed.     Pressure injury risk assessment:   Sensory Perception: 4-->no impairment  Moisture: 4-->rarely moist  Activity: 3-->walks occasionally  Mobility: 4-->no limitation  Nutrition: 3-->adequate  Friction and Shear: 3-->no apparent problem  Rayray Score: 21      Pager no longer is use, please contact through [employer] group: [facility] Nurse [place]   Dept. Office Number: [phone]

## 2024-07-15 NOTE — PLAN OF CARE
"/76 (BP Location: Right arm)   Pulse 75   Temp 97.9  F (36.6  C) (Oral)   Resp 16   Ht 1.651 m (5' 5\")   Wt 78.8 kg (173 lb 11.6 oz)   SpO2 94%   BMI 28.91 kg/m      Pt is A&Ox4. VSS. On room air. Tolerating regular diet with no reports of nausea. Pain managed with scheduled pain meds. Up ad mignon in room. Colostomy with some output. Jpx2. Lovenox teaching reinforced; pt self administered. MACKENZIE teaching completed. PIV removed. Perineal flap CDI. AVS printed; discharge instructions reviewed with pt. Pt reminded to take all belongings. Will  meds in pharmacy.  "

## 2024-07-15 NOTE — PLAN OF CARE
D AVSS with sat's 97% on room air. Heart regular and lungs mostly clear. Voiced no c/o pain or nausea overnight and has been sleeping well. Up to bathroom independently to void an adequate amount. Had a small amount of blood output from MACKENZIE's L>R and no output from colostomy.   I Vital's, assessment and med's per order.  A Resting in bed with call light in reach.  P Continue to monitor and update MD with changes.

## 2024-07-15 NOTE — PLAN OF CARE
[id number]. VSS on RA. Up independently. Pain managed with scheduled Tylenol and Ibuprofen. No c/o nausea. Tolerating regular diet. JPx2 with small amount serosanguinous drainage out. Colostomy with small amount stool in bag. Changed kerlix fluff over perineal incision x1. PIV saline locked. Continue to monitor and with POC.

## 2024-07-15 NOTE — DISCHARGE SUMMARY
[facility]  Discharge Summary  Colon and Rectal Surgery     Nicol Aldrich MRN# [id number]   YOB: 1960 Age: 63 year old     Date of Admission:  7/10/2024  Date of Discharge::  7/15/2024  Admitting Physician:  Kunal Herrera MD  Discharge Physician:    Primary Care Physician:        Janet Noe          Admission Diagnoses:   Crohn's disease of both small and large intestine with fistula (H) [K50.813]    Squamous Cell Carcinoma of anus  History of C. Diff infection   History of pyoderma gangrenosum          Discharge Diagnosis:   Crohn's disease of both small and large intestine with fistula (H) [K50.813]    Squamous Cell Carcinoma of anus  History of C. Diff infection   History of pyoderma gangrenosum         Procedures:   PROCEDURE: 7/10 by Dr. Kunal Herrera  1. Exploratory laparotomy  2. APR  3. Colostomy revision  4. VRAM flap(Dr Mccarthy)     ANESTHESIA: General endotracheal anesthesia plus local anesthesia.    PROCEDURES: 7/10 by Dr. Goyo Mccarthy  1.  Right rectus abdominis vertical transpelvic musculocutaneous flap reconstruction of perineum    ANESTHESIA: General anesthesia with endotracheal intubation.           Consultations:   WOUND OSTOMY CONTINENCE NURSE  IP CONSULT  OCCUPATIONAL THERAPY ADULT IP CONSULT  PHYSICAL THERAPY ADULT IP CONSULT  CARE MANAGEMENT / SOCIAL WORK IP CONSULT  CARE MANAGEMENT / SOCIAL WORK IP CONSULT         Imaging Studies:     Results for orders placed or performed during the hospital encounter of 06/21/23   MR Enterography wo and w Contrast    Narrative    MR ENTEROGRAPHY WITH AND WITHOUT CONTRAST June 21, 2023 10:30 AM    HISTORY: 62 year-old female, diverting colostomy, Crohn's, multiple  karen-stomal fistulas, evaluate inflammatory levels and assess fistula  tracts. Crohn's disease of small and large intestines with  complication (H).    COMPARISON: Outside abdominal MRI on 5/8/2023.    TECHNIQUE: MRI  enterography with and without IV contrast. MR  enterography was performed following oral contrast administration.  This examination is tailored to evaluate for inflammatory changes in  the bowel.    FINDINGS:    Bowel: No evidence of abnormal small bowel wall thickening or  enhancement, to suggest the presence of active small bowel Crohn's  disease. Postsurgical changes of the lower quadrant loop colostomy  with associated parastomal hernia. Large amount of stool in the  ascending and transverse colon down to the level of the colostomy. The  more distal colon is decompressed. Mild colonic wall enhancement of  the transverse colon near the stoma (series 28 image 45),  indeterminate, could be due to underdistention versus active  inflammation. No convincing evidence of fistula formation.    Remainder of the abdomen and pelvis: The visualized portion of the  liver and gallbladder are grossly unremarkable. There is 8 mm T2  hyperintense focus in the pancreatic body area (series 5 image 22),  too small to characterize, could represent side branch intraductal  papillary mucinous neoplasm versus other pancreatic cystic lesions. No  splenomegaly. No hydronephrosis or solid renal mass. No adrenal  nodules. No significant free fluid in the abdomen or pelvis. The  uterus is not visualized and likely surgically absent. Atherosclerotic  vascular calcification of abdominal aorta.    Bones and soft tissue: No suspicious osseous lesion.      Impression    IMPRESSION:  1. Postsurgical changes of loop colostomy with moderate size  parastomal hernia. The transverse colon near the stoma within the  parastomal hernia demonstrates mild colonic wall thickening and  enhancement, could be due to underdistention versus active  inflammation. No convincing evidence of fistula formation. Large  amount of stool in the colon upstream to the stoma. The more distal  descending and sigmoid colon are decompressed.  2. No evidence of abnormal small  "bowel wall thickening or enhancement  to suggest presence of active small bowel Crohn's disease.  3. 8 mm T2 hyperintense focus in the pancreatic body, too small to  characterize could represent side branch intraductal papillary  mucinous neoplasm versus other pancreatic cystic lesions. Recommend  follow-up exam with contrast enhanced MRI/MRCP in one year to assess  for interval change.    HEMAL MAYER MD         SYSTEM ID:  [id number]              Medications Prior to Admission:     Medications Prior to Admission   Medication Sig Dispense Refill Last Dose    acetaminophen (TYLENOL) 500 MG tablet Take 1,000 mg by mouth every 4 hours as needed   Past Week    metroNIDAZOLE (FLAGYL) 500 MG tablet Take 1 tablet (500 mg) by mouth every 6 hours At 8:00 am, 2:00 pm, 8:00 pm the day prior to your surgery with neomycin and zofran. 3 tablet 0 7/9/2024 at 2000    neomycin (MYCIFRADIN) 500 MG tablet Take 2 tablets (1,000 mg) by mouth every 6 hours At 8:00 am, 2:00 pm, 8:00 pm the day prior to your surgery with flagyl and zofran. 6 tablet 0 7/9/2024 at 2000    ondansetron (ZOFRAN) 4 MG tablet Take 1 tablet (4 mg) by mouth every 6 hours At 8:00 am, 2:00 pm, 8:00 pm the day prior to your surgery with neomycin and flagyl. 3 tablet 0 7/9/2024 at 2000    Ostomy Supplies MISC 1 each daily 1 each 11 Past Week    polyethylene glycol (MIRALAX) 17 g packet Take 238 g by mouth See Admin Instructions Starting at 4 pm night prior to surgery. Refer to \"Getting Ready for Surgery\" instructions. 14 packet 0 7/9/2024    trospium (SANCTURA XR) 60 MG CP24 24 hr capsule Take 60 mg by mouth every morning (before breakfast)   7/10/2024 at 0700    inFLIXimab Inject 4 mg into the vein every 30 days Last infusion 12/30 5/28/2024              Discharge Medications:     Current Discharge Medication List        CONTINUE these medications which have NOT CHANGED    Details   acetaminophen (TYLENOL) 500 MG tablet Take 1,000 mg by mouth every 4 hours as " "needed      metroNIDAZOLE (FLAGYL) 500 MG tablet Take 1 tablet (500 mg) by mouth every 6 hours At 8:00 am, 2:00 pm, 8:00 pm the day prior to your surgery with neomycin and zofran.  Qty: 3 tablet, Refills: 0    Associated Diagnoses: Colostomy status (H)      neomycin (MYCIFRADIN) 500 MG tablet Take 2 tablets (1,000 mg) by mouth every 6 hours At 8:00 am, 2:00 pm, 8:00 pm the day prior to your surgery with flagyl and zofran.  Qty: 6 tablet, Refills: 0    Associated Diagnoses: Colostomy status (H)      ondansetron (ZOFRAN) 4 MG tablet Take 1 tablet (4 mg) by mouth every 6 hours At 8:00 am, 2:00 pm, 8:00 pm the day prior to your surgery with neomycin and flagyl.  Qty: 3 tablet, Refills: 0    Associated Diagnoses: Colostomy status (H)      Ostomy Supplies MISC 1 each daily  Qty: 1 each, Refills: 11    Comments: Coloplast belt 4247,  Associated Diagnoses: Encounter for attention to colostomy (H)      polyethylene glycol (MIRALAX) 17 g packet Take 238 g by mouth See Admin Instructions Starting at 4 pm night prior to surgery. Refer to \"Getting Ready for Surgery\" instructions.  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 She underwent APR and end colostomy revision by Dr. Herrera 7/10/2024 at [facility]. During the operation, VRAM flap was taken down and placed into pelvic dead space by Plastic Surgery team and Dr. Mccarthy.            Hospital Course:   Post-operatively pt was gently fluid resuscitated. Cronin was removed and pt was able to void " "independently. Patient experienced some post-operative nausea that was controlled with anti-emetics. Pt had eventual return of bowel function per ostomy and was able to tolerate regular diet.  Post-operative pain was controlled with scheduled tylenol, gabapentin, robaxin and prn oxycodone/dilaudid. She was able to tolerate pain on oral medications. Pt was seen by WOCN and taught how to manage her ostomy revision. Pt was seen by PT/OT and deemed appropriate for discharge home. Pt was taught how to self-inject post-operative prophylactic lovenox for which she is to do for a total of 30 days.Patient is to follow up in the Colon and Rectal Surgery Clinic in 2-3 week with Marry Guzmán NP or Evelyn Perdue PA-C and then with Dr. Martinez in 2-3 weeks after.          Day of Discharge Physical Exam:   Blood pressure 134/76, pulse 75, temperature 97.9  F (36.6  C), temperature source Oral, resp. rate 16, height 1.651 m (5' 5\"), weight 78.8 kg (173 lb 11.6 oz), SpO2 94%, not currently breastfeeding.    Gen:AAOx3, NAD  Pulm:Non-labored breathing on room air  Abd:Soft, non distended, appropriately tender, no guarding               Incision C/D/I open to air               Stoma pink and viable, some stool in bag.                MACKENZIE drains in right and left lower quadrant with serosanginous output.                Old fistula site lateral to new stoma with no evidence of active infection.  Dressing was changed.  Ext:       Warm and well-perfused         Final Pathology Result:   Pending at time of discharge           Discharge Instructions and Follow-Up:     Discharge Procedure Orders   Adult [facility] Follow-up and recommended labs and tests   Order Comments: Per ELMER Chacon to restart Remicade the week of August 12th.        Appointments on [place] and/or [facility] (with [facility] or [facility] provider or service). Call [phone] if you haven't heard regarding these appointments within 7 days of discharge.    "         Home Health Care:     Home with Assist           Discharge Disposition:     Discharged to home      Condition at discharge: Stable    Marry Nicole, MS4    and    Roger Dietrich MD  General Surgery, PGY-1  [facility]    Pt was seen and discussed with Dr. Wood on 7/15/2024    The above plan of care was performed and communicated to me by CRS Staff: Dr. Kunal Herrera

## 2024-07-15 NOTE — PLAN OF CARE
Occupational Therapy Discharge Summary    Reason for therapy discharge:    Discharged to home.    Progress towards therapy goal(s). See goals on Care Plan in [facility] electronic health record for goal details.  Goals met    Therapy recommendation(s):    No further therapy is recommended.  REC assist as needed for heavier IADLs to maintain post-surgical precautions.

## 2024-07-15 NOTE — PROGRESS NOTES
Plastic Surgery Progress Note    TRISHA. Some pain in abdomen around wound. Controlled with oral meds. Otherwise doing ok. Tearful about her daughter being hospitalized.     Temp:  [97.9  F (36.6  C)-98  F (36.7  C)] 97.9  F (36.6  C)  Pulse:  [74-81] 74  Resp:  [16-18] 16  BP: (114-129)/(70-82) 114/70  SpO2:  [98 %-99 %] 98 %    LLQ (pelvic drain) -30 (45)  RLQ (subcutaneous drain) - 10 (45)    General: Alert, well-appearing in no acute distress.  Respiratory: Non-labored breathing on RA  Cardiovascular: Regular rate and rhythm.   Gastrointestinal: Abdomen non-distended, soft, appropriately tender. Incision c/d/I with staples. Stoma pink and patent, gas and stool present. Drains serosang.   : Flap is pink, warm, and perfused with 2 sec cap refill. Incision intact. Distal end of flap (closest to vagina) is eccymotic and macerated but appears perfused, appears stable today. Dressing changed today.  Extremities: wwp, no tenderness.  Skin: No rashes or lesions appreciated.    Hb 8.3 (8.3)    Nicol Aldrich is a 63 year old female w/ Crohn's disease c/b fistulas to abdominal wall and perirectal area and anal cancer s/p chemoradiation now s/p APR and reconstruction with pedicled R VRAM flap 7/10. She is doing well post operatively. Mild ecchymosis to distal tip of flap but appears well perfused.     - No sitting for 2 weeks. HOB no greater than 30 degrees. Ok to sit transiently < 1 min to transition to standing and ambulate. Ok to sit for a few minutes on commode when duran comes out.  - Change dressing daily to perineum with fluffs held in place with mesh panties  - Strip and record drain output qshift  - Abdominal binder for comfort  - No heavy lifting 6wks  - PT/OT  - Remainder of care per CRS  - Ok for DVT ppx from plastics standpoint. Will defer to CRS.   - Follow up scheduled with plastics PA 7/26  - Ok to discharge from plastics standpoint    D/w Dr. Fabio Muniz MD  Plastic Surgery PGY6

## 2024-07-15 NOTE — PROGRESS NOTES
Colorectal Surgery Progress Note  [facility]      Subjective: Pain controlled. Stooling well. Ambulating well. Would like teaching on how to pack wound. Endorses some concerning social situations at home with daughter.     Vitals:  Vitals:    07/14/24 0039 07/14/24 0814 07/14/24 1603 07/14/24 2220   BP: 107/70 129/82 116/77 114/70   BP Location: Right arm Right arm Right arm    Pulse: 79 81 78 74   Resp: 18 18 16 16   Temp: 97.7  F (36.5  C) 98  F (36.7  C) 98  F (36.7  C) 97.9  F (36.6  C)   TempSrc: Oral Oral Oral Oral   SpO2: 95% 99% 98% 98%   Weight:       Height:         I/O:  I/O last 3 completed shifts:  In: 890 [P.O.:890]  Out: 890 [Urine:750; Drains:90; Stool:50]    Physical Exam:  Gen: AAOx3, NAD  Pulm: Non-labored breathing on room air  Abd: Soft, non distended, appropriately tender, no guarding   Incision C/D/I open to air   Stoma pink and viable, some stool in bag.    MACKENZIE drains in right and left lower quadrant with serosanginous output.    Old fistula site lateral to new stoma with no evidence of active infection.  Dressing was changed.  Ext:  Warm and well-perfused    BMP  Recent Labs   Lab 07/13/24  0618 07/12/24  1425 07/12/24  1230 07/12/24  0704 07/12/24  0609 07/11/24  0533 07/11/24  0450 07/10/24  2033   NA  --   --   --   --   --   --  134*  --    POTASSIUM  --   --   --   --   --   --  4.9  --    CHLORIDE  --   --   --   --   --   --  102  --    CO2  --   --   --   --   --   --  25  --    BUN  --   --   --   --   --   --  13.6  --    CR  --   --   --   --   --   --  0.96* 0.95   GLC  --  113* 110*  --  91 126* 129*  --    MAG 2.2  --   --  2.0  --   --  1.9  --    PHOS 3.2  --   --   --   --   --  4.6*  --      CBC  Recent Labs   Lab 07/14/24  0702 07/13/24  0618 07/12/24  0533 07/11/24  1620 07/11/24  0450   WBC  --   --   --  10.8 13.5*   HGB 8.3* 8.3* 8.3* 9.4* 10.7*   HCT  --   --   --  27.7* 31.5*   PLT  --  213  --  225 689         ASSESSMENT: This is a 63  "year old female with past medical history of C. 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 She underwent APR and end colostomy revision by Dr. Herrera 7/10/2024 at [facility]. During the operation, VRAM flap was taken down and placed into pelvic dead space by Plastic Surgery team and Dr. Mccarthy.     Patient doing well, Taught patient how to pack fistula wound. Off IV medications. Pending 1 more day of diet tolerance and pain    Neuro/Pain: Oral pain medications ordered and working well.  CV: No concerns.  PULM: encourage IS and ambulation.  GI/FEN: Regular diet. Ondansatron and Compazine prn. Right superficial MACKENZIE drain to stay in place until Discharge. LLQ drain TBD.   : Patient urinating without difficulty   Heme: stabilized   ID: No infection concerns  Endocrine: No concerns   Activity: No sitting for more than just transfers out of bed. Head of bed less than 30 degrees. For 2 weeks s/p surgery.   Ppx: Enoxaparin 40 daily  Dispo: Diet & Pain, Discharge likely tomorrow.    Clinically Significant Risk Factors                              #Precipitous drop in Hgb/Hct: Lowest Hgb this hospitalization: 8.3 g/dL. Will continue to monitor and treat/transfuse as appropriate.     # Overweight: Estimated body mass index is 28.91 kg/m  as calculated from the following:    Height as of this encounter: 1.651 m (5' 5\").    Weight as of this encounter: 78.8 kg (173 lb 11.6 oz).             Marry Nicole MS4    and    Roger Dietrich MD  General Surgery, PGY-1  [facility]    Patient was seen and discussed with Dr. Wood and plan was discussed with staff.  "

## 2024-07-15 NOTE — PROGRESS NOTES
Nursing Focus: Discharge    D: Patient discharged to Home at 2:56pm. Patient  and accompanied by .    I: Discharge prescriptions sent to discharge pharmacy to be filled. All discharge medications and instructions reviewed with Patient. Patient instructed to call clinic triage nurse if she experiences a fever >100.4, uncontrolled nausea, vomiting, diarrhea, or pain; or experiences any signs or symptoms of bleeding. Other phone numbers to call with questions or concerns after discharge reviewed. PIV removed. Education completed.    A: Patient verbalized understanding of discharge medications and instructions. Patient will  medications at discharge pharmacy. YES.     P: Patient to follow-up in clinic on 7/24 with primary team.      [No Pertinent Cardiac History] : no history of aortic stenosis, atrial fibrillation, coronary artery disease, recent myocardial infarction, or implantable device/pacemaker [No Adverse Anesthesia Reaction] : no adverse anesthesia reaction in self or family member [Diabetes] : diabetes [(Patient denies any chest pain, claudication, dyspnea on exertion, orthopnea, palpitations or syncope)] : Patient denies any chest pain, claudication, dyspnea on exertion, orthopnea, palpitations or syncope [Chronic Anticoagulation] : no chronic anticoagulation [Chronic Kidney Disease] : no chronic kidney disease [FreeTextEntry1] : Parathyroidectomy [FreeTextEntry2] : August 22, 2018 [FreeTextEntry3] : Dr. Aranda [FreeTextEntry4] : 66-year-old male with hyperparathyroidism presents for evaluation prior to parathyroidectomy.\par \par Patient's history also includes hypertension, hyperlipidemia and type 2 diabetes.\par History also includes a Schwannomma in the neck which is being monitored.\par \par Overall the patient is feeling well without complaints including no chest pain, palpitations, shortness of breath or edema.

## 2024-07-15 NOTE — PROGRESS NOTES
Pt briefly seen, as her  had almost arrived to bring her home. Noted that she had all her belongings packed and had no further questions about discharge. Pt left at 1621 for discharge pharmacy and family transport to home.

## 2024-07-16 NOTE — PROGRESS NOTES
Care Management Discharge Note    Discharge Date: 07/15/2024     Discharge Disposition: Home (with family)    Discharge Services: Home Care (RN, PT, OT)    Discharge DME:  (ostomy supplies, WOC RN works with patient on where/how to obtain these outpatient)    Discharge Transportation: family or friend will provide    Private pay costs discussed:  YES    Does the patient's insurance plan have a 3 day qualifying hospital stay waiver?  No but has health plan waiver    PAS Confirmation Code: n/a  Patient/family educated on Medicare website which has current facility and service quality ratings: yes    Education Provided on the Discharge Plan: Yes  Persons Notified of Discharge Plans: patient by bedside  Patient/Family in Agreement with the Plan: yes    Handoff Referral Completed: Yes EHO    Additional Information:  Patient discharged to home, new ostomy. Writer sent AVS to [facility]. Family transported.     [facility]    Services Available   Skilled Nursing, Home Health Services      Address   [address] [place], [place] [id number]  [facility]             Contact Information    [phone]        Future Appointments   Date Time Provider Department Center   7/26/2024 10:40 AM Lesa Fortune PA-C [facility]   7/29/2024 10:30 AM Sabiha Ventura, OMEGA [facility]   7/29/2024 11:30 AM Evelyn Perdue PA-C [facility]   8/29/2024 11:30 AM Kunal Herrera MD [facility]   10/16/2024  9:20 AM Torsten Perez PA-C [facility]       Annette Torres RNCC float  Nurse Coordinator    Social Work and Care Management Department   SEARCHABLE in [facility] - search CARE COORDINATOR     [place] [facility] [place] ([phone]) Saturday & Sunday; ([phone]) [facility] Recognized Holidays     Units: 5A Onc 5201 thru 5219 RNCC, 5A Onc 5220 thru 5240 RNCC, 5C OFFSERVICE [phone] RNCC & 5C OFF SERVICE [phone] RNCC Pager: [phone]    Units: 6B Vocera, 6C Card 6401 thru 6420 RNCC, 6C Card 6502 thru 6514 RNCC, &  6C Card 6515 thru 6519 CC  Pager: [phone]    Units: 7A SOT RNCC Vocera, 7B Med Surg Vocera, 7C Med Surg 7401 thru 7418 RNCC & 7C Med Surg 7502 thru 8306 RNCC Pager: [phone]    Units: 6A Vocera & 4A CVICU Vocera, 4C MICU Vocera, and 4E SICU Vocera   Pager: [phone]    Units: 5 Ortho Vocera & 5 Med Surg Vocera  Pager: [phone]    Units: 6 Med Surg Vocera & 8 Med Surg Vocera  Pager [phone]

## 2024-07-17 ENCOUNTER — TELEPHONE (OUTPATIENT)
Dept: SURGERY | Facility: CLINIC | Age: 64
End: 2024-07-17
Payer: COMMERCIAL

## 2024-07-18 DIAGNOSIS — K50.813 CROHN'S DISEASE OF BOTH SMALL AND LARGE INTESTINE WITH FISTULA (H): Primary | ICD-10-CM

## 2024-07-19 ENCOUNTER — LAB (OUTPATIENT)
Dept: LAB | Facility: OTHER | Age: 64
End: 2024-07-19
Payer: COMMERCIAL

## 2024-07-19 ENCOUNTER — DOCUMENTATION ONLY (OUTPATIENT)
Dept: GASTROENTEROLOGY | Facility: CLINIC | Age: 64
End: 2024-07-19

## 2024-07-19 DIAGNOSIS — K50.813 CROHN'S DISEASE OF BOTH SMALL AND LARGE INTESTINE WITH FISTULA (H): ICD-10-CM

## 2024-07-19 LAB
ALBUMIN SERPL BCG-MCNC: 3.7 G/DL (ref 3.5–5.2)
ALP SERPL-CCNC: 109 U/L (ref 40–150)
ALT SERPL W P-5'-P-CCNC: 20 U/L (ref 0–50)
ANION GAP SERPL CALCULATED.3IONS-SCNC: 10 MMOL/L (ref 7–15)
AST SERPL W P-5'-P-CCNC: 17 U/L (ref 0–45)
BASOPHILS # BLD AUTO: 0 10E3/UL (ref 0–0.2)
BASOPHILS NFR BLD AUTO: 0 %
BILIRUB SERPL-MCNC: 0.4 MG/DL
BUN SERPL-MCNC: 11 MG/DL (ref 8–23)
CALCIUM SERPL-MCNC: 9 MG/DL (ref 8.8–10.4)
CHLORIDE SERPL-SCNC: 102 MMOL/L (ref 98–107)
CREAT SERPL-MCNC: 0.81 MG/DL (ref 0.51–0.95)
EGFRCR SERPLBLD CKD-EPI 2021: 82 ML/MIN/1.73M2
EOSINOPHIL # BLD AUTO: 0.2 10E3/UL (ref 0–0.7)
EOSINOPHIL NFR BLD AUTO: 1 %
ERYTHROCYTE [DISTWIDTH] IN BLOOD BY AUTOMATED COUNT: 14.4 % (ref 10–15)
GLUCOSE SERPL-MCNC: 137 MG/DL (ref 70–99)
HCO3 SERPL-SCNC: 25 MMOL/L (ref 22–29)
HCT VFR BLD AUTO: 29.5 % (ref 35–47)
HGB BLD-MCNC: 9.5 G/DL (ref 11.7–15.7)
IMM GRANULOCYTES # BLD: 0.1 10E3/UL
IMM GRANULOCYTES NFR BLD: 1 %
LYMPHOCYTES # BLD AUTO: 1.5 10E3/UL (ref 0.8–5.3)
LYMPHOCYTES NFR BLD AUTO: 13 %
MAGNESIUM SERPL-MCNC: 2 MG/DL (ref 1.7–2.3)
MCH RBC QN AUTO: 29.2 PG (ref 26.5–33)
MCHC RBC AUTO-ENTMCNC: 32.2 G/DL (ref 31.5–36.5)
MCV RBC AUTO: 91 FL (ref 78–100)
MONOCYTES # BLD AUTO: 0.8 10E3/UL (ref 0–1.3)
MONOCYTES NFR BLD AUTO: 7 %
NEUTROPHILS # BLD AUTO: 8.7 10E3/UL (ref 1.6–8.3)
NEUTROPHILS NFR BLD AUTO: 78 %
PHOSPHATE SERPL-MCNC: 2.9 MG/DL (ref 2.5–4.5)
PLATELET # BLD AUTO: 490 10E3/UL (ref 150–450)
POTASSIUM SERPL-SCNC: 4.2 MMOL/L (ref 3.4–5.3)
PROT SERPL-MCNC: 7 G/DL (ref 6.4–8.3)
RBC # BLD AUTO: 3.25 10E6/UL (ref 3.8–5.2)
SODIUM SERPL-SCNC: 137 MMOL/L (ref 135–145)
WBC # BLD AUTO: 11.2 10E3/UL (ref 4–11)

## 2024-07-19 PROCEDURE — 83735 ASSAY OF MAGNESIUM: CPT

## 2024-07-19 PROCEDURE — 84100 ASSAY OF PHOSPHORUS: CPT

## 2024-07-19 PROCEDURE — 80053 COMPREHEN METABOLIC PANEL: CPT

## 2024-07-19 PROCEDURE — 84134 ASSAY OF PREALBUMIN: CPT

## 2024-07-19 PROCEDURE — 36415 COLL VENOUS BLD VENIPUNCTURE: CPT

## 2024-07-19 PROCEDURE — 85025 COMPLETE CBC W/AUTO DIFF WBC: CPT

## 2024-07-19 NOTE — PROGRESS NOTES
INCOMING FAX:    Facility:   [address] [address]  Phone: [phone]  Fax: [phone]     What:   Infliximab prescribers orders       Routed document to Jeovany RAMIREZ and Dr. Coyle to review and sign.     Reviewed and signed by provider. Faxed back to [facility] at [phone]. Copy scanned into chart.    Cleopatra Lozada LPN

## 2024-07-20 LAB — PREALB SERPL-MCNC: 18.7 MG/DL (ref 20–40)

## 2024-07-22 LAB
PATH REPORT.COMMENTS IMP SPEC: NORMAL
PATH REPORT.COMMENTS IMP SPEC: NORMAL
PATH REPORT.FINAL DX SPEC: NORMAL
PATH REPORT.GROSS SPEC: NORMAL
PATH REPORT.MICROSCOPIC SPEC OTHER STN: NORMAL
PATH REPORT.RELEVANT HX SPEC: NORMAL
PHOTO IMAGE: NORMAL

## 2024-07-23 ENCOUNTER — MEDICAL CORRESPONDENCE (OUTPATIENT)
Dept: HEALTH INFORMATION MANAGEMENT | Facility: CLINIC | Age: 64
End: 2024-07-23
Payer: COMMERCIAL

## 2024-07-23 NOTE — PROGRESS NOTES
[facility] orders reviewed and signed by Dr. Coyle. Scanned back to Ms. Lozada to fax to [facility].

## 2024-07-24 NOTE — PROGRESS NOTES
"Plastic Surgery Outpatient Visit    ID: Nicol Aldrich is a 63 year old female s/p exploratory laparotomy, abdominal perineal resection, colostomy revision, take down of colocutaneous fistula and Right Vertical Rectus Abdominis Musculocutaneous transpelvic flap reconstruction of perineum. 7/10/2024 with Dr. Mccarthy.     S: overall Nicol has not been feeling well. Complains of fevers up to 102 since last week. Fevers come at night and she has night sweats. Taking tylenol. Not on abx. Communicated with colorectal team yesterday and got CT. Also notes that MACKENZIE drain to R abdomen fell out yesterday and was putting out brown, turbid fluid. Not much of an appetite and some nausea. Has a history of c.diff.     O:  /69 (BP Location: Left arm, Patient Position: Standing, Cuff Size: Adult Regular)   Pulse 97   Temp 98.1  F (36.7  C) (Oral)   Ht 1.651 m (5' 5\")   Wt 74.7 kg (164 lb 9.6 oz)   SpO2 100%   BMI 27.39 kg/m     General: NAD  Abdomen: midline vertical incision with sutures/staples intact. LUQ ostomy bag in place with stool present. RLQ drain site with minimal erythema to edges. No significant swelling, no erythema.   Pelvic: flap intact. Mild erythema and swelling to flap. + moisture to the area, appears slightly macerated. Anterior edge with mild dehiscence.     Imaging CT 7/25/24.   IMPRESSION:   1.  No acute findings in the abdomen and pelvis. No pelvic fluid  collections.  2.  Interval postoperative changes of resection of the sigmoid colon,  rectum and anus, reconstruction of the perineum, repair of the left  lower quadrant parastomal hernia and left lower quadrant transverse  end colostomy creation.     A/P:  -overall flap appears stable with mild dehiscence that will heal in with time. The area is staying too moist, will start more frequent dressing changes with gauze/pads. Ok to shower and get area wet.     Patient seen and discussed with Marry MIX, spoke with Dr. Herrera, recommend " cipro/flagyl. Keep previously scheduled follow for Monday.   -CBC today and Monday    -return precautions discussed-worsening fevers, feeling ill, unable to keep down food.     Lesa Fortune PA-C  Plastic and Reconstructive Surgery    40 minutes spent on the date of the encounter doing chart review, history and physical, dressing changes, documentation and further activity as noted above.

## 2024-07-25 ENCOUNTER — TELEPHONE (OUTPATIENT)
Dept: SURGERY | Facility: CLINIC | Age: 64
End: 2024-07-25
Payer: COMMERCIAL

## 2024-07-25 ENCOUNTER — HOSPITAL ENCOUNTER (OUTPATIENT)
Dept: CT IMAGING | Facility: CLINIC | Age: 64
Discharge: HOME | End: 2024-07-25
Attending: SURGERY | Admitting: SURGERY
Payer: COMMERCIAL

## 2024-07-25 DIAGNOSIS — K50.90 CROHN DISEASE (H): Primary | ICD-10-CM

## 2024-07-25 DIAGNOSIS — T81.40XA POSTOPERATIVE INFECTION, UNSPECIFIED TYPE, INITIAL ENCOUNTER: ICD-10-CM

## 2024-07-25 DIAGNOSIS — K50.90 CROHN DISEASE (H): ICD-10-CM

## 2024-07-25 DIAGNOSIS — K50.813 CROHN'S DISEASE OF BOTH SMALL AND LARGE INTESTINE WITH FISTULA (H): ICD-10-CM

## 2024-07-25 PROCEDURE — 74177 CT ABD & PELVIS W/CONTRAST: CPT

## 2024-07-25 PROCEDURE — 250N000011 HC RX IP 250 OP 636: Performed by: SURGERY

## 2024-07-25 PROCEDURE — 250N000009 HC RX 250: Performed by: SURGERY

## 2024-07-25 RX ORDER — IOPAMIDOL 755 MG/ML
500 INJECTION, SOLUTION INTRAVASCULAR ONCE
Status: COMPLETED | OUTPATIENT
Start: 2024-07-25 | End: 2024-07-25

## 2024-07-25 RX ADMIN — IOPAMIDOL 83 ML: 755 INJECTION, SOLUTION INTRAVENOUS at 14:33

## 2024-07-25 RX ADMIN — SODIUM CHLORIDE 57 ML: 9 INJECTION, SOLUTION INTRAVENOUS at 14:34

## 2024-07-25 NOTE — TELEPHONE ENCOUNTER
[facility] Call Center    Phone Message    May a detailed message be left on voicemail: yes     Reason for Call: Symptoms or Concerns     If patient has red-flag symptoms, warm transfer to triage line    Current symptom or concern: Fever, Nausea, Feeling Unwell, J Tube Drainage is Brown in Color     Symptoms have been present for: 8 hour(s)    Has patient previously been seen for this? Yes    By : Dr. Herrera    Date: 07/25    Are there any new or worsening symptoms? Yes: Fever, Nausea, Feeling Unwell, J Tube Drainage is Brown in Color     Action Taken: Message routed to:  [facility] Rectal     Travel Screening: Not Applicable     Date of Service:

## 2024-07-25 NOTE — TELEPHONE ENCOUNTER
Connected with Emilia with homecare.  We are planning Ct scan for patient. Will discuss with Nicol

## 2024-07-26 ENCOUNTER — OFFICE VISIT (OUTPATIENT)
Dept: PLASTIC SURGERY | Facility: CLINIC | Age: 64
End: 2024-07-26
Payer: COMMERCIAL

## 2024-07-26 ENCOUNTER — LAB (OUTPATIENT)
Dept: LAB | Facility: CLINIC | Age: 64
End: 2024-07-26
Payer: COMMERCIAL

## 2024-07-26 VITALS
OXYGEN SATURATION: 100 % | TEMPERATURE: 98.1 F | HEART RATE: 97 BPM | SYSTOLIC BLOOD PRESSURE: 113 MMHG | BODY MASS INDEX: 27.42 KG/M2 | HEIGHT: 65 IN | WEIGHT: 164.6 LBS | DIASTOLIC BLOOD PRESSURE: 69 MMHG

## 2024-07-26 DIAGNOSIS — K50.813 CROHN'S DISEASE OF BOTH SMALL AND LARGE INTESTINE WITH FISTULA (H): ICD-10-CM

## 2024-07-26 DIAGNOSIS — Z98.890 S/P FLAP GRAFT: ICD-10-CM

## 2024-07-26 DIAGNOSIS — Z98.890 S/P FLAP GRAFT: Primary | ICD-10-CM

## 2024-07-26 LAB
ERYTHROCYTE [DISTWIDTH] IN BLOOD BY AUTOMATED COUNT: 13.7 % (ref 10–15)
HCT VFR BLD AUTO: 28.3 % (ref 35–47)
HGB BLD-MCNC: 9.3 G/DL (ref 11.7–15.7)
MCH RBC QN AUTO: 28.6 PG (ref 26.5–33)
MCHC RBC AUTO-ENTMCNC: 32.9 G/DL (ref 31.5–36.5)
MCV RBC AUTO: 87 FL (ref 78–100)
PLATELET # BLD AUTO: 748 10E3/UL (ref 150–450)
RBC # BLD AUTO: 3.25 10E6/UL (ref 3.8–5.2)
WBC # BLD AUTO: 13.9 10E3/UL (ref 4–11)

## 2024-07-26 PROCEDURE — 99024 POSTOP FOLLOW-UP VISIT: CPT | Performed by: PHYSICIAN ASSISTANT

## 2024-07-26 PROCEDURE — 36415 COLL VENOUS BLD VENIPUNCTURE: CPT | Performed by: PATHOLOGY

## 2024-07-26 PROCEDURE — 85027 COMPLETE CBC AUTOMATED: CPT | Performed by: PATHOLOGY

## 2024-07-26 RX ORDER — CIPROFLOXACIN 500 MG/1
500 TABLET, FILM COATED ORAL 2 TIMES DAILY
Qty: 20 TABLET | Refills: 0 | Status: SHIPPED | OUTPATIENT
Start: 2024-07-26 | End: 2024-08-05

## 2024-07-26 RX ORDER — ONDANSETRON 4 MG/1
4 TABLET, ORALLY DISINTEGRATING ORAL EVERY 8 HOURS PRN
Qty: 20 TABLET | Refills: 0 | Status: SHIPPED | OUTPATIENT
Start: 2024-07-26

## 2024-07-26 RX ORDER — METRONIDAZOLE 500 MG/1
500 TABLET ORAL 2 TIMES DAILY
Qty: 20 TABLET | Refills: 0 | Status: SHIPPED | OUTPATIENT
Start: 2024-07-26 | End: 2024-08-05

## 2024-07-26 ASSESSMENT — PAIN SCALES - GENERAL: PAINLEVEL: MODERATE PAIN (5)

## 2024-07-26 NOTE — LETTER
"7/26/2024       RE: Nicol Aldrich  [address]  [facility] [address]       Dear Colleague,    Thank you for referring your patient, Nicol Aldrich, to the [facility] [place] at [facility]. Please see a copy of my visit note below.    Plastic Surgery Outpatient Visit    ID: Nicol Aldrich is a 63 year old female s/p exploratory laparotomy, abdominal perineal resection, colostomy revision, take down of colocutaneous fistula and Right Vertical Rectus Abdominis Musculocutaneous transpelvic flap reconstruction of perineum. 7/10/2024 with Dr. Mccarthy.     S: overall Nicol has not been feeling well. Complains of fevers up to 102 since last week. Fevers come at night and she has night sweats. Taking tylenol. Not on abx. Communicated with colorectal team yesterday and got CT. Also notes that MACKENZIE drain to R abdomen fell out yesterday and was putting out brown, turbid fluid. Not much of an appetite and some nausea. Has a history of c.diff.     O:  /69 (BP Location: Left arm, Patient Position: Standing, Cuff Size: Adult Regular)   Pulse 97   Temp 98.1  F (36.7  C) (Oral)   Ht 1.651 m (5' 5\")   Wt 74.7 kg (164 lb 9.6 oz)   SpO2 100%   BMI 27.39 kg/m     General: NAD  Abdomen: midline vertical incision with sutures/staples intact. LUQ ostomy bag in place with stool present. RLQ drain site with minimal erythema to edges. No significant swelling, no erythema.   Pelvic: flap intact. Mild erythema and swelling to flap. + moisture to the area, appears slightly macerated. Anterior edge with mild dehiscence.     Imaging CT 7/25/24.   IMPRESSION:   1.  No acute findings in the abdomen and pelvis. No pelvic fluid  collections.  2.  Interval postoperative changes of resection of the sigmoid colon,  rectum and anus, reconstruction of the perineum, repair of the left  lower quadrant parastomal hernia and left lower quadrant " transverse  end colostomy creation.     A/P:  -overall flap appears stable with mild dehiscence that will heal in with time. The area is staying too moist, will start more frequent dressing changes with gauze/pads. Ok to shower and get area wet.     Patient seen and discussed with Marry MIX, spoke with Dr. Herrera, recommend cipro/flagyl. Keep previously scheduled follow for Monday.   -CBC today and Monday    -return precautions discussed-worsening fevers, feeling ill, unable to keep down food.     40 minutes spent on the date of the encounter doing chart review, history and physical, dressing changes, documentation and further activity as noted above.       Again, thank you for allowing me to participate in the care of your patient.      Sincerely,    Lesa Fortune PA-C

## 2024-07-26 NOTE — NURSING NOTE
"Chief Complaint   Patient presents with    Surgical Followup     2 week post-op, DOS 7/10/24.       Vitals:    07/26/24 1050   BP: 113/69   BP Location: Left arm   Patient Position: Standing   Cuff Size: Adult Regular   Pulse: 97   Temp: 98.1  F (36.7  C)   TempSrc: Oral   SpO2: 100%   Weight: 164 lb 9.6 oz   Height: 5' 5\"       Body mass index is 27.39 kg/m .    Patient reports moderate lower abdominal and perineal pain (5/10).    Don Sanchez, EMT    "

## 2024-07-29 ENCOUNTER — OFFICE VISIT (OUTPATIENT)
Dept: WOUND CARE | Facility: CLINIC | Age: 64
End: 2024-07-29
Payer: COMMERCIAL

## 2024-07-29 ENCOUNTER — OFFICE VISIT (OUTPATIENT)
Dept: SURGERY | Facility: CLINIC | Age: 64
End: 2024-07-29
Payer: COMMERCIAL

## 2024-07-29 ENCOUNTER — TELEPHONE (OUTPATIENT)
Dept: SURGERY | Facility: CLINIC | Age: 64
End: 2024-07-29

## 2024-07-29 ENCOUNTER — LAB (OUTPATIENT)
Dept: LAB | Facility: CLINIC | Age: 64
End: 2024-07-29
Payer: COMMERCIAL

## 2024-07-29 VITALS — SYSTOLIC BLOOD PRESSURE: 120 MMHG | HEART RATE: 92 BPM | DIASTOLIC BLOOD PRESSURE: 76 MMHG | OXYGEN SATURATION: 100 %

## 2024-07-29 DIAGNOSIS — R50.9 FEVER, UNSPECIFIED FEVER CAUSE: ICD-10-CM

## 2024-07-29 DIAGNOSIS — Z43.3 ENCOUNTER FOR ATTENTION TO COLOSTOMY (H): Primary | ICD-10-CM

## 2024-07-29 DIAGNOSIS — Z98.890 S/P FLAP GRAFT: ICD-10-CM

## 2024-07-29 DIAGNOSIS — K50.813 CROHN'S DISEASE OF BOTH SMALL AND LARGE INTESTINE WITH FISTULA (H): ICD-10-CM

## 2024-07-29 DIAGNOSIS — G89.18 ACUTE POST-OPERATIVE PAIN: ICD-10-CM

## 2024-07-29 DIAGNOSIS — Z09 FOLLOW-UP EXAMINATION AFTER COLORECTAL SURGERY: Primary | ICD-10-CM

## 2024-07-29 LAB
ERYTHROCYTE [DISTWIDTH] IN BLOOD BY AUTOMATED COUNT: 13.6 % (ref 10–15)
HCT VFR BLD AUTO: 30.9 % (ref 35–47)
HGB BLD-MCNC: 9.8 G/DL (ref 11.7–15.7)
MCH RBC QN AUTO: 28.2 PG (ref 26.5–33)
MCHC RBC AUTO-ENTMCNC: 31.7 G/DL (ref 31.5–36.5)
MCV RBC AUTO: 89 FL (ref 78–100)
PLATELET # BLD AUTO: 748 10E3/UL (ref 150–450)
RBC # BLD AUTO: 3.48 10E6/UL (ref 3.8–5.2)
WBC # BLD AUTO: 12.1 10E3/UL (ref 4–11)

## 2024-07-29 PROCEDURE — 99024 POSTOP FOLLOW-UP VISIT: CPT

## 2024-07-29 PROCEDURE — 36415 COLL VENOUS BLD VENIPUNCTURE: CPT | Performed by: PATHOLOGY

## 2024-07-29 PROCEDURE — 85027 COMPLETE CBC AUTOMATED: CPT | Performed by: PATHOLOGY

## 2024-07-29 RX ORDER — OXYCODONE HYDROCHLORIDE 5 MG/1
5 TABLET ORAL EVERY 6 HOURS PRN
Qty: 12 TABLET | Refills: 0 | Status: SHIPPED | OUTPATIENT
Start: 2024-07-29 | End: 2024-08-01

## 2024-07-29 ASSESSMENT — PAIN SCALES - GENERAL: PAINLEVEL: MILD PAIN (3)

## 2024-07-29 NOTE — PROGRESS NOTES
Colon and Rectal Surgery Postoperative Clinic Note    Patient: Nicol Aldrich  YOB: 1960  Date of Visit: 7/29/2024    Nicol Aldrich is a very pleasant 63 year old female with a history of Crohn's disease (on Infliximab and Entocort) and anal SCC (s/p Win protocol 2014) now status post open abdominoperineal resection, colostomy revision, takedown of colocutaneous fistula, and VRAM flap perineal reconstruction (Dr. Mccarthy) on 7/10/2024 with Dr. Herrera and Dr. Santana.    Final Diagnosis      A. OMENTUM, RESECTION:  Benign fibroadipose tissue    B. ASCENDING COLON, RESECTION, 5.5-CM:  Focal stricture compatible with Crohn complication, currently minimally active:   -No dysplasia or malignancy in colon or sampled lymph nodes   -Resection margins show no active inflammation or other abnormality    C. FISTULA TRACT, RESECTION:  Inflamed connective tissue and organizing granulation tissue with cutaneous surface opening consistent with Crohn-related enterocutaneous fistula; no enteric mucosa component or malignancy identified     D. ILEUM, COLOSTOMY STOMA, RESECTION:  Ileo-cutaneous anastomotic junction with nonspecific mucosa inflammation, congestion, and other features consistent with ileostomy; no dysplasia or malignancy in intestinal wall or sampled lymph nodes    E. RECTOSIGMOID COLON AND ANUS (45-CM), RESECTION:  Chronic active colitis compatible with Crohn colitis:   -With crypt architectural distortion focal stricture/mural scarring    -Mildly active colitis present at proximal colonic margin   -Distal, anal, margin is free of inflammation, stricture, or other abnormality   -No dysplasia or malignancy in colon or sampled lymph nodes    F. OMENTUM, RESECTION:  Benign fibroadipose tissue    G. COLON, COLOSTOMY STOMA, RESECTION, 2.5 CM:  Colonic mucosa ad wall with focal, mild crypt architectural distortion, evidence of prior injury; negative for active inflammation, dysplasia, or  malignancy    H. HERNIA SAC, REPAIR:  Benign fibroadipose connective tissue, consistent with wall of hernia sac      Interval history: Had a fever last week, up to 102. She was given Cipro and Flagyl by the Plastic Surgery clinic on follow up 7/26. At that time the VRAM flap was intact with mild dehiscence at the anterior edge.     Was not given oxycodone at discharge. Took some old oxycodone she had leftover from last year. This helped a lot. Started the antibiotics on Friday. Last night had night sweats and fever up to 100s. Still feels ill, not improved from Friday. Zofran did not help with the nausea. The old drain site is still leaking but does not look purulent anymore. Having a lot of perineal drainage and needs to change the gauze every few hours.    CT A/P 7/25/24:  1.  No acute findings in the abdomen and pelvis. No pelvic fluid collections.  2.  Interval postoperative changes of resection of the sigmoid colon, rectum and anus, reconstruction of the perineum, repair of the left lower quadrant parastomal hernia and left lower quadrant transverse end colostomy creation.     07/26/24 12:30 07/29/24 10:04   WBC 13.9 (H) 12.1 (H)     Physical Examination:   /76 (BP Location: Right arm, Patient Position: Standing, Cuff Size: Adult Regular)   Pulse 92   SpO2 100%   General: alert, oriented, in no acute distress, sitting comfortably  Respiratory: non-labored breathing on RA  Abdomen: midline incision well approximated with staples and nylon sutures in place; mild erythema at some of the staples and sutures. RLQ old drain site with erythema at the edges, no active drainage. Left sided stoma pink and well-everted.      Perineal: flap pink with good capillary refill throughout. Dehiscence anteriorly R>L.      Assessment/Plan:  63 year old female with a history of Crohn's disease (on Infliximab and Entocort) and anal SCC (s/p Win protocol 2014) now status post open abdominoperineal resection, colostomy  revision, takedown of colocutaneous fistula, and VRAM flap perineal reconstruction (Dr. Mccarthy) on 7/10/2024 with Dr. Herrera and Dr. Santana.  Postop pain and fever. Will give oxycodone since she did not get any on discharge. Continue antibiotics since WBC did improve since starting them, and advised her to complete the course.  Repeat CT AP (w/ IV contrast) and labs (CBC, BMP) later this week.  Discussed with Dr. Herrera. Will reach out to Dr. Coyle to see if Nicol can restart Infliximab at the end of next week pending the results of the CT and labs.  Removed all staples and every other nylon suture. Return next week to remove the remaining sutures.  Scheduled to see Dr. Herrera on 24.  Contact us with any questions or concerns in the meantime. Patient's questions were answered to their stated satisfaction and they are in agreement with this plan.    Past Medical History:   Diagnosis Date    Squamous cell carcinoma of skin, unspecified      Past Surgical History:   Procedure Laterality Date     SECTION      COLONOSCOPY N/A 2024    Procedure: Colonoscopy with biopsies;  Surgeon: Jordan Coyle MD;  Location: [facility] OR    EXAM UNDER ANESTHESIA ANUS N/A 2024    Procedure: EXAM UNDER ANESTHESIA, ANUS, ANAL DILATION;  Surgeon: Kunal Herrera MD;  Location: [facility] OR    GRAFT FLAP PEDICLE PERINEUM N/A 7/10/2024    Procedure: Right Vertical Rectus Abdominis Musculocutaneous transpelvic flap reconstruction of perineum.;  Surgeon: ANEESH Mccarthy MD;  Location: [facility] OR    ostomy creation      RESECTION ABDOMINAL PERINEAL N/A 7/10/2024    Procedure: exploratory laparotomy, abdominal perineal resection, colostomy revision, take down of colocutaneous fistula;  Surgeon: Kunal Herrera MD;  Location: [facility] OR    SIGMOIDOSCOPY FLEXIBLE N/A 2024    Procedure: Sigmoidoscopy flexible, With Hegar Dilation;  Surgeon: Jordan Coyle MD;  Location: [facility] OR     Current  "Outpatient Medications   Medication Sig Dispense Refill    acetaminophen (TYLENOL) 500 MG tablet Take 1,000 mg by mouth every 4 hours as needed      ciprofloxacin (CIPRO) 500 MG tablet Take 1 tablet (500 mg) by mouth 2 times daily for 10 days 20 tablet 0    enoxaparin ANTICOAGULANT (LOVENOX) 40 MG/0.4ML syringe Inject 0.4 mLs (40 mg) subcutaneously every 24 hours for 26 doses 10.4 mL 0    inFLIXimab Inject 4 mg into the vein every 30 days Last infusion 12/30      metroNIDAZOLE (FLAGYL) 500 MG tablet Take 1 tablet (500 mg) by mouth 2 times daily for 10 days 20 tablet 0    ondansetron (ZOFRAN ODT) 4 MG ODT tab Take 1 tablet (4 mg) by mouth every 8 hours as needed for nausea 20 tablet 0    Ostomy Supplies MISC 1 each daily 1 each 11    oxyCODONE (ROXICODONE) 5 MG tablet Take 1 tablet (5 mg) by mouth every 6 hours as needed for pain 12 tablet 0    trospium (SANCTURA XR) 60 MG CP24 24 hr capsule Take 60 mg by mouth every morning (before breakfast)      gabapentin (NEURONTIN) 100 MG capsule Take 1 capsule (100 mg) by mouth 3 times daily for 10 days 30 capsule 0     Allergies   Allergen Reactions    Blood Transfusion Related (Informational Only) Other (See Comments)     Patient has a history of a clinically significant antibody against RBC antigens.  A delay in compatible RBCs may occur.    Penicillins Rash     On 07/10/18, pt states that she experienced a rash when she had this \"a while ago.\" This was not a reaction from childhood.   On 07/10/18, pt states that she experienced a rash when she had this \"a while ago.\" This was not a reaction from childhood.        Family History   Problem Relation Age of Onset    Anesthesia Reaction No family hx of     Deep Vein Thrombosis (DVT) No family hx of      Social History     Tobacco Use    Smoking status: Never    Smokeless tobacco: Never   Substance Use Topics    Alcohol use: Yes     Alcohol/week: 2.0 standard drinks of alcohol     Types: 2 Glasses of wine per week     Comment: " Occasional     Marital status: .      Evelyn Perdue PA-C  Colon and Rectal Surgery  [facility]      I spent a total of 30 minutes on the day of the visit.  Time spent on date of encounter doing chart review, history and exam, documentation, and further activities in this note. This is a postoperative visit.

## 2024-07-29 NOTE — PROGRESS NOTES
"Post op Ostomy Consult    Referral from Dr. Herrera  Consult attended by patient   Diagnosis: Encounter for attention to colostomy (H) Date of Surgery: 07/10/24   Type of Surgery: PROCTECTOMY, ABDOMINOPERINEAL, COLOSTOMY CREATION     Subjective:She is here with spouseand ostomy care is provided by self   Patient's reason for visit: \"post op\"     The quantity of ostomy supplies needed by a beneficiary is determined primarily by the type of ostomy, its location, its construction, and the condition of the skin surface surrounding the stoma.    Objective:  Type: Colostomy since 07/10/2024  Stoma: 22x35 mm  end healthy, oval, good turgor, and protuberant   Location: right upper quadrant     Complications: none   Mucutaneous junction:  intact     Output: none,     Peristomal skin: intact  and barrier is intact  Frequency of pouch change: three times weekly. This is scheduled.   Received home care WOC assessment after discharge:Yes  Ordering supplies from:  [email or web address] , Account # [id number], Fax: [phone], Customer Service: [phone]      Current pouch system/supplies: Roxy   two piece, cut to fit, flat, and barrier ring    Assessment: Patient doing well after her proctectomy and colostomy creation.  She has an altered draining MACKENZIE site which will be assessed by the surgical team.  Her staples are slightly red which will also be assessed by the surgical team.  Her stoma is doing well but she has a problem with nausea may be due to the several antibiotics that she is on.  She has not had much output since yesterday but has also been taking oxycodone so I recommended MiraLAX for her to take daily as long as she is taking narcotics.  Her stoma is protuberant and working well with her current 2 piece pouching.  She might want to go back to Coloplast pouches which she was using previously.  The small wound 1-1/2 inch lateral to the stoma is healing well.  This was the previous fistula site.  She is covering this " with a small piece of moist gauze and I recommend she continues that.    Intervention/Plan: All questions are answered.  Since patient has experience with a previous stoma she is comfortable with taking care of this.  I gave her a template to cut the barrier slightly smaller than what she has been.  Return to clinic  PRN  .      Marion MISHRA will see pt and was available for supervision of care if needed or if questions should arise and regarding plan of care. Sabiha Ventura, RN  RN CWON

## 2024-07-29 NOTE — TELEPHONE ENCOUNTER
Patient confirmed scheduled appointment:  Date: 7/31/24  Time: 11 am  Visit type: lab  Provider: lab  Location: [place] lab  Testing/imaging: n/a  Additional notes: n/a

## 2024-07-29 NOTE — NURSING NOTE
Chief Complaint   Patient presents with    Post-op Visit       Vitals:    07/29/24 1126   BP: 120/76   BP Location: Right arm   Patient Position: Standing   Cuff Size: Adult Regular   Pulse: 92   SpO2: 100%       There is no height or weight on file to calculate BMI.    Trey Pinto EMT-P

## 2024-07-29 NOTE — TELEPHONE ENCOUNTER
Patient confirmed scheduled appointment:  Date: 7/31/24  Time: 11:50 am  Visit type: imaging  Provider: imaging  Location: [place] [facility]  Testing/imaging: CT abd/pelvis  Additional notes: n/a

## 2024-07-29 NOTE — LETTER
7/29/2024       RE: Nicol Aldrich  [address]  [facility] [address]       Dear Colleague,    Thank you for referring your patient, Nicol Aldrich, to the [facility] [place] at [facility]. Please see a copy of my visit note below.    Colon and Rectal Surgery Postoperative Clinic Note    Patient: Nicol Aldrich  YOB: 1960  Date of Visit: 7/29/2024    Nicol Aldrich is a very pleasant 63 year old female with a history of Crohn's disease (on Infliximab and Entocort) and anal SCC (s/p Win protocol 2014) now status post open abdominoperineal resection, colostomy revision, takedown of colocutaneous fistula, and VRAM flap perineal reconstruction (Dr. Mccarthy) on 7/10/2024 with Dr. Herrera and Dr. Santana.    Final Diagnosis      A. OMENTUM, RESECTION:  Benign fibroadipose tissue    B. ASCENDING COLON, RESECTION, 5.5-CM:  Focal stricture compatible with Crohn complication, currently minimally active:   -No dysplasia or malignancy in colon or sampled lymph nodes   -Resection margins show no active inflammation or other abnormality    C. FISTULA TRACT, RESECTION:  Inflamed connective tissue and organizing granulation tissue with cutaneous surface opening consistent with Crohn-related enterocutaneous fistula; no enteric mucosa component or malignancy identified     D. ILEUM, COLOSTOMY STOMA, RESECTION:  Ileo-cutaneous anastomotic junction with nonspecific mucosa inflammation, congestion, and other features consistent with ileostomy; no dysplasia or malignancy in intestinal wall or sampled lymph nodes    E. RECTOSIGMOID COLON AND ANUS (45-CM), RESECTION:  Chronic active colitis compatible with Crohn colitis:   -With crypt architectural distortion focal stricture/mural scarring    -Mildly active colitis present at proximal colonic margin   -Distal, anal, margin is free of inflammation, stricture, or other abnormality    -No dysplasia or malignancy in colon or sampled lymph nodes    F. OMENTUM, RESECTION:  Benign fibroadipose tissue    G. COLON, COLOSTOMY STOMA, RESECTION, 2.5 CM:  Colonic mucosa ad wall with focal, mild crypt architectural distortion, evidence of prior injury; negative for active inflammation, dysplasia, or malignancy    H. HERNIA SAC, REPAIR:  Benign fibroadipose connective tissue, consistent with wall of hernia sac      Interval history: Had a fever last week, up to 102. She was given Cipro and Flagyl by the Plastic Surgery clinic on follow up 7/26. At that time the VRAM flap was intact with mild dehiscence at the anterior edge.     Was not given oxycodone at discharge. Took some old oxycodone she had leftover from last year. This helped a lot. Started the antibiotics on Friday. Last night had night sweats and fever up to 100s. Still feels ill, not improved from Friday. Zofran did not help with the nausea. The old drain site is still leaking but does not look purulent anymore. Having a lot of perineal drainage and needs to change the gauze every few hours.    CT A/P 7/25/24:  1.  No acute findings in the abdomen and pelvis. No pelvic fluid collections.  2.  Interval postoperative changes of resection of the sigmoid colon, rectum and anus, reconstruction of the perineum, repair of the left lower quadrant parastomal hernia and left lower quadrant transverse end colostomy creation.     07/26/24 12:30 07/29/24 10:04   WBC 13.9 (H) 12.1 (H)     Physical Examination:   /76 (BP Location: Right arm, Patient Position: Standing, Cuff Size: Adult Regular)   Pulse 92   SpO2 100%   General: alert, oriented, in no acute distress, sitting comfortably  Respiratory: non-labored breathing on RA  Abdomen: midline incision well approximated with staples and nylon sutures in place; mild erythema at some of the staples and sutures. RLQ old drain site with erythema at the edges, no active drainage. Left sided stoma pink and  well-everted.      Perineal: flap pink with good capillary refill throughout. Dehiscence anteriorly R>L.      Assessment/Plan:  63 year old female with a history of Crohn's disease (on Infliximab and Entocort) and anal SCC (s/p Win protocol ) now status post open abdominoperineal resection, colostomy revision, takedown of colocutaneous fistula, and VRAM flap perineal reconstruction (Dr. Mccarthy) on 7/10/2024 with Dr. Herrera and Dr. Santana.  Postop pain and fever. Will give oxycodone since she did not get any on discharge. Continue antibiotics since WBC did improve since starting them, and advised her to complete the course.  Repeat CT AP (w/ IV contrast) and labs (CBC, BMP) later this week.  Discussed with Dr. Herrera. Will reach out to Dr. Coyle to see if Nicol can restart Infliximab at the end of next week pending the results of the CT and labs.  Removed all staples and every other nylon suture. Return next week to remove the remaining sutures.  Scheduled to see Dr. Herrera on 24.  Contact us with any questions or concerns in the meantime. Patient's questions were answered to their stated satisfaction and they are in agreement with this plan.    Past Medical History:   Diagnosis Date    Squamous cell carcinoma of skin, unspecified      Past Surgical History:   Procedure Laterality Date     SECTION      COLONOSCOPY N/A 2024    Procedure: Colonoscopy with biopsies;  Surgeon: Jordan Coyle MD;  Location: [facility] OR    EXAM UNDER ANESTHESIA ANUS N/A 2024    Procedure: EXAM UNDER ANESTHESIA, ANUS, ANAL DILATION;  Surgeon: Kunal Herrera MD;  Location: [facility] OR    GRAFT FLAP PEDICLE PERINEUM N/A 7/10/2024    Procedure: Right Vertical Rectus Abdominis Musculocutaneous transpelvic flap reconstruction of perineum.;  Surgeon: ANEESH Mccarthy MD;  Location: [facility] OR    ostomy creation      RESECTION ABDOMINAL PERINEAL N/A 7/10/2024    Procedure: exploratory laparotomy,  "abdominal perineal resection, colostomy revision, take down of colocutaneous fistula;  Surgeon: Kunal Herrera MD;  Location: [facility] OR    SIGMOIDOSCOPY FLEXIBLE N/A 4/11/2024    Procedure: Sigmoidoscopy flexible, With Hegar Dilation;  Surgeon: Jordan Coyle MD;  Location: [facility] OR     Current Outpatient Medications   Medication Sig Dispense Refill    acetaminophen (TYLENOL) 500 MG tablet Take 1,000 mg by mouth every 4 hours as needed      ciprofloxacin (CIPRO) 500 MG tablet Take 1 tablet (500 mg) by mouth 2 times daily for 10 days 20 tablet 0    enoxaparin ANTICOAGULANT (LOVENOX) 40 MG/0.4ML syringe Inject 0.4 mLs (40 mg) subcutaneously every 24 hours for 26 doses 10.4 mL 0    inFLIXimab Inject 4 mg into the vein every 30 days Last infusion 12/30      metroNIDAZOLE (FLAGYL) 500 MG tablet Take 1 tablet (500 mg) by mouth 2 times daily for 10 days 20 tablet 0    ondansetron (ZOFRAN ODT) 4 MG ODT tab Take 1 tablet (4 mg) by mouth every 8 hours as needed for nausea 20 tablet 0    Ostomy Supplies MISC 1 each daily 1 each 11    oxyCODONE (ROXICODONE) 5 MG tablet Take 1 tablet (5 mg) by mouth every 6 hours as needed for pain 12 tablet 0    trospium (SANCTURA XR) 60 MG CP24 24 hr capsule Take 60 mg by mouth every morning (before breakfast)      gabapentin (NEURONTIN) 100 MG capsule Take 1 capsule (100 mg) by mouth 3 times daily for 10 days 30 capsule 0     Allergies   Allergen Reactions    Blood Transfusion Related (Informational Only) Other (See Comments)     Patient has a history of a clinically significant antibody against RBC antigens.  A delay in compatible RBCs may occur.    Penicillins Rash     On 07/10/18, pt states that she experienced a rash when she had this \"a while ago.\" This was not a reaction from childhood.   On 07/10/18, pt states that she experienced a rash when she had this \"a while ago.\" This was not a reaction from childhood.        Family History   Problem Relation Age of Onset    Anesthesia " Reaction No family hx of     Deep Vein Thrombosis (DVT) No family hx of      Social History     Tobacco Use    Smoking status: Never    Smokeless tobacco: Never   Substance Use Topics    Alcohol use: Yes     Alcohol/week: 2.0 standard drinks of alcohol     Types: 2 Glasses of wine per week     Comment: Occasional     Marital status: .    I spent a total of 30 minutes on the day of the visit.  Time spent on date of encounter doing chart review, history and exam, documentation, and further activities in this note. This is a postoperative visit.       Again, thank you for allowing me to participate in the care of your patient.      Sincerely,    Evelyn Perdue PA-C

## 2024-07-30 ENCOUNTER — TELEPHONE (OUTPATIENT)
Dept: GASTROENTEROLOGY | Facility: CLINIC | Age: 64
End: 2024-07-30
Payer: COMMERCIAL

## 2024-07-30 ENCOUNTER — MYC MEDICAL ADVICE (OUTPATIENT)
Dept: GASTROENTEROLOGY | Facility: CLINIC | Age: 64
End: 2024-07-30
Payer: COMMERCIAL

## 2024-07-30 NOTE — TELEPHONE ENCOUNTER
Received callback from Tamara at [facility].    Tamara confirmed the patient is scheduled to restart the infusion on 8/7/24.    [employer] message sent to clinic support pool requesting patient care order be faxed to [facility].     Updated GI and CRS team.

## 2024-07-30 NOTE — PROGRESS NOTES
Colon and Rectal Surgery Postoperative Clinic Note    Patient: Nicol Aldrich  YOB: 1960  Date of Visit: 8/5/2024    Nicol Aldrich is a very pleasant 63 year old female with a history of Crohn's disease (on Infliximab and Entocort) and anal SCC (s/p Win protocol 2014) now status post open abdominoperineal resection, colostomy revision, takedown of colocutaneous fistula, and VRAM flap perineal reconstruction (Dr. Mccarthy) on 7/10/2024 with Dr. Herrera and Dr. Santana.    Final Diagnosis      A. OMENTUM, RESECTION:  Benign fibroadipose tissue    B. ASCENDING COLON, RESECTION, 5.5-CM:  Focal stricture compatible with Crohn complication, currently minimally active:   -No dysplasia or malignancy in colon or sampled lymph nodes   -Resection margins show no active inflammation or other abnormality    C. FISTULA TRACT, RESECTION:  Inflamed connective tissue and organizing granulation tissue with cutaneous surface opening consistent with Crohn-related enterocutaneous fistula; no enteric mucosa component or malignancy identified     D. ILEUM, COLOSTOMY STOMA, RESECTION:  Ileo-cutaneous anastomotic junction with nonspecific mucosa inflammation, congestion, and other features consistent with ileostomy; no dysplasia or malignancy in intestinal wall or sampled lymph nodes    E. RECTOSIGMOID COLON AND ANUS (45-CM), RESECTION:  Chronic active colitis compatible with Crohn colitis:   -With crypt architectural distortion focal stricture/mural scarring    -Mildly active colitis present at proximal colonic margin   -Distal, anal, margin is free of inflammation, stricture, or other abnormality   -No dysplasia or malignancy in colon or sampled lymph nodes    F. OMENTUM, RESECTION:  Benign fibroadipose tissue    G. COLON, COLOSTOMY STOMA, RESECTION, 2.5 CM:  Colonic mucosa ad wall with focal, mild crypt architectural distortion, evidence of prior injury; negative for active inflammation, dysplasia, or malignancy    H.  "HERNIA SAC, REPAIR:  Benign fibroadipose connective tissue, consistent with wall of hernia sac      Interval history: Nicol is feeling well. No significant pain and only took oxycodone today. No fevers or chills. Is planning to start Remicade on Wednesday.     CT A/P 7/25/24:  1.  No acute findings in the abdomen and pelvis. No pelvic fluid collections.  2.  Interval postoperative changes of resection of the sigmoid colon, rectum and anus, reconstruction of the perineum, repair of the left lower quadrant parastomal hernia and left lower quadrant transverse end colostomy creation.    CT AP 7/31/24:  IMPRESSION:   1.  Small amount of gas and fluid along the tract of the previous  subcutaneous surgical drain in the right lower abdomen. At the mid to  lower point of the tract there is a focal roughly 4 cm ill-defined  fluid collection. There is no definable enhancing rim, but this would  be concerning for developing abscess, given the patient's symptoms.  2.  Postsurgical changes noted of distal proctocolectomy and flap  reconstruction of the lower pelvis and perineum. There is some  increased edema and stranding through this area and new mild diffuse  bladder wall thickening that may be reactive. No definable fluid  collection in this area.  3.  Left lower quadrant end colostomy. Large amount of stool seen  through the colon extending to the ostomy.  4.  There was no answer, but message was left at the provided phone  number.     07/11/24 16:20 07/19/24 12:54 07/26/24 12:30 07/29/24 10:04 07/31/24 11:10   WBC 10.8 11.2 (H) 13.9 (H) 12.1 (H) 8.5     Physical Examination:   /70 (BP Location: Left arm, Patient Position: Sitting, Cuff Size: Adult Regular)   Pulse 97   Ht 5' 5\"   Wt 164 lb 9.6 oz   SpO2 99%   BMI 27.39 kg/m    General: alert, oriented, in no acute distress, sitting comfortably  Respiratory: non-labored breathing on RA  Abdomen: midline incision well approximated with a few sutures that were " removed;  RLQ old drain site with some hypergranulation tissue and silver nitrate applied. Left sided stoma pink and well-everted.    Perineal: flap pink with good capillary refill throughout. Dehiscence anteriorly R>L.      Assessment/Plan:  63 year old female with a history of Crohn's disease (on Infliximab and Entocort) and anal SCC (s/p Win protocol 2014) now status post open abdominoperineal resection, colostomy revision, takedown of colocutaneous fistula, and VRAM flap perineal reconstruction (Dr. Mccarthy) on 7/10/2024 with Dr. Herrera and Dr. Santana. She is feeling well. WBC normalized and she is on her last day of antibiotics. Not much of an appetite but no nausea or vomiting. I expect this to improve off antibiotics. Midline incision healing well. Stoma functioning well. Perineal flap with some dehiscence. Will check with Dr. Herrera and Dr. Mccarthy if they are okay with her starting Remicade this week given the wound edge separation. Notify the clinic if she develops any worsening pain, fevers, or chills. Patient's questions were answered to her stated satisfaction and she is in agreement with this plan.     Past Medical History:   Diagnosis Date    Squamous cell carcinoma of skin, unspecified      Past Surgical History:   Procedure Laterality Date     SECTION      COLONOSCOPY N/A 2024    Procedure: Colonoscopy with biopsies;  Surgeon: Jordan Coyle MD;  [place] [facility] [place]    EXAM UNDER ANESTHESIA ANUS N/A 2024    Procedure: EXAM UNDER ANESTHESIA, ANUS, ANAL DILATION;  Surgeon: Kunal Herrera MD;  Location: [facility] OR    GRAFT FLAP PEDICLE PERINEUM N/A 7/10/2024    Procedure: Right Vertical Rectus Abdominis Musculocutaneous transpelvic flap reconstruction of perineum.;  Surgeon: ANEESH Mccarthy MD;  Location: [facility] OR    ostomy creation      RESECTION ABDOMINAL PERINEAL N/A 7/10/2024    Procedure: exploratory laparotomy, abdominal perineal resection, colostomy  "revision, take down of colocutaneous fistula;  Surgeon: Kunal Herrera MD;  Location: [facility] OR    SIGMOIDOSCOPY FLEXIBLE N/A 4/11/2024    Procedure: Sigmoidoscopy flexible, With Hegar Dilation;  Surgeon: Jordan Coyle MD;  Location: [facility] OR     Current Outpatient Medications   Medication Sig Dispense Refill    acetaminophen (TYLENOL) 500 MG tablet Take 1,000 mg by mouth every 4 hours as needed      ciprofloxacin (CIPRO) 500 MG tablet Take 1 tablet (500 mg) by mouth 2 times daily for 10 days 20 tablet 0    enoxaparin ANTICOAGULANT (LOVENOX) 40 MG/0.4ML syringe Inject 0.4 mLs (40 mg) subcutaneously every 24 hours for 26 doses 10.4 mL 0    metroNIDAZOLE (FLAGYL) 500 MG tablet Take 1 tablet (500 mg) by mouth 2 times daily for 10 days 20 tablet 0    Ostomy Supplies MISC 1 each daily 1 each 11    trospium (SANCTURA XR) 60 MG CP24 24 hr capsule Take 60 mg by mouth every morning (before breakfast)      gabapentin (NEURONTIN) 100 MG capsule Take 1 capsule (100 mg) by mouth 3 times daily for 10 days 30 capsule 0    inFLIXimab Inject 4 mg into the vein every 30 days Last infusion 12/30 (Patient not taking: Reported on 8/5/2024)      ondansetron (ZOFRAN ODT) 4 MG ODT tab Take 1 tablet (4 mg) by mouth every 8 hours as needed for nausea 20 tablet 0     Allergies   Allergen Reactions    Blood Transfusion Related (Informational Only) Other (See Comments)     Patient has a history of a clinically significant antibody against RBC antigens.  A delay in compatible RBCs may occur.    Penicillins Rash     On 07/10/18, pt states that she experienced a rash when she had this \"a while ago.\" This was not a reaction from childhood.   On 07/10/18, pt states that she experienced a rash when she had this \"a while ago.\" This was not a reaction from childhood.        Family History   Problem Relation Age of Onset    Anesthesia Reaction No family hx of     Deep Vein Thrombosis (DVT) No family hx of      Social History     Tobacco Use    " Smoking status: Never    Smokeless tobacco: Never   Substance Use Topics    Alcohol use: Yes     Alcohol/week: 2.0 standard drinks of alcohol     Types: 2 Glasses of wine per week     Comment: Occasional     Marital status: .      ANUP Mann CNP  Colon and Rectal Surgery  [facility]      No LOS data to display  Time spent on date of encounter doing chart review, history and exam, documentation, and further activities in this note. This is a postoperative visit.

## 2024-07-30 NOTE — TELEPHONE ENCOUNTER
Attempted to contact Tamara with [facility] to discuss restarting infusions, however no answer. Left detailed message requesting callback.     Also sent [employer] message to the patient discussing the plan to resume infusions.

## 2024-07-30 NOTE — TELEPHONE ENCOUNTER
----- Message from Chelsea Say sent at 7/29/2024  5:14 PM CDT -----  Regarding: RE: Restarting Infliximab  Great, thanks!    Jeovany -- can we restart?  ----- Message -----  From: Evelyn Perdue PA-C  Sent: 7/29/2024   1:14 PM CDT  To: Angle Kay RN; #  Subject: Restarting Infliximab                            Alcides Camarena is now s/p APR with VRAM flap on 7/10. Dr. Herrera is wondering if she could restart her infliximab by the end of next week. I believe she gets home infusions.    Thank you,  Evelyn Perdue PA-C

## 2024-07-31 ENCOUNTER — HOSPITAL ENCOUNTER (OUTPATIENT)
Dept: CT IMAGING | Facility: CLINIC | Age: 64
Discharge: HOME | End: 2024-07-31
Payer: COMMERCIAL

## 2024-07-31 ENCOUNTER — LAB (OUTPATIENT)
Dept: LAB | Facility: CLINIC | Age: 64
End: 2024-07-31
Payer: COMMERCIAL

## 2024-07-31 DIAGNOSIS — Z98.890 S/P FLAP GRAFT: ICD-10-CM

## 2024-07-31 DIAGNOSIS — R50.9 FEVER, UNSPECIFIED FEVER CAUSE: ICD-10-CM

## 2024-07-31 DIAGNOSIS — K50.813 CROHN'S DISEASE OF BOTH SMALL AND LARGE INTESTINE WITH FISTULA (H): ICD-10-CM

## 2024-07-31 DIAGNOSIS — Z09 FOLLOW-UP EXAMINATION AFTER COLORECTAL SURGERY: ICD-10-CM

## 2024-07-31 LAB
ANION GAP SERPL CALCULATED.3IONS-SCNC: 11 MMOL/L (ref 7–15)
BUN SERPL-MCNC: 7.7 MG/DL (ref 8–23)
CALCIUM SERPL-MCNC: 9.3 MG/DL (ref 8.8–10.4)
CHLORIDE SERPL-SCNC: 99 MMOL/L (ref 98–107)
CREAT SERPL-MCNC: 0.8 MG/DL (ref 0.51–0.95)
EGFRCR SERPLBLD CKD-EPI 2021: 83 ML/MIN/1.73M2
ERYTHROCYTE [DISTWIDTH] IN BLOOD BY AUTOMATED COUNT: 13.7 % (ref 10–15)
GLUCOSE SERPL-MCNC: 114 MG/DL (ref 70–99)
HCO3 SERPL-SCNC: 27 MMOL/L (ref 22–29)
HCT VFR BLD AUTO: 28 % (ref 35–47)
HGB BLD-MCNC: 9.1 G/DL (ref 11.7–15.7)
MCH RBC QN AUTO: 28.2 PG (ref 26.5–33)
MCHC RBC AUTO-ENTMCNC: 32.5 G/DL (ref 31.5–36.5)
MCV RBC AUTO: 87 FL (ref 78–100)
PLATELET # BLD AUTO: 643 10E3/UL (ref 150–450)
POTASSIUM SERPL-SCNC: 3.9 MMOL/L (ref 3.4–5.3)
RBC # BLD AUTO: 3.23 10E6/UL (ref 3.8–5.2)
SODIUM SERPL-SCNC: 137 MMOL/L (ref 135–145)
WBC # BLD AUTO: 8.5 10E3/UL (ref 4–11)

## 2024-07-31 PROCEDURE — 250N000009 HC RX 250

## 2024-07-31 PROCEDURE — 36415 COLL VENOUS BLD VENIPUNCTURE: CPT

## 2024-07-31 PROCEDURE — 85027 COMPLETE CBC AUTOMATED: CPT

## 2024-07-31 PROCEDURE — 80048 BASIC METABOLIC PNL TOTAL CA: CPT

## 2024-07-31 PROCEDURE — 250N000011 HC RX IP 250 OP 636

## 2024-07-31 PROCEDURE — 74177 CT ABD & PELVIS W/CONTRAST: CPT

## 2024-07-31 RX ORDER — IOPAMIDOL 755 MG/ML
500 INJECTION, SOLUTION INTRAVASCULAR ONCE
Status: COMPLETED | OUTPATIENT
Start: 2024-07-31 | End: 2024-07-31

## 2024-07-31 RX ADMIN — SODIUM CHLORIDE 67 ML: 9 INJECTION, SOLUTION INTRAVENOUS at 11:35

## 2024-07-31 RX ADMIN — IOPAMIDOL 80 ML: 755 INJECTION, SOLUTION INTRAVENOUS at 11:35

## 2024-07-31 NOTE — TELEPHONE ENCOUNTER
----- Message from Angle RAMIREZ sent at 7/30/2024  3:38 PM CDT -----  Regarding: FW: Restarting Infliximab  Good afternoon,    Can we please fax the therapy plan (mainly the patient care order that ok's to restart infusions) to [facility] at  [phone]    Thank you!!  Jeovany  ----- Message -----  From: Chelsea Deal MD  Sent: 7/29/2024   5:15 PM CDT  To: Evelyn Perdue PA-C; Angle Kay RN; #  Subject: RE: Restarting Infliximab                        Great, thanks!    Jeovany -- can we restart?  ----- Message -----  From: Evelyn Perdue PA-C  Sent: 7/29/2024   1:14 PM CDT  To: Angle Kay RN; #  Subject: Restarting Infliximab                            Alcides Camarena is now s/p APR with VRAM flap on 7/10. Dr. Herrera is wondering if she could restart her infliximab by the end of next week. I believe she gets home infusions.    Thank you,  Evelyn Perdue PA-C

## 2024-07-31 NOTE — TELEPHONE ENCOUNTER
GI - INFLIXIMAB therapy plan faxed to [facility] Attn: Tamara to [phone].    Cleopatra Lozada LPN

## 2024-08-05 ENCOUNTER — OFFICE VISIT (OUTPATIENT)
Dept: SURGERY | Facility: CLINIC | Age: 64
End: 2024-08-05
Payer: COMMERCIAL

## 2024-08-05 VITALS
HEIGHT: 65 IN | HEART RATE: 97 BPM | SYSTOLIC BLOOD PRESSURE: 128 MMHG | BODY MASS INDEX: 27.42 KG/M2 | OXYGEN SATURATION: 99 % | WEIGHT: 164.6 LBS | DIASTOLIC BLOOD PRESSURE: 70 MMHG

## 2024-08-05 DIAGNOSIS — Z09 FOLLOW-UP EXAMINATION AFTER COLORECTAL SURGERY: Primary | ICD-10-CM

## 2024-08-05 PROCEDURE — 99024 POSTOP FOLLOW-UP VISIT: CPT | Performed by: NURSE PRACTITIONER

## 2024-08-05 ASSESSMENT — PAIN SCALES - GENERAL: PAINLEVEL: MILD PAIN (3)

## 2024-08-05 NOTE — LETTER
8/5/2024       RE: Nicol Aldrich  [address]  [facility] [address]       Dear Colleague,    Thank you for referring your patient, Nicol Aldrich, to the [facility] [place] at [facility]. Please see a copy of my visit note below.    Colon and Rectal Surgery Postoperative Clinic Note    Patient: Nicol Aldrich  YOB: 1960  Date of Visit: 8/5/2024    Nicol Aldrich is a very pleasant 63 year old female with a history of Crohn's disease (on Infliximab and Entocort) and anal SCC (s/p Win protocol 2014) now status post open abdominoperineal resection, colostomy revision, takedown of colocutaneous fistula, and VRAM flap perineal reconstruction (Dr. Mccarthy) on 7/10/2024 with Dr. Herrera and Dr. Santana.    Final Diagnosis      A. OMENTUM, RESECTION:  Benign fibroadipose tissue    B. ASCENDING COLON, RESECTION, 5.5-CM:  Focal stricture compatible with Crohn complication, currently minimally active:   -No dysplasia or malignancy in colon or sampled lymph nodes   -Resection margins show no active inflammation or other abnormality    C. FISTULA TRACT, RESECTION:  Inflamed connective tissue and organizing granulation tissue with cutaneous surface opening consistent with Crohn-related enterocutaneous fistula; no enteric mucosa component or malignancy identified     D. ILEUM, COLOSTOMY STOMA, RESECTION:  Ileo-cutaneous anastomotic junction with nonspecific mucosa inflammation, congestion, and other features consistent with ileostomy; no dysplasia or malignancy in intestinal wall or sampled lymph nodes    E. RECTOSIGMOID COLON AND ANUS (45-CM), RESECTION:  Chronic active colitis compatible with Crohn colitis:   -With crypt architectural distortion focal stricture/mural scarring    -Mildly active colitis present at proximal colonic margin   -Distal, anal, margin is free of inflammation, stricture, or other abnormality    -No dysplasia or malignancy in colon or sampled lymph nodes    F. OMENTUM, RESECTION:  Benign fibroadipose tissue    G. COLON, COLOSTOMY STOMA, RESECTION, 2.5 CM:  Colonic mucosa ad wall with focal, mild crypt architectural distortion, evidence of prior injury; negative for active inflammation, dysplasia, or malignancy    H. HERNIA SAC, REPAIR:  Benign fibroadipose connective tissue, consistent with wall of hernia sac      Interval history: Nicol is feeling well. No significant pain and only took oxycodone today. No fevers or chills. Is planning to start Remicade on Wednesday.     CT A/P 7/25/24:  1.  No acute findings in the abdomen and pelvis. No pelvic fluid collections.  2.  Interval postoperative changes of resection of the sigmoid colon, rectum and anus, reconstruction of the perineum, repair of the left lower quadrant parastomal hernia and left lower quadrant transverse end colostomy creation.    CT AP 7/31/24:  IMPRESSION:   1.  Small amount of gas and fluid along the tract of the previous  subcutaneous surgical drain in the right lower abdomen. At the mid to  lower point of the tract there is a focal roughly 4 cm ill-defined  fluid collection. There is no definable enhancing rim, but this would  be concerning for developing abscess, given the patient's symptoms.  2.  Postsurgical changes noted of distal proctocolectomy and flap  reconstruction of the lower pelvis and perineum. There is some  increased edema and stranding through this area and new mild diffuse  bladder wall thickening that may be reactive. No definable fluid  collection in this area.  3.  Left lower quadrant end colostomy. Large amount of stool seen  through the colon extending to the ostomy.  4.  There was no answer, but message was left at the provided phone  number.     07/11/24 16:20 07/19/24 12:54 07/26/24 12:30 07/29/24 10:04 07/31/24 11:10   WBC 10.8 11.2 (H) 13.9 (H) 12.1 (H) 8.5     Physical Examination:   /70 (BP Location:  "Left arm, Patient Position: Sitting, Cuff Size: Adult Regular)   Pulse 97   Ht 5' 5\"   Wt 164 lb 9.6 oz   SpO2 99%   BMI 27.39 kg/m    General: alert, oriented, in no acute distress, sitting comfortably  Respiratory: non-labored breathing on RA  Abdomen: midline incision well approximated with a few sutures that were removed;  RLQ old drain site with some hypergranulation tissue and silver nitrate applied. Left sided stoma pink and well-everted.    Perineal: flap pink with good capillary refill throughout. Dehiscence anteriorly R>L.      Assessment/Plan:  63 year old female with a history of Crohn's disease (on Infliximab and Entocort) and anal SCC (s/p Win protocol ) now status post open abdominoperineal resection, colostomy revision, takedown of colocutaneous fistula, and VRAM flap perineal reconstruction (Dr. Mccarthy) on 7/10/2024 with Dr. Herrera and Dr. Santana. She is feeling well. WBC normalized and she is on her last day of antibiotics. Not much of an appetite but no nausea or vomiting. I expect this to improve off antibiotics. Midline incision healing well. Stoma functioning well. Perineal flap with some dehiscence. Will check with Dr. Herrera and Dr. Mccarthy if they are okay with her starting Remicade this week given the wound edge separation. Notify the clinic if she develops any worsening pain, fevers, or chills. Patient's questions were answered to her stated satisfaction and she is in agreement with this plan.     Past Medical History:   Diagnosis Date    Squamous cell carcinoma of skin, unspecified      Past Surgical History:   Procedure Laterality Date     SECTION      COLONOSCOPY N/A 2024    Procedure: Colonoscopy with biopsies;  Surgeon: Jordan Coyle MD;  Location: [facility] OR    EXAM UNDER ANESTHESIA ANUS N/A 2024    Procedure: EXAM UNDER ANESTHESIA, ANUS, ANAL DILATION;  Surgeon: Kunal Herrera MD;  Location: [facility] OR    GRAFT FLAP PEDICLE PERINEUM N/A " "7/10/2024    Procedure: Right Vertical Rectus Abdominis Musculocutaneous transpelvic flap reconstruction of perineum.;  Surgeon: ANEESH Mccarthy MD;  Location: [facility] OR    ostomy creation      RESECTION ABDOMINAL PERINEAL N/A 7/10/2024    Procedure: exploratory laparotomy, abdominal perineal resection, colostomy revision, take down of colocutaneous fistula;  Surgeon: Kunal Herrera MD;  Location: [facility] OR    SIGMOIDOSCOPY FLEXIBLE N/A 4/11/2024    Procedure: Sigmoidoscopy flexible, With Hegar Dilation;  Surgeon: Jordan Coyle MD;  Location: [facility] OR     Current Outpatient Medications   Medication Sig Dispense Refill    acetaminophen (TYLENOL) 500 MG tablet Take 1,000 mg by mouth every 4 hours as needed      ciprofloxacin (CIPRO) 500 MG tablet Take 1 tablet (500 mg) by mouth 2 times daily for 10 days 20 tablet 0    enoxaparin ANTICOAGULANT (LOVENOX) 40 MG/0.4ML syringe Inject 0.4 mLs (40 mg) subcutaneously every 24 hours for 26 doses 10.4 mL 0    metroNIDAZOLE (FLAGYL) 500 MG tablet Take 1 tablet (500 mg) by mouth 2 times daily for 10 days 20 tablet 0    Ostomy Supplies MISC 1 each daily 1 each 11    trospium (SANCTURA XR) 60 MG CP24 24 hr capsule Take 60 mg by mouth every morning (before breakfast)      gabapentin (NEURONTIN) 100 MG capsule Take 1 capsule (100 mg) by mouth 3 times daily for 10 days 30 capsule 0    inFLIXimab Inject 4 mg into the vein every 30 days Last infusion 12/30 (Patient not taking: Reported on 8/5/2024)      ondansetron (ZOFRAN ODT) 4 MG ODT tab Take 1 tablet (4 mg) by mouth every 8 hours as needed for nausea 20 tablet 0     Allergies   Allergen Reactions    Blood Transfusion Related (Informational Only) Other (See Comments)     Patient has a history of a clinically significant antibody against RBC antigens.  A delay in compatible RBCs may occur.    Penicillins Rash     On 07/10/18, pt states that she experienced a rash when she had this \"a while ago.\" This was not a reaction " "from childhood.   On 07/10/18, pt states that she experienced a rash when she had this \"a while ago.\" This was not a reaction from childhood.        Family History   Problem Relation Age of Onset    Anesthesia Reaction No family hx of     Deep Vein Thrombosis (DVT) No family hx of      Social History     Tobacco Use    Smoking status: Never    Smokeless tobacco: Never   Substance Use Topics    Alcohol use: Yes     Alcohol/week: 2.0 standard drinks of alcohol     Types: 2 Glasses of wine per week     Comment: Occasional     Marital status: .      No LOS data to display  Time spent on date of encounter doing chart review, history and exam, documentation, and further activities in this note. This is a postoperative visit.       Again, thank you for allowing me to participate in the care of your patient.      Sincerely,    ANUP Mann CNP    "

## 2024-08-05 NOTE — NURSING NOTE
"Chief Complaint   Patient presents with    Post-op Visit       Vitals:    08/05/24 1339   BP: 128/70   BP Location: Left arm   Patient Position: Sitting   Cuff Size: Adult Regular   Pulse: 97   SpO2: 99%   Weight: 164 lb 9.6 oz   Height: 5' 5\"       Body mass index is 27.39 kg/m .    Gabriela Leija, EMT  "

## 2024-08-07 ENCOUNTER — RESULTS ONLY (OUTPATIENT)
Dept: LAB | Facility: CLINIC | Age: 64
End: 2024-08-07

## 2024-08-08 NOTE — PROGRESS NOTES
\Colon and Rectal Surgery Postoperative Clinic Note     Referring provider:  Aftab Wall MD  [address] [place] [phone]       RE: Nicol Aldrich  : 1960  [place]: 2024    Nicol Aldrich is a very pleasant 63 year old female with a history of Crohn's disease (on Infliximab and Entocort) and anal SCC (s/p Win protocol ) now status post open abdominoperineal resection, colostomy revision, takedown of colocutaneous fistula, and VRAM flap perineal reconstruction (Dr. Mccarthy) on 7/10/2024 with Dr. Herrera and Dr. Santana.    Final Diagnosis      A. OMENTUM, RESECTION:  Benign fibroadipose tissue    B. ASCENDING COLON, RESECTION, 5.5-CM:  Focal stricture compatible with Crohn complication, currently minimally active:   -No dysplasia or malignancy in colon or sampled lymph nodes   -Resection margins show no active inflammation or other abnormality    C. FISTULA TRACT, RESECTION:  Inflamed connective tissue and organizing granulation tissue with cutaneous surface opening consistent with Crohn-related enterocutaneous fistula; no enteric mucosa component or malignancy identified     D. ILEUM, COLOSTOMY STOMA, RESECTION:  Ileo-cutaneous anastomotic junction with nonspecific mucosa inflammation, congestion, and other features consistent with ileostomy; no dysplasia or malignancy in intestinal wall or sampled lymph nodes    E. RECTOSIGMOID COLON AND ANUS (45-CM), RESECTION:  Chronic active colitis compatible with Crohn colitis:   -With crypt architectural distortion focal stricture/mural scarring    -Mildly active colitis present at proximal colonic margin   -Distal, anal, margin is free of inflammation, stricture, or other abnormality   -No dysplasia or malignancy in colon or sampled lymph nodes    F. OMENTUM, RESECTION:  Benign fibroadipose tissue    G. COLON, COLOSTOMY STOMA, RESECTION, 2.5 CM:  Colonic mucosa ad wall with focal, mild crypt architectural distortion, evidence of prior  injury; negative for active inflammation, dysplasia, or malignancy    H. HERNIA SAC, REPAIR:  Benign fibroadipose connective tissue, consistent with wall of hernia sac      Interval history: Nicol is feeling well. No significant pain and only took oxycodone today. No fevers or chills. Is planning to start Remicade on Wednesday.     CT A/P 7/25/24:  1.  No acute findings in the abdomen and pelvis. No pelvic fluid collections.  2.  Interval postoperative changes of resection of the sigmoid colon, rectum and anus, reconstruction of the perineum, repair of the left lower quadrant parastomal hernia and left lower quadrant transverse end colostomy creation.    CT AP 7/31/24:  IMPRESSION:   1.  Small amount of gas and fluid along the tract of the previous  subcutaneous surgical drain in the right lower abdomen. At the mid to  lower point of the tract there is a focal roughly 4 cm ill-defined  fluid collection. There is no definable enhancing rim, but this would  be concerning for developing abscess, given the patient's symptoms.  2.  Postsurgical changes noted of distal proctocolectomy and flap  reconstruction of the lower pelvis and perineum. There is some  increased edema and stranding through this area and new mild diffuse  bladder wall thickening that may be reactive. No definable fluid  collection in this area.  3.  Left lower quadrant end colostomy. Large amount of stool seen  through the colon extending to the ostomy.  4.  There was no answer, but message was left at the provided phone  number.     07/11/24 16:20 07/19/24 12:54 07/26/24 12:30 07/29/24 10:04 07/31/24 11:10   WBC 10.8 11.2 (H) 13.9 (H) 12.1 (H) 8.5         Assessment/Plan:  63 year old female now s/p APR with VRAM, feeling some pelvic pressure and feverish.  -CT A/P with IV contrast, labs as well to evaluate for possible pelvic fluid collection.     PLEASE SEE NOTE BELOW FOR PHYSICAL EXAMINATION, REVIEW OF SYSTEMS, AND OTHER HISTORY.    Kunal  MD Javier  Division of Colon and Rectal Surgery   [facility]  [phone]    -------------------------------------------------------------------------------------------------------------------    Medical history:  Past Medical History:   Diagnosis Date    Squamous cell carcinoma of skin, unspecified        Surgical history:  Past Surgical History:   Procedure Laterality Date     SECTION      COLONOSCOPY N/A 2024    Procedure: Colonoscopy with biopsies;  Surgeon: Jordan Coyle MD;  Location: [facility] OR    EXAM UNDER ANESTHESIA ANUS N/A 2024    Procedure: EXAM UNDER ANESTHESIA, ANUS, ANAL DILATION;  Surgeon: Kunal Herrera MD;  Location: [facility] OR    GRAFT FLAP PEDICLE PERINEUM N/A 7/10/2024    Procedure: Right Vertical Rectus Abdominis Musculocutaneous transpelvic flap reconstruction of perineum.;  Surgeon: ANEESH Mccarthy MD;  Location: [facility] OR    ostomy creation      RESECTION ABDOMINAL PERINEAL N/A 7/10/2024    Procedure: exploratory laparotomy, abdominal perineal resection, colostomy revision, take down of colocutaneous fistula;  Surgeon: Kunal Herrera MD;  Location: [facility] OR    SIGMOIDOSCOPY FLEXIBLE N/A 2024    Procedure: Sigmoidoscopy flexible, With Hegar Dilation;  Surgeon: Jordan Coyle MD;  Location: [facility] OR       Problem list:    Patient Active Problem List    Diagnosis Date Noted    Crohn disease (H) 07/10/2024     Priority: Medium    Crohn's disease of both small and large intestine with complication (H) 2024     Priority: Medium    Neoplasm of uncertain behavior of skin 2023     Priority: Medium    Crohn's ileocolitis (H) 2023     Priority: Medium       Medications:  Current Outpatient Medications   Medication Sig Dispense Refill    acetaminophen (TYLENOL) 500 MG tablet Take 1,000 mg by mouth every 4 hours as needed      enoxaparin ANTICOAGULANT (LOVENOX) 40 MG/0.4ML syringe Inject 0.4 mLs (40 mg)  "subcutaneously every 24 hours for 26 doses 10.4 mL 0    gabapentin (NEURONTIN) 100 MG capsule Take 1 capsule (100 mg) by mouth 3 times daily for 10 days 30 capsule 0    inFLIXimab Inject 4 mg into the vein every 30 days Last infusion 12/30 (Patient not taking: Reported on 8/5/2024)      ondansetron (ZOFRAN ODT) 4 MG ODT tab Take 1 tablet (4 mg) by mouth every 8 hours as needed for nausea 20 tablet 0    Ostomy Supplies MISC 1 each daily 1 each 11    trospium (SANCTURA XR) 60 MG CP24 24 hr capsule Take 60 mg by mouth every morning (before breakfast)         Allergies:  Allergies   Allergen Reactions    Blood Transfusion Related (Informational Only) Other (See Comments)     Patient has a history of a clinically significant antibody against RBC antigens.  A delay in compatible RBCs may occur.    Penicillins Rash     On 07/10/18, pt states that she experienced a rash when she had this \"a while ago.\" This was not a reaction from childhood.   On 07/10/18, pt states that she experienced a rash when she had this \"a while ago.\" This was not a reaction from childhood.          Family history:  Family History   Problem Relation Age of Onset    Anesthesia Reaction No family hx of     Deep Vein Thrombosis (DVT) No family hx of        Social history:  Social History     Socioeconomic History    Marital status:      Spouse name: Not on file    Number of children: Not on file    Years of education: Not on file    Highest education level: Not on file   Occupational History    Not on file   Tobacco Use    Smoking status: Never    Smokeless tobacco: Never   Vaping Use    Vaping status: Never Used   Substance and Sexual Activity    Alcohol use: Yes     Alcohol/week: 2.0 standard drinks of alcohol     Types: 2 Glasses of wine per week     Comment: Occasional    Drug use: Never    Sexual activity: Not on file   Other Topics Concern    Not on file   Social History Narrative    Not on file     Social Determinants of Health "     Financial Resource Strain: Low Risk  (12/15/2022)    Received from [facility]    Overall Financial Resource Strain (CARDIA)     Difficulty of Paying Living Expenses: Not hard at all   Food Insecurity: No Food Insecurity (12/15/2022)    Received from [facility]    Hunger Vital Sign     Worried About Running Out of Food in the Last Year: Never true     Ran Out of Food in the Last Year: Never true   Transportation Needs: No Transportation Needs (12/15/2022)    Received from [facility]    PRAPARE - Transportation     Lack of Transportation (Medical): No     Lack of Transportation (Non-Medical): No   Physical Activity: Insufficiently Active (12/15/2022)    Received from [facility]    Exercise Vital Sign     Days of Exercise per Week: 2 days     Minutes of Exercise per Session: 60 min   Stress: No Stress Concern Present (12/15/2022)    Received from [facility]    [employer] - Occupational Stress Questionnaire     Feeling of Stress : Only a little   Social Connections: Moderately Isolated (12/15/2022)    Received from [facility]    Social Connection and Isolation Panel [NHANES]     Frequency of Communication with Friends and Family: Once a week     Frequency of Social Gatherings with Friends and Family: Three times a week     Attends Protestant Services: Never     Active Member of Clubs or Organizations: No     Attends Club or Organization Meetings: Never     Marital Status:    Interpersonal Safety: Not At Risk (12/15/2022)    Received from [facility]    Humiliation, Afraid, Rape, and Kick questionnaire     Fear of Current or Ex-Partner: No     Emotionally Abused: No     Physically Abused: No     Sexually Abused: No   Housing Stability: Low Risk  (12/15/2022)    Received from [facility]    Housing Stability Vital Sign     Unable to Pay for Housing in the Last Year: No      Number of Places Lived in the Last Year: 1     Unstable Housing in the Last Year: No       Physical Examination:  /84 (BP Location: Right arm, Patient Position: Sitting, Cuff Size: Adult Regular)   Pulse 92   SpO2 93%   General: NAD  Abdomen: soft, NTND, ostomy pink and healthy, midline incision well healed.  Perianal external examination:  Flap healthy and healing well.

## 2024-08-16 ENCOUNTER — OFFICE VISIT (OUTPATIENT)
Dept: PLASTIC SURGERY | Facility: CLINIC | Age: 64
End: 2024-08-16
Payer: COMMERCIAL

## 2024-08-16 VITALS
BODY MASS INDEX: 27.16 KG/M2 | HEIGHT: 65 IN | HEART RATE: 67 BPM | WEIGHT: 163 LBS | SYSTOLIC BLOOD PRESSURE: 113 MMHG | OXYGEN SATURATION: 100 % | DIASTOLIC BLOOD PRESSURE: 73 MMHG

## 2024-08-16 DIAGNOSIS — K50.813 CROHN'S DISEASE OF BOTH SMALL AND LARGE INTESTINE WITH FISTULA (H): ICD-10-CM

## 2024-08-16 DIAGNOSIS — Z98.890 S/P FLAP GRAFT: Primary | ICD-10-CM

## 2024-08-16 DIAGNOSIS — Z48.00 ENCOUNTER FOR CHANGE OF DRESSING: ICD-10-CM

## 2024-08-16 DIAGNOSIS — T81.31XD POSTOPERATIVE WOUND DEHISCENCE, SUBSEQUENT ENCOUNTER: ICD-10-CM

## 2024-08-16 PROCEDURE — 99024 POSTOP FOLLOW-UP VISIT: CPT | Performed by: PHYSICIAN ASSISTANT

## 2024-08-16 ASSESSMENT — PAIN SCALES - GENERAL: PAINLEVEL: NO PAIN (0)

## 2024-08-16 NOTE — NURSING NOTE
"Chief Complaint   Patient presents with    [place] C[place], is being seen today for a post-op DOS 7/10/24 combo w/ colorectal.       Vitals:    08/16/24 1012   BP: 113/73   BP Location: Left arm   Patient Position: Chair   Cuff Size: Adult Regular   Pulse: 67   SpO2: 100%   Weight: 73.9 kg (163 lb)   Height: 1.651 m (5' 5\")       Body mass index is 27.12 kg/m .      Krystyna Ling LPN    "

## 2024-08-16 NOTE — PROGRESS NOTES
Nicol Aldrich is a 63 year old female s/p exploratory laparotomy, abdominal perineal resection, colostomy revision, take down of colocutaneous fistula and Right Vertical Rectus Abdominis Musculocutaneous transpelvic flap reconstruction of perineum. 7/10/2024 with Dr. Mccarthy so is approximately 6 weeks post-op.   She feels that she is making forward progress.  Pain is minimal.  She is covering the perineal flap with gauze that is changed every time she uses the bathroom (4-5 times a day).  Denies fevers, chills, concerning-appearing drainage.  She is also following with colorectal and has been restarted on the Infliximab.    Exam: Alert, pleasant, NAD, well-appearing.  Perineal flap pink, warm and healthy for the most part, but with some congestion/hyperpigmentation anteriorly.  The anterior portion is partially dehisced, extending 2-3 cm along both sides posteriorly and 1.5cm deep. No foul odor or purulent fluid, tissue appears healthy, no granulation tissue yet. Purple vicryl sutures still in place.    A/P:  Partial anterior flap dehiscence - instructed and demonstrated to  wet-to-dry packing of this area with saline moistened gauze and q-tip, covered with dry gauze or maxi pad.  Encouraged dressing change TID and went over wound healing expectations. Supplies provided.    She can continue use of Vaseline or Aquaphor on intact suture lines.  Patient should remove packing and cleaning this area well during showering.  I will see her again in 2-3 weeks to assess healing but she was encouraged to contact us in the meantime via phone or MyChart with questions or concerns.      30 minutes spent by me on the date of the encounter doing chart review, history and exam, documentation and further activities per the note

## 2024-08-16 NOTE — LETTER
8/16/2024       RE: Nicol Aldrich  [address]  [place] [address]     Dear Colleague,    Thank you for referring your patient, Nicol Aldrich, to the [facility] [place] [facility]. Please see a copy of my visit note below.    Nicol Aldrich is a 63 year old female s/p exploratory laparotomy, abdominal perineal resection, colostomy revision, take down of colocutaneous fistula and Right Vertical Rectus Abdominis Musculocutaneous transpelvic flap reconstruction of perineum. 7/10/2024 with Dr. Mccarthy so is approximately 6 weeks post-op.   She feels that she is making forward progress.  Pain is minimal.  She is covering the perineal flap with gauze that is changed every time she uses the bathroom (4-5 times a day).  Denies fevers, chills, concerning-appearing drainage.  She is also following with colorectal and has been restarted on the Infliximab.    Exam: Alert, pleasant, NAD, well-appearing.  Perineal flap pink, warm and healthy for the most part, but with some congestion/hyperpigmentation anteriorly.  The anterior portion is partially dehisced, extending 2-3 cm along both sides posteriorly and 1.5cm deep. No foul odor or purulent fluid, tissue appears healthy, no granulation tissue yet. Purple vicryl sutures still in place.    A/P:  Partial anterior flap dehiscence - instructed and demonstrated to  wet-to-dry packing of this area with saline moistened gauze and q-tip, covered with dry gauze or maxi pad.  Encouraged dressing change TID and went over wound healing expectations. Supplies provided.    She can continue use of Vaseline or Aquaphor on intact suture lines.  Patient should remove packing and cleaning this area well during showering.  I will see her again in 2-3 weeks to assess healing but she was encouraged to contact us in the meantime via phone or MyChart with questions or concerns.      30  minutes spent by me on the date of the encounter doing chart review, history and exam, documentation and further activities per the note      Again, thank you for allowing me to participate in the care of your patient.      Sincerely,    Jenelle Benítez PA-C

## 2024-08-29 ENCOUNTER — ANCILLARY PROCEDURE (OUTPATIENT)
Dept: CT IMAGING | Facility: CLINIC | Age: 64
End: 2024-08-29
Attending: SURGERY
Payer: COMMERCIAL

## 2024-08-29 ENCOUNTER — LAB (OUTPATIENT)
Dept: LAB | Facility: CLINIC | Age: 64
End: 2024-08-29
Payer: COMMERCIAL

## 2024-08-29 ENCOUNTER — OFFICE VISIT (OUTPATIENT)
Dept: SURGERY | Facility: CLINIC | Age: 64
End: 2024-08-29
Payer: COMMERCIAL

## 2024-08-29 VITALS — DIASTOLIC BLOOD PRESSURE: 84 MMHG | HEART RATE: 92 BPM | SYSTOLIC BLOOD PRESSURE: 130 MMHG | OXYGEN SATURATION: 93 %

## 2024-08-29 DIAGNOSIS — Z09 FOLLOW-UP EXAMINATION AFTER COLORECTAL SURGERY: Primary | ICD-10-CM

## 2024-08-29 DIAGNOSIS — K50.819 CROHN'S DISEASE OF BOTH SMALL AND LARGE INTESTINE WITH COMPLICATION (H): ICD-10-CM

## 2024-08-29 DIAGNOSIS — Z09 FOLLOW-UP EXAMINATION AFTER COLORECTAL SURGERY: ICD-10-CM

## 2024-08-29 LAB
ALBUMIN SERPL BCG-MCNC: 4.2 G/DL (ref 3.5–5.2)
ALP SERPL-CCNC: 81 U/L (ref 40–150)
ALT SERPL W P-5'-P-CCNC: 9 U/L (ref 0–50)
ANION GAP SERPL CALCULATED.3IONS-SCNC: 11 MMOL/L (ref 7–15)
AST SERPL W P-5'-P-CCNC: 16 U/L (ref 0–45)
BASOPHILS # BLD AUTO: 0 10E3/UL (ref 0–0.2)
BASOPHILS NFR BLD AUTO: 0 %
BILIRUB SERPL-MCNC: 0.5 MG/DL
BUN SERPL-MCNC: 9.1 MG/DL (ref 8–23)
CALCIUM SERPL-MCNC: 9.5 MG/DL (ref 8.8–10.4)
CHLORIDE SERPL-SCNC: 102 MMOL/L (ref 98–107)
CREAT SERPL-MCNC: 0.77 MG/DL (ref 0.51–0.95)
EGFRCR SERPLBLD CKD-EPI 2021: 87 ML/MIN/1.73M2
EOSINOPHIL # BLD AUTO: 0 10E3/UL (ref 0–0.7)
EOSINOPHIL NFR BLD AUTO: 0 %
ERYTHROCYTE [DISTWIDTH] IN BLOOD BY AUTOMATED COUNT: 13.9 % (ref 10–15)
GLUCOSE SERPL-MCNC: 100 MG/DL (ref 70–99)
HCO3 SERPL-SCNC: 25 MMOL/L (ref 22–29)
HCT VFR BLD AUTO: 35.4 % (ref 35–47)
HGB BLD-MCNC: 11.3 G/DL (ref 11.7–15.7)
IMM GRANULOCYTES # BLD: 0 10E3/UL
IMM GRANULOCYTES NFR BLD: 0 %
LYMPHOCYTES # BLD AUTO: 1.4 10E3/UL (ref 0.8–5.3)
LYMPHOCYTES NFR BLD AUTO: 13 %
MCH RBC QN AUTO: 28 PG (ref 26.5–33)
MCHC RBC AUTO-ENTMCNC: 31.9 G/DL (ref 31.5–36.5)
MCV RBC AUTO: 88 FL (ref 78–100)
MONOCYTES # BLD AUTO: 0.8 10E3/UL (ref 0–1.3)
MONOCYTES NFR BLD AUTO: 8 %
NEUTROPHILS # BLD AUTO: 8.3 10E3/UL (ref 1.6–8.3)
NEUTROPHILS NFR BLD AUTO: 79 %
NRBC # BLD AUTO: 0 10E3/UL
NRBC BLD AUTO-RTO: 0 /100
PLATELET # BLD AUTO: 387 10E3/UL (ref 150–450)
POTASSIUM SERPL-SCNC: 4 MMOL/L (ref 3.4–5.3)
PROT SERPL-MCNC: 8.3 G/DL (ref 6.4–8.3)
RBC # BLD AUTO: 4.03 10E6/UL (ref 3.8–5.2)
SODIUM SERPL-SCNC: 138 MMOL/L (ref 135–145)
WBC # BLD AUTO: 10.6 10E3/UL (ref 4–11)

## 2024-08-29 PROCEDURE — 85025 COMPLETE CBC W/AUTO DIFF WBC: CPT | Performed by: PATHOLOGY

## 2024-08-29 PROCEDURE — 36415 COLL VENOUS BLD VENIPUNCTURE: CPT | Performed by: PATHOLOGY

## 2024-08-29 PROCEDURE — 99024 POSTOP FOLLOW-UP VISIT: CPT | Performed by: SURGERY

## 2024-08-29 PROCEDURE — 74177 CT ABD & PELVIS W/CONTRAST: CPT | Mod: GC | Performed by: RADIOLOGY

## 2024-08-29 PROCEDURE — 80053 COMPREHEN METABOLIC PANEL: CPT | Performed by: PATHOLOGY

## 2024-08-29 RX ORDER — IOPAMIDOL 755 MG/ML
85 INJECTION, SOLUTION INTRAVASCULAR ONCE
Status: COMPLETED | OUTPATIENT
Start: 2024-08-29 | End: 2024-08-29

## 2024-08-29 RX ADMIN — IOPAMIDOL 85 ML: 755 INJECTION, SOLUTION INTRAVASCULAR at 12:45

## 2024-08-29 ASSESSMENT — PAIN SCALES - GENERAL: PAINLEVEL: MODERATE PAIN (5)

## 2024-08-29 NOTE — LETTER
2024       RE: Nicol Aldrich  [address]  [facility] [address]     Dear Colleague,    Thank you for referring your patient, Nicol Aldrich, to the [facility] COLON AND RECTAL SURGERY CLINIC [place] at [facility]. Please see a copy of my visit note below.    \Colon and Rectal Surgery Postoperative Clinic Note     Referring provider:  Aftab Wall MD  [address] [place] [id number]       RE: Nicol Aldrich  : 1960  [place]: 2024    Nicol Aldrich is a very pleasant 63 year old female with a history of Crohn's disease (on Infliximab and Entocort) and anal SCC (s/p Win protocol ) now status post open abdominoperineal resection, colostomy revision, takedown of colocutaneous fistula, and VRAM flap perineal reconstruction (Dr. Mccarthy) on 7/10/2024 with Dr. Herrera and Dr. Santana.    Final Diagnosis      A. OMENTUM, RESECTION:  Benign fibroadipose tissue    B. ASCENDING COLON, RESECTION, 5.5-CM:  Focal stricture compatible with Crohn complication, currently minimally active:   -No dysplasia or malignancy in colon or sampled lymph nodes   -Resection margins show no active inflammation or other abnormality    C. FISTULA TRACT, RESECTION:  Inflamed connective tissue and organizing granulation tissue with cutaneous surface opening consistent with Crohn-related enterocutaneous fistula; no enteric mucosa component or malignancy identified     D. ILEUM, COLOSTOMY STOMA, RESECTION:  Ileo-cutaneous anastomotic junction with nonspecific mucosa inflammation, congestion, and other features consistent with ileostomy; no dysplasia or malignancy in intestinal wall or sampled lymph nodes    E. RECTOSIGMOID COLON AND ANUS (45-CM), RESECTION:  Chronic active colitis compatible with Crohn colitis:   -With crypt architectural distortion focal stricture/mural scarring    -Mildly active colitis present at proximal colonic margin   -Distal, anal,  margin is free of inflammation, stricture, or other abnormality   -No dysplasia or malignancy in colon or sampled lymph nodes    F. OMENTUM, RESECTION:  Benign fibroadipose tissue    G. COLON, COLOSTOMY STOMA, RESECTION, 2.5 CM:  Colonic mucosa ad wall with focal, mild crypt architectural distortion, evidence of prior injury; negative for active inflammation, dysplasia, or malignancy    H. HERNIA SAC, REPAIR:  Benign fibroadipose connective tissue, consistent with wall of hernia sac      Interval history: Nicol is feeling well. No significant pain and only took oxycodone today. No fevers or chills. Is planning to start Remicade on Wednesday.     CT A/P 7/25/24:  1.  No acute findings in the abdomen and pelvis. No pelvic fluid collections.  2.  Interval postoperative changes of resection of the sigmoid colon, rectum and anus, reconstruction of the perineum, repair of the left lower quadrant parastomal hernia and left lower quadrant transverse end colostomy creation.    CT AP 7/31/24:  IMPRESSION:   1.  Small amount of gas and fluid along the tract of the previous  subcutaneous surgical drain in the right lower abdomen. At the mid to  lower point of the tract there is a focal roughly 4 cm ill-defined  fluid collection. There is no definable enhancing rim, but this would  be concerning for developing abscess, given the patient's symptoms.  2.  Postsurgical changes noted of distal proctocolectomy and flap  reconstruction of the lower pelvis and perineum. There is some  increased edema and stranding through this area and new mild diffuse  bladder wall thickening that may be reactive. No definable fluid  collection in this area.  3.  Left lower quadrant end colostomy. Large amount of stool seen  through the colon extending to the ostomy.  4.  There was no answer, but message was left at the provided phone  number.     07/11/24 16:20 07/19/24 12:54 07/26/24 12:30 07/29/24 10:04 07/31/24 11:10   WBC 10.8 11.2 (H) 13.9 (H)  12.1 (H) 8.5         Assessment/Plan:  63 year old female now s/p APR with VRAM, feeling some pelvic pressure and feverish.  -CT A/P with IV contrast, labs as well to evaluate for possible pelvic fluid collection.     PLEASE SEE NOTE BELOW FOR PHYSICAL EXAMINATION, REVIEW OF SYSTEMS, AND OTHER HISTORY.    Kunal Herrera MD  Division of Colon and Rectal Surgery   [facility]  [phone]    -------------------------------------------------------------------------------------------------------------------    Medical history:  Past Medical History:   Diagnosis Date     Squamous cell carcinoma of skin, unspecified        Surgical history:  Past Surgical History:   Procedure Laterality Date      SECTION       COLONOSCOPY N/A 2024    Procedure: Colonoscopy with biopsies;  Surgeon: Jordan Coyle MD;  Location: [facility] OR     EXAM UNDER ANESTHESIA ANUS N/A 2024    Procedure: EXAM UNDER ANESTHESIA, ANUS, ANAL DILATION;  Surgeon: Kunal Herrera MD;  Location: [facility] OR     GRAFT FLAP PEDICLE PERINEUM N/A 7/10/2024    Procedure: Right Vertical Rectus Abdominis Musculocutaneous transpelvic flap reconstruction of perineum.;  Surgeon: ANEESH Mccarhty MD;  Location: [facility] OR     ostomy creation       RESECTION ABDOMINAL PERINEAL N/A 7/10/2024    Procedure: exploratory laparotomy, abdominal perineal resection, colostomy revision, take down of colocutaneous fistula;  Surgeon: Kunal Herrera MD;  Location: [facility] OR     SIGMOIDOSCOPY FLEXIBLE N/A 2024    Procedure: Sigmoidoscopy flexible, With Hegar Dilation;  Surgeon: Jordan Coyle MD;  Location: [facility] OR       Problem list:    Patient Active Problem List    Diagnosis Date Noted     Crohn disease (H) 07/10/2024     Priority: Medium     Crohn's disease of both small and large intestine with complication (H) 2024     Priority: Medium     Neoplasm of uncertain behavior of skin 2023     Priority: Medium  "    Crohn's ileocolitis (H) 06/29/2023     Priority: Medium       Medications:  Current Outpatient Medications   Medication Sig Dispense Refill     acetaminophen (TYLENOL) 500 MG tablet Take 1,000 mg by mouth every 4 hours as needed       enoxaparin ANTICOAGULANT (LOVENOX) 40 MG/0.4ML syringe Inject 0.4 mLs (40 mg) subcutaneously every 24 hours for 26 doses 10.4 mL 0     gabapentin (NEURONTIN) 100 MG capsule Take 1 capsule (100 mg) by mouth 3 times daily for 10 days 30 capsule 0     inFLIXimab Inject 4 mg into the vein every 30 days Last infusion 12/30 (Patient not taking: Reported on 8/5/2024)       ondansetron (ZOFRAN ODT) 4 MG ODT tab Take 1 tablet (4 mg) by mouth every 8 hours as needed for nausea 20 tablet 0     Ostomy Supplies MISC 1 each daily 1 each 11     trospium (SANCTURA XR) 60 MG CP24 24 hr capsule Take 60 mg by mouth every morning (before breakfast)         Allergies:  Allergies   Allergen Reactions     Blood Transfusion Related (Informational Only) Other (See Comments)     Patient has a history of a clinically significant antibody against RBC antigens.  A delay in compatible RBCs may occur.     Penicillins Rash     On 07/10/18, pt states that she experienced a rash when she had this \"a while ago.\" This was not a reaction from childhood.   On 07/10/18, pt states that she experienced a rash when she had this \"a while ago.\" This was not a reaction from childhood.          Family history:  Family History   Problem Relation Age of Onset     Anesthesia Reaction No family hx of      Deep Vein Thrombosis (DVT) No family hx of        Social history:  Social History     Socioeconomic History     Marital status:      Spouse name: Not on file     Number of children: Not on file     Years of education: Not on file     Highest education level: Not on file   Occupational History     Not on file   Tobacco Use     Smoking status: Never     Smokeless tobacco: Never   Vaping Use     Vaping status: Never Used "   Substance and Sexual Activity     Alcohol use: Yes     Alcohol/week: 2.0 standard drinks of alcohol     Types: 2 Glasses of wine per week     Comment: Occasional     Drug use: Never     Sexual activity: Not on file   Other Topics Concern     Not on file   Social History Narrative     Not on file     Social Determinants of Health     Financial Resource Strain: Low Risk  (12/15/2022)    Received from [facility]    Overall Financial Resource Strain (CARDIA)      Difficulty of Paying Living Expenses: Not hard at all   Food Insecurity: No Food Insecurity (12/15/2022)    Received from [facility]    Hunger Vital Sign      Worried About Running Out of Food in the Last Year: Never true      Ran Out of Food in the Last Year: Never true   Transportation Needs: No Transportation Needs (12/15/2022)    Received from [facility]    PRAPARE - Transportation      Lack of Transportation (Medical): No      Lack of Transportation (Non-Medical): No   Physical Activity: Insufficiently Active (12/15/2022)    Received from [facility]    Exercise Vital Sign      Days of Exercise per Week: 2 days      Minutes of Exercise per Session: 60 min   Stress: No Stress Concern Present (12/15/2022)    Received from [facility]    [employer] - Occupational Stress Questionnaire      Feeling of Stress : Only a little   Social Connections: Moderately Isolated (12/15/2022)    Received from [facility]    Social Connection and Isolation Panel [NHANES]      Frequency of Communication with Friends and Family: Once a week      Frequency of Social Gatherings with Friends and Family: Three times a week      Attends Christian Services: Never      Active Member of Clubs or Organizations: No      Attends Club or Organization Meetings: Never      Marital Status:    Interpersonal Safety: Not At Risk (12/15/2022)    Received from [facility]    Humiliation, Afraid, Rape, and Kick questionnaire      Fear of Current or Ex-Partner: No      Emotionally Abused: No      Physically Abused: No      Sexually Abused: No   Housing Stability: Low Risk  (12/15/2022)    [facility]    Housing Stability Vital Sign      Unable to Pay for Housing in the Last Year: No      Number of Places Lived in the Last Year: 1      Unstable Housing in the Last Year: No       Physical Examination:  /84 (BP Location: Right arm, Patient Position: Sitting, Cuff Size: Adult Regular)   Pulse 92   SpO2 93%   General: NAD  Abdomen: soft, NTND, ostomy pink and healthy, midline incision well healed.  Perianal external examination:  Flap healthy and healing well.      Again, thank you for allowing me to participate in the care of your patient.      Sincerely,    Kunal Herrera MD, MD

## 2024-08-29 NOTE — NURSING NOTE
Chief Complaint   Patient presents with    Post-op Visit       Vitals:    08/29/24 1151   BP: 130/84   BP Location: Right arm   Patient Position: Sitting   Cuff Size: Adult Regular   Pulse: 92   SpO2: 93%       There is no height or weight on file to calculate BMI.    Trey Pinto EMT-P

## 2024-08-29 NOTE — DISCHARGE INSTRUCTIONS

## 2024-09-03 ENCOUNTER — MYC MEDICAL ADVICE (OUTPATIENT)
Dept: GASTROENTEROLOGY | Facility: CLINIC | Age: 64
End: 2024-09-03
Payer: COMMERCIAL

## 2024-09-03 DIAGNOSIS — K50.819 CROHN'S DISEASE OF SMALL AND LARGE INTESTINES WITH COMPLICATION (H): Primary | ICD-10-CM

## 2024-09-04 NOTE — TELEPHONE ENCOUNTER
"Contacted patient to discuss symptoms.     Continues to complain of mild lower abdominal pain, describes it as a \"soreness,\" however states its getting much better.    Denies fevers or bloody stools.     Reports she does experience some intermittent loose stools, denies diarrhea. States the frequency of her stools is at baseline.     Reports a decreased appetite, so she has not been eating much, but is staying hydrated.     Receives her next dose of Infliximab tomorrow.    Routed to Dr. Coyle for an update.    "

## 2024-09-09 NOTE — TELEPHONE ENCOUNTER
Per Dr. Coyle -    Continue to monitor temperatures. If fever recurs, recheck CT with CTE protocol. Also plan to complete labs at that time - CBC, CRP, & hepatic panel.     If diarrhea recurs check stool studies from ostomy.     If constipation is ongoing, start Miralax.     Confirm that the patient is passing flatus from her ostomy bag.     If flatus stops completely, and patient continues to not pass stool advise patient presents to the emergency room.     CRS agree with the plan.    Updated patient via [employer].

## 2024-09-09 NOTE — TELEPHONE ENCOUNTER
Contacted patient to discuss symptoms.     Denies any sort of respiratory symptoms, etc.    Denies abdominal pain.     Reports her last bowel movement was Saturday, denies blood. Stool was noted to be loose and she noted the odor was more foul then normal. Passing some flatus.     Reports a headache and nausea, denies vomiting. Patient reports her appetite has been poor, so she hasn't eaten much all weekend. Just sipping water.     Took her temperature one hour ago, down to 98.0 with Tylenol on board.    Received Infliximab infusion on 9/5.     Routed to [facility] and Dr. Coyle for an update.

## 2024-09-10 ENCOUNTER — OFFICE VISIT (OUTPATIENT)
Dept: SURGERY | Facility: CLINIC | Age: 64
End: 2024-09-10
Payer: COMMERCIAL

## 2024-09-10 DIAGNOSIS — Z48.89 ENCOUNTER FOR POST SURGICAL WOUND CHECK: Primary | ICD-10-CM

## 2024-09-10 DIAGNOSIS — L92.9 HYPERGRANULATION: ICD-10-CM

## 2024-09-10 PROCEDURE — 99024 POSTOP FOLLOW-UP VISIT: CPT | Performed by: PHYSICIAN ASSISTANT

## 2024-09-10 NOTE — PROGRESS NOTES
Nicol Aldrich is a 63 year old female s/p exploratory laparotomy, abdominal perineal resection, colostomy revision, take down of colocutaneous fistula and Right Vertical Rectus Abdominis Musculocutaneous transpelvic flap reconstruction of perineum. 7/10/2024 with Dr. Mccarthy so is approximately 2 months post-op. I saw her a little over 3 weeks ago at which time there was a partial anterior dehiscence and for part of the last 3 weeks, her  was performing wet-to-dry dressing changes on the area.  She's unsure how long since they stopped, but she is not performing any specific wound care.  She continues to wear a maxipad and occasionally sees blood on the more posterior aspect.      She reports fevers of 102.5 for the past 2 nights but none this morning.  Denies chills, cough, malaise, new pain, nausea.  She contacted GI regarding the fever and they had no concerns.      She is leaving next week for [place] to meet her first grand-baby.     Exam: Alert, pleasant, NAD  Anterior flap dehiscence has healed, but now there are 2 small 1-2cm flaps of hypergranulation tissue anteriorly and a 1 cm area posteriorly.  Remainder of suture line is well healed and flap is pink, perfused and healthy.  No drainage or palpable fluid collections. Surrounding skin with faint rash (appears much more dramatic in photos) but area is not moist or macerated.    A/P:   Flap does not appear to be the source for her fevers.  I suggest a Covid  test, given the current infection rate, and f/up with her PCP if fevers are not resolved.  Today, I treated both areas of hypergranulation tissue with silver nitrate.  This should force the tissue to slough over th next 7 days and then allow for epithelialization.   Patient was instructed on Aquaphor to both of these areas daily, ideally after showering and washing the area with a soft cloth.  Would healing expectations discussed - warned that sloughing tissue will initially appear black on the  pad before healthy tissue fills in.  I will see her again in 4 weeks (after her grand-baby visit) to assess healing.  If needed, I can re-treat any remaining hypergranulation tissue.   She was encouraged to contact the clinic in the meantime with questions or concerns.     30 minutes spent by me on the date of the encounter doing chart review, history and exam, documentation and further activities per the note

## 2024-09-10 NOTE — TELEPHONE ENCOUNTER
Patient requesting to complete infectious stool studies. Orders placed, per Dr. Coyle. Patient updated via [employer].

## 2024-09-10 NOTE — LETTER
9/10/2024      Nicol Aldrich  [address]  [facility] [address]      Dear Colleague,    Thank you for referring your patient, Nicol Aldrich, to the [facility]. Please see a copy of my visit note below.    Nicol Aldrich is a 63 year old female s/p exploratory laparotomy, abdominal perineal resection, colostomy revision, take down of colocutaneous fistula and Right Vertical Rectus Abdominis Musculocutaneous transpelvic flap reconstruction of perineum. 7/10/2024 with Dr. Mccarthy so is approximately 2 months post-op. I saw her a little over 3 weeks ago at which time there was a partial anterior dehiscence and for part of the last 3 weeks, her  was performing wet-to-dry dressing changes on the area.  She's unsure how long since they stopped, but she is not performing any specific wound care.  She continues to wear a maxipad and occasionally sees blood on the more posterior aspect.      She reports fevers of 102.5 for the past 2 nights but none this morning.  Denies chills, cough, malaise, new pain, nausea.  She contacted GI regarding the fever and they had no concerns.      She is leaving next week for [place] to meet her first grand-baby.     Exam: Alert, pleasant, NAD  Anterior flap dehiscence has healed, but now there are 2 small 1-2cm flaps of hypergranulation tissue anteriorly and a 1 cm area posteriorly.  Remainder of suture line is well healed and flap is pink, perfused and healthy.  No drainage or palpable fluid collections. Surrounding skin with faint rash (appears much more dramatic in photos) but area is not moist or macerated.    A/P:   Flap does not appear to be the source for her fevers.  I suggest a Covid  test, given the current infection rate, and f/up with her PCP if fevers are not resolved.  Today, I treated both areas of hypergranulation tissue with silver nitrate.  This should force the tissue to slough over th next 7 days and then allow for epithelialization.    Patient was instructed on Aquaphor to both of these areas daily, ideally after showering and washing the area with a soft cloth.  Would healing expectations discussed - warned that sloughing tissue will initially appear black on the pad before healthy tissue fills in.  I will see her again in 4 weeks (after her grand-baby visit) to assess healing.  If needed, I can re-treat any remaining hypergranulation tissue.   She was encouraged to contact the clinic in the meantime with questions or concerns.     30 minutes spent by me on the date of the encounter doing chart review, history and exam, documentation and further activities per the note      Again, thank you for allowing me to participate in the care of your patient.        Sincerely,        Jenelle Benítez PA-C

## 2024-09-10 NOTE — NURSING NOTE
Nicol Aldrich's goals for this visit include:   Chief Complaint   Patient presents with    Surgical Followup     2-3 week recheck flap graft       She requests these members of her care team be copied on today's visit information: no    PCP: Janet Noe    Referring Provider:  Referred Self, MD  No address on file    There were no vitals taken for this visit.    Do you need any medication refills at today's visit? No    Salena Padilla LPN

## 2024-09-12 ENCOUNTER — APPOINTMENT (OUTPATIENT)
Dept: LAB | Facility: OTHER | Age: 64
End: 2024-09-12
Payer: COMMERCIAL

## 2024-09-12 PROCEDURE — 99000 SPECIMEN HANDLING OFFICE-LAB: CPT | Performed by: PATHOLOGY

## 2024-09-12 PROCEDURE — 83993 ASSAY FOR CALPROTECTIN FECAL: CPT | Performed by: INTERNAL MEDICINE

## 2024-09-12 PROCEDURE — 87493 C DIFF AMPLIFIED PROBE: CPT | Mod: XU | Performed by: INTERNAL MEDICINE

## 2024-09-12 PROCEDURE — 87507 IADNA-DNA/RNA PROBE TQ 12-25: CPT | Performed by: INTERNAL MEDICINE

## 2024-09-13 ENCOUNTER — DOCUMENTATION ONLY (OUTPATIENT)
Dept: GASTROENTEROLOGY | Facility: CLINIC | Age: 64
End: 2024-09-13
Payer: COMMERCIAL

## 2024-09-13 NOTE — TELEPHONE ENCOUNTER
Discussed positive EPEC result and symptom update with Dr. Coyle -    No need to treat EPEC, as patient reports she is improving.     Patient is appropriate to travel, however would recommend thorough handwashing and maintaining good hygiene.     Recommend she notify her children of the active infection, and determine what they are comfortable with.     E coli - spread by fecal-oral route - wash hands thoroughly after working with ostomy bag with warm soup and water.     Updated patient via mychart.

## 2024-09-13 NOTE — PROGRESS NOTES
Per Rahul,This patient:'s labs drawn with her infusion on 8/7 result were requested  on 09/13/2024 @ 11:30 and asked to fax to us.    She receives her infusions with [facility], however we never received a report of the labs.       Phone: [phone] or  [phone]     Jose D Conner MA

## 2024-10-07 DIAGNOSIS — R39.15 URINARY URGENCY: Primary | ICD-10-CM

## 2024-10-07 DIAGNOSIS — N39.41 URGE INCONTINENCE: ICD-10-CM

## 2024-10-08 ENCOUNTER — OFFICE VISIT (OUTPATIENT)
Dept: SURGERY | Facility: CLINIC | Age: 64
End: 2024-10-08
Payer: COMMERCIAL

## 2024-10-08 DIAGNOSIS — Z48.89 ENCOUNTER FOR POST SURGICAL WOUND CHECK: ICD-10-CM

## 2024-10-08 DIAGNOSIS — L92.9 HYPERGRANULATION: Primary | ICD-10-CM

## 2024-10-08 PROCEDURE — 99024 POSTOP FOLLOW-UP VISIT: CPT | Performed by: PHYSICIAN ASSISTANT

## 2024-10-08 NOTE — LETTER
"10/8/2024      Nicol Aldrich  [address]  [facility] [address]      Dear Colleague,    Thank you for referring your patient, Nicol Aldrich, to the [facility]. Please see a copy of my visit note below.         Nicol Aldrihc is a 63 year old female s/p exploratory laparotomy, abdominal perineal resection, colostomy revision, take down of colocutaneous fistula and Right Vertical Rectus Abdominis Musculocutaneous transpelvic flap reconstruction of perineum on 7/10/2024 with Dr. Mccarthy so is approximately 3 months post-op.  I saw her about 4 weeks ago at which time there were 2 areas of hypergranulation tissue and the most anterior and posterior aspects of the surgical site that were friable and irritating.  At that time, I treated them both with silver nitrate.  Since then, there has been some sloughing of tissue (as expected) but then things seemed fine with no drainage until a few nights ago when the area nearest her vaginal started to bleed.  She reports being careful wiping and is not sure what caused the bleeding, but it did not soak a pad and stopped within 24 hours.      Additionally, at the post inferior aspect of his vertical lower abdomen incision there is something \"poking through\" causing a little drainage.  No pain or redness and the drainage is yellowish and minimal.         She got to see her new grandbaby since our last visit and brings photos today :)      Exam: Alert, pleasant, NAD   Entire flap has healed and is healthy.  The more anterior flaps of hyper granulation tissue are 1/2 the size they were 4 weeks ago, but are  still present (approx 0.5cm each). No bleeding or drainage, but protruding in a way that could be irritating.  The posterior hypergranulation tissue has sloughed and is now a 0.5cm strip just slight above skin level.  No surrounding erythema, bleeding or drainage.No palpable fluid collections. Rash present at last visit has resolved.  Abdomen - at most " inferior aspect of well-healed suture line there is a tiny, palpable piece of Monocryl suture poking through.  No drainage or erythema.     A/P:    Today, I again treated both areas of hypergranulation tissue with silver nitrate.  This worked pretty well the first time and there is only a small amount of anterior tissue left.  Given the precarious location, I'd much prefer continuing this method as opposed to a lesion excision, although, if still present, I could consider excising the anterior portion at skin level and then cauterizing the base.  I explained this to Ms Aldrich who expressed understanding and is OK with that plan.  Given the posterior tissue sloughed down to nearly skin level, I'm optimistic that the anterior will do the same.   Patient was instructed on Aquaphor to both of these areas daily, ideally after showering and washing the area with a soft cloth.  Would healing expectations discussed - warned that sloughing tissue will initially appear black on the pad before healing  Tiny splitting Monocryl suture material removed from inferior abd incision without incident,.   I will see her again in 4 weeks   She was encouraged to contact the clinic in the meantime with questions or concerns.      30 minutes spent by me on the date of the encounter doing chart review, history and exam, documentation and further activities per the note            Again, thank you for allowing me to participate in the care of your patient.        Sincerely,        Jenelle Benítez PA-C

## 2024-10-08 NOTE — NURSING NOTE
Nicol Aldrich's goals for this visit include:   Chief Complaint   Patient presents with    RECHECK     recheck wound, s/p Right Vertical Rectus Abdominis Musculocutaneous transpelvic flap reconstruction of perineum 7/10       She requests these members of her care team be copied on today's visit information:     PCP: Janet Noe    Referring Provider:  Referred Self, MD  No address on file    There were no vitals taken for this visit.    Do you need any medication refills at today's visit?     Yessi Hoskins MA on 10/8/2024 at 10:51 AM

## 2024-10-08 NOTE — PROGRESS NOTES
"     Nicol Aldrich is a 63 year old female s/p exploratory laparotomy, abdominal perineal resection, colostomy revision, take down of colocutaneous fistula and Right Vertical Rectus Abdominis Musculocutaneous transpelvic flap reconstruction of perineum on 7/10/2024 with Dr. Mccarthy so is approximately 3 months post-op.  I saw her about 4 weeks ago at which time there were 2 areas of hypergranulation tissue and the most anterior and posterior aspects of the surgical site that were friable and irritating.  At that time, I treated them both with silver nitrate.  Since then, there has been some sloughing of tissue (as expected) but then things seemed fine with no drainage until a few nights ago when the area nearest her vaginal started to bleed.  She reports being careful wiping and is not sure what caused the bleeding, but it did not soak a pad and stopped within 24 hours.      Additionally, at the post inferior aspect of his vertical lower abdomen incision there is something \"poking through\" causing a little drainage.  No pain or redness and the drainage is yellowish and minimal.         She got to see her [facility] since our last visit and brings photos today :)      Exam: Alert, pleasant, NAD   Entire flap has healed and is healthy.  The more anterior flaps of hyper granulation tissue are 1/2 the size they were 4 weeks ago, but are  still present (approx 0.5cm each). No bleeding or drainage, but protruding in a way that could be irritating.  The posterior hypergranulation tissue has sloughed and is now a 0.5cm strip just slight above skin level.  No surrounding erythema, bleeding or drainage.No palpable fluid collections. Rash present at last visit has resolved.  Abdomen - at most inferior aspect of well-healed suture line there is a tiny, palpable piece of Monocryl suture poking through.  No drainage or erythema.     A/P:    Today, I again treated both areas of hypergranulation tissue with silver nitrate.  " This worked pretty well the first time and there is only a small amount of anterior tissue left.  Given the precarious location, I'd much prefer continuing this method as opposed to a lesion excision, although, if still present, I could consider excising the anterior portion at skin level and then cauterizing the base.  I explained this to Ms Aldrich who expressed understanding and is OK with that plan.  Given the posterior tissue sloughed down to nearly skin level, I'm optimistic that the anterior will do the same.   Patient was instructed on Aquaphor to both of these areas daily, ideally after showering and washing the area with a soft cloth.  Would healing expectations discussed - warned that sloughing tissue will initially appear black on the pad before healing  Tiny splitting Monocryl suture material removed from inferior abd incision without incident,.   I will see her again in 4 weeks   She was encouraged to contact the clinic in the meantime with questions or concerns.      30 minutes spent by me on the date of the encounter doing chart review, history and exam, documentation and further activities per the note

## 2024-10-11 ENCOUNTER — DOCUMENTATION ONLY (OUTPATIENT)
Dept: GASTROENTEROLOGY | Facility: CLINIC | Age: 64
End: 2024-10-11
Payer: COMMERCIAL

## 2024-10-12 NOTE — TELEPHONE ENCOUNTER
trospium (SANCTURA XR) 60 MG CP24 24 hr capsule   Historical    10/17/2023  [facility] Melonie Stevens PA-C  Urology    Nv:none    Scheduling has been notified to contact the pt for appointment.    Routed because: historical  Muscarinic Antagonists (Urinary Incontinence Agents) [id number]/09/2024 03:12 PM   Protocol Details Medication indicated for associated diagnosis   Medication is associated with one or more of the following diagnoses:  Bladder pain  Disorder of the GI tract  Hyperhidrosis  Neurogenic bladder  Neurogenic detrusor overactivity  Overactive Bladder  Urge incontinence  Urgent desire to urinate

## 2024-10-14 LAB
% BASOPHILS (EXTERNAL): 0.5 %
% EOSINOPHILS (EXTERNAL): 1.5 %
% IMMATURE GRANULOCYTES (EXTERNAL): 0.3 %
% LYMPHOCYTES (EXTERNAL): 17.1 %
% MONOCYTES (EXTERNAL): 5.8 %
% NEUTROPHILS (EXTERNAL): 74.8 %
ABSOLUTE BASOPHILS (EXTERNAL): 0 THOU/CU MM
ABSOLUTE EOSINOPHILS (EXTERNAL): 0.1 THOU/CU MM
ABSOLUTE IMMATURE GRANULOCYTES (EXTERNAL): 0 THOU/CU MM
ABSOLUTE LYMPHOCYTES (EXTERNAL): 1.1 THOU/CU MM (ref 0.9–2.9)
ABSOLUTE MONOCYTES (EXTERNAL): 0.4 THOU/CU MM
ABSOLUTE NEUTROPHILS (EXTERNAL): 4.9 THOU/CU MM (ref 1.7–7)
ALBUMIN (EXTERNAL): 4 G/DL (ref 4–4.9)
ALK PHOSPHATASE (EXTERNAL): 64 IU/L (ref 36–104)
ALT SERPL-CCNC: 10 IU/L (ref 10–35)
AST SERPL-CCNC: 26 IU/L (ref 10–35)
BILIRUB SERPL-MCNC: 0.3 MG/DL (ref 0–1.2)
BILIRUBIN DIRECT (EXTERNAL): <0.2 MG/DL (ref 0–0.3)
CRP INFLAMMATION (EXTERNAL): 0.9 MG/DL
ERYTHROCYTE [DISTWIDTH] IN BLOOD BY AUTOMATED COUNT: 14.4 % (ref 11.5–15.5)
HEMATOCRIT (EXTERNAL): 30.6 % (ref 33–51)
HEMOGLOBIN (EXTERNAL): 10.1 G/DL (ref 12–16)
MCH RBC QN AUTO: 28.2 PG (ref 26–34)
MCHC RBC AUTO-ENTMCNC: 33 G/DL (ref 32–36)
MCV RBC AUTO: 86 FL (ref 80–100)
NUCLEATED RBCS (EXTERNAL): 0 %
PLATELET COUNT (EXTERNAL): 584 THOU/CU MM (ref 140–440)
PROTEIN TOTAL (EXTERNAL): 7.3 G/DL (ref 6–8)
RBC # BLD AUTO: 3.58 (ref 4–5.2)
WBC COUNT (EXTERNAL): 6.5 THOU/CMM (ref 4.5–11)

## 2024-10-15 ENCOUNTER — MYC MEDICAL ADVICE (OUTPATIENT)
Dept: UROLOGY | Facility: CLINIC | Age: 64
End: 2024-10-15
Payer: COMMERCIAL

## 2024-10-15 RX ORDER — TROSPIUM CHLORIDE ER 60 MG/1
60 CAPSULE ORAL
Qty: 30 CAPSULE | Refills: 3 | Status: SHIPPED | OUTPATIENT
Start: 2024-10-15

## 2024-10-15 NOTE — TELEPHONE ENCOUNTER
Gloria Paniagua PA-C Berkenes, Melissa, RN; P [facility] Adult [place]  Caller: Unspecified (1 week ago)  She hasn't been seen for over 1 year and was advised to follow up in 3 months after her last encounter 1 year ago. Please get her scheduled with follow-up with me in person for PVR check. Then can renew trospium only up until her appointment date.    cheng     Received the above message from Gloria Paniagua PA-C. Attempted to reach patient by phone, but no answer. Left message with request to return call to clinic and that a my chart message would also be sent. See 10/15/24 my chart encounter.    Nicol Adorno RN, BSN

## 2024-10-15 NOTE — TELEPHONE ENCOUNTER
Received update from patient who scheduled appointment with Gloria Paniagua PA-C on 1/27/25. Writer reviewed schedule and this is the next available visit date in [place]. Trospium reordered and refills provided until appointment time.     Nicol Adorno RN, BSN

## 2024-11-03 ENCOUNTER — TRANSFERRED RECORDS (OUTPATIENT)
Dept: HEALTH INFORMATION MANAGEMENT | Facility: CLINIC | Age: 64
End: 2024-11-03

## 2024-11-05 ENCOUNTER — OFFICE VISIT (OUTPATIENT)
Dept: SURGERY | Facility: CLINIC | Age: 64
End: 2024-11-05
Payer: COMMERCIAL

## 2024-11-05 DIAGNOSIS — Z48.89 ENCOUNTER FOR POST SURGICAL WOUND CHECK: Primary | ICD-10-CM

## 2024-11-05 DIAGNOSIS — L91.0 HYPERTROPHIC SCAR OF SKIN: ICD-10-CM

## 2024-11-05 PROCEDURE — 99214 OFFICE O/P EST MOD 30 MIN: CPT | Performed by: PHYSICIAN ASSISTANT

## 2024-11-05 NOTE — LETTER
"11/5/2024      Nicol Aldrich  [address]  [facility] [address]      Dear Colleague,    Thank you for referring your patient, Nicol Aldrich, to the [facility]. Please see a copy of my visit note below.    Nicol Aldrich is a 63 year old female s/p exploratory laparotomy, abdominal perineal resection, colostomy revision, take down of colocutaneous fistula and Right Vertical Rectus Abdominis Musculocutaneous transpelvic flap reconstruction of perineum on 7/10/2024 with Dr. Mccarthy so is approximately 4 months post-op.  I saw her 4 and 8 weeks ago at which time there were 2 areas of hypergranulation tissue at the most anterior and posterior aspects of the surgical site that were friable and irritating.  Both times, the areas were treated with silver nitrate as progress was noted during the second visit.  She feels that things are going well and only very occasionally notices a bit of irritation at the posterior site and bloody fluid, but this is typically after a lot of physical activity.      Today, she also points out a painful, irritating scar on her right shoulder.  The scar has been present for \"years\" but just recently she noticed that it causes some pain when her bra strap rests on it and is wondering if there are any options.  No other concerns.    Exam: Alert, pleasant, NAD  Flap well healed and healthy.  The more anterior portion of hypergranulation tissue has completely resolved and now there is an approx 1x0.5cm slightly raw area with no surrounding erythema.  The more posterior area has 2 very small areas of hypergranulation tissue, less than 1cm.  All surrounding skin otherwise appears healthy.  Right posterior shoulder: There is an approximately 4 cm linear scar with slightly more than half of it hypertrophic.  No surrounding erythema.  Hypertrophic area slightly tender to palpation.    A/P:  Last time I saw her, I very aggressively treated the more anterior portion of " hypergranulation tissue and it has resolved completely.  I was less aggressive with the more posterior area so I am not surprised that there is still some hypergranulation tissue present.  Posterior hypergranulation tissue treated today with silver nitrate.  As noted, the flap and the remainder of the surrounding skin looks fantastic.  The small raw anterior area where the hypergranulation tissue has resolved will heal secondarily.  Ms. Aldrich will apply Vaseline to both areas, keeping in mind again that the posterior area will have some black slough before it starts healing.  Regarding the scar, I offered steroid injection today, explaining risks and benefits.  Ms. Aldrich and I are both optimistic that this will be the final silver nitrate treatment for the flap area and quite possibly her last visit.  If she continues to have issues, she will call and schedule follow-up at which time she states that she might want to do the steroid injection.  I told her that if this is the case, be sure to schedule a 30-minute appointment.  Otherwise, she can contact us via [email or web address] with any questions or concerns.      30 minutes spent by me on the date of the encounter doing chart review, history and exam, silver nitrate treatment, documentation and further activities per the note    Again, thank you for allowing me to participate in the care of your patient.        Sincerely,        Jenelle Benítez PA-C

## 2024-11-05 NOTE — PROGRESS NOTES
"Nicol Aldrich is a 63 year old female s/p exploratory laparotomy, abdominal perineal resection, colostomy revision, take down of colocutaneous fistula and Right Vertical Rectus Abdominis Musculocutaneous transpelvic flap reconstruction of perineum on 7/10/2024 with Dr. Mccarthy so is approximately 4 months post-op.  I saw her 4 and 8 weeks ago at which time there were 2 areas of hypergranulation tissue at the most anterior and posterior aspects of the surgical site that were friable and irritating.  Both times, the areas were treated with silver nitrate as progress was noted during the second visit.  She feels that things are going well and only very occasionally notices a bit of irritation at the posterior site and bloody fluid, but this is typically after a lot of physical activity.      Today, she also points out a painful, irritating scar on her right shoulder.  The scar has been present for \"years\" but just recently she noticed that it causes some pain when her bra strap rests on it and is wondering if there are any options.  No other concerns.    Exam: Alert, pleasant, NAD  Flap well healed and healthy.  The more anterior portion of hypergranulation tissue has completely resolved and now there is an approx 1x0.5cm slightly raw area with no surrounding erythema.  The more posterior area has 2 very small areas of hypergranulation tissue, less than 1cm.  All surrounding skin otherwise appears healthy.  Right posterior shoulder: There is an approximately 4 cm linear scar with slightly more than half of it hypertrophic.  No surrounding erythema.  Hypertrophic area slightly tender to palpation.    A/P:  Last time I saw her, I very aggressively treated the more anterior portion of hypergranulation tissue and it has resolved completely.  I was less aggressive with the more posterior area so I am not surprised that there is still some hypergranulation tissue present.  Posterior hypergranulation tissue treated today " with silver nitrate.  As noted, the flap and the remainder of the surrounding skin looks fantastic.  The small raw anterior area where the hypergranulation tissue has resolved will heal secondarily.  Ms. Aldrich will apply Vaseline to both areas, keeping in mind again that the posterior area will have some black slough before it starts healing.  Regarding the scar, I offered steroid injection today, explaining risks and benefits.  Ms. Aldrich and I are both optimistic that this will be the final silver nitrate treatment for the flap area and quite possibly her last visit.  If she continues to have issues, she will call and schedule follow-up at which time she states that she might want to do the steroid injection.  I told her that if this is the case, be sure to schedule a 30-minute appointment.  Otherwise, she can contact us via [email or web address] with any questions or concerns.      30 minutes spent by me on the date of the encounter doing chart review, history and exam, silver nitrate treatment, documentation and further activities per the note

## 2024-11-05 NOTE — NURSING NOTE
Nicol Aldrich's goals for this visit include:   Chief Complaint   Patient presents with    RECHECK     Recheck wound, s/p Right Vertical Rectus Abdominis Musculocutaneous transpelvic flap reconstruction of perineum 7/10. Also is having issues with the R shoulder, there was a little knot in there and the pain comes and goes. Abdominal area is healing well, has a little blood once in a while with toilet paper from the rectal area with the wound there.        She requests these members of her care team be copied on today's visit information:     PCP: Janet Noe    Referring Provider:  Referred Self, MD  No address on file    There were no vitals taken for this visit.    Do you need any medication refills at today's visit?     Ashley Morejon LPN on 11/5/2024 at 9:50 AM

## 2024-11-12 ENCOUNTER — DOCUMENTATION ONLY (OUTPATIENT)
Dept: GASTROENTEROLOGY | Facility: CLINIC | Age: 64
End: 2024-11-12
Payer: COMMERCIAL

## 2024-11-12 ENCOUNTER — MYC MEDICAL ADVICE (OUTPATIENT)
Dept: GASTROENTEROLOGY | Facility: CLINIC | Age: 64
End: 2024-11-12
Payer: COMMERCIAL

## 2024-11-12 DIAGNOSIS — K50.819 CROHN'S DISEASE OF SMALL AND LARGE INTESTINES WITH COMPLICATION (H): Primary | ICD-10-CM

## 2024-11-12 DIAGNOSIS — R19.7 DIARRHEA: ICD-10-CM

## 2024-11-12 DIAGNOSIS — Z93.3 COLOSTOMY STATUS (H): Primary | ICD-10-CM

## 2024-11-12 DIAGNOSIS — K50.90 CROHN DISEASE (H): ICD-10-CM

## 2024-11-15 ENCOUNTER — LAB (OUTPATIENT)
Dept: LAB | Facility: CLINIC | Age: 64
End: 2024-11-15
Payer: COMMERCIAL

## 2024-11-15 DIAGNOSIS — K50.819 CROHN'S DISEASE OF SMALL AND LARGE INTESTINES WITH COMPLICATION (H): ICD-10-CM

## 2024-11-15 LAB
ALBUMIN SERPL BCG-MCNC: 4.3 G/DL (ref 3.5–5.2)
ALP SERPL-CCNC: 80 U/L (ref 40–150)
ALT SERPL W P-5'-P-CCNC: 13 U/L (ref 0–50)
AST SERPL W P-5'-P-CCNC: 16 U/L (ref 0–45)
BASOPHILS # BLD AUTO: 0 10E3/UL (ref 0–0.2)
BASOPHILS NFR BLD AUTO: 0 %
BILIRUB DIRECT SERPL-MCNC: <0.2 MG/DL (ref 0–0.3)
BILIRUB SERPL-MCNC: 0.3 MG/DL
CRP SERPL-MCNC: 7.14 MG/L
EOSINOPHIL # BLD AUTO: 0.1 10E3/UL (ref 0–0.7)
EOSINOPHIL NFR BLD AUTO: 1 %
ERYTHROCYTE [DISTWIDTH] IN BLOOD BY AUTOMATED COUNT: 15.7 % (ref 10–15)
HCT VFR BLD AUTO: 38.2 % (ref 35–47)
HGB BLD-MCNC: 12.5 G/DL (ref 11.7–15.7)
IMM GRANULOCYTES # BLD: 0 10E3/UL
IMM GRANULOCYTES NFR BLD: 0 %
LYMPHOCYTES # BLD AUTO: 2 10E3/UL (ref 0.8–5.3)
LYMPHOCYTES NFR BLD AUTO: 25 %
MCH RBC QN AUTO: 26.7 PG (ref 26.5–33)
MCHC RBC AUTO-ENTMCNC: 32.7 G/DL (ref 31.5–36.5)
MCV RBC AUTO: 82 FL (ref 78–100)
MONOCYTES # BLD AUTO: 0.5 10E3/UL (ref 0–1.3)
MONOCYTES NFR BLD AUTO: 6 %
NEUTROPHILS # BLD AUTO: 5.4 10E3/UL (ref 1.6–8.3)
NEUTROPHILS NFR BLD AUTO: 67 %
NRBC # BLD AUTO: 0 10E3/UL
NRBC BLD AUTO-RTO: 0 /100
PLATELET # BLD AUTO: 389 10E3/UL (ref 150–450)
PROT SERPL-MCNC: 7.6 G/DL (ref 6.4–8.3)
RBC # BLD AUTO: 4.68 10E6/UL (ref 3.8–5.2)
WBC # BLD AUTO: 8.1 10E3/UL (ref 4–11)

## 2024-11-15 PROCEDURE — 36415 COLL VENOUS BLD VENIPUNCTURE: CPT

## 2024-11-15 PROCEDURE — 85025 COMPLETE CBC W/AUTO DIFF WBC: CPT

## 2024-11-15 PROCEDURE — 86140 C-REACTIVE PROTEIN: CPT

## 2024-11-15 PROCEDURE — 80076 HEPATIC FUNCTION PANEL: CPT

## 2024-11-20 ENCOUNTER — TELEPHONE (OUTPATIENT)
Dept: GASTROENTEROLOGY | Facility: CLINIC | Age: 64
End: 2024-11-20
Payer: COMMERCIAL

## 2024-11-20 DIAGNOSIS — K50.819 CROHN'S DISEASE OF SMALL AND LARGE INTESTINES WITH COMPLICATION (H): Primary | ICD-10-CM

## 2024-11-20 NOTE — TELEPHONE ENCOUNTER
Prior Authorization Retail Medication Request    Medication/Dose: budesonide (UCERIS) 9 MG 24 hr tablet / Take 1 tablet (9 mg) by mouth every morning. - Oral   Diagnosis and ICD code (if different than what is on RX):    New/renewal/insurance change PA/secondary ins. PA:  Previously Tried and Failed:   Rationale:   Crohn's disease of small and large intestines with complication (H) [K50.819]  - Primary      Diarrhea [R19.7]          Insurance   Primary:   Insurance ID:      Secondary (if applicable):  Insurance ID:      Pharmacy Information (if different than what is on RX)  Name:    Phone:    Fax:    Clinic Information  Preferred routing pool for dept communication:

## 2024-11-20 NOTE — TELEPHONE ENCOUNTER
PA Initiation    Medication: BUDESONIDE ER 9 MG PO TB24  Insurance Company: [employer] [place] - Phone [phone] Fax [phone]  Pharmacy Filling the Rx:    Filling Pharmacy Phone:    Filling Pharmacy Fax:    Start Date: 11/20/2024  [id number]

## 2024-11-21 RX ORDER — BUDESONIDE 3 MG/1
CAPSULE, COATED PELLETS ORAL
Qty: 132 CAPSULE | Refills: 0 | Status: SHIPPED | OUTPATIENT
Start: 2024-11-21 | End: 2025-01-18

## 2024-11-21 NOTE — TELEPHONE ENCOUNTER
Per Dr. Coyle - proceed with budesonide (Entocort) prescription. Prescription sent, patient updated.

## 2024-11-21 NOTE — TELEPHONE ENCOUNTER
PRIOR AUTHORIZATION DENIED    Medication: BUDESONIDE ER 9 MG PO TB24  Insurance Company: [email or web address] [place] - Phone [phone] Fax [phone]  Denial Date: 11/21/2024  Denial Reason(s):      Appeal Information:      Patient Notified:

## 2024-11-25 PROBLEM — R19.7 DIARRHEA: Status: ACTIVE | Noted: 2024-11-25

## 2024-11-26 ENCOUNTER — VIRTUAL VISIT (OUTPATIENT)
Dept: GASTROENTEROLOGY | Facility: CLINIC | Age: 64
End: 2024-11-26
Attending: INTERNAL MEDICINE
Payer: COMMERCIAL

## 2024-11-26 DIAGNOSIS — Z01.818 PRE-OPERATIVE EXAMINATION: ICD-10-CM

## 2024-11-26 DIAGNOSIS — A49.8 RECURRENT CLOSTRIDIOIDES DIFFICILE INFECTION: ICD-10-CM

## 2024-11-26 DIAGNOSIS — K50.819 CROHN'S DISEASE OF SMALL AND LARGE INTESTINES WITH COMPLICATION (H): ICD-10-CM

## 2024-11-26 DIAGNOSIS — Z79.899 LONG-TERM USE OF HIGH-RISK MEDICATION: ICD-10-CM

## 2024-11-26 PROCEDURE — 99215 OFFICE O/P EST HI 40 MIN: CPT | Mod: 95 | Performed by: PHYSICIAN ASSISTANT

## 2024-11-26 NOTE — LETTER
11/26/2024      Nicol Aldrich  [address]  [facility] [address]      Dear Colleague,    Thank you for referring your patient, Nicol Aldrich, to the [facility] [place]. Please see a copy of my visit note below.    Virtual Visit Details    Type of service:  Video Visit     Originating Location (pt. Location): Home    Distant Location (provider location):  Off-site  Platform used for Video Visit: [facility]-  IBD Clinic:     Follow up for complicated Crohn's disease, EC fistulas, PG, hx of CDI    Last visit: 9/19/23  Date of visit: 04/30/24    Referring provider: Jordan Coyle    Follow up: 2nd opinion - penetrating crohn's with peristomal fistulas     Referring provider: Mae     ASSESSMENT AND PLAN:     #. Penetrating ileocolonic crohn's   She is now status post open abdominoperineal resection, colostomy revision, takedown of colocutaneous fistula, and VRAM flap perineal reconstruction (Dr. Mccarthy) on 7/10/2024 with Dr. Herrera and Dr. Santana.   Diagnosed at approximately age 31. Significant perianal disease and peristomal enterocutaneous fistulas at one time though these have improved since initiating IFX. Has been exposed to steroids, 6MP, methtrexate, vedolizumab (low drug levels, no antibodies, progression of penetrating disease) and on infliximab in 2023 after pred taper. Overall she has done well with IFX. Last drug levels 12 (8/2023), no abs. Would ideally target levels > 15 given penetrating phenotype with residual fistula.   -- Most recent endoscopic disease activity: severe inflammation, large cratered ulcers in colon, needs repeat disease activity assessment --> repeat flex sig 4/2024 failure with severe strictures/diversion colitis. Since then has had her APR.  -- Most recent radiologic disease activity:  6/21/23, normal SB, some karen-stomal inflammation vs decompressed colon within karen-stomal hernia. Repeating  tomorrow.  -- Most recent TDM eval:   -- vedo levels: 5/1 (3/2021) and 3.4 (11/2022) (undetectable per their assay),  --  IFX levels 12.6 (8/17/23), abs undetectable    -- repeat 12 months into IFX therapy, goal >15  -- Most recent serologic eval: CRP 7.14 (11/15/24), repeat tomorrow  -- Most recent FC: 92 (11/15/24), 129 (3/2024), 492 (4/2023), 906 (11/2022)  -- Current medication: infliximab, maintenance     PLAN:  -- continue IFX (q 4 weeks), would like to target drug levels >15 given penetrating disease   -- IVF to be scheduled  -- Repeat labs (CBC, CRP, LFTs)  -- CTE tomorrow     #. Peristomal fistulas  -- continue IFX, narrow interval to increase levels associated with fistula healing (>15)     #. Recurrent clostridiodes difficile infection  Most recently negative 11/15/24.  Two positive tests since January 2023, first positive 8/2020. Numerous negative intervening tests. Completed a number of vancomycin tapers. Increased risk of recurrent illness given iBD and increased risk of worsened IBD outcomes with colonization/recurrent infection. Positive again 6/2023, s/p vancomycin course with improvement in stool consistency and pain.   -- pre-emptive referral to IMT clinic, seen    #. Pyoderma gangrenosum   Peristomal location. Now s/p colostomy revision.  Managed by dermatology here at the [place].      #. Routine health maintenance in IBD  We will address vaccines, age appropriate cancer screening, infectious screening, bone health, and mental health as IBD therapy becomes more stable.   -- defer to next visit      #. Hx of anal cancer  #. 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 Diverted due to incontinence presumed secondary to rectal irritation/compliance issues. Now s/p APR    #. Nocturia  #. Urinary urgency  -- urogynecology referral   -- resume pelvic physical therapy exercises at home as previous     Return to Clinic: 3-6 months      Thank you for this  consultation.  44 minutes spent on the date of the encounter doing chart review, history and exam, documentation and further activities as noted above.  It was a pleasure to participate in the care of this patient; please contact us with any further questions.      Torsten Perez PA-C  Division of Gastroenterology, Hepatology and Nutrition  [facility]        ============================================================================  HPI:     Ms. Aldrich presents for follow up today.     She is now status post open abdominoperineal resection, colostomy revision, takedown of colocutaneous fistula, and VRAM flap perineal reconstruction (Dr. Mccarthy) on 7/10/2024 with Dr. Herrera and Dr. Santana.     She began having reflux and ultimately vomiting with stomach pains that were reported 24. CRP 7.14, FCP marginally elevated (92.3), Cdiff and enteric panel normal. Budesonide was started 24. She took for a couple days and had vomiting again so stopped it, but since she had vomiting before, she ultimately restarted.  Last time she vomited was 3 days ago. Vomit is dark brown. No visible blood. She notes she had the chills over the last weekend. She reports home COVID tests were negative. She has continue to pass urine. Stool output it watery (stool baseline is typically soft). No blood in the stool. She is currently afebrile. Last time she had a slight elevation in temp to 99 was a couple weeks ago, otherwise temp is normal.     CT scan scheduled for tomorrow morning.  Remicade infusion was this morning.  She will be going to get some additional fluids as well.     Now she is just really tired and lightheaded.    Targeted GI ROS negative unless noted above. All questions answered.     Past Medical History:   Diagnosis Date     Squamous cell carcinoma of skin, unspecified        Past Surgical History:   Procedure Laterality Date      SECTION       COLONOSCOPY N/A 2024    Procedure:  Colonoscopy with biopsies;  Surgeon: Jordan Coyle MD;  Location: [facility] OR     EXAM UNDER ANESTHESIA ANUS N/A 2/6/2024    Procedure: EXAM UNDER ANESTHESIA, ANUS, ANAL DILATION;  Surgeon: Kunal Herrera MD;  Location: [facility] OR     GRAFT FLAP PEDICLE PERINEUM N/A 7/10/2024    Procedure: Right Vertical Rectus Abdominis Musculocutaneous transpelvic flap reconstruction of perineum.;  Surgeon: ANEESH Mccarthy MD;  Location: [facility] OR     ostomy creation       RESECTION ABDOMINAL PERINEAL N/A 7/10/2024    Procedure: exploratory laparotomy, abdominal perineal resection, colostomy revision, take down of colocutaneous fistula;  Surgeon: Kunal Herrera MD;  Location: [facility] OR     SIGMOIDOSCOPY FLEXIBLE N/A 4/11/2024    Procedure: Sigmoidoscopy flexible, With Hegar Dilation;  Surgeon: Jordan Coyle MD;  Location: [facility] OR       Family History   Problem Relation Age of Onset     Anesthesia Reaction No family hx of      Deep Vein Thrombosis (DVT) No family hx of        Social History     Tobacco Use     Smoking status: Never     Smokeless tobacco: Never   Substance Use Topics     Alcohol use: Yes     Alcohol/week: 2.0 standard drinks of alcohol     Types: 2 Glasses of wine per week     Comment: Occasional       Physical exam:     Vitals:There were no vitals taken for this visit.   BMI: There is no height or weight on file to calculate BMI.      Constitutional - general appearance is well and in no acute distress. Body habitus normal  Eyes - No redness or discharge  Respiratory - No cough, unlabored breathing  Musculoskeletal - range of motion intact: Neck and arms  Skin - No discoloration or lesions  Neurological - No tremors, headaches  Psychiatric - No anxiety, alert & oriented      Labs:   Lab Results   Component Value Date    WBC 8.1 11/15/2024    HGB 12.5 11/15/2024    HCT 38.2 11/15/2024     11/15/2024     08/29/2024    POTASSIUM 4.0 08/29/2024    CHLORIDE 102 08/29/2024    CO2 25  2024    BUN 9.1 2024    CR 0.77 2024     (H) 2024    SED 21 2024    AST 16 11/15/2024    ALT 13 11/15/2024    ALKPHOS 80 11/15/2024    BILITOTAL 0.3 11/15/2024    INR 1.06 2024      CRP: 6 (from 12)    C.diff: PCR and GDH positive (2023)    Relevant imaging:     MRE: 23  IMPRESSION:  1. Postsurgical changes of loop colostomy with moderate size  parastomal hernia. The transverse colon near the stoma within the  parastomal hernia demonstrates mild colonic wall thickening and  enhancement, could be due to underdistention versus active  inflammation. No convincing evidence of fistula formation. Large  amount of stool in the colon upstream to the stoma. The more distal  descending and sigmoid colon are decompressed.  2. No evidence of abnormal small bowel wall thickening or enhancement  to suggest presence of active small bowel Crohn's disease.  3. 8 mm T2 hyperintense focus in the pancreatic body, too small to  characterize could represent side branch intraductal papillary  mucinous neoplasm versus other pancreatic cystic lesions. Recommend  follow-up exam with contrast enhanced MRI/MRCP in one year to assess  for interval change.     HEMAL MAYER MD     Endoscopy:    Flex si24    Findings:       The digital rectal exam findings include anal stricture. This was        dilated with index finger and then Dr. Herrera dilated with Hegar        dilator to 12mm in diameter (a length of 15cm was dilated).        A benign-appearing, intrinsic severe stenosis measuring of unknown        length x 5 mm (inner diameter) was found in the rectum and was        non-traversed. Dr. Herrera dilated with Hegar dilator to 12mm in        diameter (a length of 15cm was dilated). The pediatric colonoscope was        used to evaluate for 15 cm. The stenosis persisted beyond this length.        Changes of friability due to diversion and radiation proctitis were        found.  The pediatric colonscope could only be passed 15 cm proximal to        the anal verge. The pediatric scope was then used to evaluate the        inferior colostomy limb. This inferior limb was also very stenosed and        could only be evaluated for 5 cm. Further evaluation of the diverted        colon could not be performed.                                                                                    Impression:            - Anal stricture found on digital rectal exam.                          - Stricture in the rectum. Dr. Herrera dilated with                          Hegar dilator to 12mm in diameter (a length of 15cm                          was dilated). The pediatric colonoscope was used to                          evaluate for 15 cm. The stenosis persisted beyond this                          length. Changes of friability due to diversion and                          radiation proctitis were found. The pediatric                          colonscope could only be passed 15 cm proximal to the                          anal verge.                          - The pediatric scope was then used to evaluate the                          inferior colostomy limb. This inferior limb was also                          very stenosed and could only be evaluated for 5 cm.                          Further evaluation of the diverted colon could not be                          performed.                          There is severe stenosis of her entire diverted colon                          (sigmoid and rectum). This is due to a combination of                          radiation enteritis, diversion colitis and possible                          Crohn's. This canmnot be treated medically and this                          cannot be surveyed with endoscopy. Therefore, I                          recommend the patient follow up with Dr. Herrera in                          colorectal clinic to discuss completion proctectomy                           (APR).   Recommendation:        - Discharge patient to home (with escort).                          - Continue present medications.                          - Return to GI clinic as previously scheduled.                          - - Return to clinic with Dr. Herrera in colorectal                          surgery clinic                          - See above discussion                                                                                      Electronically Signed by: Dr. Jordan Coyle   ___________________________   JORDAN COYLE MD   4/11/2024 4:43:35 PM   I was physically present for the entire viewing portion of the exam.   __________________________   Signature of teaching physician   JORDAN COYLE MD   Number of Addenda: 0     Note Initiated On: 4/11/2024 3:48 PM   Scope In:   Scope Out:     Colonoscopy: 11/3/2022    Findings:        The perianal exam findings included some scarring at the anal area and        absence of anal tone.        The periostomal skin was without rashes or abscesses.        The colostomy was first accessed: A continuous area of severly inflammed        and friable nonbleeding mucosa with extensive large cratered ulcers was        present in the transverse colon, in the ascending colon and in the        cecum. Biopsies were taken with a cold forceps for histology R/O CMV,        Crohn's disease, ischemia.        One large ulcer was present in the distal rectum. Biopsies were taken        with a cold forceps for histology.        A diffuse area of severely friable mucosa with contact bleeding was        found in the rectum and in the recto-sigmoid colon. Biopsies were taken        with a cold forceps for histology R/O diversion colitis.        A 2 mm likely fistula was found at 18 cm proximal to the anus.     Impression:            - Abnormal perianal exam.                          - Colostomy accessed: Severly inflammed mucosa with                           large ulcers. Biopsied R/O CMV, Crohn's disease,                          ischemia.                          - Ulcer in distal rectum. Biopsied.                          - Friability with contact bleeding in the rectum and                          in the recto-sigmoid colon. Biopsied R/O diversion                          colitis.                          - A possible fistula opening was seen at 18 cm from                          the anus.     Recommendation:        - Return patient to [facility] for [facility] care.                          - Resume previous diet.                          - Await pathology results.                          - Consider MRI pelvis R/O fistula.                          - Further recs per inaptient GI team. Will consider IV                          steroids.   Ramiro Bach MD   11/3/2022 5:59:45 PM                  Again, thank you for allowing me to participate in the care of your patient.        Sincerely,        Torsten Perez PA-C

## 2024-11-26 NOTE — PROGRESS NOTES
Virtual Visit Details    Type of service:  Video Visit     Originating Location (pt. Location): Home    Distant Location (provider location):  Off-site  Platform used for Video Visit: [facility]-  IBD Clinic:     Follow up for complicated Crohn's disease, EC fistulas, PG, hx of CDI    Last visit: 9/19/23  Date of visit: 04/30/24    Referring provider: Jordan Coyle    Follow up: 2nd opinion - penetrating crohn's with peristomal fistulas     Referring provider: Mae     ASSESSMENT AND PLAN:     #. Penetrating ileocolonic crohn's   She is now status post open abdominoperineal resection, colostomy revision, takedown of colocutaneous fistula, and VRAM flap perineal reconstruction (Dr. Mccarthy) on 7/10/2024 with Dr. Herrera and Dr. Santana.   Diagnosed at approximately age 31. Significant perianal disease and peristomal enterocutaneous fistulas at one time though these have improved since initiating IFX. Has been exposed to steroids, 6MP, methtrexate, vedolizumab (low drug levels, no antibodies, progression of penetrating disease) and on infliximab in 2023 after pred taper. Overall she has done well with IFX. Last drug levels 12 (8/2023), no abs. Would ideally target levels > 15 given penetrating phenotype with residual fistula.   -- Most recent endoscopic disease activity: severe inflammation, large cratered ulcers in colon, needs repeat disease activity assessment --> repeat flex sig 4/2024 failure with severe strictures/diversion colitis. Since then has had her APR.  -- Most recent radiologic disease activity:  6/21/23, normal SB, some karen-stomal inflammation vs decompressed colon within karen-stomal hernia. Repeating tomorrow.  -- Most recent TDM eval:   -- vedo levels: 5/1 (3/2021) and 3.4 (11/2022) (undetectable per their assay),  --  IFX levels 12.6 (8/17/23), abs undetectable    -- repeat 12 months into IFX therapy, goal >15  -- Most recent serologic eval: CRP  7.14 (11/15/24), repeat tomorrow  -- Most recent FC: 92 (11/15/24), 129 (3/2024), 492 (4/2023), 906 (11/2022)  -- Current medication: infliximab, maintenance     PLAN:  -- continue IFX (q 4 weeks), would like to target drug levels >15 given penetrating disease   -- IVF to be scheduled  -- Repeat labs (CBC, CRP, LFTs)  -- CTE tomorrow     #. Peristomal fistulas  -- continue IFX, narrow interval to increase levels associated with fistula healing (>15)     #. Recurrent clostridiodes difficile infection  Most recently negative 11/15/24.  Two positive tests since January 2023, first positive 8/2020. Numerous negative intervening tests. Completed a number of vancomycin tapers. Increased risk of recurrent illness given iBD and increased risk of worsened IBD outcomes with colonization/recurrent infection. Positive again 6/2023, s/p vancomycin course with improvement in stool consistency and pain.   -- pre-emptive referral to [facility] clinic, seen    #. Pyoderma gangrenosum   Peristomal location. Now s/p colostomy revision.  Managed by dermatology here at the [facility].      #. Routine health maintenance in IBD  We will address vaccines, age appropriate cancer screening, infectious screening, bone health, and mental health as IBD therapy becomes more stable.   -- defer to next visit      #. Hx of anal cancer  #. 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 Diverted due to incontinence presumed secondary to rectal irritation/compliance issues. Now s/p APR    #. Nocturia  #. Urinary urgency  -- urogynecology referral   -- resume pelvic physical therapy exercises at home as previous     Return to Clinic: 3-6 months      Thank you for this consultation.  44 minutes spent on the date of the encounter doing chart review, history and exam, documentation and further activities as noted above.  It was a pleasure to participate in the care of this patient; please contact us with any further  questions.      Torsten Perez PA-C  Division of Gastroenterology, Hepatology and Nutrition  [facility]        ============================================================================  HPI:     Ms. Aldrich presents for follow up today.     She is now status post open abdominoperineal resection, colostomy revision, takedown of colocutaneous fistula, and VRAM flap perineal reconstruction (Dr. Mccarthy) on 7/10/2024 with Dr. Herrera and Dr. Santana.     She began having reflux and ultimately vomiting with stomach pains that were reported 24. CRP 7.14, FCP marginally elevated (92.3), Cdiff and enteric panel normal. Budesonide was started 24. She took for a couple days and had vomiting again so stopped it, but since she had vomiting before, she ultimately restarted.  Last time she vomited was 3 days ago. Vomit is dark brown. No visible blood. She notes she had the chills over the last weekend. She reports home COVID tests were negative. She has continue to pass urine. Stool output it watery (stool baseline is typically soft). No blood in the stool. She is currently afebrile. Last time she had a slight elevation in temp to 99 was a couple weeks ago, otherwise temp is normal.     CT scan scheduled for tomorrow morning.  Remicade infusion was this morning.  She will be going to get some additional fluids as well.     Now she is just really tired and lightheaded.    Targeted GI ROS negative unless noted above. All questions answered.     Past Medical History:   Diagnosis Date    Squamous cell carcinoma of skin, unspecified        Past Surgical History:   Procedure Laterality Date     SECTION      COLONOSCOPY N/A 2024    Procedure: Colonoscopy with biopsies;  Surgeon: Jordan Coyle MD;  Location: [facility] OR    EXAM UNDER ANESTHESIA ANUS N/A 2024    Procedure: EXAM UNDER ANESTHESIA, ANUS, ANAL DILATION;  Surgeon: Kunal Herrera MD;  Location: [facility] OR    GRAFT FLAP  PEDICLE PERINEUM N/A 7/10/2024    Procedure: Right Vertical Rectus Abdominis Musculocutaneous transpelvic flap reconstruction of perineum.;  Surgeon: ANEESH Mccarthy MD;  Location: [facility] OR    ostomy creation      RESECTION ABDOMINAL PERINEAL N/A 7/10/2024    Procedure: exploratory laparotomy, abdominal perineal resection, colostomy revision, take down of colocutaneous fistula;  Surgeon: Kunal Herrera MD;  Location: [facility] OR    SIGMOIDOSCOPY FLEXIBLE N/A 4/11/2024    Procedure: Sigmoidoscopy flexible, With Hegar Dilation;  Surgeon: Jordan Coyle MD;  Location: [facility] OR       Family History   Problem Relation Age of Onset    Anesthesia Reaction No family hx of     Deep Vein Thrombosis (DVT) No family hx of        Social History     Tobacco Use    Smoking status: Never    Smokeless tobacco: Never   Substance Use Topics    Alcohol use: Yes     Alcohol/week: 2.0 standard drinks of alcohol     Types: 2 Glasses of wine per week     Comment: Occasional       Physical exam:     Vitals:There were no vitals taken for this visit.   BMI: There is no height or weight on file to calculate BMI.      Constitutional - general appearance is well and in no acute distress. Body habitus normal  Eyes - No redness or discharge  Respiratory - No cough, unlabored breathing  Musculoskeletal - range of motion intact: Neck and arms  Skin - No discoloration or lesions  Neurological - No tremors, headaches  Psychiatric - No anxiety, alert & oriented      Labs:   Lab Results   Component Value Date    WBC 8.1 11/15/2024    HGB 12.5 11/15/2024    HCT 38.2 11/15/2024     11/15/2024     08/29/2024    POTASSIUM 4.0 08/29/2024    CHLORIDE 102 08/29/2024    CO2 25 08/29/2024    BUN 9.1 08/29/2024    CR 0.77 08/29/2024     (H) 08/29/2024    SED 21 04/30/2024    AST 16 11/15/2024    ALT 13 11/15/2024    ALKPHOS 80 11/15/2024    BILITOTAL 0.3 11/15/2024    INR 1.06 04/30/2024      CRP: 6 (from 12)    C.diff: PCR and GDH  positive (2023)    Relevant imaging:     MRE: 23  IMPRESSION:  1. Postsurgical changes of loop colostomy with moderate size  parastomal hernia. The transverse colon near the stoma within the  parastomal hernia demonstrates mild colonic wall thickening and  enhancement, could be due to underdistention versus active  inflammation. No convincing evidence of fistula formation. Large  amount of stool in the colon upstream to the stoma. The more distal  descending and sigmoid colon are decompressed.  2. No evidence of abnormal small bowel wall thickening or enhancement  to suggest presence of active small bowel Crohn's disease.  3. 8 mm T2 hyperintense focus in the pancreatic body, too small to  characterize could represent side branch intraductal papillary  mucinous neoplasm versus other pancreatic cystic lesions. Recommend  follow-up exam with contrast enhanced MRI/MRCP in one year to assess  for interval change.     HEMAL MAYER MD     Endoscopy:    Flex si24    Findings:       The digital rectal exam findings include anal stricture. This was        dilated with index finger and then Dr. Herrera dilated with Hegar        dilator to 12mm in diameter (a length of 15cm was dilated).        A benign-appearing, intrinsic severe stenosis measuring of unknown        length x 5 mm (inner diameter) was found in the rectum and was        non-traversed. Dr. Herrera dilated with Hegar dilator to 12mm in        diameter (a length of 15cm was dilated). The pediatric colonoscope was        used to evaluate for 15 cm. The stenosis persisted beyond this length.        Changes of friability due to diversion and radiation proctitis were        found. The pediatric colonscope could only be passed 15 cm proximal to        the anal verge. The pediatric scope was then used to evaluate the        inferior colostomy limb. This inferior limb was also very stenosed and        could only be evaluated for 5 cm. Further  evaluation of the diverted        colon could not be performed.                                                                                    Impression:            - Anal stricture found on digital rectal exam.                          - Stricture in the rectum. Dr. Herrera dilated with                          Hegar dilator to 12mm in diameter (a length of 15cm                          was dilated). The pediatric colonoscope was used to                          evaluate for 15 cm. The stenosis persisted beyond this                          length. Changes of friability due to diversion and                          radiation proctitis were found. The pediatric                          colonscope could only be passed 15 cm proximal to the                          anal verge.                          - The pediatric scope was then used to evaluate the                          inferior colostomy limb. This inferior limb was also                          very stenosed and could only be evaluated for 5 cm.                          Further evaluation of the diverted colon could not be                          performed.                          There is severe stenosis of her entire diverted colon                          (sigmoid and rectum). This is due to a combination of                          radiation enteritis, diversion colitis and possible                          Crohn's. This canmnot be treated medically and this                          cannot be surveyed with endoscopy. Therefore, I                          recommend the patient follow up with Dr. Herrera in                          colorectal clinic to discuss completion proctectomy                          (APR).   Recommendation:        - Discharge patient to home (with escort).                          - Continue present medications.                          - Return to GI clinic as previously scheduled.                          - - Return to  clinic with Dr. Herrera in colorectal                          surgery clinic                          - See above discussion                                                                                      Electronically Signed by: Dr. Jordan Coyle   ___________________________   JORDAN COYLE MD   4/11/2024 4:43:35 PM   I was physically present for the entire viewing portion of the exam.   __________________________   Signature of teaching physician   JORDAN COYLE MD   Number of Addenda: 0     Note Initiated On: 4/11/2024 3:48 PM   Scope In:   Scope Out:     Colonoscopy: 11/3/2022    Findings:        The perianal exam findings included some scarring at the anal area and        absence of anal tone.        The periostomal skin was without rashes or abscesses.        The colostomy was first accessed: A continuous area of severly inflammed        and friable nonbleeding mucosa with extensive large cratered ulcers was        present in the transverse colon, in the ascending colon and in the        cecum. Biopsies were taken with a cold forceps for histology R/O CMV,        Crohn's disease, ischemia.        One large ulcer was present in the distal rectum. Biopsies were taken        with a cold forceps for histology.        A diffuse area of severely friable mucosa with contact bleeding was        found in the rectum and in the recto-sigmoid colon. Biopsies were taken        with a cold forceps for histology R/O diversion colitis.        A 2 mm likely fistula was found at 18 cm proximal to the anus.     Impression:            - Abnormal perianal exam.                          - Colostomy accessed: Severly inflammed mucosa with                          large ulcers. Biopsied R/O CMV, Crohn's disease,                          ischemia.                          - Ulcer in distal rectum. Biopsied.                          - Friability with contact bleeding in the rectum and                           in the recto-sigmoid colon. Biopsied R/O diversion                          colitis.                          - A possible fistula opening was seen at 18 cm from                          the anus.     Recommendation:        - Return patient to [facility] for ongoing care.                          - Resume previous diet.                          - Await pathology results.                          - Consider MRI pelvis R/O fistula.                          - Further recs per inaptient GI team. Will consider IV                          steroids.   Ramiro Bach MD   11/3/2022 5:59:45 PM

## 2024-11-26 NOTE — PATIENT INSTRUCTIONS
It was a pleasure meeting with you today and discussing your healthcare plan. Below is a summary of what we covered:          Please see below for any additional questions and scheduling guidelines.    Sign up for Binpress: [employer]press patient portal serves as a secure platform for accessing your medical records from the [facility]. Additionally, Binpress facilitates easy, timely, and secure messaging with your care team. If you have not signed up, you may do so by using the provided code or calling [phone].    Coordinating your care after your visit:  There are multiple options for scheduling your follow-up care based on your provider's recommendation.    How do I schedule a follow-up clinic appointment:   After your appointment, you may receive scheduling assistance with the Clinic Coordinators by having a seat in the waiting room and a Clinic Coordinator will call you up to schedule.  Virtual visits or after you leave the clinic:  Your provider has placed a follow-up order in the [employer] portal for scheduling your return appointment. A member of the scheduling team will contact you to schedule.  [employer]press Scheduling: Timely scheduling through [employer] is advised to ensure appointment availability.   Call to schedule: You may schedule your follow-up appointment(s) by calling [phone], option 1.    How do I schedule my endoscopy or colonoscopy procedure:  If a procedure, such as a colonoscopy or upper endoscopy was ordered by your provider, the scheduling team will contact you to schedule this procedure. Or you may choose to call to schedule at   [phone], option 2.  Please allow 20-30 minutes when scheduling a procedure.    How do I get my blood work done? To get your blood work done, you need to schedule a lab appointment at an [facility]. There are multiple ways to schedule:   At the clinic: The Clinic Coordinator you meet after your visit can help you schedule a lab  appointment.   Mazree scheduling: [facility]ree offers online lab scheduling at all [facility] laboratory locations.   Call to schedule: You can call [phone] to schedule your lab appointment.    How do I schedule my imaging study: To schedule imaging studies, such as CT scans, ultrasounds, MRIs, or X-rays, contact Imaging Services at [phone].    How do I schedule a referral to another doctor: If your provider recommended a referral to another specialist(s), the referral order was placed by your provider. You will receive a phone call to schedule this referral, or you may choose to call the number attached to the referral to self-schedule.    For Post-Visit Question(s):  For any inquiries following today's visit:  Please utilize Mazree messaging and allow 48 hours for reply or contact the Call Center during normal business hours at [phone], option 3.  For Emergent After-hours questions, contact the On-Call GI Fellow through the [facility] at [phone].  In addition, you may contact your Nurse directly using the provided contact information.    Test Results:  Test results will be accessible via Mazree in compliance with the 21st Century Cures Act. This means that your results will be available to you at the same time as your provider. Often you may see your results before your provider does. Results are reviewed by staff within two weeks with communication follow-up. Results may be released in the patient portal prior to your care team review.    Prescription Refill(s):  Medication prescribed by your provider will be addressed during your visit. For future refills, please coordinate with your pharmacy. If you have not had a recent clinic visit or routine labs, for your safety, your provider may not be able to refill your prescription.

## 2024-11-26 NOTE — NURSING NOTE
Current patient location: [address] [place] [id number]    Is the patient currently in the state of [place]? YES    Visit mode:VIDEO    If the visit is dropped, the patient can be reconnected by:VIDEO VISIT: Send to e-mail at: [email or web address]    Will anyone else be joining the visit? NO  (If patient encounters technical issues they should call [phone]    Are changes needed to the allergy or medication list? No    Are refills needed on medications prescribed by this physician? NO    Rooming Documentation:  Questionnaire(s) completed    Reason for visit: RECHECK    Kadeem BELLO

## 2024-11-27 ENCOUNTER — HOSPITAL ENCOUNTER (OUTPATIENT)
Dept: CT IMAGING | Facility: CLINIC | Age: 64
Discharge: HOME | End: 2024-11-27
Attending: INTERNAL MEDICINE
Payer: COMMERCIAL

## 2024-11-27 ENCOUNTER — LAB (OUTPATIENT)
Dept: LAB | Facility: CLINIC | Age: 64
End: 2024-11-27
Payer: COMMERCIAL

## 2024-11-27 DIAGNOSIS — R14.0 ABDOMINAL BLOATING: ICD-10-CM

## 2024-11-27 DIAGNOSIS — K50.819 CROHN'S DISEASE OF SMALL AND LARGE INTESTINES WITH COMPLICATION (H): ICD-10-CM

## 2024-11-27 DIAGNOSIS — R19.7 DIARRHEA: ICD-10-CM

## 2024-11-27 DIAGNOSIS — R10.84 ABDOMINAL PAIN, GENERALIZED: ICD-10-CM

## 2024-11-27 LAB
ALBUMIN SERPL BCG-MCNC: 4 G/DL (ref 3.5–5.2)
ALP SERPL-CCNC: 58 U/L (ref 40–150)
ALT SERPL W P-5'-P-CCNC: 19 U/L (ref 0–50)
AST SERPL W P-5'-P-CCNC: 22 U/L (ref 0–45)
BASOPHILS # BLD AUTO: 0 10E3/UL (ref 0–0.2)
BASOPHILS NFR BLD AUTO: 0 %
BILIRUB DIRECT SERPL-MCNC: <0.2 MG/DL (ref 0–0.3)
BILIRUB SERPL-MCNC: 0.3 MG/DL
CRP SERPL-MCNC: 28.49 MG/L
EOSINOPHIL # BLD AUTO: 0 10E3/UL (ref 0–0.7)
EOSINOPHIL NFR BLD AUTO: 1 %
ERYTHROCYTE [DISTWIDTH] IN BLOOD BY AUTOMATED COUNT: 15.5 % (ref 10–15)
HCT VFR BLD AUTO: 35.2 % (ref 35–47)
HGB BLD-MCNC: 12 G/DL (ref 11.7–15.7)
IMM GRANULOCYTES # BLD: 0 10E3/UL
IMM GRANULOCYTES NFR BLD: 0 %
LYMPHOCYTES # BLD AUTO: 1.2 10E3/UL (ref 0.8–5.3)
LYMPHOCYTES NFR BLD AUTO: 28 %
MCH RBC QN AUTO: 27.5 PG (ref 26.5–33)
MCHC RBC AUTO-ENTMCNC: 34.1 G/DL (ref 31.5–36.5)
MCV RBC AUTO: 81 FL (ref 78–100)
MONOCYTES # BLD AUTO: 0.6 10E3/UL (ref 0–1.3)
MONOCYTES NFR BLD AUTO: 14 %
NEUTROPHILS # BLD AUTO: 2.5 10E3/UL (ref 1.6–8.3)
NEUTROPHILS NFR BLD AUTO: 58 %
PLATELET # BLD AUTO: 273 10E3/UL (ref 150–450)
PROT SERPL-MCNC: 6.8 G/DL (ref 6.4–8.3)
RBC # BLD AUTO: 4.37 10E6/UL (ref 3.8–5.2)
WBC # BLD AUTO: 4.3 10E3/UL (ref 4–11)

## 2024-11-27 PROCEDURE — 250N000009 HC RX 250: Performed by: INTERNAL MEDICINE

## 2024-11-27 PROCEDURE — 80076 HEPATIC FUNCTION PANEL: CPT

## 2024-11-27 PROCEDURE — 86140 C-REACTIVE PROTEIN: CPT

## 2024-11-27 PROCEDURE — 74177 CT ABD & PELVIS W/CONTRAST: CPT

## 2024-11-27 PROCEDURE — 36415 COLL VENOUS BLD VENIPUNCTURE: CPT

## 2024-11-27 PROCEDURE — 85025 COMPLETE CBC W/AUTO DIFF WBC: CPT

## 2024-11-27 PROCEDURE — 250N000011 HC RX IP 250 OP 636: Performed by: INTERNAL MEDICINE

## 2024-11-27 RX ORDER — IOPAMIDOL 755 MG/ML
500 INJECTION, SOLUTION INTRAVASCULAR ONCE
Status: COMPLETED | OUTPATIENT
Start: 2024-11-27 | End: 2024-11-27

## 2024-11-27 RX ADMIN — SODIUM CHLORIDE 80 ML: 9 INJECTION, SOLUTION INTRAVENOUS at 09:50

## 2024-11-27 RX ADMIN — IOPAMIDOL 80 ML: 755 INJECTION, SOLUTION INTRAVENOUS at 09:50

## 2024-12-09 ENCOUNTER — TELEPHONE (OUTPATIENT)
Dept: GASTROENTEROLOGY | Facility: CLINIC | Age: 64
End: 2024-12-09
Payer: COMMERCIAL

## 2024-12-09 NOTE — TELEPHONE ENCOUNTER
Patient confirmed scheduled appointment:  Date: 2/25/25  Time: 4:20 pm  Visit type: return ibd  Provider: Dr. Coyle  Location: [facility]  Testing/imaging: NA  Additional notes: per Torsten Perez follow-up order. Appointment okay per Jeovany RAMIREZ RN.

## 2024-12-31 ENCOUNTER — TELEPHONE (OUTPATIENT)
Dept: GASTROENTEROLOGY | Facility: CLINIC | Age: 64
End: 2024-12-31
Payer: COMMERCIAL

## 2024-12-31 NOTE — TELEPHONE ENCOUNTER
Received voicemail from Tamara at [facility].     Requesting orders that were previously faxed to be signed and returned.     Team did not receive fax.    Contacted Tamara and requested orders be emailed instead.

## 2025-01-23 ENCOUNTER — TELEPHONE (OUTPATIENT)
Dept: GASTROENTEROLOGY | Facility: CLINIC | Age: 65
End: 2025-01-23
Payer: COMMERCIAL

## 2025-01-23 NOTE — TELEPHONE ENCOUNTER
Received call from [facility] requesting approval to proceed with Infliximab infusion now that the patient is discharged.     Patient was supposed to receive infusion last Tuesday - 1/21.    Confirmed with [facility] that we are safe to proceed with the infusion.

## 2025-01-28 NOTE — PROGRESS NOTES
\Colon and Rectal Surgery Postoperative Clinic Note     Referring provider:  Aftab Wall MD  [address] [place] [phone]       RE: Nicol Aldrich  : 1960  RYLAND: 27    HPI (last seem 24): Nicol Aldrich is a very pleasant 63 year old female with a history of Crohn's disease (on Infliximab and Entocort) and anal SCC (s/p Win protocol ) now status post open abdominoperineal resection, colostomy revision, takedown of colocutaneous fistula, and VRAM flap perineal reconstruction (Dr. Mccarthy) on 7/10/2024 with Dr. Herrera and Dr. Santana. Here today to follow up on recent bowel obstruction    Final Diagnosis      A. OMENTUM, RESECTION:  Benign fibroadipose tissue    B. ASCENDING COLON, RESECTION, 5.5-CM:  Focal stricture compatible with Crohn complication, currently minimally active:   -No dysplasia or malignancy in colon or sampled lymph nodes   -Resection margins show no active inflammation or other abnormality    C. FISTULA TRACT, RESECTION:  Inflamed connective tissue and organizing granulation tissue with cutaneous surface opening consistent with Crohn-related enterocutaneous fistula; no enteric mucosa component or malignancy identified     D. ILEUM, COLOSTOMY STOMA, RESECTION:  Ileo-cutaneous anastomotic junction with nonspecific mucosa inflammation, congestion, and other features consistent with ileostomy; no dysplasia or malignancy in intestinal wall or sampled lymph nodes    E. RECTOSIGMOID COLON AND ANUS (45-CM), RESECTION:  Chronic active colitis compatible with Crohn colitis:   -With crypt architectural distortion focal stricture/mural scarring    -Mildly active colitis present at proximal colonic margin   -Distal, anal, margin is free of inflammation, stricture, or other abnormality   -No dysplasia or malignancy in colon or sampled lymph nodes    F. OMENTUM, RESECTION:  Benign fibroadipose tissue    G. COLON, COLOSTOMY STOMA, RESECTION, 2.5 CM:  Colonic mucosa ad  wall with focal, mild crypt architectural distortion, evidence of prior injury; negative for active inflammation, dysplasia, or malignancy    H. HERNIA SAC, REPAIR:  Benign fibroadipose connective tissue, consistent with wall of hernia sac      Interval history: Nicol is feeling well. No significant pain and only took oxycodone today. No fevers or chills. Is planning to start Remicade on Wednesday.     CT A/P 7/25/24:  1.  No acute findings in the abdomen and pelvis. No pelvic fluid collections.  2.  Interval postoperative changes of resection of the sigmoid colon, rectum and anus, reconstruction of the perineum, repair of the left lower quadrant parastomal hernia and left lower quadrant transverse end colostomy creation.    CT AP 7/31/24:  IMPRESSION:   1.  Small amount of gas and fluid along the tract of the previous  subcutaneous surgical drain in the right lower abdomen. At the mid to  lower point of the tract there is a focal roughly 4 cm ill-defined  fluid collection. There is no definable enhancing rim, but this would  be concerning for developing abscess, given the patient's symptoms.  2.  Postsurgical changes noted of distal proctocolectomy and flap  reconstruction of the lower pelvis and perineum. There is some  increased edema and stranding through this area and new mild diffuse  bladder wall thickening that may be reactive. No definable fluid  collection in this area.  3.  Left lower quadrant end colostomy. Large amount of stool seen  through the colon extending to the ostomy.  4.  There was no answer, but message was left at the provided phone  number.    INTERVAL HISTORY:  CT A/P 7/31/24  IMPRESSION:   1.  Small amount of gas and fluid along the tract of the previous  subcutaneous surgical drain in the right lower abdomen. At the mid to  lower point of the tract there is a focal roughly 4 cm ill-defined  fluid collection. There is no definable enhancing rim, but this would  be concerning for developing  abscess, given the patient's symptoms.  2.  Postsurgical changes noted of distal proctocolectomy and flap  reconstruction of the lower pelvis and perineum. There is some  increased edema and stranding through this area and new mild diffuse  bladder wall thickening that may be reactive. No definable fluid  collection in this area.  3.  Left lower quadrant end colostomy. Large amount of stool seen  through the colon extending to the ostomy.  4.  There was no answer, but message was left at the provided phone  number.  CT A/P (8/29/24)  IMPRESSION:   1. No acute findings in the abdomen or pelvis.  2. Decreased conspicuity of inflammatory changes involving the  anterior abdominal wall. The previously described mixed fluid/gas  collection has substantially decreased in size.  3. Stable postsurgical changes of abdominoperineal resection with  left-sided colostomy and flap reconstruction of the low  pelvis/perineum. Stable to mildly decreased inflammatory changes about  the lower pelvis and colostomy site.  4. Inspissated stool throughout the visualized portions of the colon  suggesting delayed colonic transi  CT A/P (1/19/25) -PRESENTED TO [facility] ED, WAS ADMITTED FROM 1/19-1/21 FOR BOWEL OBSTRUCTION, TREATED WITH IVF AND NGT  IMPRESSION:     Postoperative changes with evidence of interval partial colectomy and proctectomy with a left   lower quadrant end colostomy.     There are multiple dilated loops of proximal small bowel, the stomach is dilated. There is a   transition point in the pelvis with much of the ileum being decompressed. Findings are   suggestive of high-grade mechanical small bowel obstruction.     No focal bowel wall thickening identified. No abscess or free air     CT A/P (1/21/25)  IMPRESSION:    1.  No dilated loops of bowel. The previously seen small bowel obstruction has resolved.   2.  Postoperative changes of distal colonic resection with a left lower quadrant end colostomy     Assessment/Plan:  63  year old female now s/p APR with VRAM, with intermittent SBO.  -Recommend miralax and increased water intake. Follow up as needed.        PLEASE SEE NOTE BELOW FOR PHYSICAL EXAMINATION, REVIEW OF SYSTEMS, AND OTHER HISTORY.    Kunal Herrera MD  Division of Colon and Rectal Surgery   [facility]  [phone]    -------------------------------------------------------------------------------------------------------------------    Medical history:  Past Medical History:   Diagnosis Date    Squamous cell carcinoma of skin, unspecified        Surgical history:  Past Surgical History:   Procedure Laterality Date     SECTION      COLONOSCOPY N/A 2024    Procedure: Colonoscopy with biopsies;  Surgeon: Jordan Coyle MD;  [facility]    EXAM UNDER ANESTHESIA ANUS N/A 2024    Procedure: EXAM UNDER ANESTHESIA, ANUS, ANAL DILATION;  Surgeon: Kunal Herrera MD;  Location: [facility] OR    GRAFT FLAP PEDICLE PERINEUM N/A 7/10/2024    Procedure: Right Vertical Rectus Abdominis Musculocutaneous transpelvic flap reconstruction of perineum.;  Surgeon: ANEESH Mccarthy MD;  Location: [facility] OR    ostomy creation      RESECTION ABDOMINAL PERINEAL N/A 7/10/2024    Procedure: exploratory laparotomy, abdominal perineal resection, colostomy revision, take down of colocutaneous fistula;  Surgeon: Kunal Herrera MD;  Location: [facility] OR    SIGMOIDOSCOPY FLEXIBLE N/A 2024    Procedure: Sigmoidoscopy flexible, With Hegar Dilation;  Surgeon: Jordan Coyle MD;  Location: [facility] OR       Problem list:    Patient Active Problem List    Diagnosis Date Noted    Diarrhea 2024     Priority: Medium    Crohn disease (H) 07/10/2024     Priority: Medium    Crohn's disease of both small and large intestine with complication (H) 2024     Priority: Medium    Neoplasm of uncertain behavior of skin 2023     Priority: Medium    Crohn's ileocolitis (H) 2023      "Priority: Medium       Medications:  Current Outpatient Medications   Medication Sig Dispense Refill    acetaminophen (TYLENOL) 500 MG tablet Take 1,000 mg by mouth every 4 hours as needed      budesonide (ENTOCORT EC) 3 MG EC capsule Take 3 capsules (9 mg) by mouth every morning for 30 days, THEN 2 capsules (6 mg) every morning for 14 days, THEN 1 capsule (3 mg) every morning for 14 days. 132 capsule 0    gabapentin (NEURONTIN) 100 MG capsule Take 1 capsule (100 mg) by mouth 3 times daily for 10 days 30 capsule 0    inFLIXimab Inject 4 mg into the vein every 30 days. Last infusion 12/30      omeprazole (PRILOSEC) 40 MG DR capsule Take 1 capsule (40 mg) by mouth daily. 30 capsule 2    ondansetron (ZOFRAN ODT) 4 MG ODT tab Take 1 tablet (4 mg) by mouth every 8 hours as needed for nausea. 30 tablet 0    ondansetron (ZOFRAN ODT) 4 MG ODT tab Take 1 tablet (4 mg) by mouth every 8 hours as needed for nausea 20 tablet 0    Ostomy Supplies MISC 1 each daily 1 each 11    trospium (SANCTURA XR) 60 MG CP24 24 hr capsule Take 1 capsule (60 mg) by mouth every morning (before breakfast). Please keep your 1/27/25 visit as scheduled. 30 capsule 3       Allergies:  Allergies   Allergen Reactions    Blood Transfusion Related (Informational Only) Other (See Comments)     Patient has a history of a clinically significant antibody against RBC antigens.  A delay in compatible RBCs may occur.    Penicillins Rash     On 07/10/18, pt states that she experienced a rash when she had this \"a while ago.\" This was not a reaction from childhood.   On 07/10/18, pt states that she experienced a rash when she had this \"a while ago.\" This was not a reaction from childhood.          Family history:  Family History   Problem Relation Age of Onset    Anesthesia Reaction No family hx of     Deep Vein Thrombosis (DVT) No family hx of        Social history:  Social History     Socioeconomic History    Marital status:      Spouse name: Not on file "    Number of children: Not on file    Years of education: Not on file    Highest education level: Not on file   Occupational History    Not on file   Tobacco Use    Smoking status: Never    Smokeless tobacco: Never   Vaping Use    Vaping status: Never Used   Substance and Sexual Activity    Alcohol use: Yes     Alcohol/week: 2.0 standard drinks of alcohol     Types: 2 Glasses of wine per week     Comment: Occasional    Drug use: Never    Sexual activity: Not on file   Other Topics Concern    Not on file   Social History Narrative    Not on file     Social Drivers of Health     Financial Resource Strain: Low Risk  (12/15/2022)    Received from [facility]    Overall Financial Resource Strain (CARDIA)     Difficulty of Paying Living Expenses: Not hard at all   Food Insecurity: No Food Insecurity (12/15/2022)    Received from [facility]    Hunger Vital Sign     Worried About Running Out of Food in the Last Year: Never true     Ran Out of Food in the Last Year: Never true   Transportation Needs: No Transportation Needs (12/15/2022)    Received from [facility]    PRAPARE - Transportation     Lack of Transportation (Medical): No     Lack of Transportation (Non-Medical): No   Physical Activity: Insufficiently Active (12/15/2022)    Received from [facility]    Exercise Vital Sign     Days of Exercise per Week: 2 days     Minutes of Exercise per Session: 60 min   Stress: No Stress Concern Present (12/15/2022)    Received from [facility]    [employer] [place] [employer] - Occupational Stress Questionnaire     Feeling of Stress : Only a little   Social Connections: Moderately Isolated (12/15/2022)    Received from [facility]    Social Connection and Isolation Panel [NHANES]     Frequency of Communication with Friends and Family: Once a week     Frequency of Social Gatherings with Friends and Family: Three times a week     Attends  Yazdanism Services: Never     Active Member of Clubs or Organizations: No     Attends Club or Organization Meetings: Never     Marital Status:    Interpersonal Safety: Not At Risk (1/19/2025)    [facility]    Intimate Partner Violence     Are you in a relationship where you are physically hurt, threatened and/or made to feel afraid?: No   Housing Stability: Low Risk  (12/15/2022)    [facility]    Housing Stability Vital Sign     Unable to Pay for Housing in the Last Year: No     Number of Places Lived in the Last Year: 1     Unstable Housing in the Last Year: No       Physical Examination:  There were no vitals taken for this visit.  General: NAD  Abdomen: soft, NTND, ostomy pink and healthy, midline incision well healed.  Perianal external examination:  Flap healthy and healing well.

## 2025-01-30 ENCOUNTER — OFFICE VISIT (OUTPATIENT)
Dept: SURGERY | Facility: CLINIC | Age: 65
End: 2025-01-30
Payer: COMMERCIAL

## 2025-01-30 VITALS
HEIGHT: 65 IN | BODY MASS INDEX: 26.41 KG/M2 | OXYGEN SATURATION: 98 % | WEIGHT: 158.5 LBS | SYSTOLIC BLOOD PRESSURE: 121 MMHG | HEART RATE: 74 BPM | DIASTOLIC BLOOD PRESSURE: 78 MMHG

## 2025-01-30 DIAGNOSIS — K50.80 CROHN'S DISEASE OF BOTH SMALL AND LARGE INTESTINE WITHOUT COMPLICATION (H): Primary | ICD-10-CM

## 2025-01-30 ASSESSMENT — PAIN SCALES - GENERAL: PAINLEVEL_OUTOF10: NO PAIN (0)

## 2025-01-30 NOTE — LETTER
2025       RE: Nicol Aldrich  [address]  [facility] [address]     Dear Colleague,    Thank you for referring your patient, Nicol Aldrich, to the [facility] COLON AND RECTAL SURGERY CLINIC [place] at [facility]. Please see a copy of my visit note below.    \Colon and Rectal Surgery Postoperative Clinic Note     Referring provider:  Aftab Wall MD  [address] [place] [id number]       RE: Nicol Aldrich  : 1960  RYLAND: 27    HPI (last seem 24): Nicol Aldrich is a very pleasant 63 year old female with a history of Crohn's disease (on Infliximab and Entocort) and anal SCC (s/p Win protocol ) now status post open abdominoperineal resection, colostomy revision, takedown of colocutaneous fistula, and VRAM flap perineal reconstruction (Dr. Mccarthy) on 7/10/2024 with Dr. Herrera and Dr. Santana. Here today to follow up on recent bowel obstruction    Final Diagnosis      A. OMENTUM, RESECTION:  Benign fibroadipose tissue    B. ASCENDING COLON, RESECTION, 5.5-CM:  Focal stricture compatible with Crohn complication, currently minimally active:   -No dysplasia or malignancy in colon or sampled lymph nodes   -Resection margins show no active inflammation or other abnormality    C. FISTULA TRACT, RESECTION:  Inflamed connective tissue and organizing granulation tissue with cutaneous surface opening consistent with Crohn-related enterocutaneous fistula; no enteric mucosa component or malignancy identified     D. ILEUM, COLOSTOMY STOMA, RESECTION:  Ileo-cutaneous anastomotic junction with nonspecific mucosa inflammation, congestion, and other features consistent with ileostomy; no dysplasia or malignancy in intestinal wall or sampled lymph nodes    E. RECTOSIGMOID COLON AND ANUS (45-CM), RESECTION:  Chronic active colitis compatible with Crohn colitis:   -With crypt architectural distortion focal stricture/mural scarring     -Mildly active colitis present at proximal colonic margin   -Distal, anal, margin is free of inflammation, stricture, or other abnormality   -No dysplasia or malignancy in colon or sampled lymph nodes    F. OMENTUM, RESECTION:  Benign fibroadipose tissue    G. COLON, COLOSTOMY STOMA, RESECTION, 2.5 CM:  Colonic mucosa ad wall with focal, mild crypt architectural distortion, evidence of prior injury; negative for active inflammation, dysplasia, or malignancy    H. HERNIA SAC, REPAIR:  Benign fibroadipose connective tissue, consistent with wall of hernia sac      Interval history: Nicol is feeling well. No significant pain and only took oxycodone today. No fevers or chills. Is planning to start Remicade on Wednesday.     CT A/P 7/25/24:  1.  No acute findings in the abdomen and pelvis. No pelvic fluid collections.  2.  Interval postoperative changes of resection of the sigmoid colon, rectum and anus, reconstruction of the perineum, repair of the left lower quadrant parastomal hernia and left lower quadrant transverse end colostomy creation.    CT AP 7/31/24:  IMPRESSION:   1.  Small amount of gas and fluid along the tract of the previous  subcutaneous surgical drain in the right lower abdomen. At the mid to  lower point of the tract there is a focal roughly 4 cm ill-defined  fluid collection. There is no definable enhancing rim, but this would  be concerning for developing abscess, given the patient's symptoms.  2.  Postsurgical changes noted of distal proctocolectomy and flap  reconstruction of the lower pelvis and perineum. There is some  increased edema and stranding through this area and new mild diffuse  bladder wall thickening that may be reactive. No definable fluid  collection in this area.  3.  Left lower quadrant end colostomy. Large amount of stool seen  through the colon extending to the ostomy.  4.  There was no answer, but message was left at the provided phone  number.    INTERVAL HISTORY:  CT A/P  7/31/24  IMPRESSION:   1.  Small amount of gas and fluid along the tract of the previous  subcutaneous surgical drain in the right lower abdomen. At the mid to  lower point of the tract there is a focal roughly 4 cm ill-defined  fluid collection. There is no definable enhancing rim, but this would  be concerning for developing abscess, given the patient's symptoms.  2.  Postsurgical changes noted of distal proctocolectomy and flap  reconstruction of the lower pelvis and perineum. There is some  increased edema and stranding through this area and new mild diffuse  bladder wall thickening that may be reactive. No definable fluid  collection in this area.  3.  Left lower quadrant end colostomy. Large amount of stool seen  through the colon extending to the ostomy.  4.  There was no answer, but message was left at the provided phone  number.  CT A/P (8/29/24)  IMPRESSION:   1. No acute findings in the abdomen or pelvis.  2. Decreased conspicuity of inflammatory changes involving the  anterior abdominal wall. The previously described mixed fluid/gas  collection has substantially decreased in size.  3. Stable postsurgical changes of abdominoperineal resection with  left-sided colostomy and flap reconstruction of the low  pelvis/perineum. Stable to mildly decreased inflammatory changes about  the lower pelvis and colostomy site.  4. Inspissated stool throughout the visualized portions of the colon  suggesting delayed colonic transi  CT A/P (1/19/25) -PRESENTED TO [facility] ED, WAS ADMITTED FROM 1/19-1/21 FOR BOWEL OBSTRUCTION, TREATED WITH IVF AND NGT  IMPRESSION:     Postoperative changes with evidence of interval partial colectomy and proctectomy with a left   lower quadrant end colostomy.     There are multiple dilated loops of proximal small bowel, the stomach is dilated. There is a   transition point in the pelvis with much of the ileum being decompressed. Findings are   suggestive of high-grade mechanical small bowel  obstruction.     No focal bowel wall thickening identified. No abscess or free air     CT A/P (25)  IMPRESSION:    1.  No dilated loops of bowel. The previously seen small bowel obstruction has resolved.   2.  Postoperative changes of distal colonic resection with a left lower quadrant end colostomy     Assessment/Plan:  63 year old female now s/p APR with VRAM, with intermittent SBO.  -Recommend miralax and increased water intake. Follow up as needed.        PLEASE SEE NOTE BELOW FOR PHYSICAL EXAMINATION, REVIEW OF SYSTEMS, AND OTHER HISTORY.    Kunal Herrera MD  Division of Colon and Rectal Surgery   [facility]  [phone]    -------------------------------------------------------------------------------------------------------------------    Medical history:  Past Medical History:   Diagnosis Date     Squamous cell carcinoma of skin, unspecified        Surgical history:  Past Surgical History:   Procedure Laterality Date      SECTION       COLONOSCOPY N/A 2024    Procedure: Colonoscopy with biopsies;  Surgeon: Jordan Coyle MD;  [place] [facility] [place]     EXAM UNDER ANESTHESIA ANUS N/A 2024    Procedure: EXAM UNDER ANESTHESIA, ANUS, ANAL DILATION;  Surgeon: Kunal Herrera MD;  Location: [facility] OR     GRAFT FLAP PEDICLE PERINEUM N/A 7/10/2024    Procedure: Right Vertical Rectus Abdominis Musculocutaneous transpelvic flap reconstruction of perineum.;  Surgeon: ANEESH Mccarthy MD;  Location: [facility] OR     ostomy creation       RESECTION ABDOMINAL PERINEAL N/A 7/10/2024    Procedure: exploratory laparotomy, abdominal perineal resection, colostomy revision, take down of colocutaneous fistula;  Surgeon: Kunal Herrera MD;  Location: [facility] OR     SIGMOIDOSCOPY FLEXIBLE N/A 2024    Procedure: Sigmoidoscopy flexible, With Hegar Dilation;  Surgeon: Jordan Coyle MD;  Location: [facility] OR       Problem list:    Patient Active Problem List    Diagnosis  "Date Noted     Diarrhea 11/25/2024     Priority: Medium     Crohn disease (H) 07/10/2024     Priority: Medium     Crohn's disease of both small and large intestine with complication (H) 01/04/2024     Priority: Medium     Neoplasm of uncertain behavior of skin 09/19/2023     Priority: Medium     Crohn's ileocolitis (H) 06/29/2023     Priority: Medium       Medications:  Current Outpatient Medications   Medication Sig Dispense Refill     acetaminophen (TYLENOL) 500 MG tablet Take 1,000 mg by mouth every 4 hours as needed       budesonide (ENTOCORT EC) 3 MG EC capsule Take 3 capsules (9 mg) by mouth every morning for 30 days, THEN 2 capsules (6 mg) every morning for 14 days, THEN 1 capsule (3 mg) every morning for 14 days. 132 capsule 0     gabapentin (NEURONTIN) 100 MG capsule Take 1 capsule (100 mg) by mouth 3 times daily for 10 days 30 capsule 0     inFLIXimab Inject 4 mg into the vein every 30 days. Last infusion 12/30       omeprazole (PRILOSEC) 40 MG DR capsule Take 1 capsule (40 mg) by mouth daily. 30 capsule 2     ondansetron (ZOFRAN ODT) 4 MG ODT tab Take 1 tablet (4 mg) by mouth every 8 hours as needed for nausea. 30 tablet 0     ondansetron (ZOFRAN ODT) 4 MG ODT tab Take 1 tablet (4 mg) by mouth every 8 hours as needed for nausea 20 tablet 0     Ostomy Supplies MISC 1 each daily 1 each 11     trospium (SANCTURA XR) 60 MG CP24 24 hr capsule Take 1 capsule (60 mg) by mouth every morning (before breakfast). Please keep your 1/27/25 visit as scheduled. 30 capsule 3       Allergies:  Allergies   Allergen Reactions     Blood Transfusion Related (Informational Only) Other (See Comments)     Patient has a history of a clinically significant antibody against RBC antigens.  A delay in compatible RBCs may occur.     Penicillins Rash     On 07/10/18, pt states that she experienced a rash when she had this \"a while ago.\" This was not a reaction from childhood.   On 07/10/18, pt states that she experienced a rash when " "she had this \"a while ago.\" This was not a reaction from childhood.          Family history:  Family History   Problem Relation Age of Onset     Anesthesia Reaction No family hx of      Deep Vein Thrombosis (DVT) No family hx of        Social history:  Social History     Socioeconomic History     Marital status:      Spouse name: Not on file     Number of children: Not on file     Years of education: Not on file     Highest education level: Not on file   Occupational History     Not on file   Tobacco Use     Smoking status: Never     Smokeless tobacco: Never   Vaping Use     Vaping status: Never Used   Substance and Sexual Activity     Alcohol use: Yes     Alcohol/week: 2.0 standard drinks of alcohol     Types: 2 Glasses of wine per week     Comment: Occasional     Drug use: Never     Sexual activity: Not on file   Other Topics Concern     Not on file   Social History Narrative     Not on file     Social Drivers of Health     Financial Resource Strain: Low Risk  (12/15/2022)    Received from [facility]    Overall Financial Resource Strain (CARDIA)      Difficulty of Paying Living Expenses: Not hard at all   Food Insecurity: No Food Insecurity (12/15/2022)    Received from [facility]    Hunger Vital Sign      Worried About Running Out of Food in the Last Year: Never true      Ran Out of Food in the Last Year: Never true   Transportation Needs: No Transportation Needs (12/15/2022)    Received from [facility]    PRAPARE - Transportation      Lack of Transportation (Medical): No      Lack of Transportation (Non-Medical): No   Physical Activity: Insufficiently Active (12/15/2022)    Received from [facility]    Exercise Vital Sign      Days of Exercise per Week: 2 days      Minutes of Exercise per Session: 60 min   Stress: No Stress Concern Present (12/15/2022)    Received from [facility]    [place] [employer] - Occupational " Stress Questionnaire      Feeling of Stress : Only a little   Social Connections: Moderately Isolated (12/15/2022)    [facility], [facility]    Social Connection and Isolation Panel [NHANES]      Frequency of Communication with Friends and Family: Once a week      Frequency of Social Gatherings with Friends and Family: Three times a week      Attends Evangelical Services: Never      Active Member of Clubs or Organizations: No      Attends Club or Organization Meetings: Never      Marital Status:    Interpersonal Safety: Not At Risk (1/19/2025)    Received from [facility]    Intimate Partner Violence      Are you in a relationship where you are physically hurt, threatened and/or made to feel afraid?: No   Housing Stability: Low Risk  (12/15/2022)    Received from [facility], [facility]    Housing Stability Vital Sign      Unable to Pay for Housing in the Last Year: No      Number of Places Lived in the Last Year: 1      Unstable Housing in the Last Year: No       Physical Examination:  There were no vitals taken for this visit.  General: NAD  Abdomen: soft, NTND, ostomy pink and healthy, midline incision well healed.  Perianal external examination:  Flap healthy and healing well.      Again, thank you for allowing me to participate in the care of your patient.      Sincerely,    Kunal Herrera MD, MD

## 2025-01-30 NOTE — NURSING NOTE
"Chief Complaint   Patient presents with    Follow Up       Vitals:    01/30/25 1449   BP: 121/78   BP Location: Left arm   Patient Position: Sitting   Cuff Size: Adult Regular   Pulse: 74   SpO2: 98%   Weight: 158 lb 8 oz   Height: 5' 5\"       Body mass index is 26.38 kg/m .    Gerri Holt CMA    "

## 2025-02-23 DIAGNOSIS — R10.84 ABDOMINAL PAIN, GENERALIZED: ICD-10-CM

## 2025-02-23 DIAGNOSIS — K50.819 CROHN'S DISEASE OF SMALL AND LARGE INTESTINES WITH COMPLICATION (H): ICD-10-CM

## 2025-02-23 DIAGNOSIS — R14.0 ABDOMINAL BLOATING: ICD-10-CM

## 2025-02-23 RX ORDER — OMEPRAZOLE 40 MG/1
40 CAPSULE, DELAYED RELEASE ORAL DAILY
Qty: 30 CAPSULE | Refills: 2 | Status: CANCELLED | OUTPATIENT
Start: 2025-02-23

## 2025-02-25 ENCOUNTER — OFFICE VISIT (OUTPATIENT)
Dept: GASTROENTEROLOGY | Facility: CLINIC | Age: 65
End: 2025-02-25
Attending: PHYSICIAN ASSISTANT
Payer: COMMERCIAL

## 2025-02-25 VITALS
HEART RATE: 77 BPM | HEIGHT: 65 IN | SYSTOLIC BLOOD PRESSURE: 126 MMHG | DIASTOLIC BLOOD PRESSURE: 72 MMHG | WEIGHT: 160 LBS | BODY MASS INDEX: 26.66 KG/M2 | OXYGEN SATURATION: 98 %

## 2025-02-25 DIAGNOSIS — R10.84 ABDOMINAL PAIN, GENERALIZED: ICD-10-CM

## 2025-02-25 DIAGNOSIS — R14.0 ABDOMINAL BLOATING: ICD-10-CM

## 2025-02-25 DIAGNOSIS — K50.819 CROHN'S DISEASE OF SMALL AND LARGE INTESTINES WITH COMPLICATION (H): ICD-10-CM

## 2025-02-25 PROCEDURE — 1126F AMNT PAIN NOTED NONE PRSNT: CPT | Performed by: INTERNAL MEDICINE

## 2025-02-25 PROCEDURE — 99215 OFFICE O/P EST HI 40 MIN: CPT | Performed by: INTERNAL MEDICINE

## 2025-02-25 PROCEDURE — 3074F SYST BP LT 130 MM HG: CPT | Performed by: INTERNAL MEDICINE

## 2025-02-25 PROCEDURE — G2211 COMPLEX E/M VISIT ADD ON: HCPCS | Performed by: INTERNAL MEDICINE

## 2025-02-25 PROCEDURE — 3078F DIAST BP <80 MM HG: CPT | Performed by: INTERNAL MEDICINE

## 2025-02-25 RX ORDER — OMEPRAZOLE 40 MG/1
40 CAPSULE, DELAYED RELEASE ORAL DAILY
Qty: 30 CAPSULE | Refills: 11 | Status: SHIPPED | OUTPATIENT
Start: 2025-02-25

## 2025-02-25 ASSESSMENT — PAIN SCALES - GENERAL: PAINLEVEL_OUTOF10: NO PAIN (0)

## 2025-02-25 NOTE — NURSING NOTE
"Do you have a history of colon cancer in your immediate family? NO    If yes who: negative     And what age  were they diagnosed: n/a      Chief Complaint   Patient presents with    Follow Up       Vitals:    02/25/25 1616   BP: 126/72   Pulse: 77   SpO2: 98%   Weight: 72.6 kg (160 lb)   Height: 1.651 m (5' 5\")       Body mass index is 26.63 kg/m .    Kate Olivarez MA      "

## 2025-02-25 NOTE — PATIENT INSTRUCTIONS
It was a pleasure meeting with you today and discussing your healthcare plan. Below is a summary of what we covered:    Continue your infliximab every 4 weeks.    We will check and infliximab level with your next infusion    Continue with wound care    If you have recurrent small bowel obstruction please go to the [facility] ER as Dr. Herrera stated    My office will contact you to schedule your colonoscopy    Follow up with Torsten Perez in 6 months and Dr. Coyle in 12 months      Please see below for any additional questions and scheduling guidelines.    Sign up for [employer]: [employer] patient portal serves as a secure platform for accessing your medical records from the [facility]. Additionally, [employer] facilitates easy, timely, and secure messaging with your care team. If you have not signed up, you may do so by using the provided code or calling [phone].    Coordinating your care after your visit:  There are multiple options for scheduling your follow-up care based on your provider's recommendation.    How do I schedule a follow-up clinic appointment:   After your appointment, you may receive scheduling assistance with the Clinic Coordinators by having a seat in the waiting room and a Clinic Coordinator will call you up to schedule.  Virtual visits or after you leave the clinic:  Your provider has placed a follow-up order in the [employer] portal for scheduling your return appointment. A member of the scheduling team will contact you to schedule.  [employer] Scheduling: Timely scheduling through [employer] is advised to ensure appointment availability.   Call to schedule: You may schedule your follow-up appointment(s) by calling [phone], option 1.    How do I schedule my endoscopy or colonoscopy procedure:  If a procedure, such as a colonoscopy or upper endoscopy was ordered by your provider, the scheduling team will contact you to schedule this procedure. Or you may choose to call to  schedule at   [phone], option 2.  Please allow 20-30 minutes when scheduling a procedure.    How do I get my blood work done? To get your blood work done, you need to schedule a lab appointment at an [facility]. There are multiple ways to schedule:   At the clinic: The Clinic Coordinator you meet after your visit can help you schedule a lab appointment.   [employer] scheduling: [employer] offers online lab scheduling at all [facility] laboratory locations.   Call to schedule: You can call [phone] to schedule your lab appointment.    How do I schedule my imaging study: To schedule imaging studies, such as CT scans, ultrasounds, MRIs, or X-rays, contact Imaging Services at [phone].    How do I schedule a referral to another doctor: If your provider recommended a referral to another specialist(s), the referral order was placed by your provider. You will receive a phone call to schedule this referral, or you may choose to call the number attached to the referral to self-schedule.    For Post-Visit Question(s):  For any inquiries following today's visit:  Please utilize [employer] messaging and allow 48 hours for reply or contact the Call Center during normal business hours at [phone], option 3.  For Emergent After-hours questions, contact the On-Call GI Fellow through the [facility]  at [phone].  In addition, you may contact your Nurse directly using the provided contact information.    Test Results:  Test results will be accessible via [employer] in compliance with the 21st Century Cures Act. This means that your results will be available to you at the same time as your provider. Often you may see your results before your provider does. Results are reviewed by staff within two weeks with communication follow-up. Results may be released in the patient portal prior to your care team review.    Prescription Refill(s):  Medication prescribed by your provider will be  addressed during your visit. For future refills, please coordinate with your pharmacy. If you have not had a recent clinic visit or routine labs, for your safety, your provider may not be able to refill your prescription.

## 2025-02-25 NOTE — PROGRESS NOTES
IBD CLINIC VISIT    CC/REFERRING MD:  Torsten Perez    REASON FOR CONSULTATION: follow up CD    ASSESSMENT/PLAN:    Penetrating ileo-colonic Crohn's Disease  -medication: IFX 5 mg/kg q 4 weeks  -clinical symptoms: clinical remission  -last endoscopic assessment; 1/2024 - CDSES: 1 - mild changes in cecum/ascending. Normal TI and remainder of colon    Doing well. Continue as we are and check IFX levels    -maintenance labs  -continue IFX   -predict PK IFX level    2. History of SBO - likely related to adhesions. Continue miralax. If symptoms recur go to [facility] ED as Dr. Herrera recommended.    3. 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 Recurrent c diff - recheck c diff PNR    5. Pyoderma gangrenosum - see derm as needed    6. History of anal cancer - now s/p APR.      IBD HISTORY  She is now status post open abdominoperineal resection, colostomy revision, takedown of colocutaneous fistula, and VRAM flap perineal reconstruction (Dr. Mccarthy) on 7/10/2024 with Dr. Herrera and Dr. Santana.   Diagnosed at approximately age 31. Significant perianal disease and peristomal enterocutaneous fistulas at one time though these have improved since initiating IFX. Has been exposed to steroids, 6MP, methtrexate, vedolizumab (low drug levels, no antibodies, progression of penetrating disease) and on infliximab in 2023 after pred taper. Overall she has done well with IFX. Last drug levels 12 (8/2023), no abs. Would ideally target levels > 15 given penetrating phenotype with residual fistula.   -- Most recent endoscopic disease activity: CDSES: 1 - mild changes in cecum/ascending. Normal TI and remainder of colon  -- Most recent radiologic disease activity:  6/21/23, normal SB, some karen-stomal inflammation vs decompressed colon within karen-stomal hernia. Repeating tomorrow.  -- Most recent TDM eval:   -- vedo levels: 5/1 (3/2021) and 3.4 (11/2022) (undetectable per their assay),  --  IFX levels 12.6 (8/17/23), abs  undetectable               -- repeat 12 months into IFX therapy, goal >15  -- Most recent serologic eval: CRP 7.14 (11/15/24), repeat tomorrow  -- Most recent FC: 92 (11/15/24), 129 (3/2024), 492 (4/2023), 906 (11/2022)  -- Current medication: infliximab, maintenance      DRUG MONITORING  TPMT enzyme activity: see epic    6-TGN/6-MMPN levels: NA    Biologic concentration:  -IFX 12 (no antibodies)    DISEASE ASSESSMENT  Labs  Recent Labs   Lab Test 11/27/24  1002 07/11/24  0450 04/30/24  1627 08/17/23  0900   SED  --   --  21 11   HGB 12.0   < > 13.3 12.8    < > = values in this interval not displayed.     Fecal calprotectin: < 100 most recent  Endoscopy: see above  Enterography: 11/2024 - CTE negative      sIBDQ:   IBDQ Score Date IBDQ - Total Score  (Higher score better)   11/22/2024  11:32 AM 45   9/18/2023   3:15 PM 46   6/9/2023  11:39 AM 39   6/9/2023  10:51 AM 39       IBD Health Care Maintenance:    Bone mineral density screening   -- Recommend all patients supplement with calcium and vitamin D  -- Given prior steroid use recommend DEXA if not already done    Cancer Screening:  Colon cancer screening:  Given colonic disease, recommend patient undergo regular dysplasia surveillance   Next dysplasia screening is recommended due now.    Cervical cancer screening: Annual due to immunosupression    Skin cancer screening: Annual visual exam of skin by dermatologist since patient is immunocompromised    Depression Screening:  -- Over the last month, have you felt down, depressed, or hopeless? no  -- Over the last month, have you felt little interest or pleasure doing things? no    Misc:  -- Avoid tobacco use  -- Avoid NSAIDs as there is potentially a 25% chance of causing an IBD flare    Return to clinic in 6 months with Torsten Perez and 12 months with Dr. Coyle    Thank you for this consultation.  It was a pleasure to participate in the care of this patient; please contact us with any further questions.  I spent a  total of 43 minutes during the day of encounter performed chart review, meeting with patient, patient counseling, care coordination, and documentation.      This note was created with voice recognition software, and while reviewed for accuracy, typos may remain.     Jordan Coyle MD  Division of Gastroenterology, Hepatology and Nutrition  [facility]  Pager: [phone]      HPI:   Currently, here for routine follow up.    She had recurrent abdominal pain and was found to have SBO at [facility]. Treated conservatively with NG tube and it resolved.    Otherwise doing well. Followed up with Dr. Herrera - started on miralax once daily. Ostomy output in loose/watery but passes mainly in the AM. Doing well!    No issues with fistula after surgery.    ROS:    No fevers or chills  No weight loss  No blurry vision, double vision or change in vision  No sore throat  No lymphadenopathy  No headache, paraesthesias, or weakness in a limb  No shortness of breath or wheezing  No chest pain or pressure  No arthralgias or myalgias  No rashes or skin changes  No odynophagia or dysphagia  No BRBPR, hematochezia, melena  No dysuria, frequency or urgency  No hot/cold intolerance or polyria  No anxiety or depression    Extra intestinal manifestations of IBD:  No uveitis/episcleritis  No aphthous ulcers   No arthritis   No erythema nodosum/pyoderma gangrenosum.     PERTINENT PAST MEDICAL HISTORY:  Past Medical History:   Diagnosis Date    Squamous cell carcinoma of skin, unspecified        PREVIOUS SURGERIES:  Past Surgical History:   Procedure Laterality Date     SECTION      COLONOSCOPY N/A 2024    Procedure: Colonoscopy with biopsies;  Surgeon: Jordan Coyle MD;  [facility]    EXAM UNDER ANESTHESIA ANUS N/A 2024    Procedure: EXAM UNDER ANESTHESIA, ANUS, ANAL DILATION;  Surgeon: Kuanl Herrera MD;  Location: [facility] OR    GRAFT FLAP PEDICLE PERINEUM N/A 7/10/2024    Procedure: Right  Vertical Rectus Abdominis Musculocutaneous transpelvic flap reconstruction of perineum.;  Surgeon: ANEESH Mccarthy MD;  Location: [facility] OR    ostomy creation      RESECTION ABDOMINAL PERINEAL N/A 7/10/2024    Procedure: exploratory laparotomy, abdominal perineal resection, colostomy revision, take down of colocutaneous fistula;  Surgeon: Kunal Herrera MD;  Location: [facility] OR    SIGMOIDOSCOPY FLEXIBLE N/A 4/11/2024    Procedure: Sigmoidoscopy flexible, With Hegar Dilation;  Surgeon: Jeronimo Coyle MD;  Location: [facility] OR       PREVIOUS ENDOSCOPY:  Results for orders placed or performed during the hospital encounter of 01/02/24   COLONOSCOPY    Collection Time: 01/02/24  7:29 AM   Result Value Ref Range    COLONOSCOPY       [facility]  [address] [place] [id number] [phone]     Endoscopy Department  _______________________________________________________________________________  Patient Name: Nicol Aldrich            Procedure Date: 1/2/2024 7:29 AM  MRN: [id number]                       Account Number: [id number]  YOB: 1960              Admit Type: Outpatient  Age: 63                               Room: [facility] PROCEDURE ROOM 02  Gender: Female                        Note Status: Finalized  Attending MD: JERONIMO COYLE MD,   Total Sedation Time:   _______________________________________________________________________________     Procedure:             Colonoscopy  Indications:           Disease activity assessment of Crohn's disease of the                          small bowel and colon, Assess therapeutic response to                          therapy of Crohn's disease of the small bowel and                          colon, 62 YO F with history of severe                           penetrating/fistulizing CD of ileum and colon. Also                          history of anal cancer s/p radiation and diverting                          colostomy. Has had  recurrent c diff. Now improved with                          infliximab. Has residual enterocutaneous fistula that                          has become smaller but is present. Colonoscopy to                          assess for healing.  Providers:             JERONIMO KAISER MD, Sridevi Knight RN  Referring MD:          AYE PARKER MD  Requesting Provider:   AYE PARKER MD  Medicines:             Midazolam 3 mg IV, Fentanyl 150 micrograms IV  Complications:         No immediate complications.  _______________________________________________________________________________  Procedure:             Pre-Anesthesia Assessment:                         - See EPIC H and P note                         After obtaining informed consent, the colon oscope was                          passed under direct vision. Throughout the procedure,                          the patient's blood pressure, pulse, and oxygen                          saturations were monitored continuously. The                          Colonoscope was introduced through the anus and                          advanced to the terminal ileum, with identification of                          the appendiceal orifice and IC valve. The colonoscopy                          was performed without difficulty. The patient                          tolerated the procedure well. The quality of the bowel                          preparation was poor.                                                                                   Findings:       The digital rectal exam findings include anal stricture.       The Simple Endoscopic Score for Crohn's Disease was determined based on        the endoscopic appearance of the mucosa in the following segments:       - Ileum: Findings includ e no ulcers present, no ulcerated surfaces, no        affected surfaces and no narrowings. Segment score: 0.       - Right Colon: Findings  include no ulcers present, no ulcerated        surfaces, less than 50% of surfaces affected and no narrowings. Segment        score: 1.       - Transverse Colon: Findings include no ulcers present, no ulcerated        surfaces, no affected surfaces and no narrowings. Segment score: 0.        Segment score: 0.       - Rectum: Findings include no ulcers present, no ulcerated surfaces, no        affected surfaces and no narrowings. Segment score: 0.       - Total SES-CD aggregate score: 1. Biopsies were taken with a cold        forceps for histology (cecum/ascending and transverse colon biopsies and        placed in separate jars).       Scarring was found in the cecum/ascending. The scar tissue was healthy        in appearance with only minimal erythema and friability (mostly chronic        changes).                                                                                    Impression:            - Preparation of the colon was poor.                         - The pediatric colonoscopes was introduced via the                          colostomy to the colon and completed to the level of                          the ileum.                         - Anal stricture found on digital rectal exam. This                          was partially dilated with a digit. However, a                          pediatric and colonoscope could not pass the                          anal/distal rectum stricture. This appeared to be a                          chronic stricture related to diversion and prior                          radiation changes. No evidence of malignancy seen and                          no active inflammation seen.                         - Simple Endoscopic Score for Crohn's Disease: 1,                          mucosal inflammatory changes secondary to Crohn's                          disease, with ileitis and colitis. Biopsied .                         - Scarring in the cecum/ascending colon with minimal                           friablity and erythema. Mainly chronic changes.                         - Dimpling in the distal transverse colon just 2-3 cm                          prior to ostomy/stoma. This was suspicious for fistula                         - There were some inflammatory polyps in the distal                          transverse colon                         - There was a enterocutaneous fistula adjacent to the                          stoma draining some fecal material                         Overall her exam is reassuring. The infliximab is                          doing a good job at controlling active inflammation.                          Will follow to see if there is further improvement in                          enterocutaneous fistula. She does have a significant                          anal/distal rectal stricture and her diverted rectum                          and sigmoid could not  be surveyed. Continue current                          meds. Will have patient follow up with CRS to discuss                          dilation of anal stricture so surveillance can be                          obtained.  Recommendation:        - Discharge patient to home (with escort).                         - Resume previous diet.                         - Continue present medications.                         - Await pathology results.                         - Repeat colonoscopy in 1 year for surveillance based                          on pathology results.                         - Return to GI clinic as previously scheduled.                         - See above discussion                                                                                     Electronically Signed by: Dr. Jordan Kaiser  ___________________________  JORDAN KAISER MD  1/2/2024 9:07:15 AM  I was physically present for the entire viewing portion of the exam.  __________________________  Signatur e of teaching physician  JORDAN  "MD JOB  Number of Addenda: 0    Note Initiated On: 1/2/2024 7:29 AM  Scope In: 8:11:57 AM  Scope Out: 8:42:30 AM     ]    ALLERGIES:     Allergies   Allergen Reactions    Blood Transfusion Related (Informational Only) Other (See Comments)     Patient has a history of a clinically significant antibody against RBC antigens.  A delay in compatible RBCs may occur.    Penicillins Rash     On 07/10/18, pt states that she experienced a rash when she had this \"a while ago.\" This was not a reaction from childhood.   On 07/10/18, pt states that she experienced a rash when she had this \"a while ago.\" This was not a reaction from childhood.          PERTINENT MEDICATIONS:    Current Outpatient Medications:     acetaminophen (TYLENOL) 500 MG tablet, Take 1,000 mg by mouth every 4 hours as needed, Disp: , Rfl:     inFLIXimab, Inject 4 mg into the vein every 30 days. Last infusion 12/30, Disp: , Rfl:     omeprazole (PRILOSEC) 40 MG DR capsule, Take 1 capsule (40 mg) by mouth daily., Disp: 30 capsule, Rfl: 2    ondansetron (ZOFRAN ODT) 4 MG ODT tab, Take 1 tablet (4 mg) by mouth every 8 hours as needed for nausea., Disp: 30 tablet, Rfl: 0    Ostomy Supplies MISC, 1 each daily, Disp: 1 each, Rfl: 11    trospium (SANCTURA XR) 60 MG CP24 24 hr capsule, Take 1 capsule (60 mg) by mouth every morning (before breakfast). Please keep your 1/27/25 visit as scheduled., Disp: 30 capsule, Rfl: 3    SOCIAL HISTORY:  Social History     Socioeconomic History    Marital status:      Spouse name: Not on file    Number of children: Not on file    Years of education: Not on file    Highest education level: Not on file   Occupational History    Not on file   Tobacco Use    Smoking status: Never    Smokeless tobacco: Never   Vaping Use    Vaping status: Never Used   Substance and Sexual Activity    Alcohol use: Yes     Alcohol/week: 2.0 standard drinks of alcohol     Types: 2 Glasses of wine per week     Comment: Occasional    Drug use: " Never    Sexual activity: Not on file   Other Topics Concern    Not on file   Social History Narrative    Not on file     Social Drivers of Health     Financial Resource Strain: Low Risk  (12/15/2022)    Received from [facility]    Overall Financial Resource Strain (CARDIA)     Difficulty of Paying Living Expenses: Not hard at all   Food Insecurity: No Food Insecurity (12/15/2022)    Received from [facility]    Hunger Vital Sign     Worried About Running Out of Food in the Last Year: Never true     Ran Out of Food in the Last Year: Never true   Transportation Needs: No Transportation Needs (12/15/2022)    Received from [facility]    PRAPARE - Transportation     Lack of Transportation (Medical): No     Lack of Transportation (Non-Medical): No   Physical Activity: Insufficiently Active (12/15/2022)    Received from [facility]    Exercise Vital Sign     Days of Exercise per Week: 2 days     Minutes of Exercise per Session: 60 min   Stress: No Stress Concern Present (12/15/2022)    Received from [facility]    [employer] [place] [employer] - Occupational Stress Questionnaire     Feeling of Stress : Only a little   Social Connections: Moderately Isolated (12/15/2022)    Received from [facility]    Social Connection and Isolation Panel [NHANES]     Frequency of Communication with Friends and Family: Once a week     Frequency of Social Gatherings with Friends and Family: Three times a week     Attends Latter day Services: Never     Active Member of Clubs or Organizations: No     Attends Club or Organization Meetings: Never     Marital Status:    Interpersonal Safety: Not At Risk (1/19/2025)    [facility]    Intimate Partner Violence     Are you in a relationship where you are physically hurt, threatened and/or made to feel afraid?: No   Housing Stability: Low Risk  (12/15/2022)    Received  from [facility], [facility]    Housing Stability Vital Sign     Unable to Pay for Housing in the Last Year: No     Number of Places Lived in the Last Year: 1     Unstable Housing in the Last Year: No       FAMILY HISTORY:  Family History   Problem Relation Age of Onset    Anesthesia Reaction No family hx of     Deep Vein Thrombosis (DVT) No family hx of        Past/family/social history reviewed and no changes    PHYSICAL EXAMINATION:  Constitutional: aaox3, cooperative, pleasant, not dyspneic/diaphoretic, no acute distress  Vitals reviewed: There were no vitals taken for this visit.  Wt:   Wt Readings from Last 2 Encounters:   01/30/25 71.9 kg (158 lb 8 oz)   08/16/24 73.9 kg (163 lb)      Eyes: Sclera anicteric/injected  Ears/nose/mouth/throat: Normal oropharynx without ulcers or exudate, mucus membranes moist, hearing intact  Neck: supple, thyroid normal size  CV: No edema  Respiratory: Unlabored breathing  Lymph: No axillary, submandibular, supraclavicular or inguinal lymphadenopathy  Abd:  Nondistended, +bs, no hepatosplenomegaly, nontender, no peritoneal signs. Well healed wound. Ostomy looks good.  Skin: warm, perfused, no jaundice  Psych: Normal affect  MSK: Normal gait      PERTINENT STUDIES:  Most recent CBC:  Recent Labs   Lab Test 11/27/24  1002 11/15/24  1035   WBC 4.3 8.1   HGB 12.0 12.5   HCT 35.2 38.2    389     Most recent hepatic panel:  Recent Labs   Lab Test 11/27/24  1002 11/15/24  1035   ALT 19 13   AST 22 16     Most recent creatinine:  Recent Labs   Lab Test 08/29/24  1234 07/31/24  1110   CR 0.77 0.80

## 2025-02-25 NOTE — LETTER
2/25/2025      Nicol Aldrich  [address]  [facility] [address]      Dear Colleague,    Thank you for referring your patient, Nicol Aldrich, to the [facility] [place]. Please see a copy of my visit note below.    IBD CLINIC VISIT    CC/REFERRING MD:  Torsten Perez    REASON FOR CONSULTATION: follow up CD    ASSESSMENT/PLAN:    Penetrating ileo-colonic Crohn's Disease  -medication: IFX 5 mg/kg q 4 weeks  -clinical symptoms: clinical remission  -last endoscopic assessment; 1/2024 - CDSES: 1 - mild changes in cecum/ascending. Normal TI and remainder of colon    Doing well. Continue as we are and check IFX levels    -maintenance labs  -continue IFX   -predict PK IFX level    2. History of SBO - likely related to adhesions. Continue miralax. If symptoms recur go to [facility] ED as Dr. Herrera recommended.    3. 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 Recurrent c diff - recheck c diff PNR    5. Pyoderma gangrenosum - see derm as needed    6. History of anal cancer - now s/p APR.      IBD HISTORY  She is now status post open abdominoperineal resection, colostomy revision, takedown of colocutaneous fistula, and VRAM flap perineal reconstruction (Dr. Mccarthy) on 7/10/2024 with Dr. Herrera and Dr. Santana.   Diagnosed at approximately age 31. Significant perianal disease and peristomal enterocutaneous fistulas at one time though these have improved since initiating IFX. Has been exposed to steroids, 6MP, methtrexate, vedolizumab (low drug levels, no antibodies, progression of penetrating disease) and on infliximab in 2023 after pred taper. Overall she has done well with IFX. Last drug levels 12 (8/2023), no abs. Would ideally target levels > 15 given penetrating phenotype with residual fistula.   -- Most recent endoscopic disease activity: CDSES: 1 - mild changes in cecum/ascending. Normal TI and remainder of colon  -- Most recent radiologic disease activity:  6/21/23, normal  SB, some karen-stomal inflammation vs decompressed colon within karen-stomal hernia. Repeating tomorrow.  -- Most recent TDM eval:   -- vedo levels: 5/1 (3/2021) and 3.4 (11/2022) (undetectable per their assay),  --  IFX levels 12.6 (8/17/23), abs undetectable               -- repeat 12 months into IFX therapy, goal >15  -- Most recent serologic eval: CRP 7.14 (11/15/24), repeat tomorrow  -- Most recent FC: 92 (11/15/24), 129 (3/2024), 492 (4/2023), 906 (11/2022)  -- Current medication: infliximab, maintenance      DRUG MONITORING  TPMT enzyme activity: see epic    6-TGN/6-MMPN levels: NA    Biologic concentration:  -IFX 12 (no antibodies)    DISEASE ASSESSMENT  Labs  Recent Labs   Lab Test 11/27/24  1002 07/11/24  0450 04/30/24  1627 08/17/23  0900   SED  --   --  21 11   HGB 12.0   < > 13.3 12.8    < > = values in this interval not displayed.     Fecal calprotectin: < 100 most recent  Endoscopy: see above  Enterography: 11/2024 - CTE negative      sIBDQ:   IBDQ Score Date IBDQ - Total Score  (Higher score better)   11/22/2024  11:32 AM 45   9/18/2023   3:15 PM 46   6/9/2023  11:39 AM 39   6/9/2023  10:51 AM 39       IBD Health Care Maintenance:    Bone mineral density screening   -- Recommend all patients supplement with calcium and vitamin D  -- Given prior steroid use recommend DEXA if not already done    Cancer Screening:  Colon cancer screening:  Given colonic disease, recommend patient undergo regular dysplasia surveillance   Next dysplasia screening is recommended due now.    Cervical cancer screening: Annual due to immunosupression    Skin cancer screening: Annual visual exam of skin by dermatologist since patient is immunocompromised    Depression Screening:  -- Over the last month, have you felt down, depressed, or hopeless? no  -- Over the last month, have you felt little interest or pleasure doing things? no    Misc:  -- Avoid tobacco use  -- Avoid NSAIDs as there is potentially a 25% chance of causing an  IBD flare    Return to clinic in 6 months with Torsten Perez and 12 months with Dr. Coyle    Thank you for this consultation.  It was a pleasure to participate in the care of this patient; please contact us with any further questions.  I spent a total of 43 minutes during the day of encounter performed chart review, meeting with patient, patient counseling, care coordination, and documentation.      This note was created with voice recognition software, and while reviewed for accuracy, typos may remain.     Jordan Coyle MD  Division of Gastroenterology, Hepatology and Nutrition  [facility]  Pager: [phone]      HPI:   Currently, here for routine follow up.    She had recurrent abdominal pain and was found to have SBO at [facility]. Treated conservatively with NG tube and it resolved.    Otherwise doing well. Followed up with Dr. Herrera - started on miralax once daily. Ostomy output in loose/watery but passes mainly in the AM. Doing well!    No issues with fistula after surgery.    ROS:    No fevers or chills  No weight loss  No blurry vision, double vision or change in vision  No sore throat  No lymphadenopathy  No headache, paraesthesias, or weakness in a limb  No shortness of breath or wheezing  No chest pain or pressure  No arthralgias or myalgias  No rashes or skin changes  No odynophagia or dysphagia  No BRBPR, hematochezia, melena  No dysuria, frequency or urgency  No hot/cold intolerance or polyria  No anxiety or depression    Extra intestinal manifestations of IBD:  No uveitis/episcleritis  No aphthous ulcers   No arthritis   No erythema nodosum/pyoderma gangrenosum.     PERTINENT PAST MEDICAL HISTORY:  Past Medical History:   Diagnosis Date     Squamous cell carcinoma of skin, unspecified        PREVIOUS SURGERIES:  Past Surgical History:   Procedure Laterality Date      SECTION       COLONOSCOPY N/A 2024    Procedure: Colonoscopy with biopsies;  Surgeon: Jordan Coyle  MD Cara;  Location: [facility] OR     EXAM UNDER ANESTHESIA ANUS N/A 2/6/2024    Procedure: EXAM UNDER ANESTHESIA, ANUS, ANAL DILATION;  Surgeon: Kunal Herrear MD;  Location: [facility] OR     GRAFT FLAP PEDICLE PERINEUM N/A 7/10/2024    Procedure: Right Vertical Rectus Abdominis Musculocutaneous transpelvic flap reconstruction of perineum.;  Surgeon: ANEESH Mccarthy MD;  Location: [facility] OR     ostomy creation       RESECTION ABDOMINAL PERINEAL N/A 7/10/2024    Procedure: exploratory laparotomy, abdominal perineal resection, colostomy revision, take down of colocutaneous fistula;  Surgeon: Kunal Herrera MD;  Location: [facility] OR     SIGMOIDOSCOPY FLEXIBLE N/A 4/11/2024    Procedure: Sigmoidoscopy flexible, With Hegar Dilation;  Surgeon: Jeronimo Coyle MD;  Location: [facility] OR       PREVIOUS ENDOSCOPY:  Results for orders placed or performed during the hospital encounter of 01/02/24   COLONOSCOPY    Collection Time: 01/02/24  7:29 AM   Result Value Ref Range    COLONOSCOPY       [facility]  [address] [place] [id number] [phone]     Endoscopy Department  _______________________________________________________________________________  Patient Name: Nicol Aldrich            Procedure Date: 1/2/2024 7:29 AM  MRN: [id number]                       Account Number: [id number]  YOB: 1960              Admit Type: Outpatient  Age: 63                               Room: [facility] PROCEDURE ROOM 02  Gender: Female                        Note Status: Finalized  Attending MD: JERONIMO COYLE MD,   Total Sedation Time:   _______________________________________________________________________________     Procedure:             Colonoscopy  Indications:           Disease activity assessment of Crohn's disease of the                          small bowel and colon, Assess therapeutic response to                          therapy of Crohn's disease of the small bowel and                           colon, 62 YO F with history of severe                           penetrating/fistulizing CD of ileum and colon. Also                          history of anal cancer s/p radiation and diverting                          colostomy. Has had recurrent c diff. Now improved with                          infliximab. Has residual enterocutaneous fistula that                          has become smaller but is present. Colonoscopy to                          assess for healing.  Providers:             JERONIMO KAISER MD, Srideiv Knight, RN  Referring MD:          AYE PARKER MD  Requesting Provider:   AYE PARKER MD  Medicines:             Midazolam 3 mg IV, Fentanyl 150 micrograms IV  Complications:         No immediate complications.  _______________________________________________________________________________  Procedure:             Pre-Anesthesia Assessment:                         - See EPIC H and P note                         After obtaining informed consent, the colon oscope was                          passed under direct vision. Throughout the procedure,                          the patient's blood pressure, pulse, and oxygen                          saturations were monitored continuously. The                          Colonoscope was introduced through the anus and                          advanced to the terminal ileum, with identification of                          the appendiceal orifice and IC valve. The colonoscopy                          was performed without difficulty. The patient                          tolerated the procedure well. The quality of the bowel                          preparation was poor.                                                                                   Findings:       The digital rectal exam findings include anal stricture.       The Simple Endoscopic Score for Crohn's Disease was determined based  on        the endoscopic appearance of the mucosa in the following segments:       - Ileum: Findings includ e no ulcers present, no ulcerated surfaces, no        affected surfaces and no narrowings. Segment score: 0.       - Right Colon: Findings include no ulcers present, no ulcerated        surfaces, less than 50% of surfaces affected and no narrowings. Segment        score: 1.       - Transverse Colon: Findings include no ulcers present, no ulcerated        surfaces, no affected surfaces and no narrowings. Segment score: 0.        Segment score: 0.       - Rectum: Findings include no ulcers present, no ulcerated surfaces, no        affected surfaces and no narrowings. Segment score: 0.       - Total SES-CD aggregate score: 1. Biopsies were taken with a cold        forceps for histology (cecum/ascending and transverse colon biopsies and        placed in separate jars).       Scarring was found in the cecum/ascending. The scar tissue was healthy        in appearance with only minimal erythema and friability (mostly chronic        changes).                                                                                    Impression:            - Preparation of the colon was poor.                         - The pediatric colonoscopes was introduced via the                          colostomy to the colon and completed to the level of                          the ileum.                         - Anal stricture found on digital rectal exam. This                          was partially dilated with a digit. However, a                          pediatric and colonoscope could not pass the                          anal/distal rectum stricture. This appeared to be a                          chronic stricture related to diversion and prior                          radiation changes. No evidence of malignancy seen and                          no active inflammation seen.                         - Simple Endoscopic Score for  Crohn's Disease: 1,                          mucosal inflammatory changes secondary to Crohn's                          disease, with ileitis and colitis. Biopsied .                         - Scarring in the cecum/ascending colon with minimal                          friablity and erythema. Mainly chronic changes.                         - Dimpling in the distal transverse colon just 2-3 cm                          prior to ostomy/stoma. This was suspicious for fistula                         - There were some inflammatory polyps in the distal                          transverse colon                         - There was a enterocutaneous fistula adjacent to the                          stoma draining some fecal material                         Overall her exam is reassuring. The infliximab is                          doing a good job at controlling active inflammation.                          Will follow to see if there is further improvement in                          enterocutaneous fistula. She does have a significant                          anal/distal rectal stricture and her diverted rectum                          and sigmoid could not  be surveyed. Continue current                          meds. Will have patient follow up with CRS to discuss                          dilation of anal stricture so surveillance can be                          obtained.  Recommendation:        - Discharge patient to home (with escort).                         - Resume previous diet.                         - Continue present medications.                         - Await pathology results.                         - Repeat colonoscopy in 1 year for surveillance based                          on pathology results.                         - Return to GI clinic as previously scheduled.                         - See above discussion                                                                                     Electronically  "Signed by: Dr. Jordan Kaiser  ___________________________  JORDAN KAISER MD  1/2/2024 9:07:15 AM  I was physically present for the entire viewing portion of the exam.  __________________________  Signatur e of teaching physician  JORDAN KAISER MD  Number of Addenda: 0    Note Initiated On: 1/2/2024 7:29 AM  Scope In: 8:11:57 AM  Scope Out: 8:42:30 AM     ]    ALLERGIES:     Allergies   Allergen Reactions     Blood Transfusion Related (Informational Only) Other (See Comments)     Patient has a history of a clinically significant antibody against RBC antigens.  A delay in compatible RBCs may occur.     Penicillins Rash     On 07/10/18, pt states that she experienced a rash when she had this \"a while ago.\" This was not a reaction from childhood.   On 07/10/18, pt states that she experienced a rash when she had this \"a while ago.\" This was not a reaction from childhood.          PERTINENT MEDICATIONS:    Current Outpatient Medications:      acetaminophen (TYLENOL) 500 MG tablet, Take 1,000 mg by mouth every 4 hours as needed, Disp: , Rfl:      inFLIXimab, Inject 4 mg into the vein every 30 days. Last infusion 12/30, Disp: , Rfl:      omeprazole (PRILOSEC) 40 MG DR capsule, Take 1 capsule (40 mg) by mouth daily., Disp: 30 capsule, Rfl: 2     ondansetron (ZOFRAN ODT) 4 MG ODT tab, Take 1 tablet (4 mg) by mouth every 8 hours as needed for nausea., Disp: 30 tablet, Rfl: 0     Ostomy Supplies MISC, 1 each daily, Disp: 1 each, Rfl: 11     trospium (SANCTURA XR) 60 MG CP24 24 hr capsule, Take 1 capsule (60 mg) by mouth every morning (before breakfast). Please keep your 1/27/25 visit as scheduled., Disp: 30 capsule, Rfl: 3    SOCIAL HISTORY:  Social History     Socioeconomic History     Marital status:      Spouse name: Not on file     Number of children: Not on file     Years of education: Not on file     Highest education level: Not on file   Occupational History     Not on file   Tobacco Use     Smoking " status: Never     Smokeless tobacco: Never   Vaping Use     Vaping status: Never Used   Substance and Sexual Activity     Alcohol use: Yes     Alcohol/week: 2.0 standard drinks of alcohol     Types: 2 Glasses of wine per week     Comment: Occasional     Drug use: Never     Sexual activity: Not on file   Other Topics Concern     Not on file   Social History Narrative     Not on file     Social Drivers of Health     Financial Resource Strain: Low Risk  (12/15/2022)    Received from [facility]    Overall Financial Resource Strain (CARDIA)      Difficulty of Paying Living Expenses: Not hard at all   Food Insecurity: No Food Insecurity (12/15/2022)    Received from [facility]    Hunger Vital Sign      Worried About Running Out of Food in the Last Year: Never true      Ran Out of Food in the Last Year: Never true   Transportation Needs: No Transportation Needs (12/15/2022)    Received from [facility]    PRAPARE - Transportation      Lack of Transportation (Medical): No      Lack of Transportation (Non-Medical): No   Physical Activity: Insufficiently Active (12/15/2022)    Received from [facility]    Exercise Vital Sign      Days of Exercise per Week: 2 days      Minutes of Exercise per Session: 60 min   Stress: No Stress Concern Present (12/15/2022)    Received from [facility]    [place] [employer] Occupational [employer] - Occupational Stress Questionnaire      Feeling of Stress : Only a little   Social Connections: Moderately Isolated (12/15/2022)    Received from [facility]    Social Connection and Isolation Panel [NHANES]      Frequency of Communication with Friends and Family: Once a week      Frequency of Social Gatherings with Friends and Family: Three times a week      Attends Buddhism Services: Never      Active Member of Clubs or Organizations: No      Attends Club or Organization Meetings: Never      Marital Status:     Interpersonal Safety: Not At Risk (1/19/2025)    [facility]    Intimate Partner Violence      Are you in a relationship where you are physically hurt, threatened and/or made to feel afraid?: No   Housing Stability: Low Risk  (12/15/2022)    [facility]    Housing Stability Vital Sign      Unable to Pay for Housing in the Last Year: No      Number of Places Lived in the Last Year: 1      Unstable Housing in the Last Year: No       FAMILY HISTORY:  Family History   Problem Relation Age of Onset     Anesthesia Reaction No family hx of      Deep Vein Thrombosis (DVT) No family hx of        Past/family/social history reviewed and no changes    PHYSICAL EXAMINATION:  Constitutional: aaox3, cooperative, pleasant, not dyspneic/diaphoretic, no acute distress  Vitals reviewed: There were no vitals taken for this visit.  Wt:   Wt Readings from Last 2 Encounters:   01/30/25 71.9 kg (158 lb 8 oz)   08/16/24 73.9 kg (163 lb)      Eyes: Sclera anicteric/injected  Ears/nose/mouth/throat: Normal oropharynx without ulcers or exudate, mucus membranes moist, hearing intact  Neck: supple, thyroid normal size  CV: No edema  Respiratory: Unlabored breathing  Lymph: No axillary, submandibular, supraclavicular or inguinal lymphadenopathy  Abd:  Nondistended, +bs, no hepatosplenomegaly, nontender, no peritoneal signs. Well healed wound. Ostomy looks good.  Skin: warm, perfused, no jaundice  Psych: Normal affect  MSK: Normal gait      PERTINENT STUDIES:  Most recent CBC:  Recent Labs   Lab Test 11/27/24  1002 11/15/24  1035   WBC 4.3 8.1   HGB 12.0 12.5   HCT 35.2 38.2    389     Most recent hepatic panel:  Recent Labs   Lab Test 11/27/24  1002 11/15/24  1035   ALT 19 13   AST 22 16     Most recent creatinine:  Recent Labs   Lab Test 08/29/24  1234 07/31/24  1110   CR 0.77 0.80             Again, thank you for allowing me to participate in the care of your patient.         Sincerely,        Jordan Coyle MD    Electronically signed

## 2025-02-28 NOTE — PROGRESS NOTES
Per clinic visit w/ Dr Coyle 2/25/25, plan for PredictrPK Maintenance level to be drawn as trough prior to next infusion.    Called [facility] to confirm information:  Date of last infusion:  Weight:  Last dose in mg:

## 2025-03-10 ENCOUNTER — TELEPHONE (OUTPATIENT)
Dept: GASTROENTEROLOGY | Facility: CLINIC | Age: 65
End: 2025-03-10
Payer: COMMERCIAL

## 2025-03-10 DIAGNOSIS — K50.819 CROHN'S DISEASE OF SMALL AND LARGE INTESTINES WITH COMPLICATION (H): Primary | ICD-10-CM

## 2025-03-10 NOTE — TELEPHONE ENCOUNTER
Per clinic visit w/ Dr Coyle 2/25/25, plan for PredictrPK Maintenance level to be drawn as trough prior to next infusion.     -Contacted [facility] about to confirm data points for PredictrPK. Left message.

## 2025-03-13 NOTE — TELEPHONE ENCOUNTER
-Called [facility] OMEGA Mcdonald with no answer. Left message with callback number.      -Called [facility] to confirm information:  Date of last infusion: 25  Weight: 72.5 kg   Last dose in m mg     Date of next infusion: 3/20  Fax number for PK order: [phone]

## 2025-03-13 NOTE — TELEPHONE ENCOUNTER
-Called [facility] RN: Tamara. No answer    -Called [facility]: Spoke with a worker who took writers information and call back number. Waiting for call back.

## 2025-03-14 NOTE — TELEPHONE ENCOUNTER
-Called [facility] to ensure Fax was received. No answer. Will call back.     -Called [facility] again. Verified that orders were received and that they will fax back results.

## 2025-03-17 DIAGNOSIS — N39.41 URGE INCONTINENCE: ICD-10-CM

## 2025-03-17 DIAGNOSIS — R39.15 URINARY URGENCY: ICD-10-CM

## 2025-03-21 ENCOUNTER — TELEPHONE (OUTPATIENT)
Dept: UROLOGY | Facility: CLINIC | Age: 65
End: 2025-03-21

## 2025-03-21 RX ORDER — TROSPIUM CHLORIDE ER 60 MG/1
60 CAPSULE ORAL
Qty: 30 CAPSULE | OUTPATIENT
Start: 2025-03-21

## 2025-03-21 NOTE — TELEPHONE ENCOUNTER
trospium (SANCTURA XR) 60 MG CP24 24 hr capsule   Last Written Prescription:  10/15/24  #30,3 refills  ----------------------  Last Visit Date: 10/17/23  Future Visit Date: Today, 3/21/25  ----------------------  Refill decision: Medication unable to be refilled by RN due to: Other:  Appt today    Request from pharmacy:  Requested Prescriptions   Pending Prescriptions Disp Refills    trospium (SANCTURA XR) 60 MG CP24 24 hr capsule 30 capsule 3     Sig: Take 1 capsule (60 mg) by mouth every morning (before breakfast). Please keep your 1/27/25 visit as scheduled.       Muscarinic Antagonists (Urinary Incontinence Agents) Failed - 3/21/2025  8:06 AM        Failed - Recent (12 mo) or future (90 days) visit within the authorizing provider's specialty     The patient must have completed an in-person or virtual visit within the past 12 months or has a future visit scheduled within the next 90 days with the authorizing provider s specialty.  Urgent care and e-visits do not qualify as an office visit for this protocol.          Passed - Patient does not have a diagnosis of glaucoma on the problem list     If glaucoma diagnosis is new, refer refill to physician.                       Passed - Medication indicated for associated diagnosis     Medication is associated with one or more of the following diagnoses:  Bladder pain  Disorder of the GI tract  Hyperhidrosis  Neurogenic bladder  Neurogenic detrusor overactivity  Overactive Bladder  Urge incontinence  Urgent desire to urinate  Incontinence  Spasm of urinary bladder          Passed - Patient is 18 years of age or older

## 2025-03-25 ENCOUNTER — DOCUMENTATION ONLY (OUTPATIENT)
Dept: GASTROENTEROLOGY | Facility: CLINIC | Age: 65
End: 2025-03-25
Payer: COMMERCIAL

## 2025-03-25 NOTE — TELEPHONE ENCOUNTER
Retail Pharmacy Prior Authorization Team   Phone: [phone]    PA Initiation    Medication: GEMTESA 75 MG PO TABS  Insurance Company: [employer] - Phone [phone] Fax [phone]  Pharmacy Filling the Rx: [id number] - [place], [place] - [address]  Filling Pharmacy Phone: [phone]  Filling Pharmacy Fax:    Start Date: 3/25/2025

## 2025-03-25 NOTE — TELEPHONE ENCOUNTER
Note: Due to record-high volumes, our turn-around time is taking longer than usual . We are currently 3  business days behind in the pools.   We are working diligently to submit all requests in a timely manner and in the order they are received. Please only flag TRUE URGENT requests as high priority to the pool at this time.   If you have questions on status of PA's,  please send a note/message in the active PA encounter and send back to the [facility] PA pool [[id number]].    If you have questions about the turn-around time or about our process, please reach out to our supervisor Krystyna Delarosa.   Thank you!   RPPA (Retail Pharmacy Prior Authorization) team    Prior Authorization Approval    Authorization Effective Date: 2/23/2025  Authorization Expiration Date: 3/25/2026  Medication: vibegron (GEMTESA) 75 MG TABS tablet-APPROVED  Reference #:     Insurance Company: [employer] Clinical Review - Phone [phone] Fax [phone]  Which Pharmacy is filling the prescription (Not needed for infusion/clinic administered): [id number] - [place], [place] - [address]  Pharmacy Notified: Yes  Patient Notified: Instructed pharmacy to notify patient when script is ready to /ship.

## 2025-03-26 ENCOUNTER — TELEPHONE (OUTPATIENT)
Dept: GASTROENTEROLOGY | Facility: CLINIC | Age: 65
End: 2025-03-26
Payer: COMMERCIAL

## 2025-03-26 NOTE — TELEPHONE ENCOUNTER
-[facility] Called stating they meliza the wrong tube for PredictrPK and will not be able to run the lab.

## 2025-04-13 ENCOUNTER — HEALTH MAINTENANCE LETTER (OUTPATIENT)
Age: 65
End: 2025-04-13

## 2025-05-08 ENCOUNTER — DOCUMENTATION ONLY (OUTPATIENT)
Dept: GASTROENTEROLOGY | Facility: CLINIC | Age: 65
End: 2025-05-08
Payer: MEDICARE

## 2025-05-08 NOTE — CONFIDENTIAL NOTE
Requested labs from [facility] done on 03/20/2025.       Ml RAI MA

## 2025-05-09 ENCOUNTER — RESULTS FOLLOW-UP (OUTPATIENT)
Dept: GASTROENTEROLOGY | Facility: CLINIC | Age: 65
End: 2025-05-09

## 2025-05-14 ENCOUNTER — RESULTS FOLLOW-UP (OUTPATIENT)
Dept: GASTROENTEROLOGY | Facility: CLINIC | Age: 65
End: 2025-05-14

## 2025-05-14 ENCOUNTER — INFUSION THERAPY VISIT (OUTPATIENT)
Dept: INFUSION THERAPY | Facility: CLINIC | Age: 65
End: 2025-05-14
Attending: INTERNAL MEDICINE
Payer: MEDICARE

## 2025-05-14 VITALS
RESPIRATION RATE: 16 BRPM | HEART RATE: 74 BPM | TEMPERATURE: 98.3 F | WEIGHT: 156.1 LBS | SYSTOLIC BLOOD PRESSURE: 110 MMHG | OXYGEN SATURATION: 98 % | BODY MASS INDEX: 25.98 KG/M2 | DIASTOLIC BLOOD PRESSURE: 64 MMHG

## 2025-05-14 DIAGNOSIS — K50.819 CROHN'S DISEASE OF BOTH SMALL AND LARGE INTESTINE WITH COMPLICATION (H): Primary | ICD-10-CM

## 2025-05-14 DIAGNOSIS — K50.80 CROHN'S DISEASE OF BOTH SMALL AND LARGE INTESTINE WITHOUT COMPLICATION (H): ICD-10-CM

## 2025-05-14 LAB
ALBUMIN SERPL BCG-MCNC: 4 G/DL (ref 3.5–5.2)
ALP SERPL-CCNC: 85 U/L (ref 40–150)
ALT SERPL W P-5'-P-CCNC: 11 U/L (ref 0–50)
AST SERPL W P-5'-P-CCNC: 20 U/L (ref 0–45)
BASOPHILS # BLD AUTO: 0 10E3/UL (ref 0–0.2)
BASOPHILS NFR BLD AUTO: 0 %
BILIRUB DIRECT SERPL-MCNC: 0.11 MG/DL (ref 0–0.3)
BILIRUB SERPL-MCNC: 0.3 MG/DL
CRP SERPL-MCNC: 8.6 MG/L
EOSINOPHIL # BLD AUTO: 0.1 10E3/UL (ref 0–0.7)
EOSINOPHIL NFR BLD AUTO: 1 %
ERYTHROCYTE [DISTWIDTH] IN BLOOD BY AUTOMATED COUNT: 13.7 % (ref 10–15)
HCT VFR BLD AUTO: 35.4 % (ref 35–47)
HGB BLD-MCNC: 11.8 G/DL (ref 11.7–15.7)
IMM GRANULOCYTES # BLD: 0 10E3/UL
IMM GRANULOCYTES NFR BLD: 0 %
LYMPHOCYTES # BLD AUTO: 1.5 10E3/UL (ref 0.8–5.3)
LYMPHOCYTES NFR BLD AUTO: 23 %
MCH RBC QN AUTO: 28 PG (ref 26.5–33)
MCHC RBC AUTO-ENTMCNC: 33.3 G/DL (ref 31.5–36.5)
MCV RBC AUTO: 84 FL (ref 78–100)
MONOCYTES # BLD AUTO: 0.8 10E3/UL (ref 0–1.3)
MONOCYTES NFR BLD AUTO: 11 %
NEUTROPHILS # BLD AUTO: 4.3 10E3/UL (ref 1.6–8.3)
NEUTROPHILS NFR BLD AUTO: 64 %
NRBC # BLD AUTO: 0 10E3/UL
NRBC BLD AUTO-RTO: 0 /100
PLATELET # BLD AUTO: 273 10E3/UL (ref 150–450)
PROT SERPL-MCNC: 6.9 G/DL (ref 6.4–8.3)
RBC # BLD AUTO: 4.21 10E6/UL (ref 3.8–5.2)
WBC # BLD AUTO: 6.7 10E3/UL (ref 4–11)

## 2025-05-14 PROCEDURE — 250N000011 HC RX IP 250 OP 636: Mod: JZ | Performed by: INTERNAL MEDICINE

## 2025-05-14 PROCEDURE — 85025 COMPLETE CBC W/AUTO DIFF WBC: CPT | Performed by: INTERNAL MEDICINE

## 2025-05-14 PROCEDURE — 96413 CHEMO IV INFUSION 1 HR: CPT

## 2025-05-14 PROCEDURE — 86481 TB AG RESPONSE T-CELL SUSP: CPT | Performed by: INTERNAL MEDICINE

## 2025-05-14 PROCEDURE — 96415 CHEMO IV INFUSION ADDL HR: CPT

## 2025-05-14 PROCEDURE — 258N000003 HC RX IP 258 OP 636: Performed by: INTERNAL MEDICINE

## 2025-05-14 PROCEDURE — 36415 COLL VENOUS BLD VENIPUNCTURE: CPT | Performed by: INTERNAL MEDICINE

## 2025-05-14 PROCEDURE — 86140 C-REACTIVE PROTEIN: CPT | Performed by: INTERNAL MEDICINE

## 2025-05-14 PROCEDURE — 80076 HEPATIC FUNCTION PANEL: CPT | Performed by: INTERNAL MEDICINE

## 2025-05-14 RX ORDER — ACETAMINOPHEN 325 MG/1
650 TABLET ORAL ONCE
Start: 2025-06-11 | End: 2025-06-11

## 2025-05-14 RX ORDER — MEPERIDINE HYDROCHLORIDE 25 MG/ML
25 INJECTION INTRAMUSCULAR; INTRAVENOUS; SUBCUTANEOUS EVERY 30 MIN PRN
OUTPATIENT
Start: 2025-06-11

## 2025-05-14 RX ORDER — ALBUTEROL SULFATE 0.83 MG/ML
2.5 SOLUTION RESPIRATORY (INHALATION)
OUTPATIENT
Start: 2025-06-11

## 2025-05-14 RX ORDER — EPINEPHRINE 1 MG/ML
0.3 INJECTION, SOLUTION INTRAMUSCULAR; SUBCUTANEOUS EVERY 5 MIN PRN
OUTPATIENT
Start: 2025-06-11

## 2025-05-14 RX ORDER — ALBUTEROL SULFATE 90 UG/1
1-2 INHALANT RESPIRATORY (INHALATION)
Start: 2025-06-11

## 2025-05-14 RX ORDER — DIPHENHYDRAMINE HYDROCHLORIDE 50 MG/ML
50 INJECTION, SOLUTION INTRAMUSCULAR; INTRAVENOUS
Start: 2025-06-11

## 2025-05-14 RX ORDER — DIPHENHYDRAMINE HCL 25 MG
25 CAPSULE ORAL ONCE
Start: 2025-06-11 | End: 2025-06-11

## 2025-05-14 RX ORDER — METHYLPREDNISOLONE SODIUM SUCCINATE 125 MG/2ML
125 INJECTION INTRAMUSCULAR; INTRAVENOUS
Start: 2025-06-11

## 2025-05-14 RX ADMIN — INFLIXIMAB 400 MG: 100 INJECTION, POWDER, LYOPHILIZED, FOR SOLUTION INTRAVENOUS at 07:43

## 2025-05-14 NOTE — PATIENT INSTRUCTIONS
Dear Nicol Aldrich    Thank you for choosing [facility] for your infusion.  The following information is a summary of our appointment as well as important reminders.      EDUCATION POST BIOLOGICAL/CHEMOTHERAPY INFUSION  Call the triage nurse at your clinic or seek medical attention if you have chills and/or temperature greater than or equal to 100.5, uncontrolled nausea/vomiting, diarrhea, constipation, dizziness, shortness of breath, chest pain, heart palpitations, weakness or any other new or concerning symptoms, questions or concerns.  You can not have any live virus vaccines prior to or during treatment or up to 6 months post infusion.  If you have an upcoming surgery, medical procedure or dental procedure during treatment, this should be discussed with your ordering physician and your surgeon/dentist.  If you are having any concerning symptom, if you are unsure if you should get your next infusion or wish to speak to a provider before your next infusion, please call your care coordinator or triage nurse at your clinic to notify them so we can adequately serve you.     If you have any questions on your upcoming Specialty Infusion appointments, please call scheduling at [phone].  It was a pleasure taking care of you today.    Sincerely,    [facility]  [address] [place] [address]  [address]  Phone:  [phone]

## 2025-05-14 NOTE — PROGRESS NOTES
Chief Complaint   Patient presents with    Infusion     IV Remicade     Infusion Nursing Note:  Nicol Aldrich presents today for IV Remicade, every 28 days.    Patient seen by provider today: No   present during visit today: Not Applicable.    Note:   Last dose 3/20/25 at home infusion.  Patient has tolerated infusion well in the past at rapid rate with no premeds.  Tolerated infusion today at 1.5 hours. Will do 1 hour infusion next month.     Intravenous Access:  Labs drawn without difficulty.  Peripheral IV placed.    Treatment Conditions:  Biological Infusion Checklist:  ~~~ NOTE: If the patient answers yes to any of the questions below, hold the infusion and contact ordering provider or on-call provider.    Have you recently had an elevated temperature, fever, chills, productive cough, coughing for 3 weeks or longer or hemoptysis,  abnormal vital signs, night sweats,  chest pain or have you noticed a decrease in your appetite, unexplained weight loss or fatigue? No  Do you have any open wounds or new incisions? No  Do you have any upcoming hospitalizations or surgeries? Does not include esophagogastroduodenoscopy, colonoscopy, endoscopic retrograde cholangiopancreatography (ERCP), endoscopic ultrasound (EUS), dental procedures or joint aspiration/steroid injections No  Do you currently have any signs of illness or infection or are you on any antibiotics? No  Have you had any new, sudden or worsening abdominal pain? No  Have you or anyone in your household received a live vaccination in the past 4 weeks? Please note: No live vaccines while on biologic/chemotherapy until 6 months after the last treatment. Patient can receive the flu vaccine (shot only), pneumovax and the Covid vaccine. It is optimal for the patient to get these vaccines mid cycle, but they can be given at any time as long as it is not on the day of the infusion. No  Have you recently been diagnosed with any new nervous system diseases  (ie. Multiple sclerosis, Guillain [place], seizures, neurological changes) or cancer diagnosis? Are you on any form of radiation or chemotherapy? No  Are you pregnant or breast feeding or do you have plans of pregnancy in the future? No  Have you been having any signs of worsening depression or suicidal ideations?  (benlysta only) No  Have there been any other new onset medical symptoms? No  Have you had any new blood clots? (IVIG only) No    Post Infusion Assessment:  Patient tolerated infusion without incident.  Blood return noted pre and post infusion.  Site patent and intact, free from redness, edema or discomfort.  No evidence of extravasations.  Access discontinued per protocol.     POST-INFUSION OF BIOLOGICAL MEDICATION:  Reviewed with patient.  Given biologic medication or medication hand-out. Inform patient if any fever, chills or signs of infection, new symptoms, abdominal pain, heart palpitations, shortness of breath, reaction, weakness, neurological changes, seek medical attention immediately and should not receive infusions. No live virus vaccines prior to or during treatment or up to 6 months post infusion. If the patient has an upcoming procedure or surgery, this should be discussed with the rheumatologist and surgeon or provider.    Discharge Plan:   AVS to patient via [employer].  Msg sent to scheduling team for June appt.  Patient discharged in stable condition accompanied by: self.  Departure Mode: Ambulatory.    Administrations This Visit       inFLIXimab (REMICADE) 400 mg in sodium chloride 0.9 % 275 mL infusion       Admin Date  05/14/2025 Action  $New Bag Dose  400 mg Rate  137.5 mL/hr Route  Intravenous Documented By  Mike Noe, RN                  Vital signs:  Temp: 98.3  F (36.8  C) Temp src: Oral BP: 120/65 Pulse: 68   Resp: 18 SpO2: 98 % O2 Device: None (Room air)     Weight: 70.8 kg (156 lb 1.6 oz)  Estimated body mass index is 25.98 kg/m  as calculated from the following:    Height as  "of 2/25/25: 1.651 m (5' 5\").    Weight as of this encounter: 70.8 kg (156 lb 1.6 oz).          "

## 2025-05-15 LAB
GAMMA INTERFERON BACKGROUND BLD IA-ACNC: 0.17 IU/ML
M TB IFN-G BLD-IMP: NEGATIVE
M TB IFN-G CD4+ BCKGRND COR BLD-ACNC: 9.83 IU/ML
MITOGEN IGNF BCKGRD COR BLD-ACNC: 0.03 IU/ML
MITOGEN IGNF BCKGRD COR BLD-ACNC: 0.07 IU/ML
QUANTIFERON MITOGEN: 10 IU/ML
QUANTIFERON NIL TUBE: 0.17 IU/ML
QUANTIFERON TB1 TUBE: 0.24 IU/ML
QUANTIFERON TB2 TUBE: 0.2

## 2025-06-03 DIAGNOSIS — R39.15 URINARY URGENCY: ICD-10-CM

## 2025-06-03 DIAGNOSIS — N39.41 URGE INCONTINENCE: Primary | ICD-10-CM

## 2025-06-03 RX ORDER — MIRABEGRON 50 MG/1
50 TABLET, FILM COATED, EXTENDED RELEASE ORAL DAILY
Qty: 90 TABLET | Refills: 3 | Status: SHIPPED | OUTPATIENT
Start: 2025-06-03

## 2025-06-08 RX ORDER — EPINEPHRINE 1 MG/ML
0.3 INJECTION, SOLUTION, CONCENTRATE INTRAVENOUS EVERY 5 MIN PRN
OUTPATIENT
Start: 2025-06-08

## 2025-06-08 RX ORDER — ALBUTEROL SULFATE 90 UG/1
1-2 INHALANT RESPIRATORY (INHALATION)
Status: CANCELLED
Start: 2025-06-08

## 2025-06-08 RX ORDER — DIPHENHYDRAMINE HYDROCHLORIDE 50 MG/ML
50 INJECTION, SOLUTION INTRAMUSCULAR; INTRAVENOUS
Status: CANCELLED
Start: 2025-06-08

## 2025-06-08 RX ORDER — ALBUTEROL SULFATE 0.83 MG/ML
2.5 SOLUTION RESPIRATORY (INHALATION)
Status: CANCELLED | OUTPATIENT
Start: 2025-06-08

## 2025-06-08 RX ORDER — METHYLPREDNISOLONE SODIUM SUCCINATE 40 MG/ML
40 INJECTION INTRAMUSCULAR; INTRAVENOUS
Start: 2025-06-08

## 2025-06-08 RX ORDER — DIPHENHYDRAMINE HYDROCHLORIDE 50 MG/ML
25 INJECTION, SOLUTION INTRAMUSCULAR; INTRAVENOUS
Status: CANCELLED
Start: 2025-06-08

## 2025-06-08 RX ORDER — DIPHENHYDRAMINE HYDROCHLORIDE 50 MG/ML
50 INJECTION, SOLUTION INTRAMUSCULAR; INTRAVENOUS
Start: 2025-06-08

## 2025-06-08 RX ORDER — ALBUTEROL SULFATE 90 UG/1
1-2 INHALANT RESPIRATORY (INHALATION)
Start: 2025-06-08

## 2025-06-08 RX ORDER — DIPHENHYDRAMINE HYDROCHLORIDE 50 MG/ML
25 INJECTION, SOLUTION INTRAMUSCULAR; INTRAVENOUS
Start: 2025-06-08

## 2025-06-08 RX ORDER — MEPERIDINE HYDROCHLORIDE 25 MG/ML
25 INJECTION INTRAMUSCULAR; INTRAVENOUS; SUBCUTANEOUS
OUTPATIENT
Start: 2025-06-08

## 2025-06-08 RX ORDER — MEPERIDINE HYDROCHLORIDE 25 MG/ML
25 INJECTION INTRAMUSCULAR; INTRAVENOUS; SUBCUTANEOUS
Status: CANCELLED | OUTPATIENT
Start: 2025-06-08

## 2025-06-08 RX ORDER — METHYLPREDNISOLONE SODIUM SUCCINATE 40 MG/ML
40 INJECTION INTRAMUSCULAR; INTRAVENOUS
Status: CANCELLED
Start: 2025-06-08

## 2025-06-08 RX ORDER — EPINEPHRINE 1 MG/ML
0.3 INJECTION, SOLUTION, CONCENTRATE INTRAVENOUS EVERY 5 MIN PRN
Status: CANCELLED | OUTPATIENT
Start: 2025-06-08

## 2025-06-08 RX ORDER — ALBUTEROL SULFATE 0.83 MG/ML
2.5 SOLUTION RESPIRATORY (INHALATION)
OUTPATIENT
Start: 2025-06-08

## 2025-06-11 ENCOUNTER — INFUSION THERAPY VISIT (OUTPATIENT)
Dept: INFUSION THERAPY | Facility: CLINIC | Age: 65
End: 2025-06-11
Attending: INTERNAL MEDICINE
Payer: MEDICARE

## 2025-06-11 VITALS
HEART RATE: 77 BPM | TEMPERATURE: 97.5 F | OXYGEN SATURATION: 98 % | RESPIRATION RATE: 18 BRPM | SYSTOLIC BLOOD PRESSURE: 119 MMHG | BODY MASS INDEX: 26.01 KG/M2 | DIASTOLIC BLOOD PRESSURE: 79 MMHG | WEIGHT: 156.3 LBS

## 2025-06-11 DIAGNOSIS — K50.80 CROHN'S DISEASE OF BOTH SMALL AND LARGE INTESTINE WITHOUT COMPLICATION (H): ICD-10-CM

## 2025-06-11 DIAGNOSIS — K50.819 CROHN'S DISEASE OF BOTH SMALL AND LARGE INTESTINE WITH COMPLICATION (H): Primary | ICD-10-CM

## 2025-06-11 PROCEDURE — 258N000003 HC RX IP 258 OP 636: Performed by: INTERNAL MEDICINE

## 2025-06-11 PROCEDURE — 96413 CHEMO IV INFUSION 1 HR: CPT

## 2025-06-11 PROCEDURE — 250N000011 HC RX IP 250 OP 636: Mod: JZ | Performed by: INTERNAL MEDICINE

## 2025-06-11 RX ORDER — ALBUTEROL SULFATE 90 UG/1
1-2 INHALANT RESPIRATORY (INHALATION)
Start: 2025-07-09

## 2025-06-11 RX ORDER — DIPHENHYDRAMINE HYDROCHLORIDE 50 MG/ML
50 INJECTION, SOLUTION INTRAMUSCULAR; INTRAVENOUS
Start: 2025-07-09

## 2025-06-11 RX ORDER — DIPHENHYDRAMINE HYDROCHLORIDE 50 MG/ML
25 INJECTION, SOLUTION INTRAMUSCULAR; INTRAVENOUS
Start: 2025-07-09

## 2025-06-11 RX ORDER — ACETAMINOPHEN 325 MG/1
650 TABLET ORAL ONCE
Start: 2025-07-09 | End: 2025-07-09

## 2025-06-11 RX ORDER — EPINEPHRINE 1 MG/ML
0.3 INJECTION, SOLUTION INTRAMUSCULAR; SUBCUTANEOUS EVERY 5 MIN PRN
OUTPATIENT
Start: 2025-07-09

## 2025-06-11 RX ORDER — ALBUTEROL SULFATE 0.83 MG/ML
2.5 SOLUTION RESPIRATORY (INHALATION)
OUTPATIENT
Start: 2025-07-09

## 2025-06-11 RX ORDER — DIPHENHYDRAMINE HCL 25 MG
25 CAPSULE ORAL ONCE
Start: 2025-07-09 | End: 2025-07-09

## 2025-06-11 RX ORDER — MEPERIDINE HYDROCHLORIDE 25 MG/ML
25 INJECTION INTRAMUSCULAR; INTRAVENOUS; SUBCUTANEOUS
OUTPATIENT
Start: 2025-07-09

## 2025-06-11 RX ORDER — METHYLPREDNISOLONE SODIUM SUCCINATE 40 MG/ML
40 INJECTION INTRAMUSCULAR; INTRAVENOUS
Start: 2025-07-09

## 2025-06-11 RX ADMIN — INFLIXIMAB 400 MG: 100 INJECTION, POWDER, LYOPHILIZED, FOR SOLUTION INTRAVENOUS at 07:55

## 2025-06-11 NOTE — PROGRESS NOTES
Infusion Nursing Note:  Nicol Aldrich presents today for infliximab.    Patient seen by provider today: No   present during visit today: Not Applicable.    Note:   No premedication indicated.  Infusion over 60 minutes.    Intravenous Access:  Peripheral IV placed.    Treatment Conditions:  Biological Infusion Checklist:  ~~~ NOTE: If the patient answers yes to any of the questions below, hold the infusion and contact ordering provider or on-call provider.    Have you recently had an elevated temperature, fever, chills, productive cough, coughing for 3 weeks or longer or hemoptysis,  abnormal vital signs, night sweats,  chest pain or have you noticed a decrease in your appetite, unexplained weight loss or fatigue? No  Do you have any open wounds or new incisions? No  Do you have any upcoming hospitalizations or surgeries? Does not include esophagogastroduodenoscopy, colonoscopy, endoscopic retrograde cholangiopancreatography (ERCP), endoscopic ultrasound (EUS), dental procedures or joint aspiration/steroid injections No  Do you currently have any signs of illness or infection or are you on any antibiotics? No  Have you had any new, sudden or worsening abdominal pain? No  Have you or anyone in your household received a live vaccination in the past 4 weeks? Please note: No live vaccines while on biologic/chemotherapy until 6 months after the last treatment. Patient can receive the flu vaccine (shot only), pneumovax and the Covid vaccine. It is optimal for the patient to get these vaccines mid cycle, but they can be given at any time as long as it is not on the day of the infusion. No  Have you recently been diagnosed with any new nervous system diseases (ie. Multiple sclerosis, Guillain [place], seizures, neurological changes) or cancer diagnosis? Are you on any form of radiation or chemotherapy? No  Are you pregnant or breast feeding or do you have plans of pregnancy in the future? No  Have you been having  any signs of worsening depression or suicidal ideations?  (benlysta only) N/A  Have there been any other new onset medical symptoms? No  Have you had any new blood clots? (IVIG only) N/A    Post Infusion Assessment:  Patient tolerated infusion without incident.  Blood return noted pre and post infusion.  Site patent and intact, free from redness, edema or discomfort.  No evidence of extravasations.  Access discontinued per protocol.  Biologic Infusion Post Education: Call the triage nurse at your clinic or seek medical attention if you have chills and/or temperature greater than or equal to 100.5, uncontrolled nausea/vomiting, diarrhea, constipation, dizziness, shortness of breath, chest pain, heart palpitations, weakness or any other new or concerning symptoms, questions or concerns.  You cannot have any live virus vaccines prior to or during treatment or up to 6 months post infusion.  If you have an upcoming surgery, medical procedure or dental procedure during treatment, this should be discussed with your ordering physician and your surgeon/dentist.  If you are having any concerning symptom, if you are unsure if you should get your next infusion or wish to speak to a provider before your next infusion, please call your care coordinator or triage nurse at your clinic to notify them so we can adequately serve you.     Discharge Plan:   Discharge instructions reviewed with: Patient.  Patient and/or family verbalized understanding of discharge instructions and all questions answered.  AVS to patient via [employer].  Patient will return receive infusions at [place] [facility] going forward.   Patient discharged in stable condition accompanied by: self.  Departure Mode: Ambulatory.    Administrations This Visit       inFLIXimab (REMICADE) 400 mg in sodium chloride 0.9 % 275 mL infusion       Admin Date  06/11/2025 Action  $New Bag Dose  400 mg Rate  137.5 mL/hr Route  Intravenous Documented By  Mara Whatley  RN                  /80   Pulse 77   Temp 97.5  F (36.4  C) (Oral)   Resp 16   Wt 70.9 kg (156 lb 4.8 oz)   SpO2 98%   BMI 26.01 kg/m      Mara Guardado, RN

## 2025-06-11 NOTE — PATIENT INSTRUCTIONS
Dear Nicol Aldrich    Thank you for choosing [facility] ([facility]) for your infusion.  The following information is a summary of our appointment as well as important reminders.        EDUCATION POST BIOLOGICAL INFUSION  Call the triage nurse at your clinic or seek medical attention if you have chills and/or temperature greater than or equal to 100.5, uncontrolled nausea/vomiting, diarrhea, constipation, dizziness, shortness of breath, chest pain, heart palpitations, weakness or any other new or concerning symptoms, questions or concerns.  You can not have any live virus vaccines prior to or during treatment or up to 6 months post infusion.  If you have an upcoming surgery, medical procedure or dental procedure during treatment, this should be discussed with your ordering physician and your surgeon/dentist.  If you are having any concerning symptom, if you are unsure if you should get your next infusion or wish to speak to a provider before your next infusion, please call your care coordinator or triage nurse at your clinic to notify them so we can adequately serve you.       We look forward in seeing you on your next appointment here at [facility] ([facility]).  Please don t hesitate to call us at [phone] to reschedule any of your appointments or to speak with one of the [facility] registered nurses.  It was a pleasure taking care of you today.    Sincerely,    [facility]  [address] [place] [address]  [address]  Phone:  [phone]

## 2025-06-25 ENCOUNTER — TELEPHONE (OUTPATIENT)
Dept: GASTROENTEROLOGY | Facility: CLINIC | Age: 65
End: 2025-06-25
Payer: MEDICARE

## 2025-06-25 NOTE — TELEPHONE ENCOUNTER
"Endoscopy Scheduling Screen    Caller: patient    Have you had any respiratory illness or flu-like symptoms in the last 10 days?  No    Patient is ACTIVE on [employer].  Inform patient that all appointment instructions will be sent via [employer].    Review patient's insurance for any non participating payor.    Ordering/Referring Provider: Jordan Coyle MD   (If ordering provider performs procedure, schedule with ordering provider unless otherwise instructed. )    BMI: Estimated body mass index is 26.01 kg/m  as calculated from the following:    Height as of 2/25/25: 1.651 m (5' 5\").    Weight as of 6/11/25: 70.9 kg (156 lb 4.8 oz).     Sedation Ordered  MAC/deep sedation.   BMI<= 45 45 < BMI <= 48 48 < BMI < = 50  BMI > 50   No Restrictions No [facility]  No [facility]  [facility] [facility] with exceptions Hospital Only OR Only       Do you have a history of malignant hyperthermia?  No    (Females) Are you currently pregnant?   No     Have you been diagnosed or told you have pulmonary hypertension?   No    Do you have an LVAD?  No    Have you been told you have moderate to severe sleep apnea?  No.    Have you been told you have COPD, asthma, or any other lung disease?  No    Has your doctor ordered any cardiac tests like echo, angiogram, stress test, ablation, or EKG, that you have not completed yet?  No    Do you  have a history of any heart conditions?  No     Have you ever had or are you waiting for an organ transplant?  No. Continue scheduling, no site restrictions.    Have you had a stroke or transient ischemic attack (TIA aka \"mini stroke\") in the last 2 years?   No.    Have you been diagnosed with or been told you have cirrhosis of the liver?   No.    Are you currently on dialysis?   No    Do you need assistance transferring?   No    BMI: Estimated body mass index is 26.01 kg/m  as calculated from the following:    Height as of 2/25/25: 1.651 m (5' 5\").    Weight as of 6/11/25: 70.9 kg (156 lb 4.8 oz).     Is patients " BMI > 40 and scheduling location [facility]?  No    Do you take an injectable or oral medication for weight loss or diabetes (excluding insulin)?  No    Do you take the medication Naltrexone?  No    Do you take blood thinners?  No       Prep   Are you currently on dialysis or do you have chronic kidney disease?  No    Do you have a diagnosis of diabetes?  No    Do you have a diagnosis of cystic fibrosis (CF)?  No    On a regular basis do you go 3 -5 days between bowel movements?  No    BMI > 40?  No    Preferred Pharmacy:    [id number] [place] [address]  [address] [address]  Phone: [phone] Fax: [phone]    Final Scheduling Details     Procedure scheduled  Colonoscopy    Surgeon:  Leonides     Date of procedure:  12/22     Pre-OP / PAC:   No - Not required for this site.    Location  [facility] - ASC per provider    Sedation   MAC/Deep Sedation - Per order.      Patient Reminders:   You will receive a call from a Nurse to review instructions and health history.  This assessment must be completed prior to your procedure.  Failure to complete the Nurse assessment may result in the procedure being cancelled.      On the day of your procedure, please designate an adult(s) who can drive you home stay with you for the next 24 hours. The medicines used in the exam will make you sleepy. You will not be able to drive.      You cannot take public transportation, ride share services, or non-medical taxi service without a responsible caregiver.  Medical transport services are allowed with the requirement that a responsible caregiver will receive you at your destination.  We require that drivers and caregivers are confirmed prior to your procedure.

## 2025-06-30 ENCOUNTER — TELEPHONE (OUTPATIENT)
Dept: UROLOGY | Facility: CLINIC | Age: 65
End: 2025-06-30

## 2025-06-30 ENCOUNTER — MYC MEDICAL ADVICE (OUTPATIENT)
Dept: UROLOGY | Facility: CLINIC | Age: 65
End: 2025-06-30

## 2025-06-30 NOTE — TELEPHONE ENCOUNTER
[facility] Call Center    Phone Message    May a detailed message be left on voicemail: yes     Reason for Call: Other: Patient called regarding the need to reschedule 6/30 appointment with Gloria Panaigua. Writer noticed in appt notes that 8/29 and 9/15 were offered in the voicemail, but none were showing up in writer's scheduling options. Please call back patient to reschedule.     Action Taken: Message routed to:  Adult Clinics: Urology p [id number]    Travel Screening: Not Applicable

## 2025-07-07 ENCOUNTER — DOCUMENTATION ONLY (OUTPATIENT)
Dept: GASTROENTEROLOGY | Facility: CLINIC | Age: 65
End: 2025-07-07
Payer: MEDICARE

## 2025-07-07 NOTE — PROGRESS NOTES
Nicol Aldrich has an upcoming lab appointment:    Future Appointments   Date Time Provider Department Center   2025  8:00 AM [facility] CANCER PIV LAB [facility] [place]   2025  8:30 AM MG BAY 3 INFUSION [facility]   2025  8:00 AM MG BAY 8 INFUSION [facility] [place]   2025  8:00 AM Gloria Paniagua PA-C [facility] [place]   9/3/2025 10:30 AM MG BAY 6 INFUSION [facility] [place]   2025 11:20 AM Torsten Perez PA-C [facility]   10/1/2025 10:30 AM MG BAY 9 INFUSION [facility]   2026 10:40 AM Jordan Coyle MD [facility]     Patient is scheduled for the following lab(s): pt is requesting labs on 25 with  orders in chart.     Thank you, Teri

## 2025-07-09 ENCOUNTER — LAB (OUTPATIENT)
Dept: INFUSION THERAPY | Facility: CLINIC | Age: 65
End: 2025-07-09
Attending: INTERNAL MEDICINE
Payer: MEDICARE

## 2025-07-09 VITALS
HEART RATE: 60 BPM | RESPIRATION RATE: 14 BRPM | BODY MASS INDEX: 27.46 KG/M2 | OXYGEN SATURATION: 99 % | WEIGHT: 165 LBS | SYSTOLIC BLOOD PRESSURE: 107 MMHG | TEMPERATURE: 97.8 F | DIASTOLIC BLOOD PRESSURE: 70 MMHG

## 2025-07-09 DIAGNOSIS — K50.80 CROHN'S DISEASE OF BOTH SMALL AND LARGE INTESTINE WITHOUT COMPLICATION (H): Primary | ICD-10-CM

## 2025-07-09 DIAGNOSIS — K50.819 CROHN'S DISEASE OF BOTH SMALL AND LARGE INTESTINE WITH COMPLICATION (H): ICD-10-CM

## 2025-07-09 DIAGNOSIS — K50.819 CROHN'S DISEASE OF BOTH SMALL AND LARGE INTESTINE WITH COMPLICATION (H): Primary | ICD-10-CM

## 2025-07-09 DIAGNOSIS — K50.80 CROHN'S DISEASE OF BOTH SMALL AND LARGE INTESTINE WITHOUT COMPLICATION (H): ICD-10-CM

## 2025-07-09 LAB
ALBUMIN SERPL BCG-MCNC: 4.1 G/DL (ref 3.5–5.2)
ALP SERPL-CCNC: 83 U/L (ref 40–150)
ALT SERPL W P-5'-P-CCNC: 12 U/L (ref 0–50)
AST SERPL W P-5'-P-CCNC: 21 U/L (ref 0–45)
BASOPHILS # BLD AUTO: 0 10E3/UL (ref 0–0.2)
BASOPHILS NFR BLD AUTO: 0 %
BILIRUB SERPL-MCNC: 0.4 MG/DL
BILIRUBIN DIRECT (ROCHE PRO & PURE): 0.16 MG/DL (ref 0–0.45)
CRP SERPL-MCNC: <3 MG/L
EOSINOPHIL # BLD AUTO: 0.1 10E3/UL (ref 0–0.7)
EOSINOPHIL NFR BLD AUTO: 2 %
ERYTHROCYTE [DISTWIDTH] IN BLOOD BY AUTOMATED COUNT: 14.5 % (ref 10–15)
HCT VFR BLD AUTO: 35.5 % (ref 35–47)
HGB BLD-MCNC: 11.7 G/DL (ref 11.7–15.7)
HOLD SPECIMEN: NORMAL
IMM GRANULOCYTES # BLD: 0 10E3/UL
IMM GRANULOCYTES NFR BLD: 0 %
LYMPHOCYTES # BLD AUTO: 1.7 10E3/UL (ref 0.8–5.3)
LYMPHOCYTES NFR BLD AUTO: 32 %
MCH RBC QN AUTO: 28.5 PG (ref 26.5–33)
MCHC RBC AUTO-ENTMCNC: 33 G/DL (ref 31.5–36.5)
MCV RBC AUTO: 86 FL (ref 78–100)
MONOCYTES # BLD AUTO: 0.4 10E3/UL (ref 0–1.3)
MONOCYTES NFR BLD AUTO: 8 %
NEUTROPHILS # BLD AUTO: 3.1 10E3/UL (ref 1.6–8.3)
NEUTROPHILS NFR BLD AUTO: 58 %
NRBC # BLD AUTO: 0 10E3/UL
NRBC BLD AUTO-RTO: 0 /100
PLATELET # BLD AUTO: 308 10E3/UL (ref 150–450)
PROT SERPL-MCNC: 6.8 G/DL (ref 6.4–8.3)
RBC # BLD AUTO: 4.11 10E6/UL (ref 3.8–5.2)
WBC # BLD AUTO: 5.2 10E3/UL (ref 4–11)

## 2025-07-09 PROCEDURE — 80076 HEPATIC FUNCTION PANEL: CPT | Performed by: INTERNAL MEDICINE

## 2025-07-09 PROCEDURE — 85025 COMPLETE CBC W/AUTO DIFF WBC: CPT | Performed by: INTERNAL MEDICINE

## 2025-07-09 PROCEDURE — 96413 CHEMO IV INFUSION 1 HR: CPT

## 2025-07-09 PROCEDURE — 258N000003 HC RX IP 258 OP 636: Performed by: INTERNAL MEDICINE

## 2025-07-09 PROCEDURE — 250N000011 HC RX IP 250 OP 636: Mod: JZ | Performed by: INTERNAL MEDICINE

## 2025-07-09 PROCEDURE — 86140 C-REACTIVE PROTEIN: CPT | Performed by: INTERNAL MEDICINE

## 2025-07-09 PROCEDURE — 36415 COLL VENOUS BLD VENIPUNCTURE: CPT | Performed by: INTERNAL MEDICINE

## 2025-07-09 PROCEDURE — 99207 PR NO CHARGE LOS: CPT

## 2025-07-09 RX ORDER — DIPHENHYDRAMINE HCL 25 MG
25 CAPSULE ORAL ONCE
Start: 2025-08-06 | End: 2025-08-06

## 2025-07-09 RX ORDER — ALBUTEROL SULFATE 0.83 MG/ML
2.5 SOLUTION RESPIRATORY (INHALATION)
OUTPATIENT
Start: 2025-08-06

## 2025-07-09 RX ORDER — EPINEPHRINE 1 MG/ML
0.3 INJECTION, SOLUTION INTRAMUSCULAR; SUBCUTANEOUS EVERY 5 MIN PRN
OUTPATIENT
Start: 2025-08-06

## 2025-07-09 RX ORDER — DIPHENHYDRAMINE HYDROCHLORIDE 50 MG/ML
50 INJECTION, SOLUTION INTRAMUSCULAR; INTRAVENOUS
Start: 2025-08-06

## 2025-07-09 RX ORDER — ALBUTEROL SULFATE 90 UG/1
1-2 INHALANT RESPIRATORY (INHALATION)
Status: CANCELLED
Start: 2025-08-06

## 2025-07-09 RX ORDER — EPINEPHRINE 1 MG/ML
0.3 INJECTION, SOLUTION INTRAMUSCULAR; SUBCUTANEOUS EVERY 5 MIN PRN
Status: CANCELLED | OUTPATIENT
Start: 2025-08-06

## 2025-07-09 RX ORDER — DIPHENHYDRAMINE HYDROCHLORIDE 50 MG/ML
25 INJECTION, SOLUTION INTRAMUSCULAR; INTRAVENOUS
Start: 2025-08-06

## 2025-07-09 RX ORDER — METHYLPREDNISOLONE SODIUM SUCCINATE 40 MG/ML
40 INJECTION INTRAMUSCULAR; INTRAVENOUS
Start: 2025-08-06

## 2025-07-09 RX ORDER — MEPERIDINE HYDROCHLORIDE 25 MG/ML
25 INJECTION INTRAMUSCULAR; INTRAVENOUS; SUBCUTANEOUS
OUTPATIENT
Start: 2025-08-06

## 2025-07-09 RX ORDER — DIPHENHYDRAMINE HYDROCHLORIDE 50 MG/ML
25 INJECTION, SOLUTION INTRAMUSCULAR; INTRAVENOUS
Status: CANCELLED
Start: 2025-08-06

## 2025-07-09 RX ORDER — ACETAMINOPHEN 325 MG/1
650 TABLET ORAL ONCE
Status: CANCELLED
Start: 2025-08-06 | End: 2025-08-06

## 2025-07-09 RX ORDER — MEPERIDINE HYDROCHLORIDE 25 MG/ML
25 INJECTION INTRAMUSCULAR; INTRAVENOUS; SUBCUTANEOUS
Status: CANCELLED | OUTPATIENT
Start: 2025-08-06

## 2025-07-09 RX ORDER — DIPHENHYDRAMINE HYDROCHLORIDE 50 MG/ML
50 INJECTION, SOLUTION INTRAMUSCULAR; INTRAVENOUS
Status: CANCELLED
Start: 2025-08-06

## 2025-07-09 RX ORDER — ACETAMINOPHEN 325 MG/1
650 TABLET ORAL ONCE
Start: 2025-08-06 | End: 2025-08-06

## 2025-07-09 RX ORDER — ALBUTEROL SULFATE 0.83 MG/ML
2.5 SOLUTION RESPIRATORY (INHALATION)
Status: CANCELLED | OUTPATIENT
Start: 2025-08-06

## 2025-07-09 RX ORDER — ALBUTEROL SULFATE 90 UG/1
1-2 INHALANT RESPIRATORY (INHALATION)
Start: 2025-08-06

## 2025-07-09 RX ORDER — DIPHENHYDRAMINE HCL 25 MG
25 CAPSULE ORAL ONCE
Status: CANCELLED
Start: 2025-08-06 | End: 2025-08-06

## 2025-07-09 RX ORDER — METHYLPREDNISOLONE SODIUM SUCCINATE 40 MG/ML
40 INJECTION INTRAMUSCULAR; INTRAVENOUS
Status: CANCELLED
Start: 2025-08-06

## 2025-07-09 RX ADMIN — SODIUM CHLORIDE 250 ML: 0.9 INJECTION, SOLUTION INTRAVENOUS at 09:01

## 2025-07-09 RX ADMIN — INFLIXIMAB 400 MG: 100 INJECTION, POWDER, LYOPHILIZED, FOR SOLUTION INTRAVENOUS at 09:25

## 2025-07-09 NOTE — PROGRESS NOTES
Infusion Nursing Note:  Nicol Adlrich presents today for Rapid Infliximab.    Patient seen by provider today: No   present during visit today: Not Applicable.    Note: Pt arrives with no new complaints, at her baseline today.      Intravenous Access:  Peripheral IV placed.      Treatment Conditions:  Biological Infusion Checklist:  ~~~ NOTE: If the patient answers yes to any of the questions below, hold the infusion and contact ordering provider or on-call provider.    Have you recently had an elevated temperature, fever, chills, productive cough, coughing for 3 weeks or longer or hemoptysis,  abnormal vital signs, night sweats,  chest pain or have you noticed a decrease in your appetite, unexplained weight loss or fatigue? No  Do you have any open wounds or new incisions? No  Do you have any upcoming hospitalizations or surgeries? Does not include esophagogastroduodenoscopy, colonoscopy, endoscopic retrograde cholangiopancreatography (ERCP), endoscopic ultrasound (EUS), dental procedures or joint aspiration/steroid injections No  Do you currently have any signs of illness or infection or are you on any antibiotics? No  Have you had any new, sudden or worsening abdominal pain? No  Have you or anyone in your household received a live vaccination in the past 4 weeks? Please note: No live vaccines while on biologic/chemotherapy until 6 months after the last treatment. Patient can receive the flu vaccine (shot only), pneumovax and the Covid vaccine. It is optimal for the patient to get these vaccines mid cycle, but they can be given at any time as long as it is not on the day of the infusion. No  Have you recently been diagnosed with any new nervous system diseases (ie. Multiple sclerosis, Guillain [place], seizures, neurological changes) or cancer diagnosis? Are you on any form of radiation or chemotherapy? No  Are you pregnant or breast feeding or do you have plans of pregnancy in the future? NA  Have you  been having any signs of worsening depression or suicidal ideations?  (benlysta only) NA  Have there been any other new onset medical symptoms? No  Have you had any new blood clots? (IVIG only) NA  Verified Hep B and TB negative prior to start of therapy.      Post Infusion Assessment:  Patient tolerated infusion without incident.  Site patent and intact, free from redness, edema or discomfort.  No evidence of extravasations.  Access discontinued per protocol.    Biologic Infusion Post Education: Call the triage nurse at your clinic or seek medical attention if you have chills and/or temperature greater than or equal to 100.5, uncontrolled nausea/vomiting, diarrhea, constipation, dizziness, shortness of breath, chest pain, heart palpitations, weakness or any other new or concerning symptoms, questions or concerns.  You cannot have any live virus vaccines prior to or during treatment or up to 6 months post infusion.  If you have an upcoming surgery, medical procedure or dental procedure during treatment, this should be discussed with your ordering physician and your surgeon/dentist.  If you are having any concerning symptom, if you are unsure if you should get your next infusion or wish to speak to a provider before your next infusion, please call your care coordinator or triage nurse at your clinic to notify them so we can adequately serve you.       Discharge Plan:   AVS to patient via [employer].  Patient will return 08/06/2025 for next appointment.   Patient discharged in stable condition accompanied by: self.  Departure Mode: Ambulatory.      Carmelita Horan RN

## 2025-07-10 DIAGNOSIS — N39.41 URGE INCONTINENCE: Primary | ICD-10-CM

## 2025-07-10 DIAGNOSIS — R39.15 URINARY URGENCY: ICD-10-CM

## 2025-07-10 RX ORDER — MIRABEGRON 50 MG/1
50 TABLET, FILM COATED, EXTENDED RELEASE ORAL DAILY
Qty: 90 TABLET | Refills: 3 | Status: SHIPPED | OUTPATIENT
Start: 2025-07-10

## 2025-07-20 ENCOUNTER — MYC MEDICAL ADVICE (OUTPATIENT)
Dept: UROLOGY | Facility: CLINIC | Age: 65
End: 2025-07-20
Payer: MEDICARE

## 2025-07-20 DIAGNOSIS — N39.41 URGE INCONTINENCE: ICD-10-CM

## 2025-07-20 DIAGNOSIS — R35.0 URINARY FREQUENCY: ICD-10-CM

## 2025-07-20 DIAGNOSIS — R39.15 URINARY URGENCY: Primary | ICD-10-CM

## 2025-07-21 NOTE — TELEPHONE ENCOUNTER
Can stop the myrbetriq to see if sx improve. There are no other medications recommended. Have discussed third line options. Keep her appointment in August as planned to further discuss. Can obtain a urine sample as well to ensure not a UTI. Please reach out to patient with recommendations.      Aislinn      Received the above information from Gloria Paniagua PA-C.     Linette Camarena LPN on 7/21/2025 at 9:35 AM

## 2025-07-22 NOTE — CONFIDENTIAL NOTE
Per chart review, UA completed on 7/21/25 however did not meet the criteria for urine culture. Per Gloria Paniagua PA-C, urine culture to be added on.     UC ordered per Gloria Paniagua PA-C. UC in process at this time. Will watch for final UC results.     Nicol Adorno RN, BSN

## 2025-07-24 ENCOUNTER — RESULTS FOLLOW-UP (OUTPATIENT)
Dept: UROLOGY | Facility: CLINIC | Age: 65
End: 2025-07-24
Payer: MEDICARE

## 2025-07-24 NOTE — TELEPHONE ENCOUNTER
Gloria Paniagua PA-C to [facility] Adult [place] (Selected Message)  KB      7/24/25 10:19 AM  Have her stop the myrbetriq then. Strongly recommend following up with PCP for night sweats and joint pain. If sx change or worsen to go to ED     Aislinn    Received the above message from Gloria Paniagua PA-C. My chart message sent to patient.    Nicol Adorno RN, BSN

## 2025-07-24 NOTE — CONFIDENTIAL NOTE
Patient was contacted by Gloria Paniagua PA-C with results. See 7/24/25 results follow up encounter.    Nicol Adorno RN, BSN

## 2025-07-30 DIAGNOSIS — K50.819 CROHN'S DISEASE OF BOTH SMALL AND LARGE INTESTINE WITH COMPLICATION (H): Primary | ICD-10-CM

## 2025-08-06 ENCOUNTER — INFUSION THERAPY VISIT (OUTPATIENT)
Dept: INFUSION THERAPY | Facility: CLINIC | Age: 65
End: 2025-08-06
Attending: INTERNAL MEDICINE
Payer: MEDICARE

## 2025-08-06 VITALS
HEART RATE: 67 BPM | DIASTOLIC BLOOD PRESSURE: 58 MMHG | TEMPERATURE: 97.8 F | SYSTOLIC BLOOD PRESSURE: 107 MMHG | RESPIRATION RATE: 18 BRPM | WEIGHT: 167.7 LBS | OXYGEN SATURATION: 97 % | BODY MASS INDEX: 27.91 KG/M2

## 2025-08-06 DIAGNOSIS — K50.819 CROHN'S DISEASE OF BOTH SMALL AND LARGE INTESTINE WITH COMPLICATION (H): Primary | ICD-10-CM

## 2025-08-06 DIAGNOSIS — K50.80 CROHN'S DISEASE OF BOTH SMALL AND LARGE INTESTINE WITHOUT COMPLICATION (H): ICD-10-CM

## 2025-08-06 PROCEDURE — 99207 PR NO CHARGE LOS: CPT

## 2025-08-06 PROCEDURE — 258N000003 HC RX IP 258 OP 636: Performed by: INTERNAL MEDICINE

## 2025-08-06 PROCEDURE — 96413 CHEMO IV INFUSION 1 HR: CPT

## 2025-08-06 PROCEDURE — 250N000011 HC RX IP 250 OP 636: Mod: JZ | Performed by: INTERNAL MEDICINE

## 2025-08-06 RX ORDER — MEPERIDINE HYDROCHLORIDE 25 MG/ML
25 INJECTION INTRAMUSCULAR; INTRAVENOUS; SUBCUTANEOUS
OUTPATIENT
Start: 2025-09-03

## 2025-08-06 RX ORDER — DIPHENHYDRAMINE HCL 25 MG
25 CAPSULE ORAL ONCE
Start: 2025-09-03 | End: 2025-09-03

## 2025-08-06 RX ORDER — DIPHENHYDRAMINE HYDROCHLORIDE 50 MG/ML
50 INJECTION, SOLUTION INTRAMUSCULAR; INTRAVENOUS
Start: 2025-09-03

## 2025-08-06 RX ORDER — DIPHENHYDRAMINE HYDROCHLORIDE 50 MG/ML
25 INJECTION, SOLUTION INTRAMUSCULAR; INTRAVENOUS
Start: 2025-09-03

## 2025-08-06 RX ORDER — ACETAMINOPHEN 325 MG/1
650 TABLET ORAL ONCE
Start: 2025-09-03 | End: 2025-09-03

## 2025-08-06 RX ORDER — ALBUTEROL SULFATE 0.83 MG/ML
2.5 SOLUTION RESPIRATORY (INHALATION)
OUTPATIENT
Start: 2025-09-03

## 2025-08-06 RX ORDER — EPINEPHRINE 1 MG/ML
0.3 INJECTION, SOLUTION INTRAMUSCULAR; SUBCUTANEOUS EVERY 5 MIN PRN
OUTPATIENT
Start: 2025-09-03

## 2025-08-06 RX ORDER — METHYLPREDNISOLONE SODIUM SUCCINATE 40 MG/ML
40 INJECTION INTRAMUSCULAR; INTRAVENOUS
Start: 2025-09-03

## 2025-08-06 RX ORDER — ALBUTEROL SULFATE 90 UG/1
1-2 INHALANT RESPIRATORY (INHALATION)
Start: 2025-09-03

## 2025-08-06 RX ADMIN — SODIUM CHLORIDE 250 ML: 0.9 INJECTION, SOLUTION INTRAVENOUS at 08:27

## 2025-08-06 RX ADMIN — INFLIXIMAB 400 MG: 100 INJECTION, POWDER, LYOPHILIZED, FOR SOLUTION INTRAVENOUS at 08:32

## 2025-09-03 ENCOUNTER — LAB (OUTPATIENT)
Dept: INFUSION THERAPY | Facility: CLINIC | Age: 65
End: 2025-09-03
Attending: INTERNAL MEDICINE
Payer: MEDICARE

## 2025-09-03 VITALS
TEMPERATURE: 97.6 F | HEART RATE: 61 BPM | WEIGHT: 165 LBS | BODY MASS INDEX: 27.46 KG/M2 | DIASTOLIC BLOOD PRESSURE: 74 MMHG | OXYGEN SATURATION: 97 % | SYSTOLIC BLOOD PRESSURE: 113 MMHG | RESPIRATION RATE: 14 BRPM

## 2025-09-03 DIAGNOSIS — K50.80 CROHN'S DISEASE OF BOTH SMALL AND LARGE INTESTINE WITHOUT COMPLICATION (H): ICD-10-CM

## 2025-09-03 DIAGNOSIS — K50.819 CROHN'S DISEASE OF BOTH SMALL AND LARGE INTESTINE WITH COMPLICATION (H): Primary | ICD-10-CM

## 2025-09-03 DIAGNOSIS — K50.819 CROHN'S DISEASE OF BOTH SMALL AND LARGE INTESTINE WITH COMPLICATION (H): ICD-10-CM

## 2025-09-03 DIAGNOSIS — K50.80 CROHN'S DISEASE OF BOTH SMALL AND LARGE INTESTINE WITHOUT COMPLICATION (H): Primary | ICD-10-CM

## 2025-09-03 LAB
ALBUMIN SERPL BCG-MCNC: 4.3 G/DL (ref 3.5–5.2)
ALP SERPL-CCNC: 90 U/L (ref 40–150)
ALT SERPL W P-5'-P-CCNC: 11 U/L (ref 0–50)
AST SERPL W P-5'-P-CCNC: 20 U/L (ref 0–45)
BASOPHILS # BLD AUTO: 0.05 10E3/UL (ref 0–0.2)
BASOPHILS NFR BLD AUTO: 0.7 %
BILIRUB SERPL-MCNC: 0.3 MG/DL
BILIRUBIN DIRECT (ROCHE PRO & PURE): 0.14 MG/DL (ref 0–0.45)
CRP SERPL-MCNC: 7.88 MG/L
EOSINOPHIL # BLD AUTO: 0.11 10E3/UL (ref 0–0.7)
EOSINOPHIL NFR BLD AUTO: 1.6 %
ERYTHROCYTE [DISTWIDTH] IN BLOOD BY AUTOMATED COUNT: 13.7 % (ref 10–15)
HCT VFR BLD AUTO: 38.6 % (ref 35–47)
HGB BLD-MCNC: 12.7 G/DL (ref 11.7–15.7)
HOLD SPECIMEN: NORMAL
IMM GRANULOCYTES # BLD: 0.03 10E3/UL
IMM GRANULOCYTES NFR BLD: 0.4 %
LYMPHOCYTES # BLD AUTO: 1.88 10E3/UL (ref 0.8–5.3)
LYMPHOCYTES NFR BLD AUTO: 27 %
MCH RBC QN AUTO: 28.3 PG (ref 26.5–33)
MCHC RBC AUTO-ENTMCNC: 32.9 G/DL (ref 31.5–36.5)
MCV RBC AUTO: 86.2 FL (ref 78–100)
MONOCYTES # BLD AUTO: 0.54 10E3/UL (ref 0–1.3)
MONOCYTES NFR BLD AUTO: 7.8 %
NEUTROPHILS # BLD AUTO: 4.35 10E3/UL (ref 1.6–8.3)
NEUTROPHILS NFR BLD AUTO: 62.5 %
NRBC # BLD AUTO: <0.03 10E3/UL
NRBC BLD AUTO-RTO: 0 /100
PLATELET # BLD AUTO: 422 10E3/UL (ref 150–450)
PROT SERPL-MCNC: 7.9 G/DL (ref 6.4–8.3)
RBC # BLD AUTO: 4.48 10E6/UL (ref 3.8–5.2)
WBC # BLD AUTO: 6.96 10E3/UL (ref 4–11)

## 2025-09-03 PROCEDURE — 96413 CHEMO IV INFUSION 1 HR: CPT

## 2025-09-03 PROCEDURE — 250N000011 HC RX IP 250 OP 636: Mod: JZ | Performed by: INTERNAL MEDICINE

## 2025-09-03 PROCEDURE — 36415 COLL VENOUS BLD VENIPUNCTURE: CPT | Performed by: INTERNAL MEDICINE

## 2025-09-03 PROCEDURE — 86140 C-REACTIVE PROTEIN: CPT | Performed by: INTERNAL MEDICINE

## 2025-09-03 PROCEDURE — 85004 AUTOMATED DIFF WBC COUNT: CPT | Performed by: INTERNAL MEDICINE

## 2025-09-03 PROCEDURE — 258N000003 HC RX IP 258 OP 636: Performed by: INTERNAL MEDICINE

## 2025-09-03 PROCEDURE — 99207 PR NO CHARGE LOS: CPT

## 2025-09-03 PROCEDURE — 80076 HEPATIC FUNCTION PANEL: CPT | Performed by: INTERNAL MEDICINE

## 2025-09-03 RX ORDER — MEPERIDINE HYDROCHLORIDE 25 MG/ML
25 INJECTION INTRAMUSCULAR; INTRAVENOUS; SUBCUTANEOUS
Status: CANCELLED | OUTPATIENT
Start: 2025-10-01

## 2025-09-03 RX ORDER — DIPHENHYDRAMINE HYDROCHLORIDE 50 MG/ML
50 INJECTION INTRAMUSCULAR; INTRAVENOUS
Start: 2025-10-01

## 2025-09-03 RX ORDER — DIPHENHYDRAMINE HYDROCHLORIDE 50 MG/ML
25 INJECTION INTRAMUSCULAR; INTRAVENOUS
Start: 2025-10-01

## 2025-09-03 RX ORDER — ALBUTEROL SULFATE 90 UG/1
1-2 INHALANT RESPIRATORY (INHALATION)
Status: CANCELLED
Start: 2025-10-01

## 2025-09-03 RX ORDER — ACETAMINOPHEN 325 MG/1
650 TABLET ORAL ONCE
Status: CANCELLED
Start: 2025-10-01 | End: 2025-10-01

## 2025-09-03 RX ORDER — EPINEPHRINE 1 MG/ML
0.3 INJECTION, SOLUTION INTRAMUSCULAR; SUBCUTANEOUS EVERY 5 MIN PRN
OUTPATIENT
Start: 2025-10-01

## 2025-09-03 RX ORDER — DIPHENHYDRAMINE HCL 25 MG
25 CAPSULE ORAL ONCE
Status: CANCELLED
Start: 2025-10-01 | End: 2025-10-01

## 2025-09-03 RX ORDER — ALBUTEROL SULFATE 0.83 MG/ML
2.5 SOLUTION RESPIRATORY (INHALATION)
Status: CANCELLED | OUTPATIENT
Start: 2025-10-01

## 2025-09-03 RX ORDER — DIPHENHYDRAMINE HYDROCHLORIDE 50 MG/ML
50 INJECTION INTRAMUSCULAR; INTRAVENOUS
Status: CANCELLED
Start: 2025-10-01

## 2025-09-03 RX ORDER — METHYLPREDNISOLONE SODIUM SUCCINATE 40 MG/ML
40 INJECTION INTRAMUSCULAR; INTRAVENOUS
Start: 2025-10-01

## 2025-09-03 RX ORDER — MEPERIDINE HYDROCHLORIDE 25 MG/ML
25 INJECTION INTRAMUSCULAR; INTRAVENOUS; SUBCUTANEOUS
OUTPATIENT
Start: 2025-10-01

## 2025-09-03 RX ORDER — DIPHENHYDRAMINE HYDROCHLORIDE 50 MG/ML
25 INJECTION INTRAMUSCULAR; INTRAVENOUS
Status: CANCELLED
Start: 2025-10-01

## 2025-09-03 RX ORDER — ACETAMINOPHEN 325 MG/1
650 TABLET ORAL ONCE
Start: 2025-10-01 | End: 2025-10-01

## 2025-09-03 RX ORDER — ALBUTEROL SULFATE 90 UG/1
1-2 INHALANT RESPIRATORY (INHALATION)
Start: 2025-10-01

## 2025-09-03 RX ORDER — DIPHENHYDRAMINE HCL 25 MG
25 CAPSULE ORAL ONCE
Start: 2025-10-01 | End: 2025-10-01

## 2025-09-03 RX ORDER — METHYLPREDNISOLONE SODIUM SUCCINATE 40 MG/ML
40 INJECTION INTRAMUSCULAR; INTRAVENOUS
Status: CANCELLED
Start: 2025-10-01

## 2025-09-03 RX ORDER — EPINEPHRINE 1 MG/ML
0.3 INJECTION, SOLUTION INTRAMUSCULAR; SUBCUTANEOUS EVERY 5 MIN PRN
Status: CANCELLED | OUTPATIENT
Start: 2025-10-01

## 2025-09-03 RX ORDER — ALBUTEROL SULFATE 0.83 MG/ML
2.5 SOLUTION RESPIRATORY (INHALATION)
OUTPATIENT
Start: 2025-10-01

## 2025-09-03 RX ADMIN — INFLIXIMAB 400 MG: 100 INJECTION, POWDER, LYOPHILIZED, FOR SOLUTION INTRAVENOUS at 10:59

## 2025-09-03 RX ADMIN — SODIUM CHLORIDE 250 ML: 0.9 INJECTION, SOLUTION INTRAVENOUS at 10:53

## (undated) DEVICE — DRSG GAUZE 4X4" TRAY 6939

## (undated) DEVICE — PAD CHUX UNDERPAD 30X36" P3036C

## (undated) DEVICE — CLIP HORIZON SM RED WIDE SLOT 001201

## (undated) DEVICE — SPECIMEN CONTAINER 3OZ W/FORMALIN 59901

## (undated) DEVICE — SNARE CAPIVATOR ROUND COLD SNR BX10 M00561101

## (undated) DEVICE — ENDO FORCEP BX CAPTURA PRO SPIKE G50696

## (undated) DEVICE — DRAIN JACKSON PRATT RESERVOIR 100ML SU130-1305

## (undated) DEVICE — SUCTION MANIFOLD NEPTUNE 2 SYS 4 PORT 0702-020-000

## (undated) DEVICE — SU PDS II 0 CT-1 27" Z340H

## (undated) DEVICE — SYR BULB IRRIG DOVER 60 ML LATEX FREE 67000

## (undated) DEVICE — SU SILK 0 TIE 6X30" A306H

## (undated) DEVICE — DRAPE SHEET REV FOLD 3/4 9349

## (undated) DEVICE — SU VICRYL 2-0 SH 27" UND J417H

## (undated) DEVICE — ESU GROUND PAD ADULT W/CORD E7507

## (undated) DEVICE — TAPE DURAPORE 3" SILK 1538-3

## (undated) DEVICE — STPL LINEAR CUT 100MM TLC10

## (undated) DEVICE — TUBING SUCTION 10'X3/16" N510

## (undated) DEVICE — DRAIN JACKSON PRATT ROUND SIL 19FR W/TROCAR LF JP-2232

## (undated) DEVICE — SU VICRYL 3-0 SH 8X18" UND J864D

## (undated) DEVICE — KIT ENDO FIRST STEP DISINFECTANT 200ML W/POUCH EP-4

## (undated) DEVICE — ESU ELEC BLADE HEX-LOCKING 2.5" E1450X

## (undated) DEVICE — DRAPE IOBAN INCISE 23X17" 6650EZ

## (undated) DEVICE — GLOVE BIOGEL PI MICRO INDICATOR UNDERGLOVE SZ 7.5 48975

## (undated) DEVICE — SU VICRYL+ 0 54 VIO VCP615H

## (undated) DEVICE — Device

## (undated) DEVICE — DRAPE U SPLIT 74X120" 29440

## (undated) DEVICE — SU MONOCRYL 4-0 PS-2 27" UND Y426H

## (undated) DEVICE — JELLY LUBRICATING SURGILUBE 2OZ TUBE

## (undated) DEVICE — TUBING SUCTION MEDI-VAC 1/4"X20' N620A

## (undated) DEVICE — BASIN SET SINGLE STERILE 13752-624

## (undated) DEVICE — SU SILK 2-0 TIE 12X30" A305H

## (undated) DEVICE — KIT NEW IMAGE COLOSTOMY/ILEOSTOMY 2 3/4" LF 19354

## (undated) DEVICE — LINEN GOWN XLG 5407

## (undated) DEVICE — BLADE KNIFE SURG 15 371115

## (undated) DEVICE — PACK VAG HYST

## (undated) DEVICE — SOL WATER IRRIG 500ML BOTTLE 2F7113

## (undated) DEVICE — SU SILK 4-0 RB-1 CR 8X18" C054D

## (undated) DEVICE — GOWN IMPERVIOUS 2XL BLUE

## (undated) DEVICE — RETR RING LONE STAR 14.1X14.1CM 3307G

## (undated) DEVICE — KIT PATIENT POSITIONING PIGAZZI LATEX FREE 40580

## (undated) DEVICE — KIT ENDO TURNOVER/PROCEDURE CARRY-ON 101822

## (undated) DEVICE — CATH TRAY FOLEY SURESTEP 16FR W/URNE MTR STLK LATEX A303316A

## (undated) DEVICE — PITCHER STERILE 1000ML  SSK9004A

## (undated) DEVICE — VESSEL LOOPS YELLOW MAXI 31145694

## (undated) DEVICE — LIFTER SURGICAL ASCENDO SUBMUCOSAL LIFT AGENT BX00712934

## (undated) DEVICE — CLIP HORIZON MED BLUE 002200

## (undated) DEVICE — ESU PENCIL SMOKE EVAC W/ROCKER SWITCH 0703-047-000

## (undated) DEVICE — SPONGE LAP 18X18" X8435

## (undated) DEVICE — SU PDS II 0 CTX 36" Z370T

## (undated) DEVICE — WIPES FOLEY CARE SURESTEP PROVON DFC100

## (undated) DEVICE — VESSEL LOOPS BLUE SUPERMAXI 011022PBX

## (undated) DEVICE — PACK SET-UP STD 9102

## (undated) DEVICE — GLOVE BIOGEL PI MICRO SZ 7.0 48570

## (undated) DEVICE — PACK AB HYST II

## (undated) DEVICE — LABEL MEDICATION SYSTEM 3303-P

## (undated) DEVICE — LINEN TOWEL PACK X5 5464

## (undated) DEVICE — SUCTION MANIFOLD NEPTUNE 2 SYS 1 PORT 702-025-000

## (undated) DEVICE — BLADE CLIPPER SGL USE 9680

## (undated) DEVICE — SU PROLENE 0 CT-1 30" 8424H

## (undated) DEVICE — SU ETHILON 2-0 FS 18" 664H

## (undated) DEVICE — DRSG KERLIX 2 1/4"X3YDS ROLL 6720

## (undated) DEVICE — RETR ELASTIC STAYS LONE STAR SHARP 5MM 8/PACK 3311-8G

## (undated) DEVICE — ESU LIGASURE IMPACT OPEN SEALER/DVDR CVD LG JAW LF4418

## (undated) DEVICE — SUCTION TIP YANKAUER STR K87

## (undated) DEVICE — ADH LIQUID MASTISOL TOPICAL VIAL 2-3ML 0523-48

## (undated) DEVICE — STRAP KNEE/BODY 31143004

## (undated) DEVICE — SU VICRYL 3-0 SH 27" J316H

## (undated) DEVICE — PREP CHLORAPREP 26ML TINTED HI-LITE ORANGE 930815

## (undated) DEVICE — PANTIES MESH LG/XLG 2PK 706M2

## (undated) DEVICE — ESU ELEC BLADE 2.75" COATED/INSULATED E1455

## (undated) DEVICE — DRSG XEROFORM 5X9" CUR253590W

## (undated) DEVICE — ESU ELEC BLADE 6" COATED E1450-6

## (undated) DEVICE — SU VICRYL 2-0 CT-2 CR 8X18" J726D

## (undated) DEVICE — SOL WATER IRRIG 1000ML BOTTLE 2F7114

## (undated) DEVICE — DRAPE POUCH INSTRUMENT 1018

## (undated) DEVICE — NDL COUNTER 20CT 31142493

## (undated) DEVICE — PACK RECTAL KIT CUSTOM ASC

## (undated) DEVICE — SU SILK 3-0 TIE 12X30" A304H

## (undated) DEVICE — STPL RELOAD LINEAR CUT 100X3.8MM TCR10

## (undated) DEVICE — DRSG TEGADERM 4X4 3/4" 1626W

## (undated) DEVICE — GLOVE GAMMEX NEOPRENE ULTRA SZ 7 LF 8514

## (undated) DEVICE — PREP POVIDONE-IODINE 10% SOLUTION 4OZ BOTTLE MDS093944

## (undated) DEVICE — DRSG ABDOMINAL 07 1/2X8" 7197D

## (undated) DEVICE — LINEN TOWEL PACK X30 5481

## (undated) DEVICE — PREP POVIDONE-IODINE 7.5% SCRUB 4OZ BOTTLE MDS093945

## (undated) DEVICE — LINEN TOWEL PACK X6 WHITE 5487

## (undated) RX ORDER — FENTANYL CITRATE-0.9 % NACL/PF 10 MCG/ML
PLASTIC BAG, INJECTION (ML) INTRAVENOUS
Status: DISPENSED
Start: 2024-07-10

## (undated) RX ORDER — ACETAMINOPHEN 325 MG/1
TABLET ORAL
Status: DISPENSED
Start: 2024-02-06

## (undated) RX ORDER — ACETAMINOPHEN 325 MG/1
TABLET ORAL
Status: DISPENSED
Start: 2024-07-10

## (undated) RX ORDER — ONDANSETRON 2 MG/ML
INJECTION INTRAMUSCULAR; INTRAVENOUS
Status: DISPENSED
Start: 2024-01-02

## (undated) RX ORDER — SCOLOPAMINE TRANSDERMAL SYSTEM 1 MG/1
PATCH, EXTENDED RELEASE TRANSDERMAL
Status: DISPENSED
Start: 2024-07-10

## (undated) RX ORDER — OXYCODONE HYDROCHLORIDE 5 MG/1
TABLET ORAL
Status: DISPENSED
Start: 2024-02-06

## (undated) RX ORDER — FENTANYL CITRATE 50 UG/ML
INJECTION, SOLUTION INTRAMUSCULAR; INTRAVENOUS
Status: DISPENSED
Start: 2024-07-10

## (undated) RX ORDER — HYDROMORPHONE HYDROCHLORIDE 1 MG/ML
INJECTION, SOLUTION INTRAMUSCULAR; INTRAVENOUS; SUBCUTANEOUS
Status: DISPENSED
Start: 2024-07-10

## (undated) RX ORDER — CEFAZOLIN SODIUM/WATER 2 G/20 ML
SYRINGE (ML) INTRAVENOUS
Status: DISPENSED
Start: 2024-07-10

## (undated) RX ORDER — CEFAZOLIN SODIUM 1 G/3ML
INJECTION, POWDER, FOR SOLUTION INTRAMUSCULAR; INTRAVENOUS
Status: DISPENSED
Start: 2024-07-10

## (undated) RX ORDER — PROPOFOL 10 MG/ML
INJECTION, EMULSION INTRAVENOUS
Status: DISPENSED
Start: 2024-07-10

## (undated) RX ORDER — DEXAMETHASONE SODIUM PHOSPHATE 4 MG/ML
INJECTION, SOLUTION INTRA-ARTICULAR; INTRALESIONAL; INTRAMUSCULAR; INTRAVENOUS; SOFT TISSUE
Status: DISPENSED
Start: 2024-07-10

## (undated) RX ORDER — ENOXAPARIN SODIUM 100 MG/ML
INJECTION SUBCUTANEOUS
Status: DISPENSED
Start: 2024-07-10

## (undated) RX ORDER — FENTANYL CITRATE 50 UG/ML
INJECTION, SOLUTION INTRAMUSCULAR; INTRAVENOUS
Status: DISPENSED
Start: 2024-01-02

## (undated) RX ORDER — DIPHENHYDRAMINE HYDROCHLORIDE 50 MG/ML
INJECTION INTRAMUSCULAR; INTRAVENOUS
Status: DISPENSED
Start: 2024-01-02

## (undated) RX ORDER — GABAPENTIN 300 MG/1
CAPSULE ORAL
Status: DISPENSED
Start: 2024-07-10

## (undated) RX ORDER — FENTANYL CITRATE 50 UG/ML
INJECTION, SOLUTION INTRAMUSCULAR; INTRAVENOUS
Status: DISPENSED
Start: 2024-02-06

## (undated) RX ORDER — ONDANSETRON 2 MG/ML
INJECTION INTRAMUSCULAR; INTRAVENOUS
Status: DISPENSED
Start: 2024-07-10